# Patient Record
Sex: FEMALE | Race: WHITE | HISPANIC OR LATINO | Employment: PART TIME | URBAN - METROPOLITAN AREA
[De-identification: names, ages, dates, MRNs, and addresses within clinical notes are randomized per-mention and may not be internally consistent; named-entity substitution may affect disease eponyms.]

---

## 2024-03-14 ENCOUNTER — APPOINTMENT (OUTPATIENT)
Dept: RADIOLOGY | Facility: HOSPITAL | Age: 44
End: 2024-03-14
Payer: COMMERCIAL

## 2024-03-14 ENCOUNTER — HOSPITAL ENCOUNTER (EMERGENCY)
Facility: HOSPITAL | Age: 44
Discharge: LEFT BEFORE DISCHARGE | End: 2024-03-14
Attending: STUDENT IN AN ORGANIZED HEALTH CARE EDUCATION/TRAINING PROGRAM
Payer: COMMERCIAL

## 2024-03-14 ENCOUNTER — APPOINTMENT (OUTPATIENT)
Dept: CARDIOLOGY | Facility: HOSPITAL | Age: 44
End: 2024-03-14
Payer: COMMERCIAL

## 2024-03-14 VITALS
WEIGHT: 140 LBS | RESPIRATION RATE: 18 BRPM | DIASTOLIC BLOOD PRESSURE: 91 MMHG | SYSTOLIC BLOOD PRESSURE: 150 MMHG | TEMPERATURE: 97.7 F | BODY MASS INDEX: 23.9 KG/M2 | OXYGEN SATURATION: 98 % | HEIGHT: 64 IN | HEART RATE: 89 BPM

## 2024-03-14 DIAGNOSIS — E87.6 HYPOKALEMIA: ICD-10-CM

## 2024-03-14 DIAGNOSIS — N30.00 ACUTE CYSTITIS WITHOUT HEMATURIA: ICD-10-CM

## 2024-03-14 DIAGNOSIS — N89.8 VAGINAL DISCHARGE: ICD-10-CM

## 2024-03-14 DIAGNOSIS — R79.89 ELEVATED TROPONIN: ICD-10-CM

## 2024-03-14 DIAGNOSIS — R10.13 ABDOMINAL PAIN, EPIGASTRIC: Primary | ICD-10-CM

## 2024-03-14 LAB
ALBUMIN SERPL BCP-MCNC: 4.1 G/DL (ref 3.4–5)
ALP SERPL-CCNC: 69 U/L (ref 33–110)
ALT SERPL W P-5'-P-CCNC: 9 U/L (ref 7–45)
AMPHETAMINES UR QL SCN: ABNORMAL
ANION GAP SERPL CALC-SCNC: 13 MMOL/L (ref 10–20)
APPEARANCE UR: CLEAR
AST SERPL W P-5'-P-CCNC: 13 U/L (ref 9–39)
BACTERIA #/AREA URNS AUTO: ABNORMAL /HPF
BARBITURATES UR QL SCN: ABNORMAL
BASOPHILS # BLD AUTO: 0.05 X10*3/UL (ref 0–0.1)
BASOPHILS NFR BLD AUTO: 0.6 %
BENZODIAZ UR QL SCN: ABNORMAL
BILIRUB SERPL-MCNC: 0.5 MG/DL (ref 0–1.2)
BILIRUB UR STRIP.AUTO-MCNC: NEGATIVE MG/DL
BUN SERPL-MCNC: 7 MG/DL (ref 6–23)
BZE UR QL SCN: ABNORMAL
CALCIUM SERPL-MCNC: 9 MG/DL (ref 8.6–10.3)
CANNABINOIDS UR QL SCN: ABNORMAL
CARDIAC TROPONIN I PNL SERPL HS: 65 NG/L (ref 0–13)
CARDIAC TROPONIN I PNL SERPL HS: 68 NG/L (ref 0–13)
CHLORIDE SERPL-SCNC: 106 MMOL/L (ref 98–107)
CO2 SERPL-SCNC: 23 MMOL/L (ref 21–32)
COLOR UR: YELLOW
CREAT SERPL-MCNC: 0.58 MG/DL (ref 0.5–1.05)
EGFRCR SERPLBLD CKD-EPI 2021: >90 ML/MIN/1.73M*2
EOSINOPHIL # BLD AUTO: 0.29 X10*3/UL (ref 0–0.7)
EOSINOPHIL NFR BLD AUTO: 3.3 %
ERYTHROCYTE [DISTWIDTH] IN BLOOD BY AUTOMATED COUNT: 17.6 % (ref 11.5–14.5)
FENTANYL+NORFENTANYL UR QL SCN: ABNORMAL
GLUCOSE SERPL-MCNC: 87 MG/DL (ref 74–99)
GLUCOSE UR STRIP.AUTO-MCNC: NEGATIVE MG/DL
HCG UR QL IA.RAPID: NEGATIVE
HCT VFR BLD AUTO: 37.4 % (ref 36–46)
HGB BLD-MCNC: 12.6 G/DL (ref 12–16)
IMM GRANULOCYTES # BLD AUTO: 0.02 X10*3/UL (ref 0–0.7)
IMM GRANULOCYTES NFR BLD AUTO: 0.2 % (ref 0–0.9)
INR PPP: 1.1 (ref 0.9–1.1)
KETONES UR STRIP.AUTO-MCNC: ABNORMAL MG/DL
LEUKOCYTE ESTERASE UR QL STRIP.AUTO: ABNORMAL
LIPASE SERPL-CCNC: 21 U/L (ref 9–82)
LYMPHOCYTES # BLD AUTO: 2.15 X10*3/UL (ref 1.2–4.8)
LYMPHOCYTES NFR BLD AUTO: 24.3 %
MAGNESIUM SERPL-MCNC: 2.05 MG/DL (ref 1.6–2.4)
MCH RBC QN AUTO: 25 PG (ref 26–34)
MCHC RBC AUTO-ENTMCNC: 33.7 G/DL (ref 32–36)
MCV RBC AUTO: 74 FL (ref 80–100)
METHADONE UR QL SCN: ABNORMAL
MONOCYTES # BLD AUTO: 0.59 X10*3/UL (ref 0.1–1)
MONOCYTES NFR BLD AUTO: 6.7 %
NEUTROPHILS # BLD AUTO: 5.74 X10*3/UL (ref 1.2–7.7)
NEUTROPHILS NFR BLD AUTO: 64.9 %
NITRITE UR QL STRIP.AUTO: NEGATIVE
NRBC BLD-RTO: 0 /100 WBCS (ref 0–0)
OPIATES UR QL SCN: ABNORMAL
OXYCODONE+OXYMORPHONE UR QL SCN: ABNORMAL
PCP UR QL SCN: ABNORMAL
PH UR STRIP.AUTO: 8 [PH]
PLATELET # BLD AUTO: 338 X10*3/UL (ref 150–450)
POTASSIUM SERPL-SCNC: 3.3 MMOL/L (ref 3.5–5.3)
PROT SERPL-MCNC: 7.7 G/DL (ref 6.4–8.2)
PROT UR STRIP.AUTO-MCNC: NEGATIVE MG/DL
PROTHROMBIN TIME: 12.5 SECONDS (ref 9.8–12.8)
RBC # BLD AUTO: 5.05 X10*6/UL (ref 4–5.2)
RBC # UR STRIP.AUTO: NEGATIVE /UL
RBC #/AREA URNS AUTO: ABNORMAL /HPF
SODIUM SERPL-SCNC: 139 MMOL/L (ref 136–145)
SP GR UR STRIP.AUTO: 1.01
SQUAMOUS #/AREA URNS AUTO: ABNORMAL /HPF
UROBILINOGEN UR STRIP.AUTO-MCNC: <2 MG/DL
WBC # BLD AUTO: 8.8 X10*3/UL (ref 4.4–11.3)
WBC #/AREA URNS AUTO: ABNORMAL /HPF

## 2024-03-14 PROCEDURE — 87086 URINE CULTURE/COLONY COUNT: CPT | Mod: ELYLAB | Performed by: PHYSICIAN ASSISTANT

## 2024-03-14 PROCEDURE — 83690 ASSAY OF LIPASE: CPT | Performed by: PHYSICIAN ASSISTANT

## 2024-03-14 PROCEDURE — 81025 URINE PREGNANCY TEST: CPT | Performed by: PHYSICIAN ASSISTANT

## 2024-03-14 PROCEDURE — 74177 CT ABD & PELVIS W/CONTRAST: CPT | Mod: FOREIGN READ | Performed by: RADIOLOGY

## 2024-03-14 PROCEDURE — 80307 DRUG TEST PRSMV CHEM ANLYZR: CPT | Performed by: PHYSICIAN ASSISTANT

## 2024-03-14 PROCEDURE — 36415 COLL VENOUS BLD VENIPUNCTURE: CPT | Performed by: PHYSICIAN ASSISTANT

## 2024-03-14 PROCEDURE — 96361 HYDRATE IV INFUSION ADD-ON: CPT

## 2024-03-14 PROCEDURE — 93005 ELECTROCARDIOGRAM TRACING: CPT

## 2024-03-14 PROCEDURE — 96374 THER/PROPH/DIAG INJ IV PUSH: CPT | Mod: 59

## 2024-03-14 PROCEDURE — 85610 PROTHROMBIN TIME: CPT | Performed by: PHYSICIAN ASSISTANT

## 2024-03-14 PROCEDURE — 2550000001 HC RX 255 CONTRASTS: Performed by: PHYSICIAN ASSISTANT

## 2024-03-14 PROCEDURE — 81001 URINALYSIS AUTO W/SCOPE: CPT | Performed by: PHYSICIAN ASSISTANT

## 2024-03-14 PROCEDURE — 74177 CT ABD & PELVIS W/CONTRAST: CPT

## 2024-03-14 PROCEDURE — 96375 TX/PRO/DX INJ NEW DRUG ADDON: CPT

## 2024-03-14 PROCEDURE — 80053 COMPREHEN METABOLIC PANEL: CPT | Performed by: PHYSICIAN ASSISTANT

## 2024-03-14 PROCEDURE — 87205 SMEAR GRAM STAIN: CPT | Mod: ELYLAB | Performed by: PHYSICIAN ASSISTANT

## 2024-03-14 PROCEDURE — 2500000002 HC RX 250 W HCPCS SELF ADMINISTERED DRUGS (ALT 637 FOR MEDICARE OP, ALT 636 FOR OP/ED): Performed by: PHYSICIAN ASSISTANT

## 2024-03-14 PROCEDURE — 99285 EMERGENCY DEPT VISIT HI MDM: CPT | Mod: 25

## 2024-03-14 PROCEDURE — 2500000004 HC RX 250 GENERAL PHARMACY W/ HCPCS (ALT 636 FOR OP/ED): Performed by: PHYSICIAN ASSISTANT

## 2024-03-14 PROCEDURE — 2500000001 HC RX 250 WO HCPCS SELF ADMINISTERED DRUGS (ALT 637 FOR MEDICARE OP): Performed by: PHYSICIAN ASSISTANT

## 2024-03-14 PROCEDURE — C9113 INJ PANTOPRAZOLE SODIUM, VIA: HCPCS | Performed by: PHYSICIAN ASSISTANT

## 2024-03-14 PROCEDURE — 85025 COMPLETE CBC W/AUTO DIFF WBC: CPT | Performed by: PHYSICIAN ASSISTANT

## 2024-03-14 PROCEDURE — 84484 ASSAY OF TROPONIN QUANT: CPT | Performed by: PHYSICIAN ASSISTANT

## 2024-03-14 PROCEDURE — 87800 DETECT AGNT MULT DNA DIREC: CPT | Mod: ELYLAB | Performed by: PHYSICIAN ASSISTANT

## 2024-03-14 PROCEDURE — 83735 ASSAY OF MAGNESIUM: CPT | Performed by: PHYSICIAN ASSISTANT

## 2024-03-14 RX ORDER — ONDANSETRON HYDROCHLORIDE 2 MG/ML
4 INJECTION, SOLUTION INTRAVENOUS ONCE
Status: COMPLETED | OUTPATIENT
Start: 2024-03-14 | End: 2024-03-14

## 2024-03-14 RX ORDER — PANTOPRAZOLE SODIUM 40 MG/10ML
40 INJECTION, POWDER, LYOPHILIZED, FOR SOLUTION INTRAVENOUS ONCE
Status: COMPLETED | OUTPATIENT
Start: 2024-03-14 | End: 2024-03-14

## 2024-03-14 RX ORDER — POTASSIUM CHLORIDE 20 MEQ/1
40 TABLET, EXTENDED RELEASE ORAL ONCE
Status: COMPLETED | OUTPATIENT
Start: 2024-03-14 | End: 2024-03-14

## 2024-03-14 RX ORDER — SULFAMETHOXAZOLE AND TRIMETHOPRIM 800; 160 MG/1; MG/1
1 TABLET ORAL 2 TIMES DAILY
Qty: 10 TABLET | Refills: 0 | Status: SHIPPED | OUTPATIENT
Start: 2024-03-14 | End: 2024-03-19

## 2024-03-14 RX ORDER — NITROFURANTOIN 25; 75 MG/1; MG/1
100 CAPSULE ORAL 2 TIMES DAILY
Qty: 10 CAPSULE | Refills: 0 | Status: SHIPPED | OUTPATIENT
Start: 2024-03-14 | End: 2024-03-19

## 2024-03-14 RX ORDER — ALUMINUM HYDROXIDE, MAGNESIUM HYDROXIDE, AND SIMETHICONE 1200; 120; 1200 MG/30ML; MG/30ML; MG/30ML
30 SUSPENSION ORAL ONCE
Status: COMPLETED | OUTPATIENT
Start: 2024-03-14 | End: 2024-03-14

## 2024-03-14 RX ADMIN — ALUMINUM HYDROXIDE, MAGNESIUM HYDROXIDE, AND DIMETHICONE 30 ML: 200; 20; 200 SUSPENSION ORAL at 18:50

## 2024-03-14 RX ADMIN — IOHEXOL 75 ML: 350 INJECTION, SOLUTION INTRAVENOUS at 19:17

## 2024-03-14 RX ADMIN — POTASSIUM CHLORIDE 40 MEQ: 1500 TABLET, EXTENDED RELEASE ORAL at 19:59

## 2024-03-14 RX ADMIN — ONDANSETRON 4 MG: 2 INJECTION INTRAMUSCULAR; INTRAVENOUS at 18:50

## 2024-03-14 RX ADMIN — SODIUM CHLORIDE 1000 ML: 9 INJECTION, SOLUTION INTRAVENOUS at 18:50

## 2024-03-14 RX ADMIN — PANTOPRAZOLE SODIUM 40 MG: 40 INJECTION, POWDER, FOR SOLUTION INTRAVENOUS at 18:50

## 2024-03-14 ASSESSMENT — COLUMBIA-SUICIDE SEVERITY RATING SCALE - C-SSRS
6. HAVE YOU EVER DONE ANYTHING, STARTED TO DO ANYTHING, OR PREPARED TO DO ANYTHING TO END YOUR LIFE?: NO
1. IN THE PAST MONTH, HAVE YOU WISHED YOU WERE DEAD OR WISHED YOU COULD GO TO SLEEP AND NOT WAKE UP?: NO
2. HAVE YOU ACTUALLY HAD ANY THOUGHTS OF KILLING YOURSELF?: NO

## 2024-03-14 ASSESSMENT — PAIN SCALES - GENERAL: PAINLEVEL_OUTOF10: 8

## 2024-03-14 ASSESSMENT — PAIN - FUNCTIONAL ASSESSMENT: PAIN_FUNCTIONAL_ASSESSMENT: 0-10

## 2024-03-14 ASSESSMENT — PAIN DESCRIPTION - LOCATION: LOCATION: ABDOMEN

## 2024-03-14 NOTE — ED PROVIDER NOTES
HPI   Chief Complaint   Patient presents with    Abdominal Pain     X2 days, upper abd pain, vomiting, vaginal discharge          History provided by:  Patient   used: No         43-year-old female past medical history acid reflux presents with epigastric pain for the past 3 days.  She is been on omeprazole as she saw GI a year ago for the same type of pain and has not had this pain in a year.  She has been vomiting.  Denies radiation of pain.  She also complains of vaginal discharge that clear and has an odor.  She also complains of dysuria.  Denies fever, diarrhea, back pain, SOB or CP    ROS negative unless otherwise stated in HPI                   [place] Coma Scale Score: 15                     Patient History   History reviewed. No pertinent past medical history.  History reviewed. No pertinent surgical history.  No family history on file.  Social History     Tobacco Use    Smoking status: Not on file    Smokeless tobacco: Not on file   Substance Use Topics    Alcohol use: Not on file    Drug use: Not on file       Physical Exam   ED Triage Vitals [03/14/24 1811]   Temperature Heart Rate Respirations BP   36.5 °C (97.7 °F) 89 18 (!) 150/91      Pulse Ox Temp src Heart Rate Source Patient Position   98 % -- -- --      BP Location FiO2 (%)     -- --       Physical Exam    General: alert, no distress, well nourished, talking in full and complete sentences  Skin: dry, intact, no rashes  Head: atraumatic  Eyes: EOMI, PERRLA, normal conjunctiva  Throat: no stridor, no hot potato voice  Nose: nares patent  Mouth: mucous membranes moist  CV: +S1/S1  Respiratory: non labored breathing, lungs CTA, no wheezing, no retractions  Abd: soft, epigastric tenderness, normal BS, no peritoneal signs  Extremities: FROM X4, strength +5/5, pulses intact, capillary refill intact, radial pulses equal  Neuro: A&Ox3, no focal neurological deficits  Psych: cooperative, appropriate mood      ED Course & MDM   Diagnoses  as of 03/14/24 2054   Abdominal pain, epigastric   Vaginal discharge   Hypokalemia   Elevated troponin   Acute cystitis without hematuria     Labs Reviewed   CBC WITH AUTO DIFFERENTIAL - Abnormal       Result Value    WBC 8.8      nRBC 0.0      RBC 5.05      Hemoglobin 12.6      Hematocrit 37.4      MCV 74 (*)     MCH 25.0 (*)     MCHC 33.7      RDW 17.6 (*)     Platelets 338      Neutrophils % 64.9      Immature Granulocytes %, Automated 0.2      Lymphocytes % 24.3      Monocytes % 6.7      Eosinophils % 3.3      Basophils % 0.6      Neutrophils Absolute 5.74      Immature Granulocytes Absolute, Automated 0.02      Lymphocytes Absolute 2.15      Monocytes Absolute 0.59      Eosinophils Absolute 0.29      Basophils Absolute 0.05     COMPREHENSIVE METABOLIC PANEL - Abnormal    Glucose 87      Sodium 139      Potassium 3.3 (*)     Chloride 106      Bicarbonate 23      Anion Gap 13      Urea Nitrogen 7      Creatinine 0.58      eGFR >90      Calcium 9.0      Albumin 4.1      Alkaline Phosphatase 69      Total Protein 7.7      AST 13      Bilirubin, Total 0.5      ALT 9     TROPONIN I, HIGH SENSITIVITY - Abnormal    Troponin I, High Sensitivity 68 (*)     Narrative:     Less than 99th percentile of normal range cutoff-  Female and children under 18 years old <14 ng/L; Male <21 ng/L: Negative  Repeat testing should be performed if clinically indicated.     Female and children under 18 years old 14-50 ng/L; Male 21-50 ng/L:  Consistent with possible cardiac damage and possible increased clinical   risk. Serial measurements may help to assess extent of myocardial damage.     >50 ng/L: Consistent with cardiac damage, increased clinical risk and  myocardial infarction. Serial measurements may help assess extent of   myocardial damage.      NOTE: Children less than 1 year old may have higher baseline troponin   levels and results should be interpreted in conjunction with the overall   clinical context.     NOTE: Troponin I  testing is performed using a different   testing methodology at [facility] than at other   [facility]. Direct result comparisons should only   be made within the same method.   URINALYSIS WITH REFLEX CULTURE AND MICROSCOPIC - Abnormal    Color, Urine Yellow      Appearance, Urine Clear      Specific Gravity, Urine 1.010      pH, Urine 8.0      Protein, Urine NEGATIVE      Glucose, Urine NEGATIVE      Blood, Urine NEGATIVE      Ketones, Urine 20 (1+) (*)     Bilirubin, Urine NEGATIVE      Urobilinogen, Urine <2.0      Nitrite, Urine NEGATIVE      Leukocyte Esterase, Urine TRACE (*)    DRUG SCREEN,URINE - Abnormal    Amphetamine Screen, Urine Presumptive Negative      Barbiturate Screen, Urine Presumptive Negative      Benzodiazepines Screen, Urine Presumptive Negative      Cannabinoid Screen, Urine Presumptive Positive (*)     Cocaine Metabolite Screen, Urine Presumptive Negative      Fentanyl Screen, Urine Presumptive Negative      Opiate Screen, Urine Presumptive Negative      Oxycodone Screen, Urine Presumptive Negative      PCP Screen, Urine Presumptive Negative      Methadone Screen, Urine Presumptive Negative      Narrative:     Drug screen results are presumptive and should not be used to assess   compliance with prescribed medication. Contact the performing [facility] laboratory   to add-on definitive confirmatory testing if clinically indicated.    Toxicology screening results are reported qualitatively. The concentration must   be greater than or equal to the cutoff to be reported as positive. The concentration   at which the screening test can detect an individual drug or metabolite varies.   The absence of expected drug(s) and/or drug metabolite(s) may indicate non-compliance,   inappropriate timing of specimen collection relative to drug administration, poor drug   absorption, diluted/adulterated urine, or limitations of testing. For medical purposes   only; not valid for forensic  use.    Interpretive questions should be directed to the laboratory medical directors.   TROPONIN I, HIGH SENSITIVITY - Abnormal    Troponin I, High Sensitivity 65 (*)     Narrative:     Less than 99th percentile of normal range cutoff-  Female and children under 18 years old <14 ng/L; Male <21 ng/L: Negative  Repeat testing should be performed if clinically indicated.     Female and children under 18 years old 14-50 ng/L; Male 21-50 ng/L:  Consistent with possible cardiac damage and possible increased clinical   risk. Serial measurements may help to assess extent of myocardial damage.     >50 ng/L: Consistent with cardiac damage, increased clinical risk and  myocardial infarction. Serial measurements may help assess extent of   myocardial damage.      NOTE: Children less than 1 year old may have higher baseline troponin   levels and results should be interpreted in conjunction with the overall   clinical context.     NOTE: Troponin I testing is performed using a different   testing methodology at [facility] than at other   [facility]. Direct result comparisons should only   be made within the same method.   MICROSCOPIC ONLY, URINE - Abnormal    WBC, Urine 6-10 (*)     RBC, Urine 1-2      Squamous Epithelial Cells, Urine 1-9 (SPARSE)      Bacteria, Urine 2+ (*)    MAGNESIUM - Normal    Magnesium 2.05     LIPASE - Normal    Lipase 21      Narrative:     Venipuncture immediately after or during the administration of Metamizole may lead to falsely low results. Testing should be performed immediately prior to Metamizole dosing.   PROTIME-INR - Normal    Protime 12.5      INR 1.1     HCG, URINE, QUALITATIVE - Normal    HCG, Urine NEGATIVE     GRAM STAIN FOR BACTERIAL VAGINOSIS/YEAST   URINE CULTURE   URINALYSIS WITH REFLEX CULTURE AND MICROSCOPIC    Narrative:     The following orders were created for panel order Urinalysis with Reflex Culture and Microscopic.  Procedure                                Abnormality         Status                     ---------                               -----------         ------                     Urinalysis with Reflex C...[[id number]]  Abnormal            Final result               Extra Urine Gray Tube[[id number]]                                                         Please view results for these tests on the individual orders.   EXTRA URINE GRAY TUBE   C. TRACHOMATIS + N. GONORRHOEAE, AMPLIFIED      CT abdomen pelvis w IV contrast   Final Result   1. No detectable acute abnormality.   2. No bowel obstruction or detectable bowel inflammation; normal   appendix.   3. Minimal prominence of the renal collecting systems; no detectable   calculi or hydronephrosis.   4. Mild pelvic varices.   5. Remainder as above.   Signed by Hollis Perkins MD            Medical Decision Making  She is given Zofran and Protonix along with Maalox.  She states that this feels exact same as her acid reflux she had a year ago.  Troponin 68.1 year ago, she had elevated troponins at 167 and 176 and after reviewing previous notes, likely a demand ischemia from anemia.  Potassium 3.3 and will be given 40 p.o.  Self swabs are pending and are send outs for STD and vaginal panel.  EKG my interpretation NSR at a rate of 83 with no ST elevations.  UA positive for ketones, leukocytes WBCs and bacteria.  Urine hCG negative.  UDS positive for cannabinoids.  Delta troponin 65.  No acute findings on final read of CT abdomen pelvis with contrast.  I discussed admission for the elevated troponins as I cannot guarantee that this is a demand ischemia versus cardiac cause for the elevated troponins.  Patient states that she is using someone else's car and she cannot stay.  She states that she feels better with her symptoms completely resolved.  I discussed the risks of having an MI at home if this is a cardiac cause that could result in death and she understands.  She is given strict return precautions.  Patient will be  treated for UTI with Bactrim as the system is telling me Macrobid needs a prior Auth.  She is to follow-up with GI for the possible acid reflux.  She is to follow-up with family doctor.  Afebrile, not tachycardic, not tachypneic, not hypoxic, tolerating PO, non toxic appearing and ambulating at baseline at discharge. Hemodynamically intact. All questions answered. Educated on SE of meds. Patient in agreement with treatment.      Procedure  Procedures     Concepción Black PA-C  03/14/24 2055

## 2024-03-14 NOTE — ED TRIAGE NOTES
Pt to ED for c/o upper abd pain x2 days and vaginal discharge.  Respirations even and unlabored.  No acute distress noted at this time.  Denies CP and SOB.  A&Ox4.  VSS.

## 2024-03-15 LAB
C TRACH RRNA SPEC QL NAA+PROBE: NEGATIVE
N GONORRHOEA DNA SPEC QL PROBE+SIG AMP: NEGATIVE

## 2024-03-16 LAB
ATRIAL RATE: 83 BPM
CLUE CELLS VAG LPF-#/AREA: PRESENT /[LPF]
NUGENT SCORE: 7
P AXIS: 84 DEGREES
P OFFSET: 184 MS
P ONSET: 132 MS
PR INTERVAL: 180 MS
Q ONSET: 222 MS
QRS COUNT: 14 BEATS
QRS DURATION: 86 MS
QT INTERVAL: 388 MS
QTC CALCULATION(BAZETT): 455 MS
QTC FREDERICIA: 432 MS
R AXIS: 73 DEGREES
T AXIS: 69 DEGREES
T OFFSET: 416 MS
VENTRICULAR RATE: 83 BPM
YEAST VAG WET PREP-#/AREA: ABNORMAL

## 2024-03-17 LAB — BACTERIA UR CULT: ABNORMAL

## 2024-04-11 ENCOUNTER — APPOINTMENT (OUTPATIENT)
Dept: RADIOLOGY | Facility: HOSPITAL | Age: 44
End: 2024-04-11
Payer: COMMERCIAL

## 2024-04-11 ENCOUNTER — HOSPITAL ENCOUNTER (EMERGENCY)
Facility: HOSPITAL | Age: 44
Discharge: HOME | End: 2024-04-11
Payer: COMMERCIAL

## 2024-04-11 VITALS
DIASTOLIC BLOOD PRESSURE: 80 MMHG | SYSTOLIC BLOOD PRESSURE: 121 MMHG | WEIGHT: 136 LBS | BODY MASS INDEX: 23.22 KG/M2 | OXYGEN SATURATION: 99 % | TEMPERATURE: 97.7 F | RESPIRATION RATE: 16 BRPM | HEART RATE: 87 BPM | HEIGHT: 64 IN

## 2024-04-11 DIAGNOSIS — K29.70 GASTRITIS WITHOUT BLEEDING, UNSPECIFIED CHRONICITY, UNSPECIFIED GASTRITIS TYPE: Primary | ICD-10-CM

## 2024-04-11 LAB
ALBUMIN SERPL BCP-MCNC: 3.8 G/DL (ref 3.4–5)
ALP SERPL-CCNC: 65 U/L (ref 33–110)
ALT SERPL W P-5'-P-CCNC: 8 U/L (ref 7–45)
ANION GAP SERPL CALC-SCNC: 12 MMOL/L (ref 10–20)
APPEARANCE UR: CLEAR
AST SERPL W P-5'-P-CCNC: 13 U/L (ref 9–39)
B-HCG SERPL-ACNC: <2 MIU/ML
BASOPHILS # BLD AUTO: 0.03 X10*3/UL (ref 0–0.1)
BASOPHILS NFR BLD AUTO: 0.5 %
BILIRUB SERPL-MCNC: 0.5 MG/DL (ref 0–1.2)
BILIRUB UR STRIP.AUTO-MCNC: NEGATIVE MG/DL
BUN SERPL-MCNC: 7 MG/DL (ref 6–23)
CALCIUM SERPL-MCNC: 8.2 MG/DL (ref 8.6–10.3)
CHLORIDE SERPL-SCNC: 107 MMOL/L (ref 98–107)
CO2 SERPL-SCNC: 21 MMOL/L (ref 21–32)
COLOR UR: ABNORMAL
CREAT SERPL-MCNC: 0.69 MG/DL (ref 0.5–1.05)
EGFRCR SERPLBLD CKD-EPI 2021: >90 ML/MIN/1.73M*2
EOSINOPHIL # BLD AUTO: 0.17 X10*3/UL (ref 0–0.7)
EOSINOPHIL NFR BLD AUTO: 2.8 %
ERYTHROCYTE [DISTWIDTH] IN BLOOD BY AUTOMATED COUNT: 18.8 % (ref 11.5–14.5)
GLUCOSE SERPL-MCNC: 91 MG/DL (ref 74–99)
GLUCOSE UR STRIP.AUTO-MCNC: NEGATIVE MG/DL
HCT VFR BLD AUTO: 41.2 % (ref 36–46)
HGB BLD-MCNC: 13.6 G/DL (ref 12–16)
HOLD SPECIMEN: NORMAL
IMM GRANULOCYTES # BLD AUTO: 0.02 X10*3/UL (ref 0–0.7)
IMM GRANULOCYTES NFR BLD AUTO: 0.3 % (ref 0–0.9)
KETONES UR STRIP.AUTO-MCNC: ABNORMAL MG/DL
LACTATE SERPL-SCNC: 2 MMOL/L (ref 0.4–2)
LEUKOCYTE ESTERASE UR QL STRIP.AUTO: NEGATIVE
LIPASE SERPL-CCNC: 21 U/L (ref 9–82)
LYMPHOCYTES # BLD AUTO: 1.12 X10*3/UL (ref 1.2–4.8)
LYMPHOCYTES NFR BLD AUTO: 18.7 %
MCH RBC QN AUTO: 25.2 PG (ref 26–34)
MCHC RBC AUTO-ENTMCNC: 33 G/DL (ref 32–36)
MCV RBC AUTO: 76 FL (ref 80–100)
MONOCYTES # BLD AUTO: 0.37 X10*3/UL (ref 0.1–1)
MONOCYTES NFR BLD AUTO: 6.2 %
NEUTROPHILS # BLD AUTO: 4.28 X10*3/UL (ref 1.2–7.7)
NEUTROPHILS NFR BLD AUTO: 71.5 %
NITRITE UR QL STRIP.AUTO: NEGATIVE
NRBC BLD-RTO: 0 /100 WBCS (ref 0–0)
PH UR STRIP.AUTO: 8 [PH]
PLATELET # BLD AUTO: 330 X10*3/UL (ref 150–450)
POTASSIUM SERPL-SCNC: 3.4 MMOL/L (ref 3.5–5.3)
PROT SERPL-MCNC: 7.2 G/DL (ref 6.4–8.2)
PROT UR STRIP.AUTO-MCNC: NEGATIVE MG/DL
RBC # BLD AUTO: 5.39 X10*6/UL (ref 4–5.2)
RBC # UR STRIP.AUTO: NEGATIVE /UL
SODIUM SERPL-SCNC: 137 MMOL/L (ref 136–145)
SP GR UR STRIP.AUTO: 1.02
UROBILINOGEN UR STRIP.AUTO-MCNC: <2 MG/DL
WBC # BLD AUTO: 6 X10*3/UL (ref 4.4–11.3)

## 2024-04-11 PROCEDURE — 36415 COLL VENOUS BLD VENIPUNCTURE: CPT | Performed by: PHYSICIAN ASSISTANT

## 2024-04-11 PROCEDURE — 96375 TX/PRO/DX INJ NEW DRUG ADDON: CPT | Performed by: PHYSICIAN ASSISTANT

## 2024-04-11 PROCEDURE — 2500000004 HC RX 250 GENERAL PHARMACY W/ HCPCS (ALT 636 FOR OP/ED): Performed by: PHYSICIAN ASSISTANT

## 2024-04-11 PROCEDURE — 85025 COMPLETE CBC W/AUTO DIFF WBC: CPT | Performed by: PHYSICIAN ASSISTANT

## 2024-04-11 PROCEDURE — 96374 THER/PROPH/DIAG INJ IV PUSH: CPT | Performed by: PHYSICIAN ASSISTANT

## 2024-04-11 PROCEDURE — 81003 URINALYSIS AUTO W/O SCOPE: CPT | Performed by: PHYSICIAN ASSISTANT

## 2024-04-11 PROCEDURE — 2500000002 HC RX 250 W HCPCS SELF ADMINISTERED DRUGS (ALT 637 FOR MEDICARE OP, ALT 636 FOR OP/ED): Performed by: PHYSICIAN ASSISTANT

## 2024-04-11 PROCEDURE — 74177 CT ABD & PELVIS W/CONTRAST: CPT | Performed by: RADIOLOGY

## 2024-04-11 PROCEDURE — 2550000001 HC RX 255 CONTRASTS: Performed by: PHYSICIAN ASSISTANT

## 2024-04-11 PROCEDURE — 84702 CHORIONIC GONADOTROPIN TEST: CPT | Performed by: PHYSICIAN ASSISTANT

## 2024-04-11 PROCEDURE — C9113 INJ PANTOPRAZOLE SODIUM, VIA: HCPCS | Performed by: PHYSICIAN ASSISTANT

## 2024-04-11 PROCEDURE — 83690 ASSAY OF LIPASE: CPT | Performed by: PHYSICIAN ASSISTANT

## 2024-04-11 PROCEDURE — 99284 EMERGENCY DEPT VISIT MOD MDM: CPT | Mod: 25 | Performed by: PHYSICIAN ASSISTANT

## 2024-04-11 PROCEDURE — 80053 COMPREHEN METABOLIC PANEL: CPT | Performed by: PHYSICIAN ASSISTANT

## 2024-04-11 PROCEDURE — 74177 CT ABD & PELVIS W/CONTRAST: CPT

## 2024-04-11 PROCEDURE — 83605 ASSAY OF LACTIC ACID: CPT | Performed by: PHYSICIAN ASSISTANT

## 2024-04-11 PROCEDURE — 96361 HYDRATE IV INFUSION ADD-ON: CPT | Performed by: PHYSICIAN ASSISTANT

## 2024-04-11 RX ORDER — PANTOPRAZOLE SODIUM 20 MG/1
20 TABLET, DELAYED RELEASE ORAL DAILY
Qty: 20 TABLET | Refills: 0 | Status: SHIPPED | OUTPATIENT
Start: 2024-04-11 | End: 2024-05-01

## 2024-04-11 RX ORDER — SUCRALFATE 1 G/10ML
1 SUSPENSION ORAL 4 TIMES DAILY
Qty: 1200 ML | Refills: 0 | Status: SHIPPED | OUTPATIENT
Start: 2024-04-11 | End: 2024-05-11

## 2024-04-11 RX ORDER — PANTOPRAZOLE SODIUM 40 MG/10ML
40 INJECTION, POWDER, LYOPHILIZED, FOR SOLUTION INTRAVENOUS ONCE
Status: COMPLETED | OUTPATIENT
Start: 2024-04-11 | End: 2024-04-11

## 2024-04-11 RX ORDER — POTASSIUM CHLORIDE 20 MEQ/1
40 TABLET, EXTENDED RELEASE ORAL ONCE
Status: COMPLETED | OUTPATIENT
Start: 2024-04-11 | End: 2024-04-11

## 2024-04-11 RX ORDER — ONDANSETRON HYDROCHLORIDE 2 MG/ML
4 INJECTION, SOLUTION INTRAVENOUS ONCE
Status: COMPLETED | OUTPATIENT
Start: 2024-04-11 | End: 2024-04-11

## 2024-04-11 RX ORDER — ONDANSETRON 4 MG/1
4 TABLET, ORALLY DISINTEGRATING ORAL EVERY 8 HOURS PRN
Qty: 15 TABLET | Refills: 0 | Status: SHIPPED | OUTPATIENT
Start: 2024-04-11 | End: 2024-04-16

## 2024-04-11 RX ORDER — MORPHINE SULFATE 4 MG/ML
4 INJECTION, SOLUTION INTRAMUSCULAR; INTRAVENOUS ONCE
Status: COMPLETED | OUTPATIENT
Start: 2024-04-11 | End: 2024-04-11

## 2024-04-11 RX ADMIN — MORPHINE SULFATE 4 MG: 4 INJECTION, SOLUTION INTRAMUSCULAR; INTRAVENOUS at 08:27

## 2024-04-11 RX ADMIN — SODIUM CHLORIDE 1000 ML: 9 INJECTION, SOLUTION INTRAVENOUS at 08:27

## 2024-04-11 RX ADMIN — PANTOPRAZOLE SODIUM 40 MG: 40 INJECTION, POWDER, FOR SOLUTION INTRAVENOUS at 08:26

## 2024-04-11 RX ADMIN — IOHEXOL 75 ML: 350 INJECTION, SOLUTION INTRAVENOUS at 09:12

## 2024-04-11 RX ADMIN — POTASSIUM CHLORIDE 40 MEQ: 1500 TABLET, EXTENDED RELEASE ORAL at 10:36

## 2024-04-11 RX ADMIN — ONDANSETRON 4 MG: 2 INJECTION INTRAMUSCULAR; INTRAVENOUS at 08:26

## 2024-04-11 ASSESSMENT — LIFESTYLE VARIABLES
HAVE YOU EVER FELT YOU SHOULD CUT DOWN ON YOUR DRINKING: NO
TOTAL SCORE: 0
EVER HAD A DRINK FIRST THING IN THE MORNING TO STEADY YOUR NERVES TO GET RID OF A HANGOVER: NO
EVER FELT BAD OR GUILTY ABOUT YOUR DRINKING: NO
HAVE PEOPLE ANNOYED YOU BY CRITICIZING YOUR DRINKING: NO

## 2024-04-11 ASSESSMENT — PAIN DESCRIPTION - ORIENTATION: ORIENTATION: MID;UPPER

## 2024-04-11 ASSESSMENT — PAIN SCALES - GENERAL
PAINLEVEL_OUTOF10: 9
PAINLEVEL_OUTOF10: 0 - NO PAIN

## 2024-04-11 ASSESSMENT — PAIN DESCRIPTION - FREQUENCY: FREQUENCY: CONSTANT/CONTINUOUS

## 2024-04-11 ASSESSMENT — PAIN DESCRIPTION - LOCATION: LOCATION: ABDOMEN

## 2024-04-11 ASSESSMENT — PAIN DESCRIPTION - DESCRIPTORS: DESCRIPTORS: TIGHTNESS

## 2024-04-11 ASSESSMENT — PAIN DESCRIPTION - PROGRESSION: CLINICAL_PROGRESSION: NOT CHANGED

## 2024-04-11 ASSESSMENT — COLUMBIA-SUICIDE SEVERITY RATING SCALE - C-SSRS
2. HAVE YOU ACTUALLY HAD ANY THOUGHTS OF KILLING YOURSELF?: NO
6. HAVE YOU EVER DONE ANYTHING, STARTED TO DO ANYTHING, OR PREPARED TO DO ANYTHING TO END YOUR LIFE?: NO
1. IN THE PAST MONTH, HAVE YOU WISHED YOU WERE DEAD OR WISHED YOU COULD GO TO SLEEP AND NOT WAKE UP?: NO

## 2024-04-11 ASSESSMENT — PAIN - FUNCTIONAL ASSESSMENT: PAIN_FUNCTIONAL_ASSESSMENT: 0-10

## 2024-04-11 ASSESSMENT — PAIN DESCRIPTION - PAIN TYPE: TYPE: ACUTE PAIN

## 2024-04-11 ASSESSMENT — PAIN DESCRIPTION - ONSET: ONSET: AWAKENED FROM SLEEP

## 2024-04-11 NOTE — ED PROVIDER NOTES
HPI   Chief Complaint   Patient presents with    Abdominal Pain     Epigastric pain x 2 days.       44-year-old female patient comes in the emergency department today with complaints of upper abdominal pain x 3 days.  States she has had associated nausea, vomiting is unable to keep anything down.  States she has decreased appetite.  States she has history of gastritis.  She rates pain a 10 out of 10 on the pain scale.  Otherwise has no other complaints at this present time.  Denies any associated fevers or chills.  Denies any urinary symptoms including urinary dysuria, frequency or urgency.  Denies any history of pancreatitis.                          Eric Coma Scale Score: 15                     Patient History   Past Medical History:   Diagnosis Date    Acid reflux     Gastritis      History reviewed. No pertinent surgical history.  No family history on file.  Social History     Tobacco Use    Smoking status: Never     Passive exposure: Never    Smokeless tobacco: Never   Vaping Use    Vaping status: Never Used   Substance Use Topics    Alcohol use: Defer    Drug use: Yes     Types: Marijuana       Physical Exam   ED Triage Vitals [04/11/24 0800]   Temperature Heart Rate Respirations BP   36.5 °C (97.7 °F) 89 20 124/81      Pulse Ox Temp Source Heart Rate Source Patient Position   100 % Temporal Monitor Sitting      BP Location FiO2 (%)     Right arm --       Physical Exam  Constitutional:       General: She is in acute distress (Moderate).      Appearance: Normal appearance. She is well-developed. She is ill-appearing. She is not diaphoretic.   HENT:      Head: Normocephalic and atraumatic.      Nose: Nose normal.   Eyes:      Extraocular Movements: Extraocular movements intact.      Conjunctiva/sclera: Conjunctivae normal.      Pupils: Pupils are equal, round, and reactive to light.   Cardiovascular:      Rate and Rhythm: Normal rate and regular rhythm.   Pulmonary:      Effort: Pulmonary effort is normal. No  respiratory distress.      Breath sounds: Normal breath sounds. No stridor. No wheezing.   Abdominal:      General: Abdomen is flat. Bowel sounds are normal.      Palpations: Abdomen is soft.      Tenderness: There is abdominal tenderness in the epigastric area and left upper quadrant. There is guarding and rebound.   Musculoskeletal:         General: Normal range of motion.      Cervical back: Normal range of motion.   Skin:     General: Skin is warm and dry.   Neurological:      General: No focal deficit present.      Mental Status: She is alert and oriented to person, place, and time. Mental status is at baseline.   Psychiatric:         Mood and Affect: Mood normal.         ED Course & MDM   Diagnoses as of 04/11/24 1036   Gastritis without bleeding, unspecified chronicity, unspecified gastritis type       Medical Decision Making  44-year-old female patient comes in the emergency department today with complaints of upper abdominal pain x 3 days.  States she has had associated nausea, vomiting is unable to keep anything down.  States she has decreased appetite.  States she has history of gastritis.  She rates pain a 10 out of 10 on the pain scale.  Otherwise has no other complaints at this present time.  Denies any associated fevers or chills.  Denies any urinary symptoms including urinary dysuria, frequency or urgency.  Denies any history of pancreatitis.    Laboratory studies ordered as well as CT study of the abdomen pelvis, urinalysis.  Rule electrolyte maladies, leukocytosis or left shift.  Rule out acute kidney injury.  Rule out UTI, acute intra-abdominal surgical need.  Rule out gastroenteritis, pancreatitis, cholecystitis, colitis, bowel perforation or abscess.  IV morphine IV Zofran and IV fluids ordered for the patient as well as IV Protonix.    Patient states she is feeling much better after medications.  Patient's lipase negative lactate negative patient has a potassium 3.4 which I ordered p.o.  potassium for the patient.  Patient's urinalysis negative for infection negative pregnancy test no leukocytosis or left shift.  CT studies abdomen pelvis is negative.  Patient's symptoms are consistent with gastritis.  Will put patient on p.o. Carafate, p.o. Protonix for home as well as follow-up with her doctor.  Patient agrees with this plan expressed full verbal understanding.  All questions were answered.  Patient declines work note.    Historians patient    Diagnosis: Gastritis      Labs Reviewed   CBC WITH AUTO DIFFERENTIAL - Abnormal       Result Value    WBC 6.0      nRBC 0.0      RBC 5.39 (*)     Hemoglobin 13.6      Hematocrit 41.2      MCV 76 (*)     MCH 25.2 (*)     MCHC 33.0      RDW 18.8 (*)     Platelets 330      Neutrophils % 71.5      Immature Granulocytes %, Automated 0.3      Lymphocytes % 18.7      Monocytes % 6.2      Eosinophils % 2.8      Basophils % 0.5      Neutrophils Absolute 4.28      Immature Granulocytes Absolute, Automated 0.02      Lymphocytes Absolute 1.12 (*)     Monocytes Absolute 0.37      Eosinophils Absolute 0.17      Basophils Absolute 0.03     COMPREHENSIVE METABOLIC PANEL - Abnormal    Glucose 91      Sodium 137      Potassium 3.4 (*)     Chloride 107      Bicarbonate 21      Anion Gap 12      Urea Nitrogen 7      Creatinine 0.69      eGFR >90      Calcium 8.2 (*)     Albumin 3.8      Alkaline Phosphatase 65      Total Protein 7.2      AST 13      Bilirubin, Total 0.5      ALT 8     URINALYSIS WITH REFLEX CULTURE AND MICROSCOPIC - Abnormal    Color, Urine Straw      Appearance, Urine Clear      Specific Gravity, Urine 1.016      pH, Urine 8.0      Protein, Urine NEGATIVE      Glucose, Urine NEGATIVE      Blood, Urine NEGATIVE      Ketones, Urine 20 (1+) (*)     Bilirubin, Urine NEGATIVE      Urobilinogen, Urine <2.0      Nitrite, Urine NEGATIVE      Leukocyte Esterase, Urine NEGATIVE     LIPASE - Normal    Lipase 21      Narrative:     Venipuncture immediately after or during  the administration of Metamizole may lead to falsely low results. Testing should be performed immediately prior to Metamizole dosing.   LACTATE - Normal    Lactate 2.0      Narrative:     Venipuncture immediately after or during the administration of Metamizole may lead to falsely low results. Testing should be performed immediately  prior to Metamizole dosing.   HUMAN CHORIONIC GONADOTROPIN, SERUM QUANTITATIVE - Normal    HCG, Beta-Quantitative <2      Narrative:      Total HCG measurement is performed using the [employer] Access   Immunoassay which detects intact HCG and free beta HCG subunit.    This test is not indicated for use as a tumor marker.   HCG testing is performed using a different test methodology at [facility] than other [facility]. Direct result comparison   should only be made within the same method.       URINALYSIS WITH REFLEX CULTURE AND MICROSCOPIC    Narrative:     The following orders were created for panel order Urinalysis with Reflex Culture and Microscopic.  Procedure                               Abnormality         Status                     ---------                               -----------         ------                     Urinalysis with Reflex C...[[id number]]  Abnormal            Final result               Extra Urine Gray Tube[[id number]]                            In process                   Please view results for these tests on the individual orders.   EXTRA URINE GRAY TUBE        CT abdomen pelvis w IV contrast   Final Result   No acute finding.        MACRO:   None        Signed by: Jeovanny Varma 4/11/2024 9:29 AM   Dictation workstation:   [id number]            Procedure  Procedures     Carlos Benitez PA-C  04/11/24 1036

## 2024-06-19 ENCOUNTER — APPOINTMENT (OUTPATIENT)
Dept: CARDIOLOGY | Facility: HOSPITAL | Age: 44
End: 2024-06-19
Payer: COMMERCIAL

## 2024-06-19 ENCOUNTER — HOSPITAL ENCOUNTER (EMERGENCY)
Facility: HOSPITAL | Age: 44
Discharge: HOME | End: 2024-06-19
Attending: EMERGENCY MEDICINE
Payer: COMMERCIAL

## 2024-06-19 ENCOUNTER — APPOINTMENT (OUTPATIENT)
Dept: RADIOLOGY | Facility: HOSPITAL | Age: 44
End: 2024-06-19
Payer: COMMERCIAL

## 2024-06-19 VITALS
DIASTOLIC BLOOD PRESSURE: 85 MMHG | WEIGHT: 135.8 LBS | SYSTOLIC BLOOD PRESSURE: 124 MMHG | BODY MASS INDEX: 23.18 KG/M2 | RESPIRATION RATE: 18 BRPM | OXYGEN SATURATION: 99 % | TEMPERATURE: 97.7 F | HEART RATE: 78 BPM | HEIGHT: 64 IN

## 2024-06-19 DIAGNOSIS — R10.9 ABDOMINAL PAIN, UNSPECIFIED ABDOMINAL LOCATION: Primary | ICD-10-CM

## 2024-06-19 LAB
ALBUMIN SERPL BCP-MCNC: 4.2 G/DL (ref 3.4–5)
ALP SERPL-CCNC: 76 U/L (ref 33–110)
ALT SERPL W P-5'-P-CCNC: 8 U/L (ref 7–45)
ANION GAP SERPL CALC-SCNC: 13 MMOL/L (ref 10–20)
APPEARANCE UR: CLEAR
AST SERPL W P-5'-P-CCNC: 13 U/L (ref 9–39)
BASOPHILS # BLD AUTO: 0.02 X10*3/UL (ref 0–0.1)
BASOPHILS NFR BLD AUTO: 0.3 %
BILIRUB DIRECT SERPL-MCNC: 0 MG/DL (ref 0–0.3)
BILIRUB SERPL-MCNC: 0.7 MG/DL (ref 0–1.2)
BILIRUB UR STRIP.AUTO-MCNC: NEGATIVE MG/DL
BUN SERPL-MCNC: 8 MG/DL (ref 6–23)
CALCIUM SERPL-MCNC: 9.6 MG/DL (ref 8.6–10.3)
CARDIAC TROPONIN I PNL SERPL HS: 79 NG/L (ref 0–13)
CARDIAC TROPONIN I PNL SERPL HS: 97 NG/L (ref 0–13)
CHLORIDE SERPL-SCNC: 106 MMOL/L (ref 98–107)
CO2 SERPL-SCNC: 24 MMOL/L (ref 21–32)
COLOR UR: COLORLESS
CREAT SERPL-MCNC: 0.69 MG/DL (ref 0.5–1.05)
EGFRCR SERPLBLD CKD-EPI 2021: >90 ML/MIN/1.73M*2
EOSINOPHIL # BLD AUTO: 0.15 X10*3/UL (ref 0–0.7)
EOSINOPHIL NFR BLD AUTO: 2 %
ERYTHROCYTE [DISTWIDTH] IN BLOOD BY AUTOMATED COUNT: 16.6 % (ref 11.5–14.5)
GLUCOSE SERPL-MCNC: 101 MG/DL (ref 74–99)
GLUCOSE UR STRIP.AUTO-MCNC: NORMAL MG/DL
HCT VFR BLD AUTO: 41 % (ref 36–46)
HGB BLD-MCNC: 13.5 G/DL (ref 12–16)
HOLD SPECIMEN: NORMAL
IMM GRANULOCYTES # BLD AUTO: 0.01 X10*3/UL (ref 0–0.7)
IMM GRANULOCYTES NFR BLD AUTO: 0.1 % (ref 0–0.9)
KETONES UR STRIP.AUTO-MCNC: ABNORMAL MG/DL
LACTATE SERPL-SCNC: 1.3 MMOL/L (ref 0.4–2)
LEUKOCYTE ESTERASE UR QL STRIP.AUTO: NEGATIVE
LIPASE SERPL-CCNC: 26 U/L (ref 9–82)
LYMPHOCYTES # BLD AUTO: 1.56 X10*3/UL (ref 1.2–4.8)
LYMPHOCYTES NFR BLD AUTO: 21 %
MAGNESIUM SERPL-MCNC: 1.91 MG/DL (ref 1.6–2.4)
MCH RBC QN AUTO: 25.5 PG (ref 26–34)
MCHC RBC AUTO-ENTMCNC: 32.9 G/DL (ref 32–36)
MCV RBC AUTO: 78 FL (ref 80–100)
MONOCYTES # BLD AUTO: 0.41 X10*3/UL (ref 0.1–1)
MONOCYTES NFR BLD AUTO: 5.5 %
NEUTROPHILS # BLD AUTO: 5.27 X10*3/UL (ref 1.2–7.7)
NEUTROPHILS NFR BLD AUTO: 71.1 %
NITRITE UR QL STRIP.AUTO: NEGATIVE
NRBC BLD-RTO: 0 /100 WBCS (ref 0–0)
PH UR STRIP.AUTO: 8 [PH]
PLATELET # BLD AUTO: 347 X10*3/UL (ref 150–450)
POTASSIUM SERPL-SCNC: 3.7 MMOL/L (ref 3.5–5.3)
PROT SERPL-MCNC: 7.8 G/DL (ref 6.4–8.2)
PROT UR STRIP.AUTO-MCNC: NEGATIVE MG/DL
RBC # BLD AUTO: 5.29 X10*6/UL (ref 4–5.2)
RBC # UR STRIP.AUTO: NEGATIVE /UL
SODIUM SERPL-SCNC: 139 MMOL/L (ref 136–145)
SP GR UR STRIP.AUTO: 1
UROBILINOGEN UR STRIP.AUTO-MCNC: NORMAL MG/DL
WBC # BLD AUTO: 7.4 X10*3/UL (ref 4.4–11.3)

## 2024-06-19 PROCEDURE — 84484 ASSAY OF TROPONIN QUANT: CPT | Performed by: EMERGENCY MEDICINE

## 2024-06-19 PROCEDURE — 96375 TX/PRO/DX INJ NEW DRUG ADDON: CPT | Performed by: EMERGENCY MEDICINE

## 2024-06-19 PROCEDURE — 83735 ASSAY OF MAGNESIUM: CPT | Performed by: EMERGENCY MEDICINE

## 2024-06-19 PROCEDURE — 36415 COLL VENOUS BLD VENIPUNCTURE: CPT | Performed by: EMERGENCY MEDICINE

## 2024-06-19 PROCEDURE — 85025 COMPLETE CBC W/AUTO DIFF WBC: CPT | Performed by: EMERGENCY MEDICINE

## 2024-06-19 PROCEDURE — 93005 ELECTROCARDIOGRAM TRACING: CPT

## 2024-06-19 PROCEDURE — 74176 CT ABD & PELVIS W/O CONTRAST: CPT | Performed by: RADIOLOGY

## 2024-06-19 PROCEDURE — 96361 HYDRATE IV INFUSION ADD-ON: CPT | Performed by: EMERGENCY MEDICINE

## 2024-06-19 PROCEDURE — 83690 ASSAY OF LIPASE: CPT | Performed by: EMERGENCY MEDICINE

## 2024-06-19 PROCEDURE — 96374 THER/PROPH/DIAG INJ IV PUSH: CPT | Performed by: EMERGENCY MEDICINE

## 2024-06-19 PROCEDURE — 80053 COMPREHEN METABOLIC PANEL: CPT | Performed by: EMERGENCY MEDICINE

## 2024-06-19 PROCEDURE — 99285 EMERGENCY DEPT VISIT HI MDM: CPT | Performed by: EMERGENCY MEDICINE

## 2024-06-19 PROCEDURE — 74176 CT ABD & PELVIS W/O CONTRAST: CPT

## 2024-06-19 PROCEDURE — C9113 INJ PANTOPRAZOLE SODIUM, VIA: HCPCS | Performed by: EMERGENCY MEDICINE

## 2024-06-19 PROCEDURE — 81003 URINALYSIS AUTO W/O SCOPE: CPT | Performed by: EMERGENCY MEDICINE

## 2024-06-19 PROCEDURE — 2500000004 HC RX 250 GENERAL PHARMACY W/ HCPCS (ALT 636 FOR OP/ED): Performed by: EMERGENCY MEDICINE

## 2024-06-19 PROCEDURE — 83605 ASSAY OF LACTIC ACID: CPT | Performed by: EMERGENCY MEDICINE

## 2024-06-19 RX ORDER — ONDANSETRON HYDROCHLORIDE 2 MG/ML
4 INJECTION, SOLUTION INTRAVENOUS ONCE
Status: COMPLETED | OUTPATIENT
Start: 2024-06-19 | End: 2024-06-19

## 2024-06-19 RX ORDER — PANTOPRAZOLE SODIUM 40 MG/10ML
40 INJECTION, POWDER, LYOPHILIZED, FOR SOLUTION INTRAVENOUS ONCE
Status: COMPLETED | OUTPATIENT
Start: 2024-06-19 | End: 2024-06-19

## 2024-06-19 RX ORDER — CHOLECALCIFEROL (VITAMIN D3) 50 MCG
20 CAPSULE ORAL
Qty: 30 CAPSULE | Refills: 0 | Status: SHIPPED | OUTPATIENT
Start: 2024-06-19 | End: 2024-07-19

## 2024-06-19 ASSESSMENT — PAIN SCALES - GENERAL
PAINLEVEL_OUTOF10: 8
PAINLEVEL_OUTOF10: 8
PAINLEVEL_OUTOF10: 0 - NO PAIN
PAINLEVEL_OUTOF10: 0 - NO PAIN

## 2024-06-19 ASSESSMENT — PAIN - FUNCTIONAL ASSESSMENT
PAIN_FUNCTIONAL_ASSESSMENT: 0-10
PAIN_FUNCTIONAL_ASSESSMENT: 0-10

## 2024-06-19 ASSESSMENT — HEART SCORE
ECG: NORMAL
AGE: <45
HEART SCORE: 2
TROPONIN: GREATER THAN OR EQUAL TO 3 TIMES NORMAL LIMIT
RISK FACTORS: NO KNOWN RISK FACTORS
HISTORY: SLIGHTLY SUSPICIOUS

## 2024-06-19 ASSESSMENT — LIFESTYLE VARIABLES
HAVE YOU EVER FELT YOU SHOULD CUT DOWN ON YOUR DRINKING: NO
EVER FELT BAD OR GUILTY ABOUT YOUR DRINKING: NO
EVER HAD A DRINK FIRST THING IN THE MORNING TO STEADY YOUR NERVES TO GET RID OF A HANGOVER: NO
HAVE PEOPLE ANNOYED YOU BY CRITICIZING YOUR DRINKING: NO
TOTAL SCORE: 0

## 2024-06-19 ASSESSMENT — PAIN DESCRIPTION - PAIN TYPE: TYPE: ACUTE PAIN

## 2024-06-19 ASSESSMENT — PAIN DESCRIPTION - LOCATION: LOCATION: ABDOMEN

## 2024-06-19 NOTE — ED PROVIDER NOTES
HPI   Chief Complaint   Patient presents with    Abdominal Pain     Abdominal pain with vomiting since 3 am        HPI: 44-year-old female history of gastritis and esophageal reflux has been out of her GI medications states that she has been having some upper abdominal pain.  She has had some nausea and some vomiting.  She tried her Zofran but it did not seem to help.  She denies chest pain.  She denies shortness of breath.  She denies any lower abdominal pain.  No diarrhea.  No bloody or blood in her stools or dark tarry stools.  She is not vomiting blood.  She denies any recent surgeries.  She has had 2 previous C-sections 25 years ago.  No swelling in her legs.  No shortness of breath.  No fevers  Family HX: Denies any significant/pertinent family history.  Social Hx: Denies ETOH or drug use.  Review of systems:  Gen.: No weight loss, fatigue, anorexia, insomnia, fever.   Eyes: No vision loss, double vision, drainage, eye pain.   ENT: No pharyngitis, dry mouth.   Cardiac: No chest pain, palpitations, syncope, near syncope.   Pulmonary: No shortness of breath, cough, hemoptysis.   Heme/lymph: No swollen glands, fever, bleeding.   GI: No abdominal pain, change in bowel habits, melena, hematemesis, hematochezia, nausea, vomiting, diarrhea.   : No discharge, dysuria, frequency, urgency, hematuria.   Musculoskeletal: No limb pain, joint pain, joint swelling.   Skin: No rashes.   Psych: No depression, anxiety, suicidality, homicidality.   Review of systems is otherwise negative unless stated above or in history of present illness.    Physical Exam:    Appearance: Alert, oriented , cooperative,  in no acute distress. Well nourished & well hydrated.    Skin: Intact,  dry skin, no lesions, rash, petechiae or purpura.     Eyes: PERRLA, EOMs intact,  Conjunctiva pink with no redness or exudates. Eyelids without lesions. No scleral icterus.     ENT: Hearing grossly intact. External auditory canals patent, tympanic  membranes intact with visible landmarks. Nares patent, mucus membranes moist. Dentition without lesions. Pharynx clear, uvula midline.     Neck: Supple, without meningismus. Thyroid not palpable. Trachea at midline. No lymphadenopathy.    Pulmonary: Clear bilaterally with good chest wall excursion. No rales, rhonchi or wheezing. No accessory muscle use or stridor.    Cardiac: Normal S1, S2 without murmur, rub, gallop or extrasystole. No JVD, Carotids without bruits.    Abdomen: Soft, mild left upper quadrant tenderness without rebound or guarding, active bowel sounds.  No palpable organomegaly.  No rebound or guarding.  No CVA tenderness.    Genitourinary: Exam deferred.    Musculoskeletal: Full range of motion. no pain, edema, or deformity. Pulses full and equal. No cyanosis, clubbing, or edema.    Neurological:  Cranial nerves II through XII are grossly intact, finger-nose touch is normal, normal sensation, no weakness, no focal findings identified.    Psychiatric: Appropriate mood and affect.     Medical Decision-Making:  Testing: EKG was obtained which is interpreted by me shows a sinus rhythm rate of 74 without evidence of obvious ST elevations or T wave inversions to indicate acute ischemia or infarct.  Labs were reviewed.  The initial troponin was elevated at 97.  The recheck is improved however still elevated at 79.  Patient is not having any chest pain.  I did review these findings with the patient and my concerns and the patient states that that is how her enzymes always are.  I was able to look at old findings and patient has had elevated cardiac enzymes in multiple visits in the past.  However given these findings although her heart score is a 2 I had considered admission.  Patient has declined.  She states that she would like to see somebody for GI appointment therefore we will attempt to make her a GI appointment.  She was given a dose of IV Protonix.  On reevaluation she states that she feels markedly  improved.  I did refill her prescription for omeprazole at the pharmacy.  She is tolerating p.o.  Her abdomen is benign.  CT scan is showing likely nondistended colon otherwise no really acute findings.  Therefore at this time patient understands my concerns we did have a discussion and she has declined admission.  She should have a low threshold to return.  I will also give her a referral to cardiology.  Treatment:   Reevaluation:   Plan: Homegoing. Discussed differential. Will follow-up with the primary physician in the next 2-3 days. Return if worse. They understand return precautions and discharge instructions. Patient and family/friend/caregiver are in agreement with this plan.   Impression:   1.  Abdominal pain  2.  Elevated troponin  Labs Reviewed  BASIC METABOLIC PANEL - Abnormal     Glucose                       101 (*)                Sodium                        139                    Potassium                     3.7                    Chloride                      106                    Bicarbonate                   24                     Anion Gap                     13                     Urea Nitrogen                 8                      Creatinine                    0.69                   eGFR                          >90                    Calcium                       9.6                 CBC WITH AUTO DIFFERENTIAL - Abnormal     WBC                           7.4                    nRBC                          0.0                    RBC                           5.29 (*)               Hemoglobin                    13.5                   Hematocrit                    41.0                   MCV                           78 (*)                 MCH                           25.5 (*)               MCHC                          32.9                   RDW                           16.6 (*)               Platelets                     347                    Neutrophils %                 71.1                    Immature Granulocytes %, Automated   0.1                    Lymphocytes %                 21.0                   Monocytes %                   5.5                    Eosinophils %                 2.0                    Basophils %                   0.3                    Neutrophils Absolute          5.27                   Immature Granulocytes Absolute, Au*   0.01                   Lymphocytes Absolute          1.56                   Monocytes Absolute            0.41                   Eosinophils Absolute          0.15                   Basophils Absolute            0.02                TROPONIN I, HIGH SENSITIVITY - Abnormal     Troponin I, High Sensitivity   97 (*)                   Narrative: Less than 99th percentile of normal range cutoff-                Female and children under 18 years old <14 ng/L; Male <21 ng/L: Negative                Repeat testing should be performed if clinically indicated.                                 Female and children under 18 years old 14-50 ng/L; Male 21-50 ng/L:                Consistent with possible cardiac damage and possible increased clinical                 risk. Serial measurements may help to assess extent of myocardial damage.                                 >50 ng/L: Consistent with cardiac damage, increased clinical risk and                myocardial infarction. Serial measurements may help assess extent of                 myocardial damage.                                  NOTE: Children less than 1 year old may have higher baseline troponin                 levels and results should be interpreted in conjunction with the overall                 clinical context.                                 NOTE: Troponin I testing is performed using a different                 testing methodology at [facility] than at other                 [facility]. Direct result comparisons should only                 be made within the same method.  URINALYSIS  WITH REFLEX CULTURE AND MICROSCOPIC - Abnormal     Color, Urine                  Colorless (*)               Appearance, Urine             Clear                  Specific Gravity, Urine       1.005                  pH, Urine                     8.0                    Protein, Urine                NEGATIVE                Glucose, Urine                Normal                 Blood, Urine                  NEGATIVE                Ketones, Urine                10 (1+) (*)               Bilirubin, Urine              NEGATIVE                Urobilinogen, Urine           Normal                 Nitrite, Urine                NEGATIVE                Leukocyte Esterase, Urine     NEGATIVE             TROPONIN I, HIGH SENSITIVITY - Abnormal     Troponin I, High Sensitivity   79 (*)                   Narrative: Less than 99th percentile of normal range cutoff-                Female and children under 18 years old <14 ng/L; Male <21 ng/L: Negative                Repeat testing should be performed if clinically indicated.                                 Female and children under 18 years old 14-50 ng/L; Male 21-50 ng/L:                Consistent with possible cardiac damage and possible increased clinical                 risk. Serial measurements may help to assess extent of myocardial damage.                                 >50 ng/L: Consistent with cardiac damage, increased clinical risk and                myocardial infarction. Serial measurements may help assess extent of                 myocardial damage.                                  NOTE: Children less than 1 year old may have higher baseline troponin                 levels and results should be interpreted in conjunction with the overall                 clinical context.                                 NOTE: Troponin I testing is performed using a different                 testing methodology at [facility] than at other                 [facility].  Direct result comparisons should only                 be made within the same method.  LIPASE - Normal     Lipase                        26                       Narrative: Venipuncture immediately after or during the administration of Metamizole may lead to falsely low results. Testing should be performed immediately prior to Metamizole dosing.  LACTATE - Normal     Lactate                       1.3                      Narrative: Venipuncture immediately after or during the administration of Metamizole may lead to falsely low results. Testing should be performed immediately                prior to Metamizole dosing.  HEPATIC FUNCTION PANEL - Normal     Albumin                       4.2                    Bilirubin, Total              0.7                    Bilirubin, Direct             0.0                    Alkaline Phosphatase          76                     ALT                           8                      AST                           13                     Total Protein                 7.8                 MAGNESIUM - Normal     Magnesium                     1.91                URINALYSIS WITH REFLEX CULTURE AND MICROSCOPIC       Narrative: The following orders were created for panel order Urinalysis with Reflex Culture and Microscopic.                Procedure                               Abnormality         Status                                   ---------                               -----------         ------                                   Urinalysis with Reflex C...[[id number]]  Abnormal            Final result                             Extra Urine Gray Tube[[id number]]                            In process                                               Please view results for these tests on the individual orders.  EXTRA URINE GRAY TUBE     CT abdomen pelvis wo IV contrast   Final Result    Mild prominence of the walls of the ascending and transverse colon    most likely relates to incomplete  distention. Colitis felt less    likely. Cysts/follicles in the ovaries measuring up to 2.1 cm on the    right. Atherosclerotic disease.          MACRO:    None.          Signed by: Tariq Wiseman 6/19/2024 10:09 AM    Dictation workstation:   [id number]                                No data recorded HEART Score: 2                   Patient History   Past Medical History:   Diagnosis Date    Acid reflux     Gastritis      History reviewed. No pertinent surgical history.  No family history on file.  Social History     Tobacco Use    Smoking status: Never     Passive exposure: Never    Smokeless tobacco: Never   Vaping Use    Vaping status: Never Used   Substance Use Topics    Alcohol use: Defer    Drug use: Yes     Types: Marijuana       Physical Exam   ED Triage Vitals [06/19/24 0806]   Temperature Heart Rate Respirations BP   36.5 °C (97.7 °F) 82 16 118/74      Pulse Ox Temp src Heart Rate Source Patient Position   98 % -- -- --      BP Location FiO2 (%)     -- --       Physical Exam    ED Course & MDM   Diagnoses as of 06/19/24 1418   Abdominal pain, unspecified abdominal location       Medical Decision Making      Procedure  Procedures     Namita Guerrero MD  06/19/24 1414

## 2024-06-20 ENCOUNTER — OFFICE VISIT (OUTPATIENT)
Dept: CARDIOLOGY | Facility: CLINIC | Age: 44
End: 2024-06-20
Payer: COMMERCIAL

## 2024-06-20 VITALS
WEIGHT: 134.6 LBS | BODY MASS INDEX: 22.42 KG/M2 | HEIGHT: 65 IN | HEART RATE: 102 BPM | SYSTOLIC BLOOD PRESSURE: 110 MMHG | DIASTOLIC BLOOD PRESSURE: 70 MMHG

## 2024-06-20 DIAGNOSIS — R79.89 ELEVATED TROPONIN LEVEL: ICD-10-CM

## 2024-06-20 DIAGNOSIS — R07.9 CHEST PAIN, UNSPECIFIED TYPE: ICD-10-CM

## 2024-06-20 DIAGNOSIS — Z78.9 NEVER SMOKED CIGARETTES: ICD-10-CM

## 2024-06-20 DIAGNOSIS — K21.9 GASTROESOPHAGEAL REFLUX DISEASE, UNSPECIFIED WHETHER ESOPHAGITIS PRESENT: ICD-10-CM

## 2024-06-20 PROBLEM — R10.9 ABDOMINAL PAIN: Status: ACTIVE | Noted: 2024-06-20

## 2024-06-20 PROCEDURE — 99204 OFFICE O/P NEW MOD 45 MIN: CPT | Performed by: INTERNAL MEDICINE

## 2024-06-20 PROCEDURE — 1036F TOBACCO NON-USER: CPT | Performed by: INTERNAL MEDICINE

## 2024-06-20 PROCEDURE — 3008F BODY MASS INDEX DOCD: CPT | Performed by: INTERNAL MEDICINE

## 2024-06-20 RX ORDER — MIRTAZAPINE 15 MG/1
15 TABLET, FILM COATED ORAL NIGHTLY
COMMUNITY

## 2024-06-20 RX ORDER — ONDANSETRON 4 MG/1
1 TABLET, ORALLY DISINTEGRATING ORAL EVERY 8 HOURS PRN
COMMUNITY
Start: 2023-02-02

## 2024-06-20 RX ORDER — FAMOTIDINE 20 MG/1
20 TABLET, FILM COATED ORAL
COMMUNITY
Start: 2024-04-10

## 2024-06-20 NOTE — PROGRESS NOTES
CARDIOLOGY OFFICE VISIT      CHIEF COMPLAINT    Sent from ER at [facility] for cardiac evaluation  HISTORY OF PRESENT ILLNESS  The patient states she was seen in the emergency room yesterday at [facility].  She went to the emergency room because of some upper abdominal discomfort and some nausea.  She does have a history of esophageal reflux and gastritis.  She had been on medication for these but did run out.  She got relief with medications in the emergency room.  She was not having any chest discomfort.  She was noted to have elevated troponin at 97 and 79.  She has chronically elevated troponins in the same range or little higher.  She does not have any history of cardiac pathology.  She does have history of chronic mild anterior sharp pain when she takes a deep breath.  She did undergo a CT scan of the chest on 1 ER visit in February of last year.  This was negative for pulmonary embolus.  The CT scan was basically unremarkable.  She denies dyspnea exertion.  She denies palpitations and syncope.  She denies any problem with her current medications.  She is a former cigarette smoker.  There is no immediate family history of any significant cardiac pathology.  She does not have any history of hypertension, hyperlipidemia, or diabetes mellitus.    EKG: Normal sinus rhythm, sinus arrhythmia, within normal limits, done 6/19/2024, results discussed with patient    Lipid profile: Done 823, results discussed with patient, cholesterol 186, triglycerides 110, HDL 40,     Impression:  Chronically elevated troponin in the  range  Chronic mild pleuritic retrosternal chest discomfort  Former smoker  History of gastritis and esophageal reflux disease.    Plan:  Echocardiogram    Please excuse any errors in grammar or translation related to this dictation.  Voice recognition software was utilized to prepare this document.    Past Medical History  Past Medical History:   Diagnosis Date    Acid reflux     Gastritis         Social History  Social History     Tobacco Use    Smoking status: Never     Passive exposure: Never    Smokeless tobacco: Never   Vaping Use    Vaping status: Never Used   Substance Use Topics    Alcohol use: Never    Drug use: Yes     Types: Marijuana       Family History     Family History   Problem Relation Name Age of Onset    Hypertension Mother          Allergies:  Allergies   Allergen Reactions    Aripiprazole Nausea/vomiting and Nausea And Vomiting     Patient received one dose of Abilify and began vomiting within 2 hours. The vomiting resolved over the next 24 hours and Abilify was discontinued.    Fluoxetine GI Upset and Nausea And Vomiting     nausea        Outpatient Medications:  Current Outpatient Medications   Medication Instructions    ascorbic acid (VITAMIN C) 100 mg, oral, Daily RT    famotidine (PEPCID) 20 mg, oral    mirtazapine (REMERON) 15 mg, oral, Nightly    omeprazole (PRILOSEC) 20 mg, oral, Daily before breakfast    ondansetron ODT (Zofran-ODT) 4 mg disintegrating tablet 1 tablet, oral, Every 8 hours PRN    pantoprazole (PROTONIX) 20 mg, oral, Daily, Do not crush, chew, or split.          REVIEW OF SYSTEMS  Review of Systems   Cardiovascular:  Positive for chest pain.   All other systems reviewed and are negative.        VITALS  Vitals:    06/20/24 1526   BP: 110/70   Pulse: 102       PHYSICAL EXAM  Vitals and nursing note reviewed.   Constitutional:       Appearance: Healthy appearance.   Eyes:      Conjunctiva/sclera: Conjunctivae normal.      Pupils: Pupils are equal, round, and reactive to light.   Pulmonary:      Effort: Pulmonary effort is normal.      Breath sounds: Normal breath sounds.   Cardiovascular:      PMI at left midclavicular line. Normal rate. Regular rhythm.      Murmurs: There is no murmur.      No gallop.  No click. No rub.   Pulses:     Intact distal pulses.   Edema:     Peripheral edema absent.   Musculoskeletal: Normal range of motion. Skin:     General: Skin  is warm and dry.   Neurological:      Mental Status: Alert and oriented to person, place and time.           ASSESSMENT AND PLAN  Diagnoses and all orders for this visit:  Elevated troponin level  Chest pain, unspecified type  Gastroesophageal reflux disease, unspecified whether esophagitis present  BMI 22.0-22.9, adult  Never smoked cigarettes  Other orders  -     Referral to Cardiology      [unfilled]

## 2024-06-20 NOTE — PATIENT INSTRUCTIONS
Echocardiogram to be done   Patient to follow up after testing.    Continue same medications/treatment.  Patient educated on proper medication use.  Patient educated on risk factor modification.  Please bring any lab results from other providers / physicians to your next appointment.    Please bring all medicines, vitamins and herbal supplements with you when you come to the office.    Prescriptions will not be filled unless you are compliant with your follow up appointments or have a follow up  appointment scheduled as per instruction of your physician.  Refills should be requested at the time of  Your visit.    Roseann GUERRA LPN, am scribing for and in the presence of Dr. Zbigniew Tamez MD, FACC

## 2024-06-22 LAB
ATRIAL RATE: 74 BPM
P AXIS: 61 DEGREES
P OFFSET: 194 MS
P ONSET: 142 MS
PR INTERVAL: 162 MS
Q ONSET: 223 MS
QRS COUNT: 12 BEATS
QRS DURATION: 80 MS
QT INTERVAL: 384 MS
QTC CALCULATION(BAZETT): 426 MS
QTC FREDERICIA: 412 MS
R AXIS: 66 DEGREES
T AXIS: 64 DEGREES
T OFFSET: 415 MS
VENTRICULAR RATE: 74 BPM

## 2024-07-11 ENCOUNTER — HOSPITAL ENCOUNTER (OUTPATIENT)
Dept: CARDIOLOGY | Facility: CLINIC | Age: 44
Discharge: HOME | End: 2024-07-11
Payer: COMMERCIAL

## 2024-07-11 DIAGNOSIS — R79.89 ELEVATED TROPONIN LEVEL: ICD-10-CM

## 2024-07-11 DIAGNOSIS — R07.9 CHEST PAIN, UNSPECIFIED TYPE: ICD-10-CM

## 2024-07-11 PROCEDURE — 93306 TTE W/DOPPLER COMPLETE: CPT

## 2024-07-11 PROCEDURE — 93306 TTE W/DOPPLER COMPLETE: CPT | Performed by: INTERNAL MEDICINE

## 2024-07-12 LAB
AORTIC VALVE MEAN GRADIENT: 5 MMHG
AORTIC VALVE PEAK VELOCITY: 1.36 M/S
AV PEAK GRADIENT: 7.4 MMHG
AVA (PEAK VEL): 1.7 CM2
AVA (VTI): 1.86 CM2
EJECTION FRACTION APICAL 4 CHAMBER: 54.3
EJECTION FRACTION: 63 %
LEFT VENTRICLE INTERNAL DIMENSION DIASTOLE: 3.71 CM (ref 3.5–6)
LEFT VENTRICULAR OUTFLOW TRACT DIAMETER: 1.8 CM
LV EJECTION FRACTION BIPLANE: 67 %
MITRAL VALVE E/A RATIO: 1.3

## 2024-07-18 ENCOUNTER — APPOINTMENT (OUTPATIENT)
Dept: CARDIOLOGY | Facility: CLINIC | Age: 44
End: 2024-07-18
Payer: COMMERCIAL

## 2024-07-18 VITALS
SYSTOLIC BLOOD PRESSURE: 116 MMHG | BODY MASS INDEX: 22.33 KG/M2 | HEART RATE: 80 BPM | DIASTOLIC BLOOD PRESSURE: 78 MMHG | WEIGHT: 134.2 LBS

## 2024-07-18 DIAGNOSIS — R10.9 ABDOMINAL PAIN, UNSPECIFIED ABDOMINAL LOCATION: ICD-10-CM

## 2024-07-18 DIAGNOSIS — Z78.9 NEVER SMOKED CIGARETTES: ICD-10-CM

## 2024-07-18 DIAGNOSIS — R07.9 CHEST PAIN, UNSPECIFIED TYPE: ICD-10-CM

## 2024-07-18 PROCEDURE — 1036F TOBACCO NON-USER: CPT | Performed by: INTERNAL MEDICINE

## 2024-07-18 PROCEDURE — 99214 OFFICE O/P EST MOD 30 MIN: CPT | Performed by: INTERNAL MEDICINE

## 2024-07-18 RX ORDER — CHOLECALCIFEROL (VITAMIN D3) 50 MCG
20 CAPSULE ORAL
Qty: 90 CAPSULE | Refills: 3 | Status: SHIPPED | OUTPATIENT
Start: 2024-07-18 | End: 2025-07-18

## 2024-07-18 NOTE — PROGRESS NOTES
CARDIOLOGY OFFICE VISIT      CHIEF COMPLAINT      HISTORY OF PRESENT ILLNESS  The patient states she is doing well.  She states she has not had any recent chest discomfort.  She denies dyspnea.  She denies palpitations and syncope.  She states she is back on her GI medicines regularly and this is why she is feeling so well.  I did go over results of her echocardiogram with her.  This demonstrates normal left ventricular systolic function and no significant valvular abnormalities.      Impression:  Chronically elevated troponin in the  range  Chronic mild pleuritic retrosternal chest discomfort  Former smoker  History of gastritis and esophageal reflux disease.    Please excuse any errors in grammar or translation related to this dictation.  Voice recognition software was utilized to prepare this document.       Past Medical History  Past Medical History:   Diagnosis Date    Acid reflux     Gastritis        Social History  Social History     Tobacco Use    Smoking status: Never     Passive exposure: Never    Smokeless tobacco: Never   Vaping Use    Vaping status: Never Used   Substance Use Topics    Alcohol use: Never    Drug use: Yes     Types: Marijuana       Family History     Family History   Problem Relation Name Age of Onset    Hypertension Mother          Allergies:  Allergies   Allergen Reactions    Aripiprazole Nausea/vomiting and Nausea And Vomiting     Patient received one dose of Abilify and began vomiting within 2 hours. The vomiting resolved over the next 24 hours and Abilify was discontinued.    Fluoxetine GI Upset and Nausea And Vomiting     nausea        Outpatient Medications:  Current Outpatient Medications   Medication Instructions    ascorbic acid (VITAMIN C) 100 mg, oral, Daily RT    mirtazapine (REMERON) 15 mg, oral, Nightly    omeprazole (PRILOSEC) 20 mg, oral, Daily before breakfast    ondansetron ODT (Zofran-ODT) 4 mg disintegrating tablet 1 tablet, oral, Every 8 hours PRN     pantoprazole (PROTONIX) 20 mg, oral, Daily, Do not crush, chew, or split.          REVIEW OF SYSTEMS  Review of Systems   All other systems reviewed and are negative.        VITALS  Vitals:    07/18/24 1502   BP: 116/78   Pulse: 80       PHYSICAL EXAM  Constitutional:       Appearance: Healthy appearance. Not in distress.   Neck:      Vascular: No JVR. JVD normal.   Pulmonary:      Effort: Pulmonary effort is normal.      Breath sounds: Normal breath sounds. No wheezing. No rhonchi. No rales.   Chest:      Chest wall: Not tender to palpatation.   Cardiovascular:      PMI at left midclavicular line. Normal rate. Regular rhythm. Normal S1. Normal S2.       Murmurs: There is no murmur.      No gallop.  No click. No rub.   Pulses:     Intact distal pulses.   Edema:     Peripheral edema absent.   Abdominal:      General: Bowel sounds are normal.      Palpations: Abdomen is soft.      Tenderness: There is no abdominal tenderness.   Musculoskeletal: Normal range of motion.         General: No tenderness. Skin:     General: Skin is warm and dry.   Neurological:      General: No focal deficit present.      Mental Status: Alert and oriented to person, place and time.           ASSESSMENT AND PLAN      [unfilled]

## 2024-07-18 NOTE — PATIENT INSTRUCTIONS
Continue same medications/treatment.  Patient educated on proper medication use.  Patient educated on risk factor modification.  Please bring any lab results from other providers/physicians to your next appointment.    Please bring all medicines, vitamins, and herbal supplements with you when you come to the office.    Prescriptions will not be filled unless you are compliant with your follow up appointments or have a follow up appointment scheduled as per instruction of your physician. Refills should be requested at the time of your visit.    Follow up as needed    IRosamaria RN, am scribing for and in the presence of, Dr. Zbigniew Tamez MD, FACC

## 2024-08-07 ENCOUNTER — APPOINTMENT (OUTPATIENT)
Dept: GASTROENTEROLOGY | Facility: CLINIC | Age: 44
End: 2024-08-07
Payer: COMMERCIAL

## 2024-08-15 ENCOUNTER — HOSPITAL ENCOUNTER (INPATIENT)
Facility: HOSPITAL | Age: 44
LOS: 1 days | Discharge: HOME | End: 2024-08-17
Attending: EMERGENCY MEDICINE | Admitting: STUDENT IN AN ORGANIZED HEALTH CARE EDUCATION/TRAINING PROGRAM
Payer: COMMERCIAL

## 2024-08-15 ENCOUNTER — APPOINTMENT (OUTPATIENT)
Dept: RADIOLOGY | Facility: HOSPITAL | Age: 44
End: 2024-08-15
Payer: COMMERCIAL

## 2024-08-15 DIAGNOSIS — N20.0 RENAL STONE: ICD-10-CM

## 2024-08-15 DIAGNOSIS — R10.9 FLANK PAIN: Primary | ICD-10-CM

## 2024-08-15 DIAGNOSIS — N12 PYELONEPHRITIS: ICD-10-CM

## 2024-08-15 DIAGNOSIS — F41.9 ANXIETY: ICD-10-CM

## 2024-08-15 LAB
ALBUMIN SERPL BCP-MCNC: 4.5 G/DL (ref 3.4–5)
ALP SERPL-CCNC: 77 U/L (ref 33–110)
ALT SERPL W P-5'-P-CCNC: 9 U/L (ref 7–45)
AMYLASE SERPL-CCNC: 65 U/L (ref 29–103)
ANION GAP SERPL CALC-SCNC: 16 MMOL/L (ref 10–20)
APPEARANCE UR: ABNORMAL
AST SERPL W P-5'-P-CCNC: 12 U/L (ref 9–39)
BASOPHILS # BLD AUTO: 0.04 X10*3/UL (ref 0–0.1)
BASOPHILS NFR BLD AUTO: 0.2 %
BILIRUB SERPL-MCNC: 1.1 MG/DL (ref 0–1.2)
BILIRUB UR STRIP.AUTO-MCNC: NEGATIVE MG/DL
BUN SERPL-MCNC: 7 MG/DL (ref 6–23)
CALCIUM SERPL-MCNC: 9.8 MG/DL (ref 8.6–10.3)
CHLORIDE SERPL-SCNC: 102 MMOL/L (ref 98–107)
CO2 SERPL-SCNC: 23 MMOL/L (ref 21–32)
COLOR UR: ABNORMAL
CREAT SERPL-MCNC: 0.74 MG/DL (ref 0.5–1.05)
EGFRCR SERPLBLD CKD-EPI 2021: >90 ML/MIN/1.73M*2
EOSINOPHIL # BLD AUTO: 0.04 X10*3/UL (ref 0–0.7)
EOSINOPHIL NFR BLD AUTO: 0.2 %
ERYTHROCYTE [DISTWIDTH] IN BLOOD BY AUTOMATED COUNT: 16.1 % (ref 11.5–14.5)
GLUCOSE SERPL-MCNC: 123 MG/DL (ref 74–99)
GLUCOSE UR STRIP.AUTO-MCNC: NORMAL MG/DL
HCG UR QL IA.RAPID: NEGATIVE
HCT VFR BLD AUTO: 40.5 % (ref 36–46)
HGB BLD-MCNC: 13.8 G/DL (ref 12–16)
HOLD SPECIMEN: NORMAL
IMM GRANULOCYTES # BLD AUTO: 0.14 X10*3/UL (ref 0–0.7)
IMM GRANULOCYTES NFR BLD AUTO: 0.7 % (ref 0–0.9)
KETONES UR STRIP.AUTO-MCNC: ABNORMAL MG/DL
LACTATE SERPL-SCNC: 2 MMOL/L (ref 0.4–2)
LEUKOCYTE ESTERASE UR QL STRIP.AUTO: ABNORMAL
LIPASE SERPL-CCNC: 15 U/L (ref 9–82)
LYMPHOCYTES # BLD AUTO: 1.16 X10*3/UL (ref 1.2–4.8)
LYMPHOCYTES NFR BLD AUTO: 5.6 %
MCH RBC QN AUTO: 26 PG (ref 26–34)
MCHC RBC AUTO-ENTMCNC: 34.1 G/DL (ref 32–36)
MCV RBC AUTO: 76 FL (ref 80–100)
MONOCYTES # BLD AUTO: 1.49 X10*3/UL (ref 0.1–1)
MONOCYTES NFR BLD AUTO: 7.2 %
NEUTROPHILS # BLD AUTO: 17.73 X10*3/UL (ref 1.2–7.7)
NEUTROPHILS NFR BLD AUTO: 86.1 %
NITRITE UR QL STRIP.AUTO: NEGATIVE
NRBC BLD-RTO: 0 /100 WBCS (ref 0–0)
PH UR STRIP.AUTO: 8.5 [PH]
PLATELET # BLD AUTO: 313 X10*3/UL (ref 150–450)
POTASSIUM SERPL-SCNC: 3.5 MMOL/L (ref 3.5–5.3)
PROT SERPL-MCNC: 8.6 G/DL (ref 6.4–8.2)
PROT UR STRIP.AUTO-MCNC: ABNORMAL MG/DL
RBC # BLD AUTO: 5.31 X10*6/UL (ref 4–5.2)
RBC # UR STRIP.AUTO: ABNORMAL /UL
RBC #/AREA URNS AUTO: >20 /HPF
SODIUM SERPL-SCNC: 137 MMOL/L (ref 136–145)
SP GR UR STRIP.AUTO: 1.01
SQUAMOUS #/AREA URNS AUTO: ABNORMAL /HPF
UROBILINOGEN UR STRIP.AUTO-MCNC: NORMAL MG/DL
WBC # BLD AUTO: 20.6 X10*3/UL (ref 4.4–11.3)
WBC #/AREA URNS AUTO: >50 /HPF

## 2024-08-15 PROCEDURE — 85025 COMPLETE CBC W/AUTO DIFF WBC: CPT | Performed by: EMERGENCY MEDICINE

## 2024-08-15 PROCEDURE — 74176 CT ABD & PELVIS W/O CONTRAST: CPT

## 2024-08-15 PROCEDURE — 2500000004 HC RX 250 GENERAL PHARMACY W/ HCPCS (ALT 636 FOR OP/ED): Performed by: EMERGENCY MEDICINE

## 2024-08-15 PROCEDURE — 83690 ASSAY OF LIPASE: CPT | Performed by: EMERGENCY MEDICINE

## 2024-08-15 PROCEDURE — 83605 ASSAY OF LACTIC ACID: CPT | Performed by: EMERGENCY MEDICINE

## 2024-08-15 PROCEDURE — 96361 HYDRATE IV INFUSION ADD-ON: CPT

## 2024-08-15 PROCEDURE — 82150 ASSAY OF AMYLASE: CPT | Performed by: EMERGENCY MEDICINE

## 2024-08-15 PROCEDURE — 81025 URINE PREGNANCY TEST: CPT | Performed by: EMERGENCY MEDICINE

## 2024-08-15 PROCEDURE — 81001 URINALYSIS AUTO W/SCOPE: CPT | Performed by: EMERGENCY MEDICINE

## 2024-08-15 PROCEDURE — 2500000001 HC RX 250 WO HCPCS SELF ADMINISTERED DRUGS (ALT 637 FOR MEDICARE OP): Performed by: STUDENT IN AN ORGANIZED HEALTH CARE EDUCATION/TRAINING PROGRAM

## 2024-08-15 PROCEDURE — 2500000004 HC RX 250 GENERAL PHARMACY W/ HCPCS (ALT 636 FOR OP/ED): Performed by: STUDENT IN AN ORGANIZED HEALTH CARE EDUCATION/TRAINING PROGRAM

## 2024-08-15 PROCEDURE — G0378 HOSPITAL OBSERVATION PER HR: HCPCS

## 2024-08-15 PROCEDURE — 99285 EMERGENCY DEPT VISIT HI MDM: CPT | Mod: 25

## 2024-08-15 PROCEDURE — 99223 1ST HOSP IP/OBS HIGH 75: CPT | Performed by: STUDENT IN AN ORGANIZED HEALTH CARE EDUCATION/TRAINING PROGRAM

## 2024-08-15 PROCEDURE — 80053 COMPREHEN METABOLIC PANEL: CPT | Performed by: EMERGENCY MEDICINE

## 2024-08-15 PROCEDURE — 96365 THER/PROPH/DIAG IV INF INIT: CPT

## 2024-08-15 PROCEDURE — 96375 TX/PRO/DX INJ NEW DRUG ADDON: CPT

## 2024-08-15 PROCEDURE — 87086 URINE CULTURE/COLONY COUNT: CPT | Mod: ELYLAB | Performed by: EMERGENCY MEDICINE

## 2024-08-15 PROCEDURE — 74176 CT ABD & PELVIS W/O CONTRAST: CPT | Performed by: RADIOLOGY

## 2024-08-15 RX ORDER — CEFTRIAXONE 1 G/50ML
1 INJECTION, SOLUTION INTRAVENOUS EVERY 24 HOURS
Status: DISCONTINUED | OUTPATIENT
Start: 2024-08-16 | End: 2024-08-17 | Stop reason: HOSPADM

## 2024-08-15 RX ORDER — ONDANSETRON HYDROCHLORIDE 2 MG/ML
4 INJECTION, SOLUTION INTRAVENOUS EVERY 6 HOURS PRN
Status: DISCONTINUED | OUTPATIENT
Start: 2024-08-15 | End: 2024-08-17 | Stop reason: HOSPADM

## 2024-08-15 RX ORDER — PROCHLORPERAZINE EDISYLATE 5 MG/ML
10 INJECTION INTRAMUSCULAR; INTRAVENOUS EVERY 6 HOURS PRN
Status: DISCONTINUED | OUTPATIENT
Start: 2024-08-15 | End: 2024-08-17 | Stop reason: HOSPADM

## 2024-08-15 RX ORDER — HYDROXYZINE HYDROCHLORIDE 25 MG/1
50 TABLET, FILM COATED ORAL EVERY 4 HOURS PRN
Status: DISCONTINUED | OUTPATIENT
Start: 2024-08-15 | End: 2024-08-17 | Stop reason: HOSPADM

## 2024-08-15 RX ORDER — KETOROLAC TROMETHAMINE 30 MG/ML
15 INJECTION, SOLUTION INTRAMUSCULAR; INTRAVENOUS ONCE
Status: COMPLETED | OUTPATIENT
Start: 2024-08-15 | End: 2024-08-15

## 2024-08-15 RX ORDER — PROCHLORPERAZINE 25 MG/1
25 SUPPOSITORY RECTAL EVERY 12 HOURS PRN
Status: DISCONTINUED | OUTPATIENT
Start: 2024-08-15 | End: 2024-08-17 | Stop reason: HOSPADM

## 2024-08-15 RX ORDER — OXYCODONE HYDROCHLORIDE 5 MG/1
5 TABLET ORAL EVERY 6 HOURS PRN
Status: DISCONTINUED | OUTPATIENT
Start: 2024-08-15 | End: 2024-08-17 | Stop reason: HOSPADM

## 2024-08-15 RX ORDER — PANTOPRAZOLE SODIUM 40 MG/1
40 TABLET, DELAYED RELEASE ORAL
Status: DISCONTINUED | OUTPATIENT
Start: 2024-08-16 | End: 2024-08-17 | Stop reason: HOSPADM

## 2024-08-15 RX ORDER — ACETAMINOPHEN 160 MG/5ML
650 SOLUTION ORAL EVERY 4 HOURS PRN
Status: DISCONTINUED | OUTPATIENT
Start: 2024-08-15 | End: 2024-08-17 | Stop reason: HOSPADM

## 2024-08-15 RX ORDER — ACETAMINOPHEN 325 MG/1
650 TABLET ORAL EVERY 4 HOURS PRN
Status: DISCONTINUED | OUTPATIENT
Start: 2024-08-15 | End: 2024-08-17 | Stop reason: HOSPADM

## 2024-08-15 RX ORDER — OMEPRAZOLE 20 MG/1
1 CAPSULE, DELAYED RELEASE ORAL
COMMUNITY
Start: 2024-07-18 | End: 2024-08-15 | Stop reason: ENTERED-IN-ERROR

## 2024-08-15 RX ORDER — SODIUM CHLORIDE, SODIUM LACTATE, POTASSIUM CHLORIDE, CALCIUM CHLORIDE 600; 310; 30; 20 MG/100ML; MG/100ML; MG/100ML; MG/100ML
100 INJECTION, SOLUTION INTRAVENOUS CONTINUOUS
Status: ACTIVE | OUTPATIENT
Start: 2024-08-15 | End: 2024-08-16

## 2024-08-15 RX ORDER — MIRTAZAPINE 15 MG/1
15 TABLET, FILM COATED ORAL NIGHTLY
Status: DISCONTINUED | OUTPATIENT
Start: 2024-08-15 | End: 2024-08-17 | Stop reason: HOSPADM

## 2024-08-15 RX ORDER — SUCRALFATE 1 G/10ML
1 SUSPENSION ORAL 4 TIMES DAILY PRN
COMMUNITY

## 2024-08-15 RX ORDER — ACETAMINOPHEN 650 MG/1
650 SUPPOSITORY RECTAL EVERY 4 HOURS PRN
Status: DISCONTINUED | OUTPATIENT
Start: 2024-08-15 | End: 2024-08-17 | Stop reason: HOSPADM

## 2024-08-15 RX ORDER — KETOROLAC TROMETHAMINE 30 MG/ML
15 INJECTION, SOLUTION INTRAMUSCULAR; INTRAVENOUS EVERY 6 HOURS PRN
Status: DISCONTINUED | OUTPATIENT
Start: 2024-08-15 | End: 2024-08-17 | Stop reason: HOSPADM

## 2024-08-15 RX ORDER — ONDANSETRON HYDROCHLORIDE 2 MG/ML
4 INJECTION, SOLUTION INTRAVENOUS ONCE
Status: COMPLETED | OUTPATIENT
Start: 2024-08-15 | End: 2024-08-15

## 2024-08-15 RX ORDER — PROCHLORPERAZINE MALEATE 5 MG
10 TABLET ORAL EVERY 6 HOURS PRN
Status: DISCONTINUED | OUTPATIENT
Start: 2024-08-15 | End: 2024-08-17 | Stop reason: HOSPADM

## 2024-08-15 RX ORDER — POLYETHYLENE GLYCOL 3350 17 G/17G
17 POWDER, FOR SOLUTION ORAL DAILY PRN
Status: DISCONTINUED | OUTPATIENT
Start: 2024-08-15 | End: 2024-08-17 | Stop reason: HOSPADM

## 2024-08-15 SDOH — SOCIAL STABILITY: SOCIAL INSECURITY: HAVE YOU HAD THOUGHTS OF HARMING ANYONE ELSE?: NO

## 2024-08-15 SDOH — SOCIAL STABILITY: SOCIAL INSECURITY: ARE THERE ANY APPARENT SIGNS OF INJURIES/BEHAVIORS THAT COULD BE RELATED TO ABUSE/NEGLECT?: NO

## 2024-08-15 SDOH — SOCIAL STABILITY: SOCIAL INSECURITY: DOES ANYONE TRY TO KEEP YOU FROM HAVING/CONTACTING OTHER FRIENDS OR DOING THINGS OUTSIDE YOUR HOME?: NO

## 2024-08-15 SDOH — SOCIAL STABILITY: SOCIAL INSECURITY: HAS ANYONE EVER THREATENED TO HURT YOUR FAMILY OR YOUR PETS?: NO

## 2024-08-15 SDOH — SOCIAL STABILITY: SOCIAL INSECURITY: ARE YOU OR HAVE YOU BEEN THREATENED OR ABUSED PHYSICALLY, EMOTIONALLY, OR SEXUALLY BY ANYONE?: NO

## 2024-08-15 SDOH — SOCIAL STABILITY: SOCIAL INSECURITY: DO YOU FEEL ANYONE HAS EXPLOITED OR TAKEN ADVANTAGE OF YOU FINANCIALLY OR OF YOUR PERSONAL PROPERTY?: NO

## 2024-08-15 SDOH — SOCIAL STABILITY: SOCIAL INSECURITY: DO YOU FEEL UNSAFE GOING BACK TO THE PLACE WHERE YOU ARE LIVING?: NO

## 2024-08-15 SDOH — SOCIAL STABILITY: SOCIAL INSECURITY: WERE YOU ABLE TO COMPLETE ALL THE BEHAVIORAL HEALTH SCREENINGS?: YES

## 2024-08-15 SDOH — SOCIAL STABILITY: SOCIAL INSECURITY: ABUSE: ADULT

## 2024-08-15 SDOH — SOCIAL STABILITY: SOCIAL INSECURITY: HAVE YOU HAD ANY THOUGHTS OF HARMING ANYONE ELSE?: NO

## 2024-08-15 ASSESSMENT — PATIENT HEALTH QUESTIONNAIRE - PHQ9
2. FEELING DOWN, DEPRESSED OR HOPELESS: SEVERAL DAYS
SUM OF ALL RESPONSES TO PHQ9 QUESTIONS 1 & 2: 2
1. LITTLE INTEREST OR PLEASURE IN DOING THINGS: SEVERAL DAYS

## 2024-08-15 ASSESSMENT — COGNITIVE AND FUNCTIONAL STATUS - GENERAL
TOILETING: A LITTLE
DRESSING REGULAR UPPER BODY CLOTHING: A LITTLE
DAILY ACTIVITIY SCORE: 20
DRESSING REGULAR LOWER BODY CLOTHING: A LITTLE
HELP NEEDED FOR BATHING: A LITTLE
CLIMB 3 TO 5 STEPS WITH RAILING: A LITTLE
MOBILITY SCORE: 24
PATIENT BASELINE BEDBOUND: NO
TOILETING: A LITTLE
DRESSING REGULAR UPPER BODY CLOTHING: A LITTLE
HELP NEEDED FOR BATHING: A LITTLE
MOBILITY SCORE: 23
DRESSING REGULAR LOWER BODY CLOTHING: A LITTLE
DAILY ACTIVITIY SCORE: 20

## 2024-08-15 ASSESSMENT — ACTIVITIES OF DAILY LIVING (ADL)
GROOMING: INDEPENDENT
HEARING - LEFT EAR: FUNCTIONAL
FEEDING YOURSELF: INDEPENDENT
TOILETING: NEEDS ASSISTANCE
LACK_OF_TRANSPORTATION: PATIENT DECLINED
JUDGMENT_ADEQUATE_SAFELY_COMPLETE_DAILY_ACTIVITIES: YES
DRESSING YOURSELF: INDEPENDENT
HEARING - RIGHT EAR: FUNCTIONAL
BATHING: INDEPENDENT
WALKS IN HOME: INDEPENDENT
ADEQUATE_TO_COMPLETE_ADL: YES
PATIENT'S MEMORY ADEQUATE TO SAFELY COMPLETE DAILY ACTIVITIES?: YES

## 2024-08-15 ASSESSMENT — LIFESTYLE VARIABLES
EVER FELT BAD OR GUILTY ABOUT YOUR DRINKING: NO
HAVE PEOPLE ANNOYED YOU BY CRITICIZING YOUR DRINKING: NO
AUDIT-C TOTAL SCORE: 0
HOW OFTEN DO YOU HAVE 6 OR MORE DRINKS ON ONE OCCASION: NEVER
SUBSTANCE_ABUSE_PAST_12_MONTHS: YES
SKIP TO QUESTIONS 9-10: 1
AUDIT-C TOTAL SCORE: 0
EVER HAD A DRINK FIRST THING IN THE MORNING TO STEADY YOUR NERVES TO GET RID OF A HANGOVER: NO
HOW MANY STANDARD DRINKS CONTAINING ALCOHOL DO YOU HAVE ON A TYPICAL DAY: PATIENT DOES NOT DRINK
PRESCIPTION_ABUSE_PAST_12_MONTHS: NO
HOW OFTEN DO YOU HAVE A DRINK CONTAINING ALCOHOL: NEVER

## 2024-08-15 ASSESSMENT — PAIN - FUNCTIONAL ASSESSMENT
PAIN_FUNCTIONAL_ASSESSMENT: 0-10

## 2024-08-15 ASSESSMENT — PAIN DESCRIPTION - PAIN TYPE: TYPE: ACUTE PAIN

## 2024-08-15 ASSESSMENT — PAIN SCALES - GENERAL
PAINLEVEL_OUTOF10: 5 - MODERATE PAIN
PAINLEVEL_OUTOF10: 7
PAINLEVEL_OUTOF10: 8
PAINLEVEL_OUTOF10: 3
PAINLEVEL_OUTOF10: 8
PAINLEVEL_OUTOF10: 6
PAINLEVEL_OUTOF10: 1

## 2024-08-15 ASSESSMENT — PAIN DESCRIPTION - ORIENTATION
ORIENTATION: RIGHT
ORIENTATION: RIGHT
ORIENTATION: RIGHT;LOWER
ORIENTATION: RIGHT

## 2024-08-15 ASSESSMENT — PAIN DESCRIPTION - LOCATION
LOCATION: ABDOMEN
LOCATION: BACK
LOCATION: OTHER (COMMENT)
LOCATION: BACK

## 2024-08-15 ASSESSMENT — PAIN DESCRIPTION - DESCRIPTORS: DESCRIPTORS: ACHING

## 2024-08-15 NOTE — PROGRESS NOTES
Emergency Medicine Transition of Care Note.    I received Neela Elaine in signout from Dr. Schmitt.  Please see the previous ED provider note for all HPI, PE and MDM up to the time of signout at 0700. This is in addition to the primary record.    In brief Neela Elaine is an 44 y.o. female presenting for   Chief Complaint   Patient presents with    Flank Pain     Right flank pain since last night.      At the time of signout we were awaiting: CT    Diagnoses as of 08/15/24 0854   Flank pain   Pyelonephritis   Renal stone       Medical Decision Making      Final diagnoses:   [R10.9] Flank pain   [N12] Pyelonephritis   [N20.0] Renal stone     44-year-old female endorsed over to me by the previous emergency physician for results and reevaluation.  Patient states that she has been having right-sided flank pain for 2 days.  On my evaluation she states that the medication she was given previously has been helping her pain.  Her abdomen is otherwise benign.  I did look at the labs and she did his left a leukocytosis with a white count of 20.  In looking at old records this appears to be new.  Patient also has signs of a UTI with positive leukocyte esterase greater than 50 WBCs and greater than 50 RBCs.  I also reviewed the CT scan which showed a questionable punctate right renal stone with some collecting system fullness.  Given these findings in the setting of infection I did speak to urology Dr. Moise.  At this time it is not felt that the patient needs any emergent intervention.  Patient will be admitted to medicine for IV antibiotics and IV pain medication.      Procedure  Procedures    Namita Guerrero MD

## 2024-08-15 NOTE — CONSULTS
Urology consult note for Thursday, 8/15/2024    SUBJECTIVE: Pleasant 44-year-old  female currently being referred urologically for UTI/right-sided pyelonephritis associated with right flank pain and nausea  Clinically the patient reports to me that she has had occasional bladder/UTIs in the past but this is the first time that she has had a UTI that has resulted in aching right-sided flank pain for the past several days  Urine is otherwise clear/concentrated no hematuria no fever no chills  Normally has good urinary control  Additional medical comorbidities as listed and reviewed     OBJECTIVE:   On today's visit the patient is lying in bed, the abdomen is generally soft but she clearly points to the right CVA angle and flank area as moderate discomfort, improved somewhat over admission  Urinalysis is grossly positive for pyuria, final culture sensitivity pending  Renal function normal with serum creatinine of 0.74  CT scan was reviewed personally and agree with the report as noted, there is perhaps some stranding edema and thickening of the right kidney compared to the left, no stones no obstruction no hydronephrosis  Currently on Rocephin antibiotic prophylaxis    ASSESSMENT/PLAN    Clinically patient seems to have a UTI with right-sided pyelonephritis and flank pain  Final culture sensitivity pending and continue Rocephin until culture sensitivities returned and adjust appropriately  Recommend continued antibiotics until the patient clinically has improved and her flank pain is improved as well  Follow-up on an outpatient basis in approximately 1 month with a kidney bladder ultrasound and PVR, routine lab work including repeat urinalysis culture  Periodically thereafter depending on clinical findings and follow-up    Thank you for allowing us to participate in the patient's care and management and we will follow her along urologically with you    Additional medical and historical objective data reviewed  and listed below      Current Facility-Administered Medications:     acetaminophen (Tylenol) tablet 650 mg, 650 mg, oral, q4h PRN, 650 mg at 08/15/24 1212 **OR** acetaminophen (Tylenol) oral liquid 650 mg, 650 mg, nasogastric tube, q4h PRN **OR** acetaminophen (Tylenol) suppository 650 mg, 650 mg, rectal, q4h PRN, Sin Pearce MD    [START ON 8/16/2024] cefTRIAXone (Rocephin) 1 g in dextrose (iso) IV 50 mL, 1 g, intravenous, q24h, Sin Pearce MD    hydrOXYzine HCL (Atarax) tablet 50 mg, 50 mg, oral, q4h PRN, Sin Pearce MD, 50 mg at 08/15/24 1212    ketorolac (Toradol) injection 15 mg, 15 mg, intravenous, q6h PRN, Sin Pearce MD    mirtazapine (Remeron) tablet 15 mg, 15 mg, oral, Nightly, Sin Pearce MD    ondansetron (Zofran) injection 4 mg, 4 mg, intravenous, q6h PRN, Sin Pearce MD, 4 mg at 08/15/24 1202    oxyCODONE (Roxicodone) immediate release tablet 5 mg, 5 mg, oral, q6h PRN, Sin Pearce MD, 5 mg at 08/15/24 1045    [START ON 8/16/2024] pantoprazole (ProtoNix) EC tablet 40 mg, 40 mg, oral, Daily before breakfast, Sin Pearce MD    polyethylene glycol (Glycolax, Miralax) packet 17 g, 17 g, oral, Daily PRN, Sin Pearce MD     Results for orders placed or performed during the hospital encounter of 08/15/24 (from the past 96 hour(s))   hCG, Urine, Qualitative   Result Value Ref Range    HCG, Urine NEGATIVE NEGATIVE   CBC and Auto Differential   Result Value Ref Range    WBC 20.6 (H) 4.4 - 11.3 x10*3/uL    nRBC 0.0 0.0 - 0.0 /100 WBCs    RBC 5.31 (H) 4.00 - 5.20 x10*6/uL    Hemoglobin 13.8 12.0 - 16.0 g/dL    Hematocrit 40.5 36.0 - 46.0 %    MCV 76 (L) 80 - 100 fL    MCH 26.0 26.0 - 34.0 pg    MCHC 34.1 32.0 - 36.0 g/dL    RDW 16.1 (H) 11.5 - 14.5 %    Platelets 313 150 - 450 x10*3/uL    Neutrophils % 86.1 40.0 - 80.0 %    Immature Granulocytes %, Automated 0.7 0.0 - 0.9 %    Lymphocytes % 5.6 13.0 - 44.0 %    Monocytes % 7.2 2.0 - 10.0 %    Eosinophils % 0.2 0.0 - 6.0 %    Basophils %  0.2 0.0 - 2.0 %    Neutrophils Absolute 17.73 (H) 1.20 - 7.70 x10*3/uL    Immature Granulocytes Absolute, Automated 0.14 0.00 - 0.70 x10*3/uL    Lymphocytes Absolute 1.16 (L) 1.20 - 4.80 x10*3/uL    Monocytes Absolute 1.49 (H) 0.10 - 1.00 x10*3/uL    Eosinophils Absolute 0.04 0.00 - 0.70 x10*3/uL    Basophils Absolute 0.04 0.00 - 0.10 x10*3/uL   Comprehensive metabolic panel   Result Value Ref Range    Glucose 123 (H) 74 - 99 mg/dL    Sodium 137 136 - 145 mmol/L    Potassium 3.5 3.5 - 5.3 mmol/L    Chloride 102 98 - 107 mmol/L    Bicarbonate 23 21 - 32 mmol/L    Anion Gap 16 10 - 20 mmol/L    Urea Nitrogen 7 6 - 23 mg/dL    Creatinine 0.74 0.50 - 1.05 mg/dL    eGFR >90 >60 mL/min/1.73m*2    Calcium 9.8 8.6 - 10.3 mg/dL    Albumin 4.5 3.4 - 5.0 g/dL    Alkaline Phosphatase 77 33 - 110 U/L    Total Protein 8.6 (H) 6.4 - 8.2 g/dL    AST 12 9 - 39 U/L    Bilirubin, Total 1.1 0.0 - 1.2 mg/dL    ALT 9 7 - 45 U/L   Lipase   Result Value Ref Range    Lipase 15 9 - 82 U/L   Lactate   Result Value Ref Range    Lactate 2.0 0.4 - 2.0 mmol/L   Amylase   Result Value Ref Range    Amylase 65 29 - 103 U/L   Urinalysis with Reflex Culture and Microscopic   Result Value Ref Range    Color, Urine Light-Yellow Light-Yellow, Yellow, Dark-Yellow    Appearance, Urine Turbid (N) Clear    Specific Gravity, Urine 1.010 1.005 - 1.035    pH, Urine 8.5 (N) 5.0, 5.5, 6.0, 6.5, 7.0, 7.5, 8.0    Protein, Urine 100 (2+) (A) NEGATIVE, 10 (TRACE), 20 (TRACE) mg/dL    Glucose, Urine Normal Normal mg/dL    Blood, Urine 0.06 (1+) (A) NEGATIVE    Ketones, Urine 40 (2+) (A) NEGATIVE mg/dL    Bilirubin, Urine NEGATIVE NEGATIVE    Urobilinogen, Urine Normal Normal mg/dL    Nitrite, Urine NEGATIVE NEGATIVE    Leukocyte Esterase, Urine 500 Lola/µL (A) NEGATIVE   Microscopic Only, Urine   Result Value Ref Range    WBC, Urine >50 (A) 1-5, NONE /HPF    RBC, Urine >20 (A) NONE, 1-2, 3-5 /HPF    Squamous Epithelial Cells, Urine 1-9 (SPARSE) Reference range not  established. /[facility]     CT abdomen pelvis wo IV contrast    Result Date: 8/15/2024  Interpreted By:  Eliza Emmanuel, STUDY: CT ABDOMEN PELVIS WO IV CONTRAST;  8/15/2024 8:07 am   INDICATION: Signs/Symptoms:Right flank pain.   COMPARISON: 06/1924   ACCESSION NUMBER(S): [id number]   ORDERING CLINICIAN: ALEXANDER CRYSTAL   TECHNIQUE: CT of the abdomen and pelvis was performed. Sagittal and coronal reconstructions were generated. No intravenous contrast given for the exam.   FINDINGS: Solid organ and vessel evaluation limited without IV contrast.   ABDOMINAL ORGANS:   LIVER: No focal lesion within limits of unenhanced exam.   GALL BLADDER AND BILIARY TREE: No calcified gallstone   SPLEEN: No focal lesion within limits of unenhanced exam.   PANCREAS: No focal lesion within limits of unenhanced exam.   ADRENALS: No adrenal mass   KIDNEYS AND URETERS: No focal renal mass within limits of unenhanced exam. Mild fullness and wall thickening in the proximal right renal collecting system. Possible punctate stone in the inferior pole of the right kidney. Limited delineation of the distal ureters. No obvious ureteral calculus.   BOWEL: No abnormally dilated large or small bowel loops. Probable prominent colonic submucosal fat similar to the prior exam.   PERITONEUM, RETROPERITONEUM, NODES: No significant free fluid. No free air. No significant retroperitoneal adenopathy within limits of unenhanced exam.   VESSELS:  Lack of IV contrast precludes vascular luminal assessment. Scattered atherosclerotic calcifications. No abdominal aortic aneurysm.   PELVIS: Partially distended slightly thick-walled urinary bladder. Prominent anteverted uterus. Subtle central uterine hypodensity. Small rounded hypodensities in each adnexa measuring up to 2 cm on the right.   ABDOMINAL WALL: No sizable abdominal wall hernia.   BONES: Stable subcentimeter right acetabular sclerotic lesion, possible bone island. No new focal concerning lytic or blastic  osseous lesion.   LOWER CHEST: No airspace consolidation or pleural effusion in the included areas.       Possible punctate right kidney stone. Mild proximal right renal collecting system fullness with wall thickening consistent with UTI. Diffuse bladder wall thickening could be related to poor distention however could also reflect trabeculation and/or cystitis.   Prominent colonic submucosal fat which can be seen with chronic inflammatory disease.   Hypodensities in the central uterus and both adnexa, likely physiologic endometrial fluid or thickening and ovarian cysts, respectively, in a patient of this age.   Additional findings as described above.   MACRO: None.   Signed by: Eliza Emmanuel 8/15/2024 8:31 AM Dictation workstation:   [id number]

## 2024-08-15 NOTE — PROGRESS NOTES
08/15/24 1651   Discharge Planning   Living Arrangements Children   Support Systems Children   Assistance Needed PTA - none   Type of Residence Private residence   Do you have animals or pets at home? Yes   Home or Post Acute Services None   Expected Discharge Disposition Home   Does the patient need discharge transport arranged? No     HOME

## 2024-08-15 NOTE — H&P
Medical Group History and Physical  ASSESSMENT & PLAN:     Acute pyelonephritis  Complicated UTI  - Presenting from home with chief complaints of sudden onset right flank pain, chills, nausea and vomiting that began at midnight  - Urinalysis grossly positive  - CT abdomen/pelvis without IV contrast with concerns for this as well  - Follow-up urine cultures  - Continue ceftriaxone IV    Right nephrolithiasis  - Urology consulted  - Imaging as per above and plan as per above    Leukocytosis  - WBC count 20.6 this morning  - Likely in setting above, continue to trend    GERD  General anxiety  - Resume home medications once reconciled  - Continue protonix - will provide a couple doses of toradol PRN as she had good relief with med earlier today    VTE prophylaxis: SCDs (low VTE risk low)      ---Of note, this documentation is completed using the Dragon Dictation system (voice recognition software). There may be spelling and/or grammatical errors that were not corrected prior to final submission.---    Sin Pearce MD    HISTORY OF PRESENT ILLNESS:   Chief Complaint: Right flank pain    History Of Present Illness:    Neela Elaine is a 44 y.o. female with a significant past medical history of general anxiety, GERD that presented from home with chief complaints of sudden onset right flank pain overnight.  She states the pain began around midnight last night was tight and stabbing in nature.  Did have associated chills with nausea and vomiting.  Also has dysuria.  Feels significantly better this morning after dose of Toradol in the ED.  Denies having prior history of kidney stones and pyelonephritis.    ED course:  - Vitals: Temperature 98.6, , /82, RR 18 satting 100% on room air  - Labs: Unremarkable CMP.  WBC count of 20.6 otherwise unremarkable CBC.  Urinalysis grossly positive.  - Imaging: CT abdomen/pelvis with IV contrast with a possible right kidney stone along with right renal  "collecting system fullness and diffuse bladder wall thickening concerning for UTI/pyelonephritis.     Review of systems: 10 point review of systems is otherwise negative except as mentioned above.    PAST HISTORIES:     Past Medical History: GERD, gastritis, anxiety    Past Surgical History:  x 2      Social History: Denies previous tobacco smoking use history.  Denies current alcohol use.  Smokes marijuana daily.  Lives at home with her 2 kids.  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    Family History: Other with hypertension.  Father she does not know.     Allergies: Aripiprazole and Fluoxetine    OBJECTIVE:     Last Recorded Vitals:  Vitals:    08/15/24 0635   BP: 121/82   Patient Position: Sitting   Pulse: (!) 107   Resp: 18   Temp: 37 °C (98.6 °F)   TempSrc: Temporal   SpO2: 100%   Weight: 61.2 kg (135 lb)   Height: 1.626 m (5' 4\")     Last I/O:  No intake/output data recorded.    Physical Exam  Vitals reviewed.   Constitutional:       General: She is not in acute distress.     Appearance: Normal appearance. She is not toxic-appearing.   HENT:      Head: Normocephalic and atraumatic.      Nose: Nose normal.      Mouth/Throat:      Mouth: Mucous membranes are moist.      Pharynx: Oropharynx is clear.      Comments: Dentition okay, cavities present.  Eyes:      Extraocular Movements: Extraocular movements intact.      Conjunctiva/sclera: Conjunctivae normal.      Pupils: Pupils are equal, round, and reactive to light.   Cardiovascular:      Rate and Rhythm: Normal rate and regular rhythm.      Pulses: Normal pulses.      Heart sounds: Normal heart sounds.   Pulmonary:      Effort: Pulmonary effort is normal. No respiratory distress.      Breath sounds: Normal breath sounds. No wheezing.   Abdominal:      General: Bowel sounds are normal. There is no distension.      Palpations: Abdomen is soft.      Tenderness: There is abdominal tenderness. There is no guarding.      Comments: Right abdominal and suprapubic " tenderness to deep palpation.   Genitourinary:     Comments: Right CVA tenderness.  Musculoskeletal:         General: Normal range of motion.      Cervical back: Normal range of motion and neck supple.   Neurological:      General: No focal deficit present.      Mental Status: She is alert and oriented to person, place, and time. Mental status is at baseline.   Psychiatric:         Mood and Affect: Mood normal.         Behavior: Behavior normal.         Thought Content: Thought content normal.         Scheduled Medications  cefTRIAXone, 2 g, intravenous, Once      PRN Medications    Continuous Medications       Outpatient Medications:  Prior to Admission medications    Medication Sig Start Date End Date Taking? Authorizing Provider   ascorbic acid (Vitamin C) 100 mg tablet Take 1 tablet (100 mg) by mouth once daily.    Historical Provider, MD   mirtazapine (Remeron) 15 mg tablet Take 1 tablet (15 mg) by mouth once daily at bedtime.    Historical Provider, MD   omeprazole (PriLOSEC) 20 mg capsule,delayed release(DR/EC) Take 1 capsule (20 mg) by mouth once daily in the morning. Take before meals. 7/18/24 7/18/25  Zbigniew Tamez MD   ondansetron ODT (Zofran-ODT) 4 mg disintegrating tablet Take 1 tablet (4 mg) by mouth every 8 hours if needed. 2/2/23   Historical Provider, MD       LABS AND IMAGING:     Labs:  Results from last 7 days   Lab Units 08/15/24  0651   WBC AUTO x10*3/uL 20.6*   RBC AUTO x10*6/uL 5.31*   HEMOGLOBIN g/dL 13.8   HEMATOCRIT % 40.5   MCV fL 76*   MCH pg 26.0   MCHC g/dL 34.1   RDW % 16.1*   PLATELETS AUTO x10*3/uL 313     Results from last 7 days   Lab Units 08/15/24  0651   SODIUM mmol/L 137   POTASSIUM mmol/L 3.5   CHLORIDE mmol/L 102   CO2 mmol/L 23   BUN mg/dL 7   CREATININE mg/dL 0.74   GLUCOSE mg/dL 123*   PROTEIN TOTAL g/dL 8.6*   CALCIUM mg/dL 9.8   BILIRUBIN TOTAL mg/dL 1.1   ALK PHOS U/L 77   AST U/L 12   ALT U/L 9               Imaging:  CT abdomen pelvis wo IV  contrast  Narrative: Interpreted By:  Elzia Emmanuel,   STUDY:  CT ABDOMEN PELVIS WO IV CONTRAST;  8/15/2024 8:07 am      INDICATION:  Signs/Symptoms:Right flank pain.      COMPARISON:  06/1924      ACCESSION NUMBER(S):  [id number]      ORDERING CLINICIAN:  ALEXANDER CRYSTAL      TECHNIQUE:  CT of the abdomen and pelvis was performed. Sagittal and coronal  reconstructions were generated. No intravenous contrast given for the  exam.      FINDINGS:  Solid organ and vessel evaluation limited without IV contrast.      ABDOMINAL ORGANS:      LIVER: No focal lesion within limits of unenhanced exam.      GALL BLADDER AND BILIARY TREE: No calcified gallstone      SPLEEN: No focal lesion within limits of unenhanced exam.      PANCREAS: No focal lesion within limits of unenhanced exam.      ADRENALS: No adrenal mass      KIDNEYS AND URETERS: No focal renal mass within limits of unenhanced  exam. Mild fullness and wall thickening in the proximal right renal  collecting system. Possible punctate stone in the inferior pole of  the right kidney. Limited delineation of the distal ureters. No  obvious ureteral calculus.      BOWEL: No abnormally dilated large or small bowel loops. Probable  prominent colonic submucosal fat similar to the prior exam.      PERITONEUM, RETROPERITONEUM, NODES: No significant free fluid. No  free air. No significant retroperitoneal adenopathy within limits of  unenhanced exam.      VESSELS:  Lack of IV contrast precludes vascular luminal assessment.  Scattered atherosclerotic calcifications. No abdominal aortic  aneurysm.      PELVIS: Partially distended slightly thick-walled urinary bladder.  Prominent anteverted uterus. Subtle central uterine hypodensity.  Small rounded hypodensities in each adnexa measuring up to 2 cm on  the right.      ABDOMINAL WALL: No sizable abdominal wall hernia.      BONES: Stable subcentimeter right acetabular sclerotic lesion,  possible bone island. No new focal concerning  lytic or blastic  osseous lesion.      LOWER CHEST: No airspace consolidation or pleural effusion in the  included areas.      Impression: Possible punctate right kidney stone. Mild proximal right renal  collecting system fullness with wall thickening consistent with UTI.  Diffuse bladder wall thickening could be related to poor distention  however could also reflect trabeculation and/or cystitis.      Prominent colonic submucosal fat which can be seen with chronic  inflammatory disease.      Hypodensities in the central uterus and both adnexa, likely  physiologic endometrial fluid or thickening and ovarian cysts,  respectively, in a patient of this age.      Additional findings as described above.      MACRO:  None.      Signed by: Eliza Emmanuel 8/15/2024 8:31 AM  Dictation workstation:   [id number]

## 2024-08-15 NOTE — CARE PLAN
Problem: Pain - Adult  Goal: Verbalizes/displays adequate comfort level or baseline comfort level  8/15/2024 1836 by Rebecca Zapata RN  Outcome: Progressing  8/15/2024 1230 by Rebecca Zapata RN  Flowsheets (Taken 8/15/2024 1230)  Verbalizes/displays adequate comfort level or baseline comfort level:   Encourage patient to monitor pain and request assistance   Assess pain using appropriate pain scale   Administer analgesics based on type and severity of pain and evaluate response   Implement non-pharmacological measures as appropriate and evaluate response   Consider cultural and social influences on pain and pain management   Notify Licensed Independent Practitioner if interventions unsuccessful or patient reports new pain     Problem: Safety - Adult  Goal: Free from fall injury  8/15/2024 1836 by Rebecca Zapata RN  Outcome: Progressing  8/15/2024 1230 by Rebecca Zapata RN  Flowsheets (Taken 8/15/2024 1230)  Free from fall injury: Instruct family/caregiver on patient safety     Problem: Discharge Planning  Goal: Discharge to home or other facility with appropriate resources  8/15/2024 1836 by Rebecca Zapata RN  Outcome: Progressing  8/15/2024 1230 by Rebecca Zapata RN  Flowsheets (Taken 8/15/2024 1230)  Discharge to home or other facility with appropriate resources:   Identify barriers to discharge with patient and caregiver   Arrange for needed discharge resources and transportation as appropriate   Identify discharge learning needs (meds, wound care, etc)   Arrange for interpreters to assist at discharge as needed   Refer to discharge planning if patient needs post-hospital services based on physician order or complex needs related to functional status, cognitive ability or social support system     Problem: Chronic Conditions and Co-morbidities  Goal: Patient's chronic conditions and co-morbidity symptoms are monitored and maintained or improved  8/15/2024 1836 by Rebecca Zapata RN  Outcome:  Progressing  8/15/2024 1230 by Rebecca Zapata RN  Flowsheets (Taken 8/15/2024 1230)  Care Plan - Patient's Chronic Conditions and Co-Morbidity Symptoms are Monitored and Maintained or Improved:   Monitor and assess patient's chronic conditions and comorbid symptoms for stability, deterioration, or improvement   Collaborate with multidisciplinary team to address chronic and comorbid conditions and prevent exacerbation or deterioration   Update acute care plan with appropriate goals if chronic or comorbid symptoms are exacerbated and prevent overall improvement and discharge     Problem: Pain  Goal: Turns in bed with improved pain control throughout the shift  Outcome: Progressing  Goal: Walks with improved pain control throughout the shift  Outcome: Progressing  Goal: Performs ADL's with improved pain control throughout shift  Outcome: Progressing  Goal: Free from opioid side effects throughout the shift  Outcome: Progressing  Goal: Free from acute confusion related to pain meds throughout the shift  Outcome: Progressing     Problem: Nutrition  Goal: Oral intake greater 75%  Outcome: Progressing  Goal: Consume prescribed supplement  Outcome: Progressing  Goal: Adequate PO fluid intake  Outcome: Progressing  Goal: Lab values WNL  Outcome: Progressing  Goal: Electrolytes WNL  Outcome: Progressing  Goal: Promote healing  Outcome: Progressing   The patient's goals for the shift include control of pain and nausea.     The clinical goals for the shift include Patient will be afebrile by the end of this shift.    Over the shift, the patient did make progress toward care plan goals.

## 2024-08-15 NOTE — ED PROVIDER NOTES
HPI   Chief Complaint   Patient presents with    Flank Pain     Right flank pain since last night.        Chief complaint flank pain  History of present illness this is a 44-year-old  female who is here with right flank pain rating to right lower quadrant had his onset last evening progressively getting worse with slight nausea she denies a pleuritic chest pain.  Denies any cough congestion she has had a  in the past acid reflux and gastritis.              Patient History   Past Medical History:   Diagnosis Date    Acid reflux     Gastritis      Past Surgical History:   Procedure Laterality Date     SECTION, CLASSIC  1999     SECTION, CLASSIC  10/21/2003     Family History   Problem Relation Name Age of Onset    Hypertension Mother       Social History     Tobacco Use    Smoking status: Never     Passive exposure: Never    Smokeless tobacco: Never   Vaping Use    Vaping status: Never Used   Substance Use Topics    Alcohol use: Never    Drug use: Yes     Types: Marijuana       Physical Exam   ED Triage Vitals   Temp Pulse Resp BP   -- -- -- --      SpO2 Temp src Heart Rate Source Patient Position   -- -- -- --      BP Location FiO2 (%)     -- --       Physical Exam  Vitals and nursing note reviewed. Exam conducted with a chaperone present.   Constitutional:       General: She is in acute distress.      Appearance: Normal appearance.   HENT:      Head: Normocephalic and atraumatic.      Nose: Nose normal.      Mouth/Throat:      Mouth: Mucous membranes are moist.   Eyes:      Pupils: Pupils are equal, round, and reactive to light.   Cardiovascular:      Rate and Rhythm: Normal rate and regular rhythm.   Pulmonary:      Effort: Pulmonary effort is normal.   Abdominal:      Palpations: Abdomen is soft.      Tenderness: There is right CVA tenderness.   Musculoskeletal:         General: Normal range of motion.      Cervical back: Normal range of motion.   Skin:     General: Skin is  dry.      Capillary Refill: Capillary refill takes 2 to 3 seconds.   Neurological:      General: No focal deficit present.      Mental Status: She is alert.   Psychiatric:         Mood and Affect: Mood normal.           ED Course & MDM   Diagnoses as of 08/16/24 2205   Flank pain   Pyelonephritis   Renal stone                 No data recorded     [place] Coma Scale Score: 15 (08/15/24 2110 : Beverley Ruiz, RAUL)                           Medical Decision Making  Differential diagnosis  Pyelonephritis  Ureteral stone  Perinephric abscess  Ovarian cyst  Considering the above differential diagnosis following tests and treatments were considered in order with shared decision making  CBC electrolytes hepatic panel amylase lipase lactic acid urinalysis urine pregnancy  CT of the abdomen renal protocol  IV fluids IV Toradol IV Zofran      Labs Reviewed   URINE CULTURE - Abnormal       Result Value    Urine Culture 20,000 - 80,000 Gram Negative Bacilli (*)    CBC WITH AUTO DIFFERENTIAL - Abnormal    WBC 20.6 (*)     nRBC 0.0      RBC 5.31 (*)     Hemoglobin 13.8      Hematocrit 40.5      MCV 76 (*)     MCH 26.0      MCHC 34.1      RDW 16.1 (*)     Platelets 313      Neutrophils % 86.1      Immature Granulocytes %, Automated 0.7      Lymphocytes % 5.6      Monocytes % 7.2      Eosinophils % 0.2      Basophils % 0.2      Neutrophils Absolute 17.73 (*)     Immature Granulocytes Absolute, Automated 0.14      Lymphocytes Absolute 1.16 (*)     Monocytes Absolute 1.49 (*)     Eosinophils Absolute 0.04      Basophils Absolute 0.04     COMPREHENSIVE METABOLIC PANEL - Abnormal    Glucose 123 (*)     Sodium 137      Potassium 3.5      Chloride 102      Bicarbonate 23      Anion Gap 16      Urea Nitrogen 7      Creatinine 0.74      eGFR >90      Calcium 9.8      Albumin 4.5      Alkaline Phosphatase 77      Total Protein 8.6 (*)     AST 12      Bilirubin, Total 1.1      ALT 9     URINALYSIS WITH REFLEX CULTURE AND MICROSCOPIC -  Abnormal    Color, Urine Light-Yellow      Appearance, Urine Turbid (*)     Specific Gravity, Urine 1.010      pH, Urine 8.5 (*)     Protein, Urine 100 (2+) (*)     Glucose, Urine Normal      Blood, Urine 0.06 (1+) (*)     Ketones, Urine 40 (2+) (*)     Bilirubin, Urine NEGATIVE      Urobilinogen, Urine Normal      Nitrite, Urine NEGATIVE      Leukocyte Esterase, Urine 500 Lola/µL (*)    MICROSCOPIC ONLY, URINE - Abnormal    WBC, Urine >50 (*)     RBC, Urine >20 (*)     Squamous Epithelial Cells, Urine 1-9 (SPARSE)     BASIC METABOLIC PANEL - Abnormal    Glucose 108 (*)     Sodium 137      Potassium 3.0 (*)     Chloride 104      Bicarbonate 25      Anion Gap 11      Urea Nitrogen 6      Creatinine 0.74      eGFR >90      Calcium 9.0     CBC WITH AUTO DIFFERENTIAL - Abnormal    WBC 13.8 (*)     nRBC 0.0      RBC 4.52      Hemoglobin 11.7 (*)     Hematocrit 34.4 (*)     MCV 76 (*)     MCH 25.9 (*)     MCHC 34.0      RDW 16.1 (*)     Platelets 229      Neutrophils % 86.3      Immature Granulocytes %, Automated 0.4      Lymphocytes % 5.2      Monocytes % 7.3      Eosinophils % 0.6      Basophils % 0.2      Neutrophils Absolute 11.88 (*)     Immature Granulocytes Absolute, Automated 0.06      Lymphocytes Absolute 0.72 (*)     Monocytes Absolute 1.00      Eosinophils Absolute 0.08      Basophils Absolute 0.03     HCG, URINE, QUALITATIVE - Normal    HCG, Urine NEGATIVE     LIPASE - Normal    Lipase 15      Narrative:     Venipuncture immediately after or during the administration of Metamizole may lead to falsely low results. Testing should be performed immediately prior to Metamizole dosing.   LACTATE - Normal    Lactate 2.0      Narrative:     Venipuncture immediately after or during the administration of Metamizole may lead to falsely low results. Testing should be performed immediately  prior to Metamizole dosing.   AMYLASE - Normal    Amylase 65     MAGNESIUM - Normal    Magnesium 1.86     URINALYSIS WITH REFLEX  CULTURE AND MICROSCOPIC    Narrative:     The following orders were created for panel order Urinalysis with Reflex Culture and Microscopic.  Procedure                               Abnormality         Status                     ---------                               -----------         ------                     Urinalysis with Reflex C...[[id number]]  Abnormal            Final result               Extra Urine Gray Tube[[id number]]                            Final result                 Please view results for these tests on the individual orders.   EXTRA URINE GRAY TUBE    Extra Tube Hold for add-ons.     BASIC METABOLIC PANEL   CBC WITH AUTO DIFFERENTIAL   MAGNESIUM        CT abdomen pelvis wo IV contrast   Final Result   Possible punctate right kidney stone. Mild proximal right renal   collecting system fullness with wall thickening consistent with UTI.   Diffuse bladder wall thickening could be related to poor distention   however could also reflect trabeculation and/or cystitis.        Prominent colonic submucosal fat which can be seen with chronic   inflammatory disease.        Hypodensities in the central uterus and both adnexa, likely   physiologic endometrial fluid or thickening and ovarian cysts,   respectively, in a patient of this age.        Additional findings as described above.        MACRO:   None.        Signed by: Eliza Emmanuel 8/15/2024 8:31 AM   Dictation workstation:   [id number]           Procedure  Procedures     Geovani Schmitt MD  08/16/24 6032

## 2024-08-15 NOTE — CONSULTS
"Nutrition Initial Assessment:   Nutrition Assessment    Reason for Assessment: Admission nursing screening    Patient is a 44 y.o. female presenting with pyelonephritis      Nutrition History:  Energy Intake:  (no meal intakes recorded at this time)  Food and Nutrient History: RDN consult for MST score of 2. Pt reports 8 lb wt loss in the past 2 weeks due to poor po intake and decreased appetite. Pt states she has only been eating 1 meal/day, will usually have 3. Pt denies following diet restrictions, states she has had Ensure in the past but [employer] no longer covering. Pt states she avoids spicey or acidic foods. Pt reports vomiting, denies diarrhea. Pt denies chewing or swallowing difficulty. Pt agreeable to adding Ensure BID - will follow for acceptance.  Vitamin/Herbal Supplement Use: vitamin C per home med list  Food Allergies/Intolerances:  None  GI Symptoms: Vomiting  Oral Problems: None       Anthropometrics:  Height: 162.6 cm (5' 4.02\")   Weight: 60.6 kg (133 lb 9.6 oz)   BMI (Calculated): 22.92  IBW/kg (Dietitian Calculated): 54.5 kg  Percent of IBW: 111 %       Weight History:   Wt Readings from Last 10 Encounters:   08/15/24 60.6 kg (133 lb 9.6 oz)   07/18/24 60.9 kg (134 lb 3.2 oz)   06/20/24 61.1 kg (134 lb 9.6 oz)      Weight Change %:  Weight History / % Weight Change: no significant wt change in 1 month or 2 months per chart review  Significant Weight Loss: No    Nutrition Focused Physical Exam Findings:    Subcutaneous Fat Loss:   Orbital Fat Pads: Well nourished (slightly bulging fat pads)  Buccal Fat Pads: Well nourished (full, rounded cheeks)  Triceps: Well nourished (ample fat tissue)  Muscle Wasting:  Temporalis: Mild-Moderate (slight depression)  Pectoralis (Clavicular Region): Well nourished (clavicle not visible)  Deltoid/Trapezius: Well nourished (rounded appearance at arm, shoulder, neck)  Interosseous: Well nourished (muscle bulges)  Edema:  Edema: none  Physical Findings:  Skin: " Negative    Nutrition Significant Labs:  BMP Trend:   Results from last 7 days   Lab Units 08/15/24  0651   GLUCOSE mg/dL 123*   CALCIUM mg/dL 9.8   SODIUM mmol/L 137   POTASSIUM mmol/L 3.5   CO2 mmol/L 23   CHLORIDE mmol/L 102   BUN mg/dL 7   CREATININE mg/dL 0.74    , A1C:  Lab Results   Component Value Date    HGBA1C 5.7 (H) 08/05/2023   , BG POCT trend:        Nutrition Specific Medications:  Scheduled medications  [START ON 8/16/2024] cefTRIAXone, 1 g, intravenous, q24h  mirtazapine, 15 mg, oral, Nightly  [START ON 8/16/2024] pantoprazole, 40 mg, oral, Daily before breakfast        I/O:    ;      Dietary Orders (From admission, onward)       Start     Ordered    08/15/24 1125  Adult diet Regular  Diet effective now        Question:  Diet type  Answer:  Regular    08/15/24 1124                     Estimated Needs:   Total Energy Estimated Needs (kCal): 1515 kCal  Method for Estimating Needs: 25 kcal/kg ABW  Total Protein Estimated Needs (g): 61 g  Method for Estimating Needs: 1 g/kg ABW  Total Fluid Estimated Needs (mL): 1515 mL  Method for Estimating Needs: 1 ml/kcal or per MD        Nutrition Diagnosis   Malnutrition Diagnosis  Patient has Malnutrition Diagnosis: No    Nutrition Diagnosis  Patient has Nutrition Diagnosis: Yes  Diagnosis Status (1): New  Nutrition Diagnosis 1: Inadequate oral intake  Related to (1): acute illness, change in appetite  As Evidenced by (1): pt report of eating 1 meal/day for the past 2 weeks prior to admission       Nutrition Interventions/Recommendations         Nutrition Prescription:  Individualized Nutrition Prescription Provided for : Regular diet with ONS        Nutrition Interventions:   Interventions: Meals and snacks, Medical food supplement  Meals and Snacks: General healthful diet  Goal: Consumes 3 meals per day  Medical Food Supplement: Commercial beverage  Goal: Ensure Plus High Protein BID (provides 350 kcal, 20 g protein per serving)         Nutrition Education:    No needs at this time       Nutrition Monitoring and Evaluation   Food/Nutrient Related History Monitoring  Monitoring and Evaluation Plan: Energy intake, Amount of food, Fluid intake  Energy Intake: Estimated energy intake  Criteria: Pt meets >75% of estimated energy needs  Fluid Intake: Estimated fluid intake  Criteria: Meets >75% of estimated fluid needs  Amount of Food: Estimated amout of food, Medical food intake  Criteria: Pt consumes >75% of meals and supplements    Body Composition/Growth/Weight History  Monitoring and Evaluation Plan: Weight  Weight: Measured weight  Criteria: Maintains stable weight    Biochemical Data, Medical Tests and Procedures  Monitoring and Evaluation Plan: Glucose/endocrine profile, Electrolyte/renal panel  Electrolyte and Renal Panel: Sodium, Potassium, Phosphorus  Criteria: Electrolytes WNL  Glucose/Endocrine Profile: Glucose, casual  Criteria: BG within desirable range              Time Spent (min): 45 minutes

## 2024-08-16 PROBLEM — R10.9 FLANK PAIN: Status: ACTIVE | Noted: 2024-08-16

## 2024-08-16 LAB
ANION GAP SERPL CALC-SCNC: 11 MMOL/L (ref 10–20)
BASOPHILS # BLD AUTO: 0.03 X10*3/UL (ref 0–0.1)
BASOPHILS NFR BLD AUTO: 0.2 %
BUN SERPL-MCNC: 6 MG/DL (ref 6–23)
CALCIUM SERPL-MCNC: 9 MG/DL (ref 8.6–10.3)
CHLORIDE SERPL-SCNC: 104 MMOL/L (ref 98–107)
CO2 SERPL-SCNC: 25 MMOL/L (ref 21–32)
CREAT SERPL-MCNC: 0.74 MG/DL (ref 0.5–1.05)
EGFRCR SERPLBLD CKD-EPI 2021: >90 ML/MIN/1.73M*2
EOSINOPHIL # BLD AUTO: 0.08 X10*3/UL (ref 0–0.7)
EOSINOPHIL NFR BLD AUTO: 0.6 %
ERYTHROCYTE [DISTWIDTH] IN BLOOD BY AUTOMATED COUNT: 16.1 % (ref 11.5–14.5)
GLUCOSE SERPL-MCNC: 108 MG/DL (ref 74–99)
HCT VFR BLD AUTO: 34.4 % (ref 36–46)
HGB BLD-MCNC: 11.7 G/DL (ref 12–16)
HOLD SPECIMEN: NORMAL
IMM GRANULOCYTES # BLD AUTO: 0.06 X10*3/UL (ref 0–0.7)
IMM GRANULOCYTES NFR BLD AUTO: 0.4 % (ref 0–0.9)
LYMPHOCYTES # BLD AUTO: 0.72 X10*3/UL (ref 1.2–4.8)
LYMPHOCYTES NFR BLD AUTO: 5.2 %
MAGNESIUM SERPL-MCNC: 1.86 MG/DL (ref 1.6–2.4)
MCH RBC QN AUTO: 25.9 PG (ref 26–34)
MCHC RBC AUTO-ENTMCNC: 34 G/DL (ref 32–36)
MCV RBC AUTO: 76 FL (ref 80–100)
MONOCYTES # BLD AUTO: 1 X10*3/UL (ref 0.1–1)
MONOCYTES NFR BLD AUTO: 7.3 %
NEUTROPHILS # BLD AUTO: 11.88 X10*3/UL (ref 1.2–7.7)
NEUTROPHILS NFR BLD AUTO: 86.3 %
NRBC BLD-RTO: 0 /100 WBCS (ref 0–0)
PLATELET # BLD AUTO: 229 X10*3/UL (ref 150–450)
POTASSIUM SERPL-SCNC: 3 MMOL/L (ref 3.5–5.3)
RBC # BLD AUTO: 4.52 X10*6/UL (ref 4–5.2)
SODIUM SERPL-SCNC: 137 MMOL/L (ref 136–145)
WBC # BLD AUTO: 13.8 X10*3/UL (ref 4.4–11.3)

## 2024-08-16 PROCEDURE — 83735 ASSAY OF MAGNESIUM: CPT | Performed by: STUDENT IN AN ORGANIZED HEALTH CARE EDUCATION/TRAINING PROGRAM

## 2024-08-16 PROCEDURE — 85025 COMPLETE CBC W/AUTO DIFF WBC: CPT | Performed by: STUDENT IN AN ORGANIZED HEALTH CARE EDUCATION/TRAINING PROGRAM

## 2024-08-16 PROCEDURE — 1210000001 HC SEMI-PRIVATE ROOM DAILY

## 2024-08-16 PROCEDURE — 36415 COLL VENOUS BLD VENIPUNCTURE: CPT | Performed by: STUDENT IN AN ORGANIZED HEALTH CARE EDUCATION/TRAINING PROGRAM

## 2024-08-16 PROCEDURE — 2500000002 HC RX 250 W HCPCS SELF ADMINISTERED DRUGS (ALT 637 FOR MEDICARE OP, ALT 636 FOR OP/ED): Performed by: STUDENT IN AN ORGANIZED HEALTH CARE EDUCATION/TRAINING PROGRAM

## 2024-08-16 PROCEDURE — 2500000001 HC RX 250 WO HCPCS SELF ADMINISTERED DRUGS (ALT 637 FOR MEDICARE OP): Performed by: STUDENT IN AN ORGANIZED HEALTH CARE EDUCATION/TRAINING PROGRAM

## 2024-08-16 PROCEDURE — 80048 BASIC METABOLIC PNL TOTAL CA: CPT | Performed by: STUDENT IN AN ORGANIZED HEALTH CARE EDUCATION/TRAINING PROGRAM

## 2024-08-16 PROCEDURE — 99232 SBSQ HOSP IP/OBS MODERATE 35: CPT | Performed by: STUDENT IN AN ORGANIZED HEALTH CARE EDUCATION/TRAINING PROGRAM

## 2024-08-16 PROCEDURE — 2500000004 HC RX 250 GENERAL PHARMACY W/ HCPCS (ALT 636 FOR OP/ED): Performed by: STUDENT IN AN ORGANIZED HEALTH CARE EDUCATION/TRAINING PROGRAM

## 2024-08-16 PROCEDURE — 2500000004 HC RX 250 GENERAL PHARMACY W/ HCPCS (ALT 636 FOR OP/ED)

## 2024-08-16 RX ORDER — SODIUM CHLORIDE, SODIUM LACTATE, POTASSIUM CHLORIDE, CALCIUM CHLORIDE 600; 310; 30; 20 MG/100ML; MG/100ML; MG/100ML; MG/100ML
100 INJECTION, SOLUTION INTRAVENOUS CONTINUOUS
Status: ACTIVE | OUTPATIENT
Start: 2024-08-16 | End: 2024-08-16

## 2024-08-16 RX ORDER — POTASSIUM CHLORIDE 20 MEQ/1
40 TABLET, EXTENDED RELEASE ORAL ONCE
Status: COMPLETED | OUTPATIENT
Start: 2024-08-16 | End: 2024-08-16

## 2024-08-16 RX ORDER — SUCRALFATE 1 G/1
1 TABLET ORAL EVERY 6 HOURS SCHEDULED
Status: DISCONTINUED | OUTPATIENT
Start: 2024-08-16 | End: 2024-08-17 | Stop reason: HOSPADM

## 2024-08-16 RX ORDER — SODIUM CHLORIDE, SODIUM LACTATE, POTASSIUM CHLORIDE, CALCIUM CHLORIDE 600; 310; 30; 20 MG/100ML; MG/100ML; MG/100ML; MG/100ML
80 INJECTION, SOLUTION INTRAVENOUS CONTINUOUS
Status: ACTIVE | OUTPATIENT
Start: 2024-08-16 | End: 2024-08-17

## 2024-08-16 RX ORDER — POTASSIUM CHLORIDE 14.9 MG/ML
20 INJECTION INTRAVENOUS ONCE
Status: COMPLETED | OUTPATIENT
Start: 2024-08-16 | End: 2024-08-16

## 2024-08-16 RX ORDER — LORAZEPAM 2 MG/ML
0.5 INJECTION INTRAMUSCULAR ONCE AS NEEDED
Status: COMPLETED | OUTPATIENT
Start: 2024-08-16 | End: 2024-08-16

## 2024-08-16 RX ORDER — LORAZEPAM 2 MG/ML
INJECTION INTRAMUSCULAR
Status: COMPLETED
Start: 2024-08-16 | End: 2024-08-16

## 2024-08-16 ASSESSMENT — PAIN - FUNCTIONAL ASSESSMENT
PAIN_FUNCTIONAL_ASSESSMENT: 0-10
PAIN_FUNCTIONAL_ASSESSMENT: 0-10

## 2024-08-16 ASSESSMENT — COGNITIVE AND FUNCTIONAL STATUS - GENERAL
TOILETING: A LITTLE
DRESSING REGULAR UPPER BODY CLOTHING: A LITTLE
DAILY ACTIVITIY SCORE: 21
MOBILITY SCORE: 24
DRESSING REGULAR LOWER BODY CLOTHING: A LITTLE

## 2024-08-16 ASSESSMENT — PAIN SCALES - GENERAL
PAINLEVEL_OUTOF10: 2
PAINLEVEL_OUTOF10: 6

## 2024-08-16 ASSESSMENT — PAIN DESCRIPTION - ORIENTATION: ORIENTATION: RIGHT

## 2024-08-16 ASSESSMENT — PAIN DESCRIPTION - LOCATION: LOCATION: BACK

## 2024-08-16 NOTE — PROGRESS NOTES
Medical Group Progress Note  ASSESSMENT & PLAN:     Acute pyelonephritis  Complicated UTI  - Presenting from home with chief complaints of sudden onset right flank pain, chills, nausea and vomiting that began at midnight  - Urinalysis grossly positive  - CT abdomen/pelvis without IV contrast with concerns for this as well  - Follow-up urine cultures  - Continue ceftriaxone IV    Panic attack  - Did have a panic attack this morning which improved with medication  - If she continues have episodes of anxiety or panic attacks will consult psychiatry    Intractable nausea/vomiting  - Continues to have nausea and vomiting today, Zofran helps  - Continue IV fluids     Right nephrolithiasis  - Urology consulted  - Imaging as per above and plan as per above     Leukocytosis  - WBC count 20.6 this morning  - Likely in setting above, continue to trend     GERD  General anxiety  - Resume home medications once reconciled  - Continue protonix - will provide a couple doses of toradol PRN as she had good relief with med earlier today     VTE prophylaxis: SCDs (low VTE risk low)    Disposition/Daily update: Had a panic attack today requiring IV Ativan.  IV Zofran continuing IV fluids as well as she is still symptomatic.  Spoke with patient's friend at bedside.      ---Of note, this documentation is completed using the Dragon Dictation system (voice recognition software). There may be spelling and/or grammatical errors that were not corrected prior to final submission.---    Sin Pearce MD    SUBJECTIVE     Patient was seen and examined bedside this morning.  Had some episodes of nausea prior to my exam otherwise had no complaints of continued pain.    Few hours later in the morning, was notified by nursing staff the patient was having a panic attack.  Was present at bedside, was clearly tachypneic and tachycardic.  Did feel better with Ativan.    OBJECTIVE:     Last Recorded Vitals:  Vitals:    08/16/24 0804 08/16/24 1015  08/16/24 1113 08/16/24 1205   BP: 135/88 130/80 (!) 140/91 125/77   BP Location: Left arm Right arm Right arm Right arm   Patient Position: Lying Lying Lying Lying   Pulse:  (!) 122 (!) 128 99   Resp:   (!) 28 20   Temp:   36.7 °C (98.1 °F) 37.3 °C (99.1 °F)   TempSrc:   Temporal Temporal   SpO2:   96% 100%   Weight:       Height:         Last I/O:  I/O last 3 completed shifts:  In: 2324 (38.4 mL/kg) [P.O.:600; I.V.:675 (11.1 mL/kg); IV Piggyback:1049]  Out: 775 (12.8 mL/kg) [Urine:275 (0.1 mL/kg/hr); Emesis/NG output:500]  Weight: 60.6 kg     Physical Exam  Vitals reviewed.   Constitutional:       General: She is not in acute distress.     Appearance: Normal appearance. She is not toxic-appearing.   HENT:      Head: Normocephalic and atraumatic.   Eyes:      Extraocular Movements: Extraocular movements intact.      Conjunctiva/sclera: Conjunctivae normal.   Cardiovascular:      Rate and Rhythm: Normal rate and regular rhythm.      Pulses: Normal pulses.      Heart sounds: Normal heart sounds.   Pulmonary:      Effort: Pulmonary effort is normal. No respiratory distress.      Breath sounds: No wheezing.   Abdominal:      General: Bowel sounds are normal. There is no distension.      Palpations: Abdomen is soft.      Tenderness: There is no abdominal tenderness. There is no guarding.   Musculoskeletal:         General: Normal range of motion.      Cervical back: Normal range of motion and neck supple.   Neurological:      General: No focal deficit present.      Mental Status: She is alert and oriented to person, place, and time. Mental status is at baseline.   Psychiatric:      Comments: Had a panic attack during 2nd exam today     Inpatient Medications:  cefTRIAXone, 1 g, intravenous, q24h  mirtazapine, 15 mg, oral, Nightly  pantoprazole, 40 mg, oral, Daily before breakfast    PRN Medications  PRN medications: acetaminophen **OR** acetaminophen **OR** acetaminophen, hydrOXYzine HCL, ketorolac, ondansetron,  oxyCODONE, polyethylene glycol, prochlorperazine **OR** prochlorperazine **OR** prochlorperazine  Continuous Medications:  lactated Ringer's, 100 mL/hr, Last Rate: 100 mL/hr (08/16/24 1426)      LABS AND IMAGING:     Labs:  Results from last 7 days   Lab Units 08/16/24  0647 08/15/24  0651   WBC AUTO x10*3/uL 13.8* 20.6*   RBC AUTO x10*6/uL 4.52 5.31*   HEMOGLOBIN g/dL 11.7* 13.8   HEMATOCRIT % 34.4* 40.5   MCV fL 76* 76*   MCH pg 25.9* 26.0   MCHC g/dL 34.0 34.1   RDW % 16.1* 16.1*   PLATELETS AUTO x10*3/uL 229 313     Results from last 7 days   Lab Units 08/16/24  0647 08/15/24  0651   SODIUM mmol/L 137 137   POTASSIUM mmol/L 3.0* 3.5   CHLORIDE mmol/L 104 102   CO2 mmol/L 25 23   BUN mg/dL 6 7   CREATININE mg/dL 0.74 0.74   GLUCOSE mg/dL 108* 123*   PROTEIN TOTAL g/dL  --  8.6*   CALCIUM mg/dL 9.0 9.8   BILIRUBIN TOTAL mg/dL  --  1.1   ALK PHOS U/L  --  77   AST U/L  --  12   ALT U/L  --  9     Results from last 7 days   Lab Units 08/16/24  0647   MAGNESIUM mg/dL 1.86         Imaging:  CT abdomen pelvis wo IV contrast  Narrative: Interpreted By:  Eliza Emmanuel,   STUDY:  CT ABDOMEN PELVIS WO IV CONTRAST;  8/15/2024 8:07 am      INDICATION:  Signs/Symptoms:Right flank pain.      COMPARISON:  06/1924      ACCESSION NUMBER(S):  [id number]      ORDERING CLINICIAN:  ALEXANDER CRYSTAL      TECHNIQUE:  CT of the abdomen and pelvis was performed. Sagittal and coronal  reconstructions were generated. No intravenous contrast given for the  exam.      FINDINGS:  Solid organ and vessel evaluation limited without IV contrast.      ABDOMINAL ORGANS:      LIVER: No focal lesion within limits of unenhanced exam.      GALL BLADDER AND BILIARY TREE: No calcified gallstone      SPLEEN: No focal lesion within limits of unenhanced exam.      PANCREAS: No focal lesion within limits of unenhanced exam.      ADRENALS: No adrenal mass      KIDNEYS AND URETERS: No focal renal mass within limits of unenhanced  exam. Mild fullness and wall  thickening in the proximal right renal  collecting system. Possible punctate stone in the inferior pole of  the right kidney. Limited delineation of the distal ureters. No  obvious ureteral calculus.      BOWEL: No abnormally dilated large or small bowel loops. Probable  prominent colonic submucosal fat similar to the prior exam.      PERITONEUM, RETROPERITONEUM, NODES: No significant free fluid. No  free air. No significant retroperitoneal adenopathy within limits of  unenhanced exam.      VESSELS:  Lack of IV contrast precludes vascular luminal assessment.  Scattered atherosclerotic calcifications. No abdominal aortic  aneurysm.      PELVIS: Partially distended slightly thick-walled urinary bladder.  Prominent anteverted uterus. Subtle central uterine hypodensity.  Small rounded hypodensities in each adnexa measuring up to 2 cm on  the right.      ABDOMINAL WALL: No sizable abdominal wall hernia.      BONES: Stable subcentimeter right acetabular sclerotic lesion,  possible bone island. No new focal concerning lytic or blastic  osseous lesion.      LOWER CHEST: No airspace consolidation or pleural effusion in the  included areas.      Impression: Possible punctate right kidney stone. Mild proximal right renal  collecting system fullness with wall thickening consistent with UTI.  Diffuse bladder wall thickening could be related to poor distention  however could also reflect trabeculation and/or cystitis.      Prominent colonic submucosal fat which can be seen with chronic  inflammatory disease.      Hypodensities in the central uterus and both adnexa, likely  physiologic endometrial fluid or thickening and ovarian cysts,  respectively, in a patient of this age.      Additional findings as described above.      MACRO:  None.      Signed by: Eliza Emmanuel 8/15/2024 8:31 AM  Dictation workstation:   [id number]

## 2024-08-16 NOTE — CARE PLAN
The patient's goals for the shift include  Pts pain will be controlled throughout this shift.    The clinical goals for the shift include  Pt will be afebrile throughout this shift.

## 2024-08-16 NOTE — CARE PLAN
Problem: Pain - Adult  Goal: Verbalizes/displays adequate comfort level or baseline comfort level  8/16/2024 1843 by Rebecca Zapata RN  Outcome: Progressing  8/16/2024 1425 by Rebecca Zapata RN  Flowsheets (Taken 8/16/2024 0825)  Verbalizes/displays adequate comfort level or baseline comfort level:   Encourage patient to monitor pain and request assistance   Assess pain using appropriate pain scale   Administer analgesics based on type and severity of pain and evaluate response   Implement non-pharmacological measures as appropriate and evaluate response   Consider cultural and social influences on pain and pain management   Notify Licensed Independent Practitioner if interventions unsuccessful or patient reports new pain     Problem: Safety - Adult  Goal: Free from fall injury  8/16/2024 1843 by Rebecca Zapata RN  Outcome: Progressing  8/16/2024 1425 by Rebecca Zapata RN  Flowsheets (Taken 8/16/2024 0825)  Free from fall injury: Instruct family/caregiver on patient safety     Problem: Discharge Planning  Goal: Discharge to home or other facility with appropriate resources  8/16/2024 1843 by Rebecca Zapata RN  Outcome: Progressing  8/16/2024 1425 by Rebecca Zaptaa RN  Flowsheets (Taken 8/16/2024 0825)  Discharge to home or other facility with appropriate resources:   Identify barriers to discharge with patient and caregiver   Arrange for needed discharge resources and transportation as appropriate   Identify discharge learning needs (meds, wound care, etc)   Refer to discharge planning if patient needs post-hospital services based on physician order or complex needs related to functional status, cognitive ability or social support system     Problem: Chronic Conditions and Co-morbidities  Goal: Patient's chronic conditions and co-morbidity symptoms are monitored and maintained or improved  8/16/2024 1843 by Rebecca Zapata RN  Outcome: Progressing  8/16/2024 1425 by Rebecca Zapata RN  Flowsheets  (Taken 8/16/2024 4942)  Care Plan - Patient's Chronic Conditions and Co-Morbidity Symptoms are Monitored and Maintained or Improved:   Monitor and assess patient's chronic conditions and comorbid symptoms for stability, deterioration, or improvement   Collaborate with multidisciplinary team to address chronic and comorbid conditions and prevent exacerbation or deterioration   Update acute care plan with appropriate goals if chronic or comorbid symptoms are exacerbated and prevent overall improvement and discharge     Problem: Pain  Goal: Turns in bed with improved pain control throughout the shift  Outcome: Progressing  Goal: Walks with improved pain control throughout the shift  Outcome: Progressing  Goal: Performs ADL's with improved pain control throughout shift  Outcome: Progressing     Problem: Nutrition  Goal: Oral intake greater 75%  Outcome: Progressing  Goal: Consume prescribed supplement  Outcome: Progressing  Goal: Adequate PO fluid intake  Outcome: Progressing  Goal: Lab values WNL  Outcome: Progressing  Goal: Electrolytes WNL  Outcome: Progressing  Goal: Promote healing  Outcome: Progressing   The patient's goals for the shift include      The clinical goals for the shift include Patient's pain and anxiety will be controlled to a tolerable level throughout this shift.    Over the shift, the patient did make progress toward care plan goals.

## 2024-08-16 NOTE — PROGRESS NOTES
Urology progress note for Friday, 8/16/2024,  Continues having some relatively moderate right flank pain aching discomfort  Renal function continues normal with serum creatinine of 0.74  Hemoglobin 11.7, WBC count this morning down to 13,800 from the admission level of 20,600  Urine culture is growing 20-80 K gram-negative's  Currently on Rocephin antibiotic coverage  Recommend continued admission until final culture and sensitivities are available to be sure she is on the correct antibiotic prior to discharge  Otherwise continue supportive care    Additional medical and historical objective data reviewed and listed below      Current Facility-Administered Medications:     acetaminophen (Tylenol) tablet 650 mg, 650 mg, oral, q4h PRN, 650 mg at 08/15/24 1633 **OR** acetaminophen (Tylenol) oral liquid 650 mg, 650 mg, nasogastric tube, q4h PRN **OR** acetaminophen (Tylenol) suppository 650 mg, 650 mg, rectal, q4h PRN, Sin Pearce MD    cefTRIAXone (Rocephin) 1 g in dextrose (iso) IV 50 mL, 1 g, intravenous, q24h, Sin Pearce MD, Stopped at 08/16/24 1106    hydrOXYzine HCL (Atarax) tablet 50 mg, 50 mg, oral, q4h PRN, Sin Pearce MD, 50 mg at 08/16/24 1036    ketorolac (Toradol) injection 15 mg, 15 mg, intravenous, q6h PRN, Sin Pearce MD, 15 mg at 08/16/24 0848    lactated Ringer's infusion, 100 mL/hr, intravenous, Continuous, Sin Pearce MD, Last Rate: 100 mL/hr at 08/16/24 1037, 100 mL/hr at 08/16/24 1037    LORazepam (Ativan) injection  - Omnicell Override Pull, , , ,     LORazepam (Ativan) injection 0.5 mg, 0.5 mg, intravenous, Once PRN, Sin Pearce MD    mirtazapine (Remeron) tablet 15 mg, 15 mg, oral, Nightly, Sin Pearec MD, 15 mg at 08/15/24 2120    ondansetron (Zofran) injection 4 mg, 4 mg, intravenous, q6h PRN, Sin Pearce MD, 4 mg at 08/16/24 0342    oxyCODONE (Roxicodone) immediate release tablet 5 mg, 5 mg, oral, q6h PRN, Sin Pearce MD, 5 mg at 08/15/24 2121    pantoprazole  (ProtoNix) EC tablet 40 mg, 40 mg, oral, Daily before breakfast, Sin Pearce MD, 40 mg at 08/16/24 0534    polyethylene glycol (Glycolax, Miralax) packet 17 g, 17 g, oral, Daily PRN, Sin Pearce MD    prochlorperazine (Compazine) tablet 10 mg, 10 mg, oral, q6h PRN **OR** prochlorperazine (Compazine) injection 10 mg, 10 mg, intravenous, q6h PRN, 10 mg at 08/16/24 0838 **OR** prochlorperazine (Compazine) suppository 25 mg, 25 mg, rectal, q12h PRN, Sin Pearce MD     Results for orders placed or performed during the hospital encounter of 08/15/24 (from the past 96 hour(s))   hCG, Urine, Qualitative   Result Value Ref Range    HCG, Urine NEGATIVE NEGATIVE   CBC and Auto Differential   Result Value Ref Range    WBC 20.6 (H) 4.4 - 11.3 x10*3/uL    nRBC 0.0 0.0 - 0.0 /100 WBCs    RBC 5.31 (H) 4.00 - 5.20 x10*6/uL    Hemoglobin 13.8 12.0 - 16.0 g/dL    Hematocrit 40.5 36.0 - 46.0 %    MCV 76 (L) 80 - 100 fL    MCH 26.0 26.0 - 34.0 pg    MCHC 34.1 32.0 - 36.0 g/dL    RDW 16.1 (H) 11.5 - 14.5 %    Platelets 313 150 - 450 x10*3/uL    Neutrophils % 86.1 40.0 - 80.0 %    Immature Granulocytes %, Automated 0.7 0.0 - 0.9 %    Lymphocytes % 5.6 13.0 - 44.0 %    Monocytes % 7.2 2.0 - 10.0 %    Eosinophils % 0.2 0.0 - 6.0 %    Basophils % 0.2 0.0 - 2.0 %    Neutrophils Absolute 17.73 (H) 1.20 - 7.70 x10*3/uL    Immature Granulocytes Absolute, Automated 0.14 0.00 - 0.70 x10*3/uL    Lymphocytes Absolute 1.16 (L) 1.20 - 4.80 x10*3/uL    Monocytes Absolute 1.49 (H) 0.10 - 1.00 x10*3/uL    Eosinophils Absolute 0.04 0.00 - 0.70 x10*3/uL    Basophils Absolute 0.04 0.00 - 0.10 x10*3/uL   Comprehensive metabolic panel   Result Value Ref Range    Glucose 123 (H) 74 - 99 mg/dL    Sodium 137 136 - 145 mmol/L    Potassium 3.5 3.5 - 5.3 mmol/L    Chloride 102 98 - 107 mmol/L    Bicarbonate 23 21 - 32 mmol/L    Anion Gap 16 10 - 20 mmol/L    Urea Nitrogen 7 6 - 23 mg/dL    Creatinine 0.74 0.50 - 1.05 mg/dL    eGFR >90 >60 mL/min/1.73m*2     Calcium 9.8 8.6 - 10.3 mg/dL    Albumin 4.5 3.4 - 5.0 g/dL    Alkaline Phosphatase 77 33 - 110 U/L    Total Protein 8.6 (H) 6.4 - 8.2 g/dL    AST 12 9 - 39 U/L    Bilirubin, Total 1.1 0.0 - 1.2 mg/dL    ALT 9 7 - 45 U/L   Lipase   Result Value Ref Range    Lipase 15 9 - 82 U/L   Lactate   Result Value Ref Range    Lactate 2.0 0.4 - 2.0 mmol/L   Amylase   Result Value Ref Range    Amylase 65 29 - 103 U/L   Urinalysis with Reflex Culture and Microscopic   Result Value Ref Range    Color, Urine Light-Yellow Light-Yellow, Yellow, Dark-Yellow    Appearance, Urine Turbid (N) Clear    Specific Gravity, Urine 1.010 1.005 - 1.035    pH, Urine 8.5 (N) 5.0, 5.5, 6.0, 6.5, 7.0, 7.5, 8.0    Protein, Urine 100 (2+) (A) NEGATIVE, 10 (TRACE), 20 (TRACE) mg/dL    Glucose, Urine Normal Normal mg/dL    Blood, Urine 0.06 (1+) (A) NEGATIVE    Ketones, Urine 40 (2+) (A) NEGATIVE mg/dL    Bilirubin, Urine NEGATIVE NEGATIVE    Urobilinogen, Urine Normal Normal mg/dL    Nitrite, Urine NEGATIVE NEGATIVE    Leukocyte Esterase, Urine 500 Lola/µL (A) NEGATIVE   Extra Urine Gray Tube   Result Value Ref Range    Extra Tube Hold for add-ons.    Microscopic Only, Urine   Result Value Ref Range    WBC, Urine >50 (A) 1-5, NONE /HPF    RBC, Urine >20 (A) NONE, 1-2, 3-5 /HPF    Squamous Epithelial Cells, Urine 1-9 (SPARSE) Reference range not established. /HPF   Urine Culture    Specimen: Clean Catch/Voided; Urine   Result Value Ref Range    Urine Culture 20,000 - 80,000 Gram Negative Bacilli (A)    SST TOP   Result Value Ref Range    Extra Tube Hold for add-ons.    Basic Metabolic Panel   Result Value Ref Range    Glucose 108 (H) 74 - 99 mg/dL    Sodium 137 136 - 145 mmol/L    Potassium 3.0 (L) 3.5 - 5.3 mmol/L    Chloride 104 98 - 107 mmol/L    Bicarbonate 25 21 - 32 mmol/L    Anion Gap 11 10 - 20 mmol/L    Urea Nitrogen 6 6 - 23 mg/dL    Creatinine 0.74 0.50 - 1.05 mg/dL    eGFR >90 >60 mL/min/1.73m*2    Calcium 9.0 8.6 - 10.3 mg/dL   CBC and Auto  Differential   Result Value Ref Range    WBC 13.8 (H) 4.4 - 11.3 x10*3/uL    nRBC 0.0 0.0 - 0.0 /100 WBCs    RBC 4.52 4.00 - 5.20 x10*6/uL    Hemoglobin 11.7 (L) 12.0 - 16.0 g/dL    Hematocrit 34.4 (L) 36.0 - 46.0 %    MCV 76 (L) 80 - 100 fL    MCH 25.9 (L) 26.0 - 34.0 pg    MCHC 34.0 32.0 - 36.0 g/dL    RDW 16.1 (H) 11.5 - 14.5 %    Platelets 229 150 - 450 x10*3/uL    Neutrophils % 86.3 40.0 - 80.0 %    Immature Granulocytes %, Automated 0.4 0.0 - 0.9 %    Lymphocytes % 5.2 13.0 - 44.0 %    Monocytes % 7.3 2.0 - 10.0 %    Eosinophils % 0.6 0.0 - 6.0 %    Basophils % 0.2 0.0 - 2.0 %    Neutrophils Absolute 11.88 (H) 1.20 - 7.70 x10*3/uL    Immature Granulocytes Absolute, Automated 0.06 0.00 - 0.70 x10*3/uL    Lymphocytes Absolute 0.72 (L) 1.20 - 4.80 x10*3/uL    Monocytes Absolute 1.00 0.10 - 1.00 x10*3/uL    Eosinophils Absolute 0.08 0.00 - 0.70 x10*3/uL    Basophils Absolute 0.03 0.00 - 0.10 x10*3/uL   Magnesium   Result Value Ref Range    Magnesium 1.86 1.60 - 2.40 mg/dL     CT abdomen pelvis wo IV contrast    Result Date: 8/15/2024  Interpreted By:  Eliza Emmanuel, STUDY: CT ABDOMEN PELVIS WO IV CONTRAST;  8/15/2024 8:07 am   INDICATION: Signs/Symptoms:Right flank pain.   COMPARISON: 06/1924   ACCESSION NUMBER(S): [id number]   ORDERING CLINICIAN: ALEXANDER CRYSTAL   TECHNIQUE: CT of the abdomen and pelvis was performed. Sagittal and coronal reconstructions were generated. No intravenous contrast given for the exam.   FINDINGS: Solid organ and vessel evaluation limited without IV contrast.   ABDOMINAL ORGANS:   LIVER: No focal lesion within limits of unenhanced exam.   GALL BLADDER AND BILIARY TREE: No calcified gallstone   SPLEEN: No focal lesion within limits of unenhanced exam.   PANCREAS: No focal lesion within limits of unenhanced exam.   ADRENALS: No adrenal mass   KIDNEYS AND URETERS: No focal renal mass within limits of unenhanced exam. Mild fullness and wall thickening in the proximal right renal  collecting system. Possible punctate stone in the inferior pole of the right kidney. Limited delineation of the distal ureters. No obvious ureteral calculus.   BOWEL: No abnormally dilated large or small bowel loops. Probable prominent colonic submucosal fat similar to the prior exam.   PERITONEUM, RETROPERITONEUM, NODES: No significant free fluid. No free air. No significant retroperitoneal adenopathy within limits of unenhanced exam.   VESSELS:  Lack of IV contrast precludes vascular luminal assessment. Scattered atherosclerotic calcifications. No abdominal aortic aneurysm.   PELVIS: Partially distended slightly thick-walled urinary bladder. Prominent anteverted uterus. Subtle central uterine hypodensity. Small rounded hypodensities in each adnexa measuring up to 2 cm on the right.   ABDOMINAL WALL: No sizable abdominal wall hernia.   BONES: Stable subcentimeter right acetabular sclerotic lesion, possible bone island. No new focal concerning lytic or blastic osseous lesion.   LOWER CHEST: No airspace consolidation or pleural effusion in the included areas.       Possible punctate right kidney stone. Mild proximal right renal collecting system fullness with wall thickening consistent with UTI. Diffuse bladder wall thickening could be related to poor distention however could also reflect trabeculation and/or cystitis.   Prominent colonic submucosal fat which can be seen with chronic inflammatory disease.   Hypodensities in the central uterus and both adnexa, likely physiologic endometrial fluid or thickening and ovarian cysts, respectively, in a patient of this age.   Additional findings as described above.   MACRO: None.   Signed by: Eliza Emmanuel 8/15/2024 8:31 AM Dictation workstation:   [id number]

## 2024-08-17 VITALS
WEIGHT: 133.6 LBS | OXYGEN SATURATION: 97 % | DIASTOLIC BLOOD PRESSURE: 89 MMHG | BODY MASS INDEX: 22.81 KG/M2 | RESPIRATION RATE: 16 BRPM | TEMPERATURE: 97.9 F | SYSTOLIC BLOOD PRESSURE: 127 MMHG | HEART RATE: 104 BPM | HEIGHT: 64 IN

## 2024-08-17 LAB
ANION GAP SERPL CALC-SCNC: 12 MMOL/L (ref 10–20)
BACTERIA UR CULT: ABNORMAL
BASOPHILS # BLD AUTO: 0.02 X10*3/UL (ref 0–0.1)
BASOPHILS NFR BLD AUTO: 0.3 %
BUN SERPL-MCNC: 5 MG/DL (ref 6–23)
CALCIUM SERPL-MCNC: 8.8 MG/DL (ref 8.6–10.3)
CHLORIDE SERPL-SCNC: 108 MMOL/L (ref 98–107)
CO2 SERPL-SCNC: 23 MMOL/L (ref 21–32)
CREAT SERPL-MCNC: 0.73 MG/DL (ref 0.5–1.05)
EGFRCR SERPLBLD CKD-EPI 2021: >90 ML/MIN/1.73M*2
EOSINOPHIL # BLD AUTO: 0.08 X10*3/UL (ref 0–0.7)
EOSINOPHIL NFR BLD AUTO: 1.2 %
ERYTHROCYTE [DISTWIDTH] IN BLOOD BY AUTOMATED COUNT: 16.3 % (ref 11.5–14.5)
GLUCOSE SERPL-MCNC: 102 MG/DL (ref 74–99)
HCT VFR BLD AUTO: 34.3 % (ref 36–46)
HGB BLD-MCNC: 11.5 G/DL (ref 12–16)
HOLD SPECIMEN: NORMAL
IMM GRANULOCYTES # BLD AUTO: 0.02 X10*3/UL (ref 0–0.7)
IMM GRANULOCYTES NFR BLD AUTO: 0.3 % (ref 0–0.9)
LYMPHOCYTES # BLD AUTO: 1.07 X10*3/UL (ref 1.2–4.8)
LYMPHOCYTES NFR BLD AUTO: 15.9 %
MAGNESIUM SERPL-MCNC: 1.83 MG/DL (ref 1.6–2.4)
MCH RBC QN AUTO: 25.7 PG (ref 26–34)
MCHC RBC AUTO-ENTMCNC: 33.5 G/DL (ref 32–36)
MCV RBC AUTO: 77 FL (ref 80–100)
MONOCYTES # BLD AUTO: 0.78 X10*3/UL (ref 0.1–1)
MONOCYTES NFR BLD AUTO: 11.6 %
NEUTROPHILS # BLD AUTO: 4.74 X10*3/UL (ref 1.2–7.7)
NEUTROPHILS NFR BLD AUTO: 70.7 %
NRBC BLD-RTO: 0 /100 WBCS (ref 0–0)
PLATELET # BLD AUTO: 221 X10*3/UL (ref 150–450)
POTASSIUM SERPL-SCNC: 3.8 MMOL/L (ref 3.5–5.3)
RBC # BLD AUTO: 4.48 X10*6/UL (ref 4–5.2)
SODIUM SERPL-SCNC: 139 MMOL/L (ref 136–145)
WBC # BLD AUTO: 6.7 X10*3/UL (ref 4.4–11.3)

## 2024-08-17 PROCEDURE — 83735 ASSAY OF MAGNESIUM: CPT | Performed by: STUDENT IN AN ORGANIZED HEALTH CARE EDUCATION/TRAINING PROGRAM

## 2024-08-17 PROCEDURE — 85025 COMPLETE CBC W/AUTO DIFF WBC: CPT | Performed by: STUDENT IN AN ORGANIZED HEALTH CARE EDUCATION/TRAINING PROGRAM

## 2024-08-17 PROCEDURE — 80048 BASIC METABOLIC PNL TOTAL CA: CPT | Performed by: STUDENT IN AN ORGANIZED HEALTH CARE EDUCATION/TRAINING PROGRAM

## 2024-08-17 PROCEDURE — 2500000001 HC RX 250 WO HCPCS SELF ADMINISTERED DRUGS (ALT 637 FOR MEDICARE OP): Performed by: STUDENT IN AN ORGANIZED HEALTH CARE EDUCATION/TRAINING PROGRAM

## 2024-08-17 PROCEDURE — 2500000004 HC RX 250 GENERAL PHARMACY W/ HCPCS (ALT 636 FOR OP/ED): Performed by: STUDENT IN AN ORGANIZED HEALTH CARE EDUCATION/TRAINING PROGRAM

## 2024-08-17 PROCEDURE — 99239 HOSP IP/OBS DSCHRG MGMT >30: CPT | Performed by: STUDENT IN AN ORGANIZED HEALTH CARE EDUCATION/TRAINING PROGRAM

## 2024-08-17 PROCEDURE — 36415 COLL VENOUS BLD VENIPUNCTURE: CPT | Performed by: STUDENT IN AN ORGANIZED HEALTH CARE EDUCATION/TRAINING PROGRAM

## 2024-08-17 RX ORDER — ACETAMINOPHEN 500 MG
5 TABLET ORAL NIGHTLY PRN
Status: DISCONTINUED | OUTPATIENT
Start: 2024-08-17 | End: 2024-08-17 | Stop reason: HOSPADM

## 2024-08-17 RX ORDER — LEVOFLOXACIN 750 MG/1
750 TABLET ORAL DAILY
Qty: 10 TABLET | Refills: 0 | Status: SHIPPED | OUTPATIENT
Start: 2024-08-17 | End: 2024-08-27

## 2024-08-17 RX ORDER — HYDROXYZINE HYDROCHLORIDE 50 MG/1
50 TABLET, FILM COATED ORAL EVERY 4 HOURS PRN
Qty: 30 TABLET | Refills: 1 | Status: SHIPPED | OUTPATIENT
Start: 2024-08-17

## 2024-08-17 RX ORDER — CALCIUM CARBONATE 200(500)MG
500 TABLET,CHEWABLE ORAL 4 TIMES DAILY PRN
Status: DISCONTINUED | OUTPATIENT
Start: 2024-08-17 | End: 2024-08-17 | Stop reason: HOSPADM

## 2024-08-17 ASSESSMENT — COGNITIVE AND FUNCTIONAL STATUS - GENERAL
DAILY ACTIVITIY SCORE: 24
MOBILITY SCORE: 24
TOILETING: A LITTLE
MOBILITY SCORE: 24
DRESSING REGULAR UPPER BODY CLOTHING: A LITTLE
DAILY ACTIVITIY SCORE: 21
DRESSING REGULAR LOWER BODY CLOTHING: A LITTLE

## 2024-08-17 ASSESSMENT — PAIN SCALES - GENERAL
PAINLEVEL_OUTOF10: 0 - NO PAIN
PAINLEVEL_OUTOF10: 0 - NO PAIN

## 2024-08-17 NOTE — DISCHARGE SUMMARY
Medical Group Discharge Summary  DISCHARGE DIAGNOSIS     Acute pyelonephritis  Complicated UTI  Right nephrolithiasis  Leukocytosis, resolved  Panic attack  Generalized anxiety    HOSPITAL COURSE AND DETAILS     This is a 44-year-old female with a significant past medical history of generalized anxiety, GERD that presented from home with chief complaints of right flank and lower abdominal pain as well as dysuria.    Patient was admitted for acute pyelonephritis as well as nephrolithiasis.  During hospitalization, she was seen by urology recommended no further interventions at this time.  Patient had a panic attack on 8/16 improved with 1 dose of IV Ativan.  Urine cultures finalized with E. coli sensitive to fluoroquinolones.  Was treated with IV ceftriaxone during hospitalization.    Patient is being discharged home today, will complete another 10 days of oral antibiotics.  Leukocytosis has resolved, WBC is down to 6.7.  No longer anxious, tolerating oral intake and no longer having right flank pain.  She was advised to follow-up with her PCP to discuss hospitalization in 2 weeks.  Also referred to the [facility] for outpatient psychiatric care.    Total time spent on discharge: >30min      ---Of note, this documentation is completed using the Dragon Dictation system (voice recognition software). There may be spelling and/or grammatical errors that were not corrected prior to final submission.---    Sin Pearce MD    DISCHARGE PHYSICAL EXAM     Last Recorded Vitals:  Vitals:    08/16/24 2010 08/16/24 2345 08/17/24 0409 08/17/24 0805   BP: 114/75 118/71 119/79 127/89   BP Location: Right arm Right arm Right arm Right arm   Patient Position: Lying Lying Lying Sitting   Pulse: (!) 121 105 99 104   Resp: 18 16 16    Temp: 36.6 °C (97.9 °F) 37.2 °C (99 °F) 35.9 °C (96.6 °F) 36.6 °C (97.9 °F)   TempSrc: Temporal Temporal Temporal Temporal   SpO2: 98% 96% 99% 97%   Weight:       Height:         Physical  Exam  Vitals reviewed.   Constitutional:       General: She is not in acute distress.     Appearance: Normal appearance. She is not toxic-appearing.   HENT:      Head: Normocephalic and atraumatic.   Eyes:      Extraocular Movements: Extraocular movements intact.      Conjunctiva/sclera: Conjunctivae normal.   Cardiovascular:      Rate and Rhythm: Normal rate and regular rhythm.      Pulses: Normal pulses.      Heart sounds: Normal heart sounds.   Pulmonary:      Effort: Pulmonary effort is normal. No respiratory distress.      Breath sounds: No wheezing.   Abdominal:      General: Bowel sounds are normal. There is no distension.      Palpations: Abdomen is soft.      Tenderness: There is no abdominal tenderness. There is no guarding.   Musculoskeletal:         General: Normal range of motion.      Cervical back: Normal range of motion and neck supple.   Neurological:      General: No focal deficit present.      Mental Status: She is alert and oriented to person, place, and time. Mental status is at baseline.   Psychiatric:      Comments: Had a panic attack during 2nd exam today       DISCHARGE MEDICATIONS        Your medication list        START taking these medications        Instructions Last Dose Given Next Dose Due   hydrOXYzine HCL 50 mg tablet  Commonly known as: Atarax      Take 1 tablet (50 mg) by mouth every 4 hours if needed for anxiety.       levoFLOXacin 750 mg tablet  Commonly known as: Levaquin      Take 1 tablet (750 mg) by mouth once daily for 10 days.              CONTINUE taking these medications        Instructions Last Dose Given Next Dose Due   ascorbic acid 100 mg tablet  Commonly known as: Vitamin C           mirtazapine 15 mg tablet  Commonly known as: Remeron           omeprazole 20 mg capsule,delayed release(DR/EC)  Commonly known as: PriLOSEC      Take 1 capsule (20 mg) by mouth once daily in the morning. Take before meals.       ondansetron ODT 4 mg disintegrating tablet  Commonly known  as: Zofran-ODT           sucralfate 100 mg/mL suspension  Commonly known as: Carafate                     Where to Get Your Medications        These medications were sent to [id number] - [place], [place] - [address] AT [facility] [address] [facility] [place] [address]      Phone: [phone]   hydrOXYzine HCL 50 mg tablet  levoFLOXacin 750 mg tablet       OUTPATIENT FOLLOW-UP     Future Appointments   Date Time Provider Department Center   9/10/2024 10:40 AM Preethi Díaz APRN-CNP [id number] [place]

## 2024-08-17 NOTE — PROGRESS NOTES
Urology progress note for Saturday, 8/17/2024  Discharge planning in progress for later today  Comfortable otherwise  Final urine CNS still pending but okay for discharge on antibiotics  Follow-up  Routine lab work including urine analysis and culture and kidney bladder ultrasound and office visit in 1 month urologically  Thank you  Additional medical and historical objective data reviewed and listed below.med    Current Facility-Administered Medications:     acetaminophen (Tylenol) tablet 650 mg, 650 mg, oral, q4h PRN, 650 mg at 08/16/24 1546 **OR** acetaminophen (Tylenol) oral liquid 650 mg, 650 mg, nasogastric tube, q4h PRN **OR** acetaminophen (Tylenol) suppository 650 mg, 650 mg, rectal, q4h PRN, Sin Pearce MD    calcium carbonate (Tums) chewable tablet 500 mg, 500 mg, oral, 4x daily PRN, Titus Bryant MD, 500 mg at 08/17/24 0316    cefTRIAXone (Rocephin) 1 g in dextrose (iso) IV 50 mL, 1 g, intravenous, q24h, Sin Pearce MD, Stopped at 08/17/24 0915    hydrOXYzine HCL (Atarax) tablet 50 mg, 50 mg, oral, q4h PRN, Sin Pearce MD, 50 mg at 08/16/24 2002    ketorolac (Toradol) injection 15 mg, 15 mg, intravenous, q6h PRN, Sin Pearce MD, 15 mg at 08/16/24 0848    melatonin tablet 5 mg, 5 mg, oral, Nightly PRN, Titus Bryant MD    mirtazapine (Remeron) tablet 15 mg, 15 mg, oral, Nightly, Sin Pearce MD, 15 mg at 08/16/24 2044    ondansetron (Zofran) injection 4 mg, 4 mg, intravenous, q6h PRN, Sin Pearce MD, 4 mg at 08/16/24 0342    oxyCODONE (Roxicodone) immediate release tablet 5 mg, 5 mg, oral, q6h PRN, Sin Pearce MD, 5 mg at 08/15/24 2121    pantoprazole (ProtoNix) EC tablet 40 mg, 40 mg, oral, Daily before breakfast, Sin Pearce MD, 40 mg at 08/17/24 0606    polyethylene glycol (Glycolax, Miralax) packet 17 g, 17 g, oral, Daily PRN, Sin Pearce MD    prochlorperazine (Compazine) tablet 10 mg, 10 mg, oral, q6h PRN **OR** prochlorperazine (Compazine) injection 10 mg, 10 mg,  intravenous, q6h PRN, 10 mg at 08/16/24 0838 **OR** prochlorperazine (Compazine) suppository 25 mg, 25 mg, rectal, q12h PRN, Sin Pearce MD    sucralfate (Carafate) tablet 1 g, 1 g, oral, q6h IRVIN, Sin Pearce MD, 1 g at 08/17/24 0521     Results for orders placed or performed during the hospital encounter of 08/15/24 (from the past 96 hour(s))   hCG, Urine, Qualitative   Result Value Ref Range    HCG, Urine NEGATIVE NEGATIVE   CBC and Auto Differential   Result Value Ref Range    WBC 20.6 (H) 4.4 - 11.3 x10*3/uL    nRBC 0.0 0.0 - 0.0 /100 WBCs    RBC 5.31 (H) 4.00 - 5.20 x10*6/uL    Hemoglobin 13.8 12.0 - 16.0 g/dL    Hematocrit 40.5 36.0 - 46.0 %    MCV 76 (L) 80 - 100 fL    MCH 26.0 26.0 - 34.0 pg    MCHC 34.1 32.0 - 36.0 g/dL    RDW 16.1 (H) 11.5 - 14.5 %    Platelets 313 150 - 450 x10*3/uL    Neutrophils % 86.1 40.0 - 80.0 %    Immature Granulocytes %, Automated 0.7 0.0 - 0.9 %    Lymphocytes % 5.6 13.0 - 44.0 %    Monocytes % 7.2 2.0 - 10.0 %    Eosinophils % 0.2 0.0 - 6.0 %    Basophils % 0.2 0.0 - 2.0 %    Neutrophils Absolute 17.73 (H) 1.20 - 7.70 x10*3/uL    Immature Granulocytes Absolute, Automated 0.14 0.00 - 0.70 x10*3/uL    Lymphocytes Absolute 1.16 (L) 1.20 - 4.80 x10*3/uL    Monocytes Absolute 1.49 (H) 0.10 - 1.00 x10*3/uL    Eosinophils Absolute 0.04 0.00 - 0.70 x10*3/uL    Basophils Absolute 0.04 0.00 - 0.10 x10*3/uL   Comprehensive metabolic panel   Result Value Ref Range    Glucose 123 (H) 74 - 99 mg/dL    Sodium 137 136 - 145 mmol/L    Potassium 3.5 3.5 - 5.3 mmol/L    Chloride 102 98 - 107 mmol/L    Bicarbonate 23 21 - 32 mmol/L    Anion Gap 16 10 - 20 mmol/L    Urea Nitrogen 7 6 - 23 mg/dL    Creatinine 0.74 0.50 - 1.05 mg/dL    eGFR >90 >60 mL/min/1.73m*2    Calcium 9.8 8.6 - 10.3 mg/dL    Albumin 4.5 3.4 - 5.0 g/dL    Alkaline Phosphatase 77 33 - 110 U/L    Total Protein 8.6 (H) 6.4 - 8.2 g/dL    AST 12 9 - 39 U/L    Bilirubin, Total 1.1 0.0 - 1.2 mg/dL    ALT 9 7 - 45 U/L   Lipase    Result Value Ref Range    Lipase 15 9 - 82 U/L   Lactate   Result Value Ref Range    Lactate 2.0 0.4 - 2.0 mmol/L   Amylase   Result Value Ref Range    Amylase 65 29 - 103 U/L   Urinalysis with Reflex Culture and Microscopic   Result Value Ref Range    Color, Urine Light-Yellow Light-Yellow, Yellow, Dark-Yellow    Appearance, Urine Turbid (N) Clear    Specific Gravity, Urine 1.010 1.005 - 1.035    pH, Urine 8.5 (N) 5.0, 5.5, 6.0, 6.5, 7.0, 7.5, 8.0    Protein, Urine 100 (2+) (A) NEGATIVE, 10 (TRACE), 20 (TRACE) mg/dL    Glucose, Urine Normal Normal mg/dL    Blood, Urine 0.06 (1+) (A) NEGATIVE    Ketones, Urine 40 (2+) (A) NEGATIVE mg/dL    Bilirubin, Urine NEGATIVE NEGATIVE    Urobilinogen, Urine Normal Normal mg/dL    Nitrite, Urine NEGATIVE NEGATIVE    Leukocyte Esterase, Urine 500 Lola/µL (A) NEGATIVE   Extra Urine Gray Tube   Result Value Ref Range    Extra Tube Hold for add-ons.    Microscopic Only, Urine   Result Value Ref Range    WBC, Urine >50 (A) 1-5, NONE /HPF    RBC, Urine >20 (A) NONE, 1-2, 3-5 /HPF    Squamous Epithelial Cells, Urine 1-9 (SPARSE) Reference range not established. /HPF   Urine Culture    Specimen: Clean Catch/Voided; Urine   Result Value Ref Range    Urine Culture 20,000 - 80,000 Gram Negative Bacilli (A)    SST TOP   Result Value Ref Range    Extra Tube Hold for add-ons.    Basic Metabolic Panel   Result Value Ref Range    Glucose 108 (H) 74 - 99 mg/dL    Sodium 137 136 - 145 mmol/L    Potassium 3.0 (L) 3.5 - 5.3 mmol/L    Chloride 104 98 - 107 mmol/L    Bicarbonate 25 21 - 32 mmol/L    Anion Gap 11 10 - 20 mmol/L    Urea Nitrogen 6 6 - 23 mg/dL    Creatinine 0.74 0.50 - 1.05 mg/dL    eGFR >90 >60 mL/min/1.73m*2    Calcium 9.0 8.6 - 10.3 mg/dL   CBC and Auto Differential   Result Value Ref Range    WBC 13.8 (H) 4.4 - 11.3 x10*3/uL    nRBC 0.0 0.0 - 0.0 /100 WBCs    RBC 4.52 4.00 - 5.20 x10*6/uL    Hemoglobin 11.7 (L) 12.0 - 16.0 g/dL    Hematocrit 34.4 (L) 36.0 - 46.0 %    MCV 76 (L)  80 - 100 fL    MCH 25.9 (L) 26.0 - 34.0 pg    MCHC 34.0 32.0 - 36.0 g/dL    RDW 16.1 (H) 11.5 - 14.5 %    Platelets 229 150 - 450 x10*3/uL    Neutrophils % 86.3 40.0 - 80.0 %    Immature Granulocytes %, Automated 0.4 0.0 - 0.9 %    Lymphocytes % 5.2 13.0 - 44.0 %    Monocytes % 7.3 2.0 - 10.0 %    Eosinophils % 0.6 0.0 - 6.0 %    Basophils % 0.2 0.0 - 2.0 %    Neutrophils Absolute 11.88 (H) 1.20 - 7.70 x10*3/uL    Immature Granulocytes Absolute, Automated 0.06 0.00 - 0.70 x10*3/uL    Lymphocytes Absolute 0.72 (L) 1.20 - 4.80 x10*3/uL    Monocytes Absolute 1.00 0.10 - 1.00 x10*3/uL    Eosinophils Absolute 0.08 0.00 - 0.70 x10*3/uL    Basophils Absolute 0.03 0.00 - 0.10 x10*3/uL   Magnesium   Result Value Ref Range    Magnesium 1.86 1.60 - 2.40 mg/dL   SST TOP   Result Value Ref Range    Extra Tube Hold for add-ons.    Basic Metabolic Panel   Result Value Ref Range    Glucose 102 (H) 74 - 99 mg/dL    Sodium 139 136 - 145 mmol/L    Potassium 3.8 3.5 - 5.3 mmol/L    Chloride 108 (H) 98 - 107 mmol/L    Bicarbonate 23 21 - 32 mmol/L    Anion Gap 12 10 - 20 mmol/L    Urea Nitrogen 5 (L) 6 - 23 mg/dL    Creatinine 0.73 0.50 - 1.05 mg/dL    eGFR >90 >60 mL/min/1.73m*2    Calcium 8.8 8.6 - 10.3 mg/dL   CBC and Auto Differential   Result Value Ref Range    WBC 6.7 4.4 - 11.3 x10*3/uL    nRBC 0.0 0.0 - 0.0 /100 WBCs    RBC 4.48 4.00 - 5.20 x10*6/uL    Hemoglobin 11.5 (L) 12.0 - 16.0 g/dL    Hematocrit 34.3 (L) 36.0 - 46.0 %    MCV 77 (L) 80 - 100 fL    MCH 25.7 (L) 26.0 - 34.0 pg    MCHC 33.5 32.0 - 36.0 g/dL    RDW 16.3 (H) 11.5 - 14.5 %    Platelets 221 150 - 450 x10*3/uL    Neutrophils % 70.7 40.0 - 80.0 %    Immature Granulocytes %, Automated 0.3 0.0 - 0.9 %    Lymphocytes % 15.9 13.0 - 44.0 %    Monocytes % 11.6 2.0 - 10.0 %    Eosinophils % 1.2 0.0 - 6.0 %    Basophils % 0.3 0.0 - 2.0 %    Neutrophils Absolute 4.74 1.20 - 7.70 x10*3/uL    Immature Granulocytes Absolute, Automated 0.02 0.00 - 0.70 x10*3/uL    Lymphocytes  Absolute 1.07 (L) 1.20 - 4.80 x10*3/uL    Monocytes Absolute 0.78 0.10 - 1.00 x10*3/uL    Eosinophils Absolute 0.08 0.00 - 0.70 x10*3/uL    Basophils Absolute 0.02 0.00 - 0.10 x10*3/uL   Magnesium   Result Value Ref Range    Magnesium 1.83 1.60 - 2.40 mg/dL     CT abdomen pelvis wo IV contrast    Result Date: 8/15/2024  Interpreted By:  Eliza Emmanuel, STUDY: CT ABDOMEN PELVIS WO IV CONTRAST;  8/15/2024 8:07 am   INDICATION: Signs/Symptoms:Right flank pain.   COMPARISON: 06/1924   ACCESSION NUMBER(S): [id number]   ORDERING CLINICIAN: ALEXANDER CRYSTAL   TECHNIQUE: CT of the abdomen and pelvis was performed. Sagittal and coronal reconstructions were generated. No intravenous contrast given for the exam.   FINDINGS: Solid organ and vessel evaluation limited without IV contrast.   ABDOMINAL ORGANS:   LIVER: No focal lesion within limits of unenhanced exam.   GALL BLADDER AND BILIARY TREE: No calcified gallstone   SPLEEN: No focal lesion within limits of unenhanced exam.   PANCREAS: No focal lesion within limits of unenhanced exam.   ADRENALS: No adrenal mass   KIDNEYS AND URETERS: No focal renal mass within limits of unenhanced exam. Mild fullness and wall thickening in the proximal right renal collecting system. Possible punctate stone in the inferior pole of the right kidney. Limited delineation of the distal ureters. No obvious ureteral calculus.   BOWEL: No abnormally dilated large or small bowel loops. Probable prominent colonic submucosal fat similar to the prior exam.   PERITONEUM, RETROPERITONEUM, NODES: No significant free fluid. No free air. No significant retroperitoneal adenopathy within limits of unenhanced exam.   VESSELS:  Lack of IV contrast precludes vascular luminal assessment. Scattered atherosclerotic calcifications. No abdominal aortic aneurysm.   PELVIS: Partially distended slightly thick-walled urinary bladder. Prominent anteverted uterus. Subtle central uterine hypodensity. Small rounded  hypodensities in each adnexa measuring up to 2 cm on the right.   ABDOMINAL WALL: No sizable abdominal wall hernia.   BONES: Stable subcentimeter right acetabular sclerotic lesion, possible bone island. No new focal concerning lytic or blastic osseous lesion.   LOWER CHEST: No airspace consolidation or pleural effusion in the included areas.       Possible punctate right kidney stone. Mild proximal right renal collecting system fullness with wall thickening consistent with UTI. Diffuse bladder wall thickening could be related to poor distention however could also reflect trabeculation and/or cystitis.   Prominent colonic submucosal fat which can be seen with chronic inflammatory disease.   Hypodensities in the central uterus and both adnexa, likely physiologic endometrial fluid or thickening and ovarian cysts, respectively, in a patient of this age.   Additional findings as described above.   MACRO: None.   Signed by: Eliza Emmanuel 8/15/2024 8:31 AM Dictation workstation:   [id number]

## 2024-08-17 NOTE — NURSING NOTE
"Patient c/o nausea at approximately 0838. Compazine 10mg IV given. Shortly after receiving compazine, patient c/o feeling \"sweaty\". Temperature checked and patient afebrile at that time. Fan provided for comfort. At 1030, patient c/o feeling very anxious. Respirations easy and patient noted to have HR of 105. Hydroxyzine administered per prn order. At 1105, patient began to c/o dyspnea. Patient had difficulty speaking. Physician notified and oxygen initiated @ 2LPM/NC. Physician in room at 1109. Patient's friend arrived in room shortly before physician and stated that patient has a history of panic attacks, but he had \"never seen one as bad as this.\" Attempted non-pharmacological interventions of coaching patient through deep breathing in through her nose and out through her mouth and  having patient breathe into a bag to assist with dyspnea without effect. Physician ordered 0.5mg lorazepam IV which was administered at 1115. Patient calmer with easy respirations at 1135. Patient sleeping with unlabored, easy respirations at 1155. Patient c/o feeling anxious at 1440. Hydroxyzine administered per prn order at 1451. Patient stated anxiety was lessened at 1545.   "

## 2024-08-17 NOTE — CARE PLAN
The patient's goals for the shift include      The clinical goals for the shift include Patient's pain and anxiety will be controlled to tolerable level throughout the shift      Problem: Pain - Adult  Goal: Verbalizes/displays adequate comfort level or baseline comfort level  Outcome: Progressing     Problem: Safety - Adult  Goal: Free from fall injury  Outcome: Progressing     Problem: Pain  Goal: Turns in bed with improved pain control throughout the shift  Outcome: Progressing  Goal: Walks with improved pain control throughout the shift  Outcome: Progressing  Goal: Performs ADL's with improved pain control throughout shift  Outcome: Progressing     Problem: Nutrition  Goal: Oral intake greater 75%  Outcome: Progressing  Goal: Consume prescribed supplement  Outcome: Progressing  Goal: Adequate PO fluid intake  Outcome: Progressing  Goal: Lab values WNL  Outcome: Progressing  Goal: Electrolytes WNL  Outcome: Progressing  Goal: Promote healing  Outcome: Progressing

## 2024-08-17 NOTE — CARE PLAN
The patient's goals for the shift include get discharged     The clinical goals for the shift include Safety

## 2024-09-10 ENCOUNTER — APPOINTMENT (OUTPATIENT)
Dept: CARDIOLOGY | Facility: HOSPITAL | Age: 44
End: 2024-09-10
Payer: COMMERCIAL

## 2024-09-10 ENCOUNTER — HOSPITAL ENCOUNTER (EMERGENCY)
Facility: HOSPITAL | Age: 44
Discharge: HOME | End: 2024-09-10
Payer: COMMERCIAL

## 2024-09-10 ENCOUNTER — OFFICE VISIT (OUTPATIENT)
Dept: GASTROENTEROLOGY | Facility: CLINIC | Age: 44
End: 2024-09-10
Payer: COMMERCIAL

## 2024-09-10 VITALS
TEMPERATURE: 97.7 F | RESPIRATION RATE: 16 BRPM | SYSTOLIC BLOOD PRESSURE: 113 MMHG | HEART RATE: 94 BPM | OXYGEN SATURATION: 98 % | BODY MASS INDEX: 22.2 KG/M2 | HEIGHT: 64 IN | WEIGHT: 130 LBS | DIASTOLIC BLOOD PRESSURE: 72 MMHG

## 2024-09-10 DIAGNOSIS — F41.9 ANXIETY: ICD-10-CM

## 2024-09-10 DIAGNOSIS — F32.A DEPRESSION, UNSPECIFIED DEPRESSION TYPE: Primary | ICD-10-CM

## 2024-09-10 LAB
ALBUMIN SERPL BCP-MCNC: 4.3 G/DL (ref 3.4–5)
ALP SERPL-CCNC: 69 U/L (ref 33–110)
ALT SERPL W P-5'-P-CCNC: 7 U/L (ref 7–45)
AMPHETAMINES UR QL SCN: ABNORMAL
ANION GAP SERPL CALC-SCNC: 13 MMOL/L (ref 10–20)
APAP SERPL-MCNC: <10 UG/ML
APPEARANCE UR: CLEAR
AST SERPL W P-5'-P-CCNC: 14 U/L (ref 9–39)
B-HCG SERPL-ACNC: <2 MIU/ML
BARBITURATES UR QL SCN: ABNORMAL
BASOPHILS # BLD AUTO: 0.01 X10*3/UL (ref 0–0.1)
BASOPHILS NFR BLD AUTO: 0.1 %
BENZODIAZ UR QL SCN: ABNORMAL
BILIRUB SERPL-MCNC: 0.6 MG/DL (ref 0–1.2)
BILIRUB UR STRIP.AUTO-MCNC: NEGATIVE MG/DL
BUN SERPL-MCNC: 8 MG/DL (ref 6–23)
BZE UR QL SCN: ABNORMAL
CALCIUM SERPL-MCNC: 9.5 MG/DL (ref 8.6–10.3)
CANNABINOIDS UR QL SCN: ABNORMAL
CHLORIDE SERPL-SCNC: 104 MMOL/L (ref 98–107)
CO2 SERPL-SCNC: 25 MMOL/L (ref 21–32)
COLOR UR: ABNORMAL
CREAT SERPL-MCNC: 0.72 MG/DL (ref 0.5–1.05)
EGFRCR SERPLBLD CKD-EPI 2021: >90 ML/MIN/1.73M*2
EOSINOPHIL # BLD AUTO: 0.21 X10*3/UL (ref 0–0.7)
EOSINOPHIL NFR BLD AUTO: 2.4 %
ERYTHROCYTE [DISTWIDTH] IN BLOOD BY AUTOMATED COUNT: 16.1 % (ref 11.5–14.5)
ETHANOL SERPL-MCNC: <10 MG/DL
FENTANYL+NORFENTANYL UR QL SCN: ABNORMAL
GLUCOSE SERPL-MCNC: 95 MG/DL (ref 74–99)
GLUCOSE UR STRIP.AUTO-MCNC: NORMAL MG/DL
HCT VFR BLD AUTO: 39.7 % (ref 36–46)
HGB BLD-MCNC: 13.4 G/DL (ref 12–16)
HOLD SPECIMEN: NORMAL
IMM GRANULOCYTES # BLD AUTO: 0.03 X10*3/UL (ref 0–0.7)
IMM GRANULOCYTES NFR BLD AUTO: 0.3 % (ref 0–0.9)
KETONES UR STRIP.AUTO-MCNC: ABNORMAL MG/DL
LEUKOCYTE ESTERASE UR QL STRIP.AUTO: NEGATIVE
LYMPHOCYTES # BLD AUTO: 1.44 X10*3/UL (ref 1.2–4.8)
LYMPHOCYTES NFR BLD AUTO: 16.6 %
MCH RBC QN AUTO: 25.9 PG (ref 26–34)
MCHC RBC AUTO-ENTMCNC: 33.8 G/DL (ref 32–36)
MCV RBC AUTO: 77 FL (ref 80–100)
METHADONE UR QL SCN: ABNORMAL
MONOCYTES # BLD AUTO: 0.49 X10*3/UL (ref 0.1–1)
MONOCYTES NFR BLD AUTO: 5.6 %
NEUTROPHILS # BLD AUTO: 6.52 X10*3/UL (ref 1.2–7.7)
NEUTROPHILS NFR BLD AUTO: 75 %
NITRITE UR QL STRIP.AUTO: NEGATIVE
NRBC BLD-RTO: 0 /100 WBCS (ref 0–0)
OPIATES UR QL SCN: ABNORMAL
OXYCODONE+OXYMORPHONE UR QL SCN: ABNORMAL
PCP UR QL SCN: ABNORMAL
PH UR STRIP.AUTO: 7.5 [PH]
PLATELET # BLD AUTO: 302 X10*3/UL (ref 150–450)
POTASSIUM SERPL-SCNC: 3.5 MMOL/L (ref 3.5–5.3)
PROT SERPL-MCNC: 8.1 G/DL (ref 6.4–8.2)
PROT UR STRIP.AUTO-MCNC: NEGATIVE MG/DL
RBC # BLD AUTO: 5.18 X10*6/UL (ref 4–5.2)
RBC # UR STRIP.AUTO: NEGATIVE /UL
SALICYLATES SERPL-MCNC: <3 MG/DL
SARS-COV-2 RNA RESP QL NAA+PROBE: NOT DETECTED
SODIUM SERPL-SCNC: 138 MMOL/L (ref 136–145)
SP GR UR STRIP.AUTO: 1.01
TSH SERPL-ACNC: 1.22 MIU/L (ref 0.44–3.98)
UROBILINOGEN UR STRIP.AUTO-MCNC: NORMAL MG/DL
WBC # BLD AUTO: 8.7 X10*3/UL (ref 4.4–11.3)

## 2024-09-10 PROCEDURE — 99284 EMERGENCY DEPT VISIT MOD MDM: CPT | Mod: 25,CS

## 2024-09-10 PROCEDURE — 84443 ASSAY THYROID STIM HORMONE: CPT | Performed by: PHYSICIAN ASSISTANT

## 2024-09-10 PROCEDURE — 96361 HYDRATE IV INFUSION ADD-ON: CPT

## 2024-09-10 PROCEDURE — 81003 URINALYSIS AUTO W/O SCOPE: CPT | Performed by: PHYSICIAN ASSISTANT

## 2024-09-10 PROCEDURE — 36415 COLL VENOUS BLD VENIPUNCTURE: CPT | Performed by: PHYSICIAN ASSISTANT

## 2024-09-10 PROCEDURE — 85025 COMPLETE CBC W/AUTO DIFF WBC: CPT | Performed by: PHYSICIAN ASSISTANT

## 2024-09-10 PROCEDURE — 96374 THER/PROPH/DIAG INJ IV PUSH: CPT

## 2024-09-10 PROCEDURE — 87635 SARS-COV-2 COVID-19 AMP PRB: CPT | Performed by: PHYSICIAN ASSISTANT

## 2024-09-10 PROCEDURE — 84075 ASSAY ALKALINE PHOSPHATASE: CPT | Performed by: PHYSICIAN ASSISTANT

## 2024-09-10 PROCEDURE — 84702 CHORIONIC GONADOTROPIN TEST: CPT | Performed by: PHYSICIAN ASSISTANT

## 2024-09-10 PROCEDURE — 80307 DRUG TEST PRSMV CHEM ANLYZR: CPT | Performed by: PHYSICIAN ASSISTANT

## 2024-09-10 PROCEDURE — 2500000001 HC RX 250 WO HCPCS SELF ADMINISTERED DRUGS (ALT 637 FOR MEDICARE OP): Performed by: PHYSICIAN ASSISTANT

## 2024-09-10 PROCEDURE — 80143 DRUG ASSAY ACETAMINOPHEN: CPT | Performed by: PHYSICIAN ASSISTANT

## 2024-09-10 PROCEDURE — 93005 ELECTROCARDIOGRAM TRACING: CPT

## 2024-09-10 PROCEDURE — 2500000004 HC RX 250 GENERAL PHARMACY W/ HCPCS (ALT 636 FOR OP/ED): Performed by: PHYSICIAN ASSISTANT

## 2024-09-10 RX ORDER — ONDANSETRON HYDROCHLORIDE 2 MG/ML
4 INJECTION, SOLUTION INTRAVENOUS ONCE
Status: COMPLETED | OUTPATIENT
Start: 2024-09-10 | End: 2024-09-10

## 2024-09-10 RX ORDER — HYDROXYZINE HYDROCHLORIDE 25 MG/1
25 TABLET, FILM COATED ORAL ONCE
Status: COMPLETED | OUTPATIENT
Start: 2024-09-10 | End: 2024-09-10

## 2024-09-10 SDOH — HEALTH STABILITY: MENTAL HEALTH: HAVE YOU EVER TRIED TO KILL YOURSELF?: NO

## 2024-09-10 SDOH — HEALTH STABILITY: MENTAL HEALTH: NON-SPECIFIC ACTIVE SUICIDAL THOUGHTS (PAST 1 MONTH): NO

## 2024-09-10 SDOH — HEALTH STABILITY: MENTAL HEALTH: SUICIDAL BEHAVIOR (LIFETIME): NO

## 2024-09-10 SDOH — HEALTH STABILITY: MENTAL HEALTH: DEPRESSION SYMPTOMS: APPETITE CHANGE;SLEEP DISTURBANCE;FEELINGS OF HELPLESSNESS

## 2024-09-10 SDOH — HEALTH STABILITY: MENTAL HEALTH: IN THE PAST WEEK, HAVE YOU BEEN HAVING THOUGHTS ABOUT KILLING YOURSELF?: NO

## 2024-09-10 SDOH — HEALTH STABILITY: MENTAL HEALTH: ARE YOU HAVING THOUGHTS OF KILLING YOURSELF RIGHT NOW?: NO

## 2024-09-10 SDOH — HEALTH STABILITY: MENTAL HEALTH: ANXIETY SYMPTOMS: GENERALIZED;PANIC ATTACK;EXCESSIVE SWEATING

## 2024-09-10 SDOH — ECONOMIC STABILITY: HOUSING INSECURITY: FEELS SAFE LIVING IN HOME: YES

## 2024-09-10 SDOH — HEALTH STABILITY: MENTAL HEALTH: IN THE PAST FEW WEEKS, HAVE YOU WISHED YOU WERE DEAD?: NO

## 2024-09-10 SDOH — HEALTH STABILITY: MENTAL HEALTH: WISH TO BE DEAD (PAST 1 MONTH): NO

## 2024-09-10 SDOH — HEALTH STABILITY: MENTAL HEALTH: IN THE PAST FEW WEEKS, HAVE YOU FELT THAT YOU OR YOUR FAMILY WOULD BE BETTER OFF IF YOU WERE DEAD?: NO

## 2024-09-10 ASSESSMENT — LIFESTYLE VARIABLES
SUBSTANCE_ABUSE_PAST_12_MONTHS: YES
EVER FELT BAD OR GUILTY ABOUT YOUR DRINKING: NO
HAVE YOU EVER FELT YOU SHOULD CUT DOWN ON YOUR DRINKING: NO
TOTAL SCORE: 0
EVER HAD A DRINK FIRST THING IN THE MORNING TO STEADY YOUR NERVES TO GET RID OF A HANGOVER: NO
HAVE PEOPLE ANNOYED YOU BY CRITICIZING YOUR DRINKING: NO
PRESCIPTION_ABUSE_PAST_12_MONTHS: YES

## 2024-09-10 ASSESSMENT — PAIN SCALES - GENERAL: PAINLEVEL_OUTOF10: 0 - NO PAIN

## 2024-09-10 ASSESSMENT — PAIN - FUNCTIONAL ASSESSMENT: PAIN_FUNCTIONAL_ASSESSMENT: 0-10

## 2024-09-10 NOTE — ED PROVIDER NOTES
HPI   Chief Complaint   Patient presents with    Anxiety    Vomiting     Pt c/o severe anxiety for a few days and vomiting that began around 2am this morning.       A 44-year-old female patient comes in the emergency department today with complaints of severe anxiety and depression.  States has been ongoing for 2 to 3 weeks and getting worse and worse.  She describes that she has absolutely no appetite because of it she is not sleeping she constantly feels like she needs to run to get out of her house for no reason.  States that she is hardly drinking any fluids as everything makes her feel nauseous.  States she is coming more more concerned as her symptoms seem to be getting worse.  She denies any suicidal thoughts.  But understands out down she feels and desperately wants to feel better.  For this purpose came to the emergency department today further evaluation.              Patient History   Past Medical History:   Diagnosis Date    Acid reflux     Gastritis      Past Surgical History:   Procedure Laterality Date     SECTION, CLASSIC  1999     SECTION, CLASSIC  10/21/2003     Family History   Problem Relation Name Age of Onset    Hypertension Mother       Social History     Tobacco Use    Smoking status: Never     Passive exposure: Never    Smokeless tobacco: Never   Vaping Use    Vaping status: Never Used   Substance Use Topics    Alcohol use: Never    Drug use: Yes     Types: Marijuana     Comment: daily       Physical Exam   ED Triage Vitals [09/10/24 0651]   Temperature Heart Rate Respirations BP   36.5 °C (97.7 °F) (!) 107 20 (!) 142/93      Pulse Ox Temp Source Heart Rate Source Patient Position   96 % Temporal Monitor Sitting      BP Location FiO2 (%)     Right arm --       Physical Exam  Constitutional:       General: She is in acute distress.      Appearance: She is normal weight. She is not ill-appearing.   HENT:      Head: Normocephalic and atraumatic.      Nose: Nose normal.    Eyes:      Extraocular Movements: Extraocular movements intact.      Conjunctiva/sclera: Conjunctivae normal.      Pupils: Pupils are equal, round, and reactive to light.   Cardiovascular:      Rate and Rhythm: Normal rate and regular rhythm.   Pulmonary:      Effort: Pulmonary effort is normal. No respiratory distress.      Breath sounds: Normal breath sounds. No stridor. No wheezing.   Abdominal:      General: Abdomen is flat.      Palpations: Abdomen is soft.      Tenderness: There is no right CVA tenderness or left CVA tenderness.   Musculoskeletal:         General: Normal range of motion.      Cervical back: Normal range of motion.   Skin:     General: Skin is warm and dry.   Neurological:      General: No focal deficit present.      Mental Status: She is alert and oriented to person, place, and time. Mental status is at baseline.   Psychiatric:      Comments: Tearful, crying, anxious           ED Course & MDM   Diagnoses as of 09/10/24 0936   Depression, unspecified depression type   Anxiety                 No data recorded     Eric Coma Scale Score: 15 (09/10/24 0815 : Marixa Browne RN)                           Medical Decision Making  A 44-year-old female patient comes in the emergency department today with complaints of severe anxiety and depression.  States has been ongoing for 2 to 3 weeks and getting worse and worse.  She describes that she has absolutely no appetite because of it she is not sleeping she constantly feels like she needs to run to get out of her house for no reason.  States that she is hardly drinking any fluids as everything makes her feel nauseous.  States she is coming more more concerned as her symptoms seem to be getting worse.  She denies any suicidal thoughts.  But understands out down she feels and desperately wants to feel better.  For this purpose came to the emergency department today further evaluation.    EKG, laboratory studies ordered to rule out electrolyte  abnormalities, leukocytosis, left shift, COVID-19, acute kidney injury, dehydration.  IV fluids IV Zofran p.o. Atarax ordered for the patient    Patient is very tearful, crying.    Patient was evaluated by psychiatry.  They discussed a plan to go to [facility] to seek further help and they gave a printout of times are available they have walk-in clinic so she can just show up.  I was informed that she felt good about this plan.    Patient's laboratory studies show negative for COVID TSH negative positive for cannabis which patient confirmed.  Patient negative pregnancy test metabolic panel within normal limits, urinalysis has ketones but otherwise normal most likely mild dehydration secondary to lack of fluid intake for patient's history.  Patient has no leukocytosis or left shift.  Will discharge patient home with plan to follow-up with [facility].  Patient agrees with this plan expressed verbal understanding.  All questions answered.    Historians patient    Diagnosis: Depression, anxiety          Labs Reviewed   CBC WITH AUTO DIFFERENTIAL - Abnormal       Result Value    WBC 8.7      nRBC 0.0      RBC 5.18      Hemoglobin 13.4      Hematocrit 39.7      MCV 77 (*)     MCH 25.9 (*)     MCHC 33.8      RDW 16.1 (*)     Platelets 302      Neutrophils % 75.0      Immature Granulocytes %, Automated 0.3      Lymphocytes % 16.6      Monocytes % 5.6      Eosinophils % 2.4      Basophils % 0.1      Neutrophils Absolute 6.52      Immature Granulocytes Absolute, Automated 0.03      Lymphocytes Absolute 1.44      Monocytes Absolute 0.49      Eosinophils Absolute 0.21      Basophils Absolute 0.01     DRUG SCREEN,URINE - Abnormal    Amphetamine Screen, Urine Presumptive Negative      Barbiturate Screen, Urine Presumptive Negative      Benzodiazepines Screen, Urine Presumptive Negative      Cannabinoid Screen, Urine Presumptive Positive (*)     Cocaine Metabolite Screen, Urine Presumptive Negative      Fentanyl Screen, Urine  Presumptive Negative      Opiate Screen, Urine Presumptive Negative      Oxycodone Screen, Urine Presumptive Negative      PCP Screen, Urine Presumptive Negative      Methadone Screen, Urine Presumptive Negative      Narrative:     Drug screen results are presumptive and should not be used to assess   compliance with prescribed medication. Contact the performing [facility] laboratory   to add-on definitive confirmatory testing if clinically indicated.    Toxicology screening results are reported qualitatively. The concentration must   be greater than or equal to the cutoff to be reported as positive. The concentration   at which the screening test can detect an individual drug or metabolite varies.   The absence of expected drug(s) and/or drug metabolite(s) may indicate non-compliance,   inappropriate timing of specimen collection relative to drug administration, poor drug   absorption, diluted/adulterated urine, or limitations of testing. For medical purposes   only; not valid for forensic use.    Interpretive questions should be directed to the laboratory medical directors.   URINALYSIS WITH REFLEX CULTURE AND MICROSCOPIC - Abnormal    Color, Urine Light-Yellow      Appearance, Urine Clear      Specific Gravity, Urine 1.013      pH, Urine 7.5      Protein, Urine NEGATIVE      Glucose, Urine Normal      Blood, Urine NEGATIVE      Ketones, Urine 40 (2+) (*)     Bilirubin, Urine NEGATIVE      Urobilinogen, Urine Normal      Nitrite, Urine NEGATIVE      Leukocyte Esterase, Urine NEGATIVE     COMPREHENSIVE METABOLIC PANEL - Normal    Glucose 95      Sodium 138      Potassium 3.5      Chloride 104      Bicarbonate 25      Anion Gap 13      Urea Nitrogen 8      Creatinine 0.72      eGFR >90      Calcium 9.5      Albumin 4.3      Alkaline Phosphatase 69      Total Protein 8.1      AST 14      Bilirubin, Total 0.6      ALT 7     ACUTE TOXICOLOGY PANEL, BLOOD - Normal    Acetaminophen <10.0      Salicylate  <3      Alcohol <10      HUMAN CHORIONIC GONADOTROPIN, SERUM QUANTITATIVE - Normal    HCG, Beta-Quantitative <2      Narrative:      Total HCG measurement is performed using the [employer] Access   Immunoassay which detects intact HCG and free beta HCG subunit.    This test is not indicated for use as a tumor marker.   HCG testing is performed using a different test methodology at [facility] than other [facility]. Direct result comparison   should only be made within the same method.       TSH WITH REFLEX TO FREE T4 IF ABNORMAL - Normal    Thyroid Stimulating Hormone 1.22      Narrative:     TSH testing is performed using different testing methodology at [facility] than at other [facility]. Direct result comparisons should only be made within the same method.     SARS-COV-2 PCR - Normal    Coronavirus 2019, PCR Not Detected      Narrative:     This assay has received FDA Emergency Use Authorization (EUA) and is only authorized for the duration of time that circumstances exist to justify the authorization of the emergency use of in vitro diagnostic tests for the detection of SARS-CoV-2 virus and/or diagnosis of COVID-19 infection under section 564(b)(1) of the Act, 21 U.S.C. 360bbb-3(b)(1). This assay is an in vitro diagnostic nucleic acid amplification test for the qualitative detection of SARS-CoV-2 from nasopharyngeal specimens and has been validated for use at [facility]. Negative results do not preclude COVID-19 infections and should not be used as the sole basis for diagnosis, treatment, or other management decisions.     URINALYSIS WITH REFLEX CULTURE AND MICROSCOPIC    Narrative:     The following orders were created for panel order Urinalysis with Reflex Culture and Microscopic.  Procedure                               Abnormality         Status                     ---------                               -----------         ------                     Urinalysis  with Reflex C...[[id number]]  Abnormal            Final result               Extra Urine Gray Tube[[id number]]                            In process                   Please view results for these tests on the individual orders.   EXTRA URINE GRAY TUBE        No orders to display         Procedure  Electrocardiogram, 12-lead    Performed by: Carlos Benitez PA-C  Authorized by: Carlos Benitez PA-C    Interpretation:     Details:  My EKG interpretation  Rate:     ECG rate:  75    ECG rate assessment: normal    Rhythm:     Rhythm: sinus rhythm    ST segments:     ST segments:  Normal  T waves:     T waves: normal         Carlos Benitez PA-C  09/10/24 0936

## 2024-09-10 NOTE — PROGRESS NOTES
EPAT - Social Work Psychiatric Assessment    Arrival Details  Mode of Arrival: Ambulatory  Admission Source: Home  Admission Type: Voluntary  EPAT Assessment Start Date: 09/10/24  EPAT Assessment Start Time: 0755  Name of : Jose Harvey    History of Present Illness  Admission Reason: Anxiety  HPI: Patient presented to the ED with increased anxiety. She explained over the past week she has not been able to sleep, racing thoughts, decreased eating and low energy. She denied any SI, HI, AH, VH. A review of her Triage, Provider and Jackson Was conducted.    SW Readmission Information   Readmission within 30 Days: No    Psychiatric Symptoms  Anxiety Symptoms: Generalized, Panic attack, Excessive sweating  Depression Symptoms: Appetite change, Sleep disturbance, Feelings of helplessness  Nedra Symptoms: No problems reported or observed.    Psychosis Symptoms  Hallucination Type: No problems reported or observed.  Delusion Type: No problems reported or observed.    Additional Symptoms - Adult  Generalized Anxiety Disorder: Excessive anxiety/worry  Obsessive Compulsive Disorder: No problems reported or observed.  Panic Attack: No problems reported or observed.  Post Traumatic Stress Disorder: No problems reported or observed.  Delirium: No problems reported or observed.  Review of Symptoms Comments: Please see above    Past Psychiatric History/Meds/Treatments  Past Psychiatric History: Patient has a history of Anxiety and Depression. She currently has a PCP but no outpatient mental health providers. She has no history of past suicide attempts or inpatient stays. She denies any substance history.  Past Psychiatric Meds/Treatments: none  Past Violence/Victimization History: patient denies    Current Mental Health Contacts   Name/Phone Number: none   Last Appointment Date: none  Provider Name/Phone Number: none  Provider Last Appointment Date: none    Support System: Immediate family    Living  Arrangement: House    Home Safety  Feels Safe Living in Home: Yes    Income Information  Employment Status: Employed    [place] Service/Education History  Current or Previous  Service: None  Education Level: Less than high school  History of Learning Problems: No  History of School Behavior Problems: No  School History: 11th grade    Social/Cultural History  Social History: Patient is her own guardian.  Spiritual Requests During Hospitalization: q  Important Activities: Family    Legal  Legal Concerns: none    Drug Screening  Have you used any substances (canabis, cocaine, heroin, hallucinogens, inhalants, etc.) in the past 12 months?: Yes  Have you used any prescription drugs other than prescribed in the past 12 months?: Yes  Is a toxicology screen needed?: Yes    Stage of Change  Stage of Change: Precontemplation  History of Treatment:  (none)  Type of Treatment Offered:  (none)  Treatment Offered: Declined  Duration of Substance Use: n/a  Frequency of Substance Use: n/a  Age of First Substance Use: n/a    Psychosocial  Psychosocial (WDL): Exceptions to WDL  Behaviors/Mood: Anxious, Cooperative, Crying  Affect: Appropriate to circumstances  Parent/Guardian/Significant Other Involvement: Attentive to patient needs  Family Behaviors: Appropriate for situation  Visitor Behaviors: Appropriate for situation    Orientation  Orientation Level: Oriented X4    General Appearance  Motor Activity: Unremarkable  Speech Pattern: Other (Comment)  General Attitude: Cooperative  Appearance/Hygiene: Unremarkable    Thought Process  Coherency: Other (Comment)  Content: Unremarkable  Delusions: Other (Comment)  Perception: Not altered  Hallucination: None  Judgment/Insight:  (fair)  Confusion: None  Cognition: Appropriate safety awareness, Follows commands, Appropriate attention/concentration    Sleep Pattern  Sleep Pattern: Difficulty falling asleep    Risk Factors  Self Harm/Suicidal Ideation Plan: Patient denies  Previous  Self Harm/Suicidal Plans: none  Risk Factors: None    Violence Risk Assessment  Assessment of Violence: None noted  Thoughts of Harm to Others: No    Ability to Assess Risk Screen  Risk Screen - Ability to Assess: Able to be screened  Ask Suicide-Screening Questions  1. In the past few weeks, have you wished you were dead?: No  2. In the past few weeks, have you felt that you or your family would be better off if you were dead?: No  3. In the past week, have you been having thoughts about killing yourself?: No  4. Have you ever tried to kill yourself?: No  5. Are you having thoughts of killing yourself right now?: No  Calculated Risk Score: No intervention is necessary  Reagan Suicide Severity Rating Scale (Screener/Recent Self-Report)  1. Wish to be Dead (Past 1 Month): No  2. Non-Specific Active Suicidal Thoughts (Past 1 Month): No  6. Suicidal Behavior (Lifetime): No  Calculated C-SSRS Risk Score (Lifetime/Recent): No Risk Indicated  Step 1: Risk Factors  Current & Past Psychiatric Dx: Mood disorder  Presenting Symptoms: Anxiety and/or panic  Precipitants/Stressors: Triggering events leading to humiliation, shame, and/or despair (e.g. loss of relationship, financial or health status) (real or anticipated)  Change in Treatment: Non-compliant or not receiving treatment  Access to Lethal Methods : No  Step 2: Protective Factors   Protective Factors Internal: Identifies reasons for living, Ability to cope with stress  Protective Factors External: Cultural, spiritual and/or moral attitudes against suicide, Supportive social network or family or friends  Step 3: Suicidal Ideation Intensity  How Many Times Have You Had These Thoughts: Less than once a week  When You Have the Thoughts How Long do They Last : Fleeting - few seconds or minutes  Could/Can You Stop Thinking About Killing Yourself or Wanting to Die if You Want to: Does not attempt to control thoughts  Are There Things - Anyone or Anything - That Stopped You  "From Wanting to Die or Acting on: Does not apply  What Sort of Reasons Did You Have For Thinking About Wanting to Die or Killing Yourself: Does not apply  Total Score: 2  Step 5: Documentation  Risk Level: Low suicide risk (Patient is low risk. NP Eli agrees.)    Psychiatric Impression and Plan of Care  Assessment and Plan: Patient is a 44 year old female with a history of Anxiety and Depression. She came to the ED on her own due to increased anxiety over the past few days. She shared she has not been able to sleep, her mind has been racing and she has lost weight. She shared it is hard for her to \"get thru a full day\". She was tearful during this assessment. She currently does not take any medications or see a therapist. She has been self medicating with Marijuana the past few days. The patient denies any suicidal thoughts or history of suicide attempts. She denied any homicidal thoughts or hallucinations. She is help seeking. We discussed treatment options. She was open to going to [facility] for a walk in intake today or tomorrow. She has good family supports. Continues to go to work.  Specific Resources Provided to Patient: [facility] Notified: no  PHP/IOP Recommended: none  Specific Information Provided for PHP/IOP: none  Plan Comments: discharge with referral    Outcome/Disposition  Patient's Perception of Outcome Achieved: patient is help seeking/future oriented.  Assessment, Recommendations and Risk Level Reviewed with: discharge  Contact Name: Alex Guevara  Contact Number(s): [phone]  Contact Relationship: sibling  EPAT Assessment Completed Date: 09/10/24  EPAT Assessment Completed Time: 0832  Patient Disposition: Home      "

## 2024-09-15 LAB
ATRIAL RATE: 75 BPM
P AXIS: 64 DEGREES
P OFFSET: 196 MS
P ONSET: 144 MS
PR INTERVAL: 156 MS
Q ONSET: 222 MS
QRS COUNT: 12 BEATS
QRS DURATION: 78 MS
QT INTERVAL: 390 MS
QTC CALCULATION(BAZETT): 435 MS
QTC FREDERICIA: 419 MS
R AXIS: 63 DEGREES
T AXIS: 65 DEGREES
T OFFSET: 417 MS
VENTRICULAR RATE: 75 BPM

## 2024-09-16 ENCOUNTER — HOSPITAL ENCOUNTER (OUTPATIENT)
Dept: RADIOLOGY | Facility: HOSPITAL | Age: 44
Discharge: HOME | End: 2024-09-16
Payer: COMMERCIAL

## 2024-09-16 DIAGNOSIS — N39.0 URINARY TRACT INFECTION, SITE NOT SPECIFIED: ICD-10-CM

## 2024-09-16 PROCEDURE — 76770 US EXAM ABDO BACK WALL COMP: CPT | Performed by: RADIOLOGY

## 2024-09-16 PROCEDURE — 76770 US EXAM ABDO BACK WALL COMP: CPT

## 2024-09-23 ENCOUNTER — APPOINTMENT (OUTPATIENT)
Dept: RADIOLOGY | Facility: HOSPITAL | Age: 44
End: 2024-09-23
Payer: COMMERCIAL

## 2024-09-23 ENCOUNTER — APPOINTMENT (OUTPATIENT)
Dept: CARDIOLOGY | Facility: HOSPITAL | Age: 44
End: 2024-09-23
Payer: COMMERCIAL

## 2024-09-23 ENCOUNTER — HOSPITAL ENCOUNTER (EMERGENCY)
Facility: HOSPITAL | Age: 44
Discharge: HOME | End: 2024-09-24
Attending: STUDENT IN AN ORGANIZED HEALTH CARE EDUCATION/TRAINING PROGRAM
Payer: COMMERCIAL

## 2024-09-23 DIAGNOSIS — K29.00 ACUTE GASTRITIS WITHOUT HEMORRHAGE, UNSPECIFIED GASTRITIS TYPE: Primary | ICD-10-CM

## 2024-09-23 LAB
ALBUMIN SERPL BCP-MCNC: 4.2 G/DL (ref 3.4–5)
ALP SERPL-CCNC: 64 U/L (ref 33–110)
ALT SERPL W P-5'-P-CCNC: 14 U/L (ref 7–45)
ANION GAP SERPL CALC-SCNC: 12 MMOL/L (ref 10–20)
AST SERPL W P-5'-P-CCNC: 14 U/L (ref 9–39)
B-HCG SERPL-ACNC: <2 MIU/ML
BASOPHILS # BLD AUTO: 0.03 X10*3/UL (ref 0–0.1)
BASOPHILS NFR BLD AUTO: 0.2 %
BILIRUB SERPL-MCNC: 0.6 MG/DL (ref 0–1.2)
BUN SERPL-MCNC: 8 MG/DL (ref 6–23)
CALCIUM SERPL-MCNC: 9.6 MG/DL (ref 8.6–10.3)
CARDIAC TROPONIN I PNL SERPL HS: 80 NG/L (ref 0–13)
CHLORIDE SERPL-SCNC: 104 MMOL/L (ref 98–107)
CO2 SERPL-SCNC: 24 MMOL/L (ref 21–32)
CREAT SERPL-MCNC: 0.65 MG/DL (ref 0.5–1.05)
EGFRCR SERPLBLD CKD-EPI 2021: >90 ML/MIN/1.73M*2
EOSINOPHIL # BLD AUTO: 0.22 X10*3/UL (ref 0–0.7)
EOSINOPHIL NFR BLD AUTO: 1.7 %
ERYTHROCYTE [DISTWIDTH] IN BLOOD BY AUTOMATED COUNT: 16 % (ref 11.5–14.5)
FLUAV RNA RESP QL NAA+PROBE: NOT DETECTED
FLUBV RNA RESP QL NAA+PROBE: NOT DETECTED
GLUCOSE SERPL-MCNC: 95 MG/DL (ref 74–99)
HCT VFR BLD AUTO: 37.9 % (ref 36–46)
HGB BLD-MCNC: 13 G/DL (ref 12–16)
IMM GRANULOCYTES # BLD AUTO: 0.05 X10*3/UL (ref 0–0.7)
IMM GRANULOCYTES NFR BLD AUTO: 0.4 % (ref 0–0.9)
LIPASE SERPL-CCNC: 26 U/L (ref 9–82)
LYMPHOCYTES # BLD AUTO: 1.98 X10*3/UL (ref 1.2–4.8)
LYMPHOCYTES NFR BLD AUTO: 15 %
MCH RBC QN AUTO: 26.1 PG (ref 26–34)
MCHC RBC AUTO-ENTMCNC: 34.3 G/DL (ref 32–36)
MCV RBC AUTO: 76 FL (ref 80–100)
MONOCYTES # BLD AUTO: 0.69 X10*3/UL (ref 0.1–1)
MONOCYTES NFR BLD AUTO: 5.2 %
NEUTROPHILS # BLD AUTO: 10.23 X10*3/UL (ref 1.2–7.7)
NEUTROPHILS NFR BLD AUTO: 77.5 %
NRBC BLD-RTO: 0 /100 WBCS (ref 0–0)
PLATELET # BLD AUTO: 307 X10*3/UL (ref 150–450)
POTASSIUM SERPL-SCNC: 3.2 MMOL/L (ref 3.5–5.3)
PROT SERPL-MCNC: 7.8 G/DL (ref 6.4–8.2)
RBC # BLD AUTO: 4.98 X10*6/UL (ref 4–5.2)
SARS-COV-2 RNA RESP QL NAA+PROBE: NOT DETECTED
SODIUM SERPL-SCNC: 137 MMOL/L (ref 136–145)
WBC # BLD AUTO: 13.2 X10*3/UL (ref 4.4–11.3)

## 2024-09-23 PROCEDURE — 84484 ASSAY OF TROPONIN QUANT: CPT | Performed by: STUDENT IN AN ORGANIZED HEALTH CARE EDUCATION/TRAINING PROGRAM

## 2024-09-23 PROCEDURE — 71045 X-RAY EXAM CHEST 1 VIEW: CPT

## 2024-09-23 PROCEDURE — 96374 THER/PROPH/DIAG INJ IV PUSH: CPT | Mod: 59

## 2024-09-23 PROCEDURE — 76705 ECHO EXAM OF ABDOMEN: CPT

## 2024-09-23 PROCEDURE — 2500000005 HC RX 250 GENERAL PHARMACY W/O HCPCS: Performed by: STUDENT IN AN ORGANIZED HEALTH CARE EDUCATION/TRAINING PROGRAM

## 2024-09-23 PROCEDURE — 2550000001 HC RX 255 CONTRASTS: Performed by: STUDENT IN AN ORGANIZED HEALTH CARE EDUCATION/TRAINING PROGRAM

## 2024-09-23 PROCEDURE — 2500000004 HC RX 250 GENERAL PHARMACY W/ HCPCS (ALT 636 FOR OP/ED)

## 2024-09-23 PROCEDURE — 76705 ECHO EXAM OF ABDOMEN: CPT | Performed by: SURGERY

## 2024-09-23 PROCEDURE — 74177 CT ABD & PELVIS W/CONTRAST: CPT

## 2024-09-23 PROCEDURE — 71045 X-RAY EXAM CHEST 1 VIEW: CPT | Performed by: RADIOLOGY

## 2024-09-23 PROCEDURE — 74177 CT ABD & PELVIS W/CONTRAST: CPT | Performed by: STUDENT IN AN ORGANIZED HEALTH CARE EDUCATION/TRAINING PROGRAM

## 2024-09-23 PROCEDURE — 83690 ASSAY OF LIPASE: CPT | Performed by: STUDENT IN AN ORGANIZED HEALTH CARE EDUCATION/TRAINING PROGRAM

## 2024-09-23 PROCEDURE — 93005 ELECTROCARDIOGRAM TRACING: CPT | Mod: 59

## 2024-09-23 PROCEDURE — 99285 EMERGENCY DEPT VISIT HI MDM: CPT | Mod: 25

## 2024-09-23 PROCEDURE — 84702 CHORIONIC GONADOTROPIN TEST: CPT | Performed by: STUDENT IN AN ORGANIZED HEALTH CARE EDUCATION/TRAINING PROGRAM

## 2024-09-23 PROCEDURE — 93005 ELECTROCARDIOGRAM TRACING: CPT

## 2024-09-23 PROCEDURE — 85025 COMPLETE CBC W/AUTO DIFF WBC: CPT | Performed by: STUDENT IN AN ORGANIZED HEALTH CARE EDUCATION/TRAINING PROGRAM

## 2024-09-23 PROCEDURE — 96376 TX/PRO/DX INJ SAME DRUG ADON: CPT

## 2024-09-23 PROCEDURE — 87636 SARSCOV2 & INF A&B AMP PRB: CPT | Performed by: STUDENT IN AN ORGANIZED HEALTH CARE EDUCATION/TRAINING PROGRAM

## 2024-09-23 PROCEDURE — 36415 COLL VENOUS BLD VENIPUNCTURE: CPT | Performed by: STUDENT IN AN ORGANIZED HEALTH CARE EDUCATION/TRAINING PROGRAM

## 2024-09-23 PROCEDURE — 2500000004 HC RX 250 GENERAL PHARMACY W/ HCPCS (ALT 636 FOR OP/ED): Performed by: STUDENT IN AN ORGANIZED HEALTH CARE EDUCATION/TRAINING PROGRAM

## 2024-09-23 PROCEDURE — 96361 HYDRATE IV INFUSION ADD-ON: CPT

## 2024-09-23 PROCEDURE — 96375 TX/PRO/DX INJ NEW DRUG ADDON: CPT

## 2024-09-23 PROCEDURE — 80053 COMPREHEN METABOLIC PANEL: CPT | Performed by: STUDENT IN AN ORGANIZED HEALTH CARE EDUCATION/TRAINING PROGRAM

## 2024-09-23 RX ORDER — DIPHENHYDRAMINE HYDROCHLORIDE 50 MG/ML
25 INJECTION INTRAMUSCULAR; INTRAVENOUS ONCE
Status: COMPLETED | OUTPATIENT
Start: 2024-09-23 | End: 2024-09-23

## 2024-09-23 RX ORDER — PROCHLORPERAZINE EDISYLATE 5 MG/ML
10 INJECTION INTRAMUSCULAR; INTRAVENOUS ONCE
Status: COMPLETED | OUTPATIENT
Start: 2024-09-23 | End: 2024-09-23

## 2024-09-23 RX ORDER — PANTOPRAZOLE SODIUM 40 MG/10ML
40 INJECTION, POWDER, LYOPHILIZED, FOR SOLUTION INTRAVENOUS ONCE
Status: COMPLETED | OUTPATIENT
Start: 2024-09-23 | End: 2024-09-23

## 2024-09-23 RX ORDER — FAMOTIDINE 10 MG/ML
20 INJECTION INTRAVENOUS ONCE
Status: COMPLETED | OUTPATIENT
Start: 2024-09-23 | End: 2024-09-23

## 2024-09-23 RX ORDER — DIPHENHYDRAMINE HYDROCHLORIDE 50 MG/ML
INJECTION INTRAMUSCULAR; INTRAVENOUS
Status: COMPLETED
Start: 2024-09-23 | End: 2024-09-23

## 2024-09-23 RX ADMIN — IOHEXOL 75 ML: 350 INJECTION, SOLUTION INTRAVENOUS at 23:44

## 2024-09-23 RX ADMIN — PROCHLORPERAZINE EDISYLATE 10 MG: 5 INJECTION INTRAMUSCULAR; INTRAVENOUS at 22:30

## 2024-09-23 RX ADMIN — FAMOTIDINE 20 MG: 10 INJECTION, SOLUTION INTRAVENOUS at 22:21

## 2024-09-23 RX ADMIN — DIPHENHYDRAMINE HYDROCHLORIDE 25 MG: 50 INJECTION, SOLUTION INTRAMUSCULAR; INTRAVENOUS at 22:48

## 2024-09-23 RX ADMIN — SODIUM CHLORIDE, POTASSIUM CHLORIDE, SODIUM LACTATE AND CALCIUM CHLORIDE 1000 ML: 600; 310; 30; 20 INJECTION, SOLUTION INTRAVENOUS at 22:30

## 2024-09-23 RX ADMIN — DIPHENHYDRAMINE HYDROCHLORIDE 25 MG: 50 INJECTION, SOLUTION INTRAMUSCULAR; INTRAVENOUS at 23:55

## 2024-09-23 RX ADMIN — DIPHENHYDRAMINE HYDROCHLORIDE AND LIDOCAINE HYDROCHLORIDE AND ALUMINUM HYDROXIDE AND MAGNESIUM HYDRO 10 ML: KIT at 22:21

## 2024-09-23 RX ADMIN — PANTOPRAZOLE SODIUM 40 MG: 40 INJECTION, POWDER, FOR SOLUTION INTRAVENOUS at 22:21

## 2024-09-23 ASSESSMENT — PAIN SCALES - GENERAL: PAINLEVEL_OUTOF10: 7

## 2024-09-23 ASSESSMENT — COLUMBIA-SUICIDE SEVERITY RATING SCALE - C-SSRS
6. HAVE YOU EVER DONE ANYTHING, STARTED TO DO ANYTHING, OR PREPARED TO DO ANYTHING TO END YOUR LIFE?: NO
2. HAVE YOU ACTUALLY HAD ANY THOUGHTS OF KILLING YOURSELF?: NO
1. IN THE PAST MONTH, HAVE YOU WISHED YOU WERE DEAD OR WISHED YOU COULD GO TO SLEEP AND NOT WAKE UP?: NO

## 2024-09-23 ASSESSMENT — PAIN DESCRIPTION - FREQUENCY: FREQUENCY: CONSTANT/CONTINUOUS

## 2024-09-23 ASSESSMENT — PAIN DESCRIPTION - PAIN TYPE: TYPE: ACUTE PAIN

## 2024-09-23 ASSESSMENT — PAIN DESCRIPTION - ORIENTATION: ORIENTATION: MID;UPPER

## 2024-09-23 ASSESSMENT — PAIN - FUNCTIONAL ASSESSMENT: PAIN_FUNCTIONAL_ASSESSMENT: 0-10

## 2024-09-23 ASSESSMENT — PAIN DESCRIPTION - DESCRIPTORS: DESCRIPTORS: DISCOMFORT

## 2024-09-23 ASSESSMENT — PAIN DESCRIPTION - LOCATION: LOCATION: ABDOMEN

## 2024-09-24 VITALS
HEART RATE: 104 BPM | TEMPERATURE: 98.1 F | OXYGEN SATURATION: 100 % | WEIGHT: 130 LBS | BODY MASS INDEX: 22.2 KG/M2 | RESPIRATION RATE: 16 BRPM | DIASTOLIC BLOOD PRESSURE: 98 MMHG | HEIGHT: 64 IN | SYSTOLIC BLOOD PRESSURE: 135 MMHG

## 2024-09-24 LAB
APPEARANCE UR: CLEAR
BILIRUB UR STRIP.AUTO-MCNC: NEGATIVE MG/DL
CARDIAC TROPONIN I PNL SERPL HS: 78 NG/L (ref 0–13)
COLOR UR: COLORLESS
GLUCOSE UR STRIP.AUTO-MCNC: NORMAL MG/DL
HOLD SPECIMEN: NORMAL
KETONES UR STRIP.AUTO-MCNC: ABNORMAL MG/DL
LEUKOCYTE ESTERASE UR QL STRIP.AUTO: ABNORMAL
NITRITE UR QL STRIP.AUTO: NEGATIVE
PH UR STRIP.AUTO: 7 [PH]
PROT UR STRIP.AUTO-MCNC: NEGATIVE MG/DL
RBC # UR STRIP.AUTO: NEGATIVE /UL
RBC #/AREA URNS AUTO: ABNORMAL /HPF
SP GR UR STRIP.AUTO: >1.05
UROBILINOGEN UR STRIP.AUTO-MCNC: NORMAL MG/DL
WBC #/AREA URNS AUTO: ABNORMAL /HPF

## 2024-09-24 PROCEDURE — 87086 URINE CULTURE/COLONY COUNT: CPT | Mod: ELYLAB | Performed by: STUDENT IN AN ORGANIZED HEALTH CARE EDUCATION/TRAINING PROGRAM

## 2024-09-24 PROCEDURE — 84484 ASSAY OF TROPONIN QUANT: CPT | Performed by: STUDENT IN AN ORGANIZED HEALTH CARE EDUCATION/TRAINING PROGRAM

## 2024-09-24 PROCEDURE — 81001 URINALYSIS AUTO W/SCOPE: CPT | Performed by: STUDENT IN AN ORGANIZED HEALTH CARE EDUCATION/TRAINING PROGRAM

## 2024-09-24 PROCEDURE — 36415 COLL VENOUS BLD VENIPUNCTURE: CPT | Performed by: STUDENT IN AN ORGANIZED HEALTH CARE EDUCATION/TRAINING PROGRAM

## 2024-09-24 PROCEDURE — 2500000001 HC RX 250 WO HCPCS SELF ADMINISTERED DRUGS (ALT 637 FOR MEDICARE OP): Performed by: STUDENT IN AN ORGANIZED HEALTH CARE EDUCATION/TRAINING PROGRAM

## 2024-09-24 RX ORDER — FAMOTIDINE 20 MG/1
20 TABLET, FILM COATED ORAL 2 TIMES DAILY
Qty: 30 TABLET | Refills: 0 | Status: SHIPPED | OUTPATIENT
Start: 2024-09-24 | End: 2024-10-09

## 2024-09-24 RX ORDER — POTASSIUM CHLORIDE 1.5 G/1.58G
40 POWDER, FOR SOLUTION ORAL ONCE
Status: COMPLETED | OUTPATIENT
Start: 2024-09-24 | End: 2024-09-24

## 2024-09-24 RX ORDER — LORAZEPAM 0.5 MG/1
0.5 TABLET ORAL ONCE
Status: COMPLETED | OUTPATIENT
Start: 2024-09-24 | End: 2024-09-24

## 2024-09-24 RX ORDER — MIRTAZAPINE 15 MG/1
15 TABLET, ORALLY DISINTEGRATING ORAL ONCE
Status: COMPLETED | OUTPATIENT
Start: 2024-09-24 | End: 2024-09-24

## 2024-09-24 RX ADMIN — LORAZEPAM 0.5 MG: 0.5 TABLET ORAL at 02:14

## 2024-09-24 RX ADMIN — MIRTAZAPINE 15 MG: 15 TABLET, ORALLY DISINTEGRATING ORAL at 00:59

## 2024-09-24 RX ADMIN — POTASSIUM CHLORIDE 40 MEQ: 1.5 POWDER, FOR SOLUTION ORAL at 02:13

## 2024-09-24 NOTE — ED PROVIDER NOTES
HPI   Chief Complaint   Patient presents with    Vomiting     This morning I ate a sandwich from [employer] and I started throwing up real bad and my stomach hurts       Patient is a 44-year-old female with history of gastritis who presents for epigastric pain.  Patient states she had a sandwich this morning and began having vomiting and abdominal pain.  No blood in her vomit.  Denies chest pain, shortness of breath, fevers, cough, Raynaud's, sore throat, diarrhea.  Dors is mild chills with her symptoms.  States her pain is in her upper abdomen.  States her urine did have a slight smell to it, denies dysuria, hematuria, polyuria.  No vaginal bleeding or discharge.  Endorses daily marijuana use, no tobacco or alcohol.  No history of MI, CVA, DVT or PE.  No history of cardiac stents.  States this feels similar to her past episodes.          Patient History   Past Medical History:   Diagnosis Date    Acid reflux     Gastritis      Past Surgical History:   Procedure Laterality Date     SECTION, CLASSIC  1999     SECTION, CLASSIC  10/21/2003     Family History   Problem Relation Name Age of Onset    Hypertension Mother       Social History     Tobacco Use    Smoking status: Never     Passive exposure: Never    Smokeless tobacco: Never   Vaping Use    Vaping status: Never Used   Substance Use Topics    Alcohol use: Never    Drug use: Yes     Types: Marijuana     Comment: daily       Physical Exam   ED Triage Vitals [24 2146]   Temperature Heart Rate Respirations BP   36.7 °C (98.1 °F) 95 16 115/80      Pulse Ox Temp Source Heart Rate Source Patient Position   100 % Temporal Monitor Sitting      BP Location FiO2 (%)     Right arm --       Physical Exam  Constitutional:       General: She is not in acute distress.  HENT:      Head: Normocephalic.   Eyes:      Extraocular Movements: Extraocular movements intact.      Conjunctiva/sclera: Conjunctivae normal.      Pupils: Pupils are equal, round, and  reactive to light.   Cardiovascular:      Rate and Rhythm: Normal rate and regular rhythm.      Pulses: Normal pulses.      Heart sounds: Normal heart sounds.   Pulmonary:      Effort: Pulmonary effort is normal.      Breath sounds: Normal breath sounds.   Abdominal:      General: There is no distension.      Palpations: Abdomen is soft. There is no mass.      Tenderness: There is abdominal tenderness. There is guarding (epigastric).   Musculoskeletal:         General: No deformity.      Cervical back: Normal range of motion and neck supple.      Right lower leg: No edema.      Left lower leg: No edema.   Skin:     General: Skin is warm and dry.      Findings: No lesion or rash.   Neurological:      General: No focal deficit present.      Mental Status: She is alert and oriented to person, place, and time. Mental status is at baseline.      Cranial Nerves: No cranial nerve deficit.      Sensory: No sensory deficit.      Motor: No weakness.   Psychiatric:         Mood and Affect: Mood normal.       ED Course & MDM   Diagnoses as of 09/24/24 0327   Acute gastritis without hemorrhage, unspecified gastritis type                 No data recorded     [place] Coma Scale Score: 15 (09/23/24 2146 : Minerva Conte RN)                           Medical Decision Making  EKG on my interpretation: Rate 71, rhythm irregular, axis normal, , QRS 80, QTc 419, T waves: Unremarkable, ST segments: No elevations or depressions, interpretation: Normal sinus rhythm with sinus arrhythmia, no STEMI    EKG my interpretation: Rate 113, rhythm regular, axis normal, , QRS 76, QTc 466, T waves: Unremarkable, ST segments: No elevations or depressions, interpretation: Sinus tachycardia, no STEMI    Patient is a 44-year-old female with above-stated past medical history presents for epigastric pain.  Patient's EKGs are nonischemic, she does have mildly elevated troponins which are consistent with her past values, she did have an  echocardiogram which was negative in July after she had similar results, low suspicion for ACS given she is not having chest pain and her symptoms are consistent with her past presentations.  Patient's ultrasound did not show signs of acute findings.  Patient's urinalysis shows white cells, but no bacteria, low suspicion for UTI at this time.  CT scan did not show signs of acute findings.  Low suspicion for pyelonephritis.  Chest x-ray did not show signs of pneumonia pneumothorax.  Low suspicion for pulmonary embolism, dissection, Boerhaave's, pericardial effusion as a cause for symptoms.  Patient CT scan did show signs of esophagitis.  Patient states she has not followed up with GI.  I did provide an appointment for her and gave her a prescription for famotidine to complement the omeprazole she is already taking.  Patient's heart rate improved.  I did advise her to follow-up with [facility] as she was instructed to, though has not done yet.  Patient is clinically well-appearing nontoxic.  I do not believe she requires admission at this time.  Patient was discharged home with return precautions and follow-up instructions.    Disclaimer: This note was dictated using speech recognition software. Minor errors in transcription may be present. Please call if questions.     Cal Turner MD  [facility]  Contact on [employer]          Problems Addressed:  Acute gastritis without hemorrhage, unspecified gastritis type: acute illness or injury    Amount and/or Complexity of Data Reviewed  Labs: ordered.  Radiology: ordered.  ECG/medicine tests: ordered and independent interpretation performed.        Procedure  Procedures     Cal Turner MD  09/24/24 5896

## 2024-09-25 LAB
ATRIAL RATE: 113 BPM
ATRIAL RATE: 71 BPM
P AXIS: 61 DEGREES
P AXIS: 69 DEGREES
P OFFSET: 194 MS
P OFFSET: 203 MS
P ONSET: 140 MS
P ONSET: 141 MS
PR INTERVAL: 160 MS
PR INTERVAL: 164 MS
Q ONSET: 221 MS
Q ONSET: 222 MS
QRS COUNT: 12 BEATS
QRS COUNT: 19 BEATS
QRS DURATION: 76 MS
QRS DURATION: 80 MS
QT INTERVAL: 340 MS
QT INTERVAL: 386 MS
QTC CALCULATION(BAZETT): 419 MS
QTC CALCULATION(BAZETT): 466 MS
QTC FREDERICIA: 408 MS
QTC FREDERICIA: 419 MS
R AXIS: 59 DEGREES
R AXIS: 63 DEGREES
T AXIS: 54 DEGREES
T AXIS: 63 DEGREES
T OFFSET: 392 MS
T OFFSET: 414 MS
VENTRICULAR RATE: 113 BPM
VENTRICULAR RATE: 71 BPM

## 2024-09-26 LAB — BACTERIA UR CULT: ABNORMAL

## 2024-09-27 ENCOUNTER — TELEPHONE (OUTPATIENT)
Dept: PHARMACY | Facility: HOSPITAL | Age: 44
End: 2024-09-27
Payer: COMMERCIAL

## 2024-09-27 NOTE — PROGRESS NOTES
EDPD Note: Rapid Result Review    This call was interpreted by the [employer] [facility]    Reviewed Mr./Mrs./Ms. Neela Elaine 's chart regarding a positive urine culture/result that was taken during their recent emergency room visit. The patient was not told about these results prior to leaving the emergency department. Therefore, patient was contacted and given proper education. Patient was asymptomatic in ED and still reports no urinary symptoms at the time of call. Since asymptomatic UTI, patient is not indicated for antibiotic at this time. Advised patient to follow up with PCP or urgent care if urinary symptoms due arise.     Susceptibility data from last 90 days.  Collected Specimen Info Organism Ampicillin Cefazolin Cefazolin (uncomplicated UTIs only) Ciprofloxacin Gentamicin Nitrofurantoin Piperacillin/Tazobactam Trimethoprim/Sulfamethoxazole   09/24/24 Urine from Clean Catch/Voided Escherichia coli  S  S  S  S  S  S  S  S   08/15/24 Urine from Clean Catch/Voided Escherichia coli  S  S  S  S  S  S  S  S       No further follow up needed from EDPD Team.     Maya Shay, PharmD

## 2024-10-18 ENCOUNTER — HOSPITAL ENCOUNTER (EMERGENCY)
Facility: HOSPITAL | Age: 44
Discharge: HOME | End: 2024-10-18
Attending: INTERNAL MEDICINE
Payer: COMMERCIAL

## 2024-10-18 ENCOUNTER — APPOINTMENT (OUTPATIENT)
Dept: RADIOLOGY | Facility: HOSPITAL | Age: 44
End: 2024-10-18
Payer: COMMERCIAL

## 2024-10-18 ENCOUNTER — APPOINTMENT (OUTPATIENT)
Dept: CARDIOLOGY | Facility: HOSPITAL | Age: 44
End: 2024-10-18
Payer: COMMERCIAL

## 2024-10-18 VITALS
SYSTOLIC BLOOD PRESSURE: 123 MMHG | TEMPERATURE: 98.4 F | HEIGHT: 64 IN | HEART RATE: 79 BPM | WEIGHT: 130.73 LBS | RESPIRATION RATE: 16 BRPM | BODY MASS INDEX: 22.32 KG/M2 | DIASTOLIC BLOOD PRESSURE: 77 MMHG | OXYGEN SATURATION: 99 %

## 2024-10-18 DIAGNOSIS — R79.89 ELEVATED TROPONIN: ICD-10-CM

## 2024-10-18 DIAGNOSIS — R10.13 ABDOMINAL PAIN, EPIGASTRIC: Primary | ICD-10-CM

## 2024-10-18 DIAGNOSIS — F12.90 CANNABINOID HYPEREMESIS SYNDROME: ICD-10-CM

## 2024-10-18 DIAGNOSIS — R11.2 NAUSEA AND VOMITING, UNSPECIFIED VOMITING TYPE: ICD-10-CM

## 2024-10-18 DIAGNOSIS — R11.2 CANNABINOID HYPEREMESIS SYNDROME: ICD-10-CM

## 2024-10-18 LAB
ALBUMIN SERPL BCP-MCNC: 4.5 G/DL (ref 3.4–5)
ALP SERPL-CCNC: 70 U/L (ref 33–110)
ALT SERPL W P-5'-P-CCNC: 13 U/L (ref 7–45)
ANION GAP SERPL CALC-SCNC: 14 MMOL/L (ref 10–20)
AST SERPL W P-5'-P-CCNC: 16 U/L (ref 9–39)
B-HCG SERPL-ACNC: <2 MIU/ML
BASOPHILS # BLD AUTO: 0.03 X10*3/UL (ref 0–0.1)
BASOPHILS NFR BLD AUTO: 0.3 %
BILIRUB SERPL-MCNC: 0.6 MG/DL (ref 0–1.2)
BUN SERPL-MCNC: 8 MG/DL (ref 6–23)
CALCIUM SERPL-MCNC: 9.3 MG/DL (ref 8.6–10.3)
CARDIAC TROPONIN I PNL SERPL HS: 54 NG/L (ref 0–13)
CARDIAC TROPONIN I PNL SERPL HS: 60 NG/L (ref 0–13)
CHLORIDE SERPL-SCNC: 104 MMOL/L (ref 98–107)
CO2 SERPL-SCNC: 24 MMOL/L (ref 21–32)
CREAT SERPL-MCNC: 0.7 MG/DL (ref 0.5–1.05)
EGFRCR SERPLBLD CKD-EPI 2021: >90 ML/MIN/1.73M*2
EOSINOPHIL # BLD AUTO: 0.21 X10*3/UL (ref 0–0.7)
EOSINOPHIL NFR BLD AUTO: 2.3 %
ERYTHROCYTE [DISTWIDTH] IN BLOOD BY AUTOMATED COUNT: 15.7 % (ref 11.5–14.5)
GLUCOSE SERPL-MCNC: 86 MG/DL (ref 74–99)
HCT VFR BLD AUTO: 40.2 % (ref 36–46)
HGB BLD-MCNC: 13.9 G/DL (ref 12–16)
IMM GRANULOCYTES # BLD AUTO: 0.02 X10*3/UL (ref 0–0.7)
IMM GRANULOCYTES NFR BLD AUTO: 0.2 % (ref 0–0.9)
LIPASE SERPL-CCNC: 25 U/L (ref 9–82)
LYMPHOCYTES # BLD AUTO: 2.38 X10*3/UL (ref 1.2–4.8)
LYMPHOCYTES NFR BLD AUTO: 25.5 %
MAGNESIUM SERPL-MCNC: 1.95 MG/DL (ref 1.6–2.4)
MCH RBC QN AUTO: 26.5 PG (ref 26–34)
MCHC RBC AUTO-ENTMCNC: 34.6 G/DL (ref 32–36)
MCV RBC AUTO: 77 FL (ref 80–100)
MONOCYTES # BLD AUTO: 0.57 X10*3/UL (ref 0.1–1)
MONOCYTES NFR BLD AUTO: 6.1 %
NEUTROPHILS # BLD AUTO: 6.11 X10*3/UL (ref 1.2–7.7)
NEUTROPHILS NFR BLD AUTO: 65.6 %
NRBC BLD-RTO: 0 /100 WBCS (ref 0–0)
PLATELET # BLD AUTO: 335 X10*3/UL (ref 150–450)
POTASSIUM SERPL-SCNC: 3.4 MMOL/L (ref 3.5–5.3)
PROT SERPL-MCNC: 8.3 G/DL (ref 6.4–8.2)
RBC # BLD AUTO: 5.25 X10*6/UL (ref 4–5.2)
SODIUM SERPL-SCNC: 139 MMOL/L (ref 136–145)
WBC # BLD AUTO: 9.3 X10*3/UL (ref 4.4–11.3)

## 2024-10-18 PROCEDURE — 99284 EMERGENCY DEPT VISIT MOD MDM: CPT

## 2024-10-18 PROCEDURE — 2500000004 HC RX 250 GENERAL PHARMACY W/ HCPCS (ALT 636 FOR OP/ED): Performed by: INTERNAL MEDICINE

## 2024-10-18 PROCEDURE — 96374 THER/PROPH/DIAG INJ IV PUSH: CPT

## 2024-10-18 PROCEDURE — 83735 ASSAY OF MAGNESIUM: CPT | Performed by: INTERNAL MEDICINE

## 2024-10-18 PROCEDURE — 84484 ASSAY OF TROPONIN QUANT: CPT | Performed by: INTERNAL MEDICINE

## 2024-10-18 PROCEDURE — 36415 COLL VENOUS BLD VENIPUNCTURE: CPT | Performed by: INTERNAL MEDICINE

## 2024-10-18 PROCEDURE — 93005 ELECTROCARDIOGRAM TRACING: CPT | Mod: 59

## 2024-10-18 PROCEDURE — 85025 COMPLETE CBC W/AUTO DIFF WBC: CPT | Performed by: INTERNAL MEDICINE

## 2024-10-18 PROCEDURE — 96375 TX/PRO/DX INJ NEW DRUG ADDON: CPT

## 2024-10-18 PROCEDURE — 71045 X-RAY EXAM CHEST 1 VIEW: CPT

## 2024-10-18 PROCEDURE — 71045 X-RAY EXAM CHEST 1 VIEW: CPT | Performed by: RADIOLOGY

## 2024-10-18 PROCEDURE — 80053 COMPREHEN METABOLIC PANEL: CPT | Performed by: INTERNAL MEDICINE

## 2024-10-18 PROCEDURE — 83690 ASSAY OF LIPASE: CPT | Performed by: INTERNAL MEDICINE

## 2024-10-18 PROCEDURE — 93005 ELECTROCARDIOGRAM TRACING: CPT

## 2024-10-18 PROCEDURE — 96376 TX/PRO/DX INJ SAME DRUG ADON: CPT

## 2024-10-18 PROCEDURE — 84702 CHORIONIC GONADOTROPIN TEST: CPT | Performed by: INTERNAL MEDICINE

## 2024-10-18 RX ORDER — DIPHENHYDRAMINE HYDROCHLORIDE 50 MG/ML
12.5 INJECTION INTRAMUSCULAR; INTRAVENOUS ONCE
Status: COMPLETED | OUTPATIENT
Start: 2024-10-18 | End: 2024-10-18

## 2024-10-18 RX ORDER — LORAZEPAM 2 MG/ML
1 INJECTION INTRAMUSCULAR ONCE
Status: COMPLETED | OUTPATIENT
Start: 2024-10-18 | End: 2024-10-18

## 2024-10-18 RX ORDER — ONDANSETRON 4 MG/1
4 TABLET, ORALLY DISINTEGRATING ORAL EVERY 8 HOURS PRN
Qty: 15 TABLET | Refills: 0 | Status: SHIPPED | OUTPATIENT
Start: 2024-10-18 | End: 2024-10-25

## 2024-10-18 RX ORDER — FAMOTIDINE 10 MG/ML
20 INJECTION INTRAVENOUS ONCE
Status: COMPLETED | OUTPATIENT
Start: 2024-10-18 | End: 2024-10-18

## 2024-10-18 RX ORDER — HALOPERIDOL 5 MG/ML
2 INJECTION INTRAMUSCULAR ONCE
Status: COMPLETED | OUTPATIENT
Start: 2024-10-18 | End: 2024-10-18

## 2024-10-18 ASSESSMENT — COLUMBIA-SUICIDE SEVERITY RATING SCALE - C-SSRS
1. IN THE PAST MONTH, HAVE YOU WISHED YOU WERE DEAD OR WISHED YOU COULD GO TO SLEEP AND NOT WAKE UP?: NO
2. HAVE YOU ACTUALLY HAD ANY THOUGHTS OF KILLING YOURSELF?: NO
6. HAVE YOU EVER DONE ANYTHING, STARTED TO DO ANYTHING, OR PREPARED TO DO ANYTHING TO END YOUR LIFE?: NO

## 2024-10-18 ASSESSMENT — PAIN - FUNCTIONAL ASSESSMENT: PAIN_FUNCTIONAL_ASSESSMENT: 0-10

## 2024-10-18 ASSESSMENT — PAIN DESCRIPTION - LOCATION: LOCATION: ABDOMEN

## 2024-10-18 ASSESSMENT — PAIN SCALES - GENERAL: PAINLEVEL_OUTOF10: 8

## 2024-10-18 NOTE — ED PROVIDER NOTES
HPI   Chief Complaint   Patient presents with    Abdominal Pain    Nausea    Vomiting       Patient presented for evaluation of abdominal pain and vomiting.  Patient states it started yesterday she thinks may have been related to a pork chop that she ate she noted will consistently today.  Patient also did vomit.  Denies constipation or diarrhea.  Patient denies blood in the stool or vomit.  Patient notes she does take omeprazole Zofran and Pepcid regularly.  Patient notes he is are not helping with the pain.  Patient denies recent alcohol use.  Patient denies any possibility of pregnancy.  Patient states she does smoke marijuana daily.  Patient states she smokes marijuana all day every day.  Patient notes that she did take a hot shower this morning that improved her symptoms.              Patient History   Past Medical History:   Diagnosis Date    Acid reflux     Gastritis      Past Surgical History:   Procedure Laterality Date     SECTION, CLASSIC  1999     SECTION, CLASSIC  10/21/2003     Family History   Problem Relation Name Age of Onset    Hypertension Mother       Social History     Tobacco Use    Smoking status: Never     Passive exposure: Never    Smokeless tobacco: Never   Vaping Use    Vaping status: Never Used   Substance Use Topics    Alcohol use: Never    Drug use: Yes     Types: Marijuana     Comment: daily       Physical Exam   ED Triage Vitals [10/18/24 1515]   Temperature Heart Rate Respirations BP   36.9 °C (98.4 °F) (!) 104 18 (!) 152/95      Pulse Ox Temp Source Heart Rate Source Patient Position   97 % Temporal Monitor Sitting      BP Location FiO2 (%)     -- --       Physical Exam  Vitals and nursing note reviewed.   Constitutional:       Appearance: Normal appearance.   HENT:      Head: Atraumatic.      Right Ear: External ear normal.      Left Ear: External ear normal.      Nose: Nose normal.      Mouth/Throat:      Mouth: Mucous membranes are moist.   Eyes:       Extraocular Movements: Extraocular movements intact.      Pupils: Pupils are equal, round, and reactive to light.   Cardiovascular:      Rate and Rhythm: Normal rate and regular rhythm.      Pulses: Normal pulses.   Pulmonary:      Effort: Pulmonary effort is normal.      Breath sounds: Normal breath sounds.   Abdominal:      Palpations: Abdomen is soft.      Tenderness: There is abdominal tenderness in the epigastric area. There is no right CVA tenderness or left CVA tenderness.   Musculoskeletal:         General: No tenderness. Normal range of motion.      Cervical back: Normal range of motion and neck supple. No rigidity or tenderness.   Skin:     General: Skin is warm and dry.   Neurological:      General: No focal deficit present.      Mental Status: She is alert and oriented to person, place, and time. Mental status is at baseline.   Psychiatric:         Mood and Affect: Mood normal.         Behavior: Behavior normal.           ED Course & MDM   ED Course as of 10/18/24 1853   Fri Oct 18, 2024   1720 Patient reportedly had a reaction after receiving Haldol and Benadryl for abdominal pain and vomiting.  Concern for reaction to Haldol.  Will administer additional diphenhydramine. [JA]   1847 Reevaluated patient.  Patient is feeling better.  Updated with EKG, lab, CT findings.  Patient agrees to follow-up as outpatient.  Patient agrees to attempt to decrease her marijuana usage.  Patient agrees to return to the ED if having worsening symptoms or other concerns. [JA]      ED Course User Index  [JA] Zach Morrell DO         Diagnoses as of 10/18/24 1853   Abdominal pain, epigastric   Nausea and vomiting, unspecified vomiting type   Cannabinoid hyperemesis syndrome   Elevated troponin                 No data recorded     Eric Coma Scale Score: 15 (10/18/24 1518 : Genesis Linton RN)                           Medical Decision Making  Differential diagnosis: Cannabinol hyperemesis, pancreatitis, gastritis, MI,  other    Reassessed patient.  Patient notes that she has recurrent epigastric pain.  Patient states she has had multiple episodes similar to this.  Patient has relief from warm showers.  Denies chest pain or shortness of breath.  EKG appears nonischemic.  Repeat EKG unchanged.  Troponin elevated with negative delta however patient always has an elevated troponin.  Patient has been evaluated for this in the past.  Patient denies chest pain or shortness of breath.  Patient is feeling better and tolerating p.o. at this time.  Patient agrees to follow-up as outpatient to return to the ED if having worsening symptoms or other concerns.  Patient has been prescribed antacids.  Will refill Zofran.        Procedure  ECG 12 lead    Performed by: Zach Morrell DO  Authorized by: Zach Morrell DO    ECG interpreted by ED Physician in the absence of a cardiologist: yes    Comments:      10/18/2024 16: 10 sinus rhythm, rate 78, normal axis, ST segments normal, T waves normal, normal EKG.  EKG interpreted by myself.    Repeat EKG 10/18/2024 18: 38 sinus rhythm, rate 99, normal axis, ST segments normal, T waves normal, normal EKG.  EKG interpreted by myself.       Zach Morrell DO  10/18/24 5236

## 2024-10-20 LAB
ATRIAL RATE: 78 BPM
ATRIAL RATE: 99 BPM
P AXIS: 63 DEGREES
P AXIS: 65 DEGREES
P OFFSET: 189 MS
P OFFSET: 193 MS
P ONSET: 135 MS
P ONSET: 139 MS
PR INTERVAL: 166 MS
PR INTERVAL: 176 MS
Q ONSET: 222 MS
Q ONSET: 223 MS
QRS COUNT: 13 BEATS
QRS COUNT: 16 BEATS
QRS DURATION: 76 MS
QRS DURATION: 82 MS
QT INTERVAL: 362 MS
QT INTERVAL: 370 MS
QTC CALCULATION(BAZETT): 421 MS
QTC CALCULATION(BAZETT): 464 MS
QTC FREDERICIA: 403 MS
QTC FREDERICIA: 427 MS
R AXIS: 58 DEGREES
R AXIS: 59 DEGREES
T AXIS: 56 DEGREES
T AXIS: 56 DEGREES
T OFFSET: 404 MS
T OFFSET: 407 MS
VENTRICULAR RATE: 78 BPM
VENTRICULAR RATE: 99 BPM

## 2024-10-28 ENCOUNTER — TELEPHONE (OUTPATIENT)
Dept: GASTROENTEROLOGY | Facility: CLINIC | Age: 44
End: 2024-10-28
Payer: COMMERCIAL

## 2024-11-19 ENCOUNTER — HOSPITAL ENCOUNTER (EMERGENCY)
Facility: HOSPITAL | Age: 44
Discharge: HOME | End: 2024-11-20
Payer: COMMERCIAL

## 2024-11-19 ENCOUNTER — APPOINTMENT (OUTPATIENT)
Dept: RADIOLOGY | Facility: HOSPITAL | Age: 44
End: 2024-11-19
Payer: COMMERCIAL

## 2024-11-19 ENCOUNTER — APPOINTMENT (OUTPATIENT)
Dept: CARDIOLOGY | Facility: HOSPITAL | Age: 44
End: 2024-11-19
Payer: COMMERCIAL

## 2024-11-19 ENCOUNTER — HOSPITAL ENCOUNTER (EMERGENCY)
Facility: HOSPITAL | Age: 44
Discharge: HOME | End: 2024-11-19
Payer: COMMERCIAL

## 2024-11-19 VITALS
SYSTOLIC BLOOD PRESSURE: 130 MMHG | TEMPERATURE: 97.7 F | DIASTOLIC BLOOD PRESSURE: 85 MMHG | BODY MASS INDEX: 23.9 KG/M2 | HEART RATE: 88 BPM | WEIGHT: 140 LBS | HEIGHT: 64 IN | OXYGEN SATURATION: 100 % | RESPIRATION RATE: 16 BRPM

## 2024-11-19 DIAGNOSIS — K29.00 ACUTE GASTRITIS WITHOUT HEMORRHAGE, UNSPECIFIED GASTRITIS TYPE: Primary | ICD-10-CM

## 2024-11-19 DIAGNOSIS — R10.13 EPIGASTRIC PAIN: Primary | ICD-10-CM

## 2024-11-19 DIAGNOSIS — K29.00 ACUTE GASTRITIS WITHOUT HEMORRHAGE, UNSPECIFIED GASTRITIS TYPE: ICD-10-CM

## 2024-11-19 LAB
ALBUMIN SERPL BCP-MCNC: 4.5 G/DL (ref 3.4–5)
ALP SERPL-CCNC: 77 U/L (ref 33–110)
ALT SERPL W P-5'-P-CCNC: 12 U/L (ref 7–45)
ANION GAP SERPL CALC-SCNC: 12 MMOL/L (ref 10–20)
APPEARANCE UR: ABNORMAL
AST SERPL W P-5'-P-CCNC: 16 U/L (ref 9–39)
ATRIAL RATE: 74 BPM
B-HCG SERPL-ACNC: <2 MIU/ML
BACTERIA #/AREA URNS AUTO: ABNORMAL /HPF
BASOPHILS # BLD AUTO: 0.04 X10*3/UL (ref 0–0.1)
BASOPHILS # BLD AUTO: 0.04 X10*3/UL (ref 0–0.1)
BASOPHILS NFR BLD AUTO: 0.4 %
BASOPHILS NFR BLD AUTO: 0.4 %
BILIRUB SERPL-MCNC: 0.6 MG/DL (ref 0–1.2)
BILIRUB UR STRIP.AUTO-MCNC: NEGATIVE MG/DL
BUN SERPL-MCNC: 9 MG/DL (ref 6–23)
CALCIUM SERPL-MCNC: 9.4 MG/DL (ref 8.6–10.3)
CARDIAC TROPONIN I PNL SERPL HS: 60 NG/L (ref 0–13)
CARDIAC TROPONIN I PNL SERPL HS: 67 NG/L (ref 0–13)
CHLORIDE SERPL-SCNC: 105 MMOL/L (ref 98–107)
CO2 SERPL-SCNC: 25 MMOL/L (ref 21–32)
COLOR UR: ABNORMAL
CREAT SERPL-MCNC: 0.68 MG/DL (ref 0.5–1.05)
EGFRCR SERPLBLD CKD-EPI 2021: >90 ML/MIN/1.73M*2
EOSINOPHIL # BLD AUTO: 0.14 X10*3/UL (ref 0–0.7)
EOSINOPHIL # BLD AUTO: 0.26 X10*3/UL (ref 0–0.7)
EOSINOPHIL NFR BLD AUTO: 1.5 %
EOSINOPHIL NFR BLD AUTO: 2.3 %
ERYTHROCYTE [DISTWIDTH] IN BLOOD BY AUTOMATED COUNT: 14.8 % (ref 11.5–14.5)
ERYTHROCYTE [DISTWIDTH] IN BLOOD BY AUTOMATED COUNT: 15.1 % (ref 11.5–14.5)
GLUCOSE SERPL-MCNC: 93 MG/DL (ref 74–99)
GLUCOSE UR STRIP.AUTO-MCNC: NORMAL MG/DL
HCT VFR BLD AUTO: 38.8 % (ref 36–46)
HCT VFR BLD AUTO: 39.1 % (ref 36–46)
HGB BLD-MCNC: 13.3 G/DL (ref 12–16)
HGB BLD-MCNC: 13.4 G/DL (ref 12–16)
IMM GRANULOCYTES # BLD AUTO: 0.02 X10*3/UL (ref 0–0.7)
IMM GRANULOCYTES # BLD AUTO: 0.03 X10*3/UL (ref 0–0.7)
IMM GRANULOCYTES NFR BLD AUTO: 0.2 % (ref 0–0.9)
IMM GRANULOCYTES NFR BLD AUTO: 0.3 % (ref 0–0.9)
KETONES UR STRIP.AUTO-MCNC: ABNORMAL MG/DL
LACTATE SERPL-SCNC: 1.2 MMOL/L (ref 0.4–2)
LEUKOCYTE ESTERASE UR QL STRIP.AUTO: ABNORMAL
LIPASE SERPL-CCNC: 21 U/L (ref 9–82)
LYMPHOCYTES # BLD AUTO: 1.65 X10*3/UL (ref 1.2–4.8)
LYMPHOCYTES # BLD AUTO: 2.14 X10*3/UL (ref 1.2–4.8)
LYMPHOCYTES NFR BLD AUTO: 17.3 %
LYMPHOCYTES NFR BLD AUTO: 18.9 %
MAGNESIUM SERPL-MCNC: 1.89 MG/DL (ref 1.6–2.4)
MCH RBC QN AUTO: 26.3 PG (ref 26–34)
MCH RBC QN AUTO: 26.4 PG (ref 26–34)
MCHC RBC AUTO-ENTMCNC: 34 G/DL (ref 32–36)
MCHC RBC AUTO-ENTMCNC: 34.5 G/DL (ref 32–36)
MCV RBC AUTO: 76 FL (ref 80–100)
MCV RBC AUTO: 78 FL (ref 80–100)
MONOCYTES # BLD AUTO: 0.63 X10*3/UL (ref 0.1–1)
MONOCYTES # BLD AUTO: 0.81 X10*3/UL (ref 0.1–1)
MONOCYTES NFR BLD AUTO: 6.6 %
MONOCYTES NFR BLD AUTO: 7.2 %
MUCOUS THREADS #/AREA URNS AUTO: ABNORMAL /LPF
NEUTROPHILS # BLD AUTO: 7.08 X10*3/UL (ref 1.2–7.7)
NEUTROPHILS # BLD AUTO: 8.03 X10*3/UL (ref 1.2–7.7)
NEUTROPHILS NFR BLD AUTO: 70.9 %
NEUTROPHILS NFR BLD AUTO: 74 %
NITRITE UR QL STRIP.AUTO: ABNORMAL
NRBC BLD-RTO: 0 /100 WBCS (ref 0–0)
NRBC BLD-RTO: 0 /100 WBCS (ref 0–0)
P AXIS: 62 DEGREES
P OFFSET: 192 MS
P ONSET: 142 MS
PH UR STRIP.AUTO: 6 [PH]
PLATELET # BLD AUTO: 307 X10*3/UL (ref 150–450)
PLATELET # BLD AUTO: 322 X10*3/UL (ref 150–450)
POTASSIUM SERPL-SCNC: 3.7 MMOL/L (ref 3.5–5.3)
PR INTERVAL: 160 MS
PROT SERPL-MCNC: 8.4 G/DL (ref 6.4–8.2)
PROT UR STRIP.AUTO-MCNC: ABNORMAL MG/DL
Q ONSET: 222 MS
QRS COUNT: 12 BEATS
QRS DURATION: 78 MS
QT INTERVAL: 394 MS
QTC CALCULATION(BAZETT): 437 MS
QTC FREDERICIA: 422 MS
R AXIS: 67 DEGREES
RBC # BLD AUTO: 5.04 X10*6/UL (ref 4–5.2)
RBC # BLD AUTO: 5.1 X10*6/UL (ref 4–5.2)
RBC # UR STRIP.AUTO: ABNORMAL /UL
RBC #/AREA URNS AUTO: ABNORMAL /HPF
SODIUM SERPL-SCNC: 138 MMOL/L (ref 136–145)
SP GR UR STRIP.AUTO: 1.01
SQUAMOUS #/AREA URNS AUTO: ABNORMAL /HPF
T AXIS: 59 DEGREES
T OFFSET: 419 MS
UROBILINOGEN UR STRIP.AUTO-MCNC: NORMAL MG/DL
VENTRICULAR RATE: 74 BPM
WBC # BLD AUTO: 11.3 X10*3/UL (ref 4.4–11.3)
WBC # BLD AUTO: 9.6 X10*3/UL (ref 4.4–11.3)
WBC #/AREA URNS AUTO: ABNORMAL /HPF
WBC CLUMPS #/AREA URNS AUTO: ABNORMAL /HPF

## 2024-11-19 PROCEDURE — 81001 URINALYSIS AUTO W/SCOPE: CPT | Performed by: EMERGENCY MEDICINE

## 2024-11-19 PROCEDURE — 81001 URINALYSIS AUTO W/SCOPE: CPT

## 2024-11-19 PROCEDURE — 83690 ASSAY OF LIPASE: CPT

## 2024-11-19 PROCEDURE — 84075 ASSAY ALKALINE PHOSPHATASE: CPT

## 2024-11-19 PROCEDURE — 2500000001 HC RX 250 WO HCPCS SELF ADMINISTERED DRUGS (ALT 637 FOR MEDICARE OP)

## 2024-11-19 PROCEDURE — 2500000004 HC RX 250 GENERAL PHARMACY W/ HCPCS (ALT 636 FOR OP/ED)

## 2024-11-19 PROCEDURE — 99284 EMERGENCY DEPT VISIT MOD MDM: CPT | Mod: 25

## 2024-11-19 PROCEDURE — 80053 COMPREHEN METABOLIC PANEL: CPT | Performed by: PHYSICIAN ASSISTANT

## 2024-11-19 PROCEDURE — 84484 ASSAY OF TROPONIN QUANT: CPT

## 2024-11-19 PROCEDURE — 83605 ASSAY OF LACTIC ACID: CPT

## 2024-11-19 PROCEDURE — 84702 CHORIONIC GONADOTROPIN TEST: CPT

## 2024-11-19 PROCEDURE — 74176 CT ABD & PELVIS W/O CONTRAST: CPT

## 2024-11-19 PROCEDURE — 85025 COMPLETE CBC W/AUTO DIFF WBC: CPT

## 2024-11-19 PROCEDURE — 93005 ELECTROCARDIOGRAM TRACING: CPT

## 2024-11-19 PROCEDURE — 2500000005 HC RX 250 GENERAL PHARMACY W/O HCPCS: Performed by: PHYSICIAN ASSISTANT

## 2024-11-19 PROCEDURE — 83735 ASSAY OF MAGNESIUM: CPT

## 2024-11-19 PROCEDURE — 71045 X-RAY EXAM CHEST 1 VIEW: CPT | Performed by: RADIOLOGY

## 2024-11-19 PROCEDURE — 71045 X-RAY EXAM CHEST 1 VIEW: CPT

## 2024-11-19 PROCEDURE — 96375 TX/PRO/DX INJ NEW DRUG ADDON: CPT

## 2024-11-19 PROCEDURE — 2500000001 HC RX 250 WO HCPCS SELF ADMINISTERED DRUGS (ALT 637 FOR MEDICARE OP): Performed by: PHYSICIAN ASSISTANT

## 2024-11-19 PROCEDURE — 36415 COLL VENOUS BLD VENIPUNCTURE: CPT

## 2024-11-19 PROCEDURE — 85025 COMPLETE CBC W/AUTO DIFF WBC: CPT | Performed by: PHYSICIAN ASSISTANT

## 2024-11-19 PROCEDURE — 74176 CT ABD & PELVIS W/O CONTRAST: CPT | Mod: FOREIGN READ | Performed by: RADIOLOGY

## 2024-11-19 PROCEDURE — 83690 ASSAY OF LIPASE: CPT | Performed by: PHYSICIAN ASSISTANT

## 2024-11-19 PROCEDURE — 96365 THER/PROPH/DIAG IV INF INIT: CPT

## 2024-11-19 PROCEDURE — 80053 COMPREHEN METABOLIC PANEL: CPT

## 2024-11-19 RX ORDER — ALUMINUM HYDROXIDE, MAGNESIUM HYDROXIDE, AND SIMETHICONE 1200; 120; 1200 MG/30ML; MG/30ML; MG/30ML
30 SUSPENSION ORAL ONCE
Status: COMPLETED | OUTPATIENT
Start: 2024-11-19 | End: 2024-11-19

## 2024-11-19 RX ORDER — SUCRALFATE 1 G/10ML
1 SUSPENSION ORAL 4 TIMES DAILY
Qty: 400 ML | Refills: 0 | Status: SHIPPED | OUTPATIENT
Start: 2024-11-19 | End: 2024-11-29

## 2024-11-19 RX ORDER — SUCRALFATE 1 G/1
1 TABLET ORAL ONCE
Status: COMPLETED | OUTPATIENT
Start: 2024-11-19 | End: 2024-11-19

## 2024-11-19 RX ORDER — PROMETHAZINE HYDROCHLORIDE 12.5 MG/1
12.5 TABLET ORAL EVERY 6 HOURS PRN
Qty: 28 TABLET | Refills: 0 | Status: SHIPPED | OUTPATIENT
Start: 2024-11-19 | End: 2024-11-26

## 2024-11-19 RX ORDER — ONDANSETRON 4 MG/1
4 TABLET, ORALLY DISINTEGRATING ORAL ONCE
Status: COMPLETED | OUTPATIENT
Start: 2024-11-19 | End: 2024-11-19

## 2024-11-19 RX ORDER — FAMOTIDINE 20 MG/1
20 TABLET, FILM COATED ORAL 2 TIMES DAILY
Qty: 20 TABLET | Refills: 0 | Status: SHIPPED | OUTPATIENT
Start: 2024-11-19 | End: 2024-11-29

## 2024-11-19 RX ORDER — FAMOTIDINE 10 MG/ML
20 INJECTION INTRAVENOUS ONCE
Status: COMPLETED | OUTPATIENT
Start: 2024-11-19 | End: 2024-11-19

## 2024-11-19 ASSESSMENT — COLUMBIA-SUICIDE SEVERITY RATING SCALE - C-SSRS
6. HAVE YOU EVER DONE ANYTHING, STARTED TO DO ANYTHING, OR PREPARED TO DO ANYTHING TO END YOUR LIFE?: NO
6. HAVE YOU EVER DONE ANYTHING, STARTED TO DO ANYTHING, OR PREPARED TO DO ANYTHING TO END YOUR LIFE?: NO
1. IN THE PAST MONTH, HAVE YOU WISHED YOU WERE DEAD OR WISHED YOU COULD GO TO SLEEP AND NOT WAKE UP?: NO
2. HAVE YOU ACTUALLY HAD ANY THOUGHTS OF KILLING YOURSELF?: NO
1. IN THE PAST MONTH, HAVE YOU WISHED YOU WERE DEAD OR WISHED YOU COULD GO TO SLEEP AND NOT WAKE UP?: NO
2. HAVE YOU ACTUALLY HAD ANY THOUGHTS OF KILLING YOURSELF?: NO

## 2024-11-19 ASSESSMENT — PAIN DESCRIPTION - LOCATION
LOCATION: ABDOMEN
LOCATION: ABDOMEN

## 2024-11-19 ASSESSMENT — PAIN - FUNCTIONAL ASSESSMENT
PAIN_FUNCTIONAL_ASSESSMENT: 0-10

## 2024-11-19 ASSESSMENT — PAIN SCALES - GENERAL
PAINLEVEL_OUTOF10: 8

## 2024-11-19 ASSESSMENT — LIFESTYLE VARIABLES
EVER FELT BAD OR GUILTY ABOUT YOUR DRINKING: NO
TOTAL SCORE: 0
HAVE YOU EVER FELT YOU SHOULD CUT DOWN ON YOUR DRINKING: NO
HAVE PEOPLE ANNOYED YOU BY CRITICIZING YOUR DRINKING: NO
EVER HAD A DRINK FIRST THING IN THE MORNING TO STEADY YOUR NERVES TO GET RID OF A HANGOVER: NO

## 2024-11-19 ASSESSMENT — PAIN DESCRIPTION - DESCRIPTORS: DESCRIPTORS: SORE

## 2024-11-19 ASSESSMENT — PAIN DESCRIPTION - ORIENTATION
ORIENTATION: RIGHT;LEFT;MID;UPPER
ORIENTATION: MID;UPPER

## 2024-11-19 ASSESSMENT — PAIN DESCRIPTION - FREQUENCY: FREQUENCY: CONSTANT/CONTINUOUS

## 2024-11-19 ASSESSMENT — PAIN DESCRIPTION - PAIN TYPE
TYPE: ACUTE PAIN
TYPE: ACUTE PAIN

## 2024-11-19 NOTE — ED PROVIDER NOTES
"HPI   Chief Complaint   Patient presents with    Abdominal Pain     Upper abd pain +n/v X2 days, pt has hx of anxiety and GERD       History provided by: Patient    Limitations to history: None    CC: Abdominal pain    HPI: 44-year-old female with a history of generalized anxiety, depression, gastritis, GERD presents the emergency department to be evaluated for upper abdominal pain.  Patient states that the abdominal pain started yesterday.  She characterized the pain as \"aching and burning \"and localizes to the epigastric region.  The pain is worse with eating and drinking as well as vomiting.  Patient takes Zofran for nausea, she states that it helped somewhat but still had a few episodes of nonbloody vomiting.  She does admit to marijuana use.  Patient has had gastritis and GERD in the past and states it feels similar to her previous exacerbations.  She takes Protonix for this but denies any use of Pepcid or Carafate.  She has an appointment with Carin Mckeon, gastroenterology CNP.  Denies fever and chills.  Denies cough runny nose, congestion, sore throat.  Denies chest pain and shortness of breath.  Denies history of heart or lung disease.  Denies tearing or spreading pain in the chest or back.  Denies lower abdominal pain, vaginal bleeding or discharge, urgency frequency and dysuria.  Denies blood in the urine or stool.  Denies all other systemic symptoms.    ROS: Negative unless mentioned in HPI    Medical Hx: Allergy to Abilify, fluoxetine, Haldol.  Immunizations up-to-date.    Physical exam:    Constitutional: Patient is well-nourished and well-developed.  Sitting comfortably in the room and in no distress.  Oriented to person, place, time, and situation.    HEENT: Head is normocephalic, atraumatic. Patient's airway is patent.  Tympanic membranes are clear bilaterally.  Nasal mucosa clear.  Mouth with normal mucosa.  Throat is not erythematous and there are no oropharyngeal exudates, uvula is midline.  " No obvious facial deformities.    Eyes: Clear bilaterally.  Pupils are equal round and reactive to light and accommodation.  Extraocular movements intact.      Cardiac: Regular rate, regular rhythm.  Heart sounds S1, S2.  No murmurs, rubs, or gallops.  PMI nondisplaced.  No JVD.    Respiratory: Regular respiratory rate and effort.  Breath sounds are clear and equal bilaterally, no adventitious lung sounds.  Patient is speaking in full sentences and is in no apparent respiratory distress. No use of accessory muscles.      Gastrointestinal:  Epigastric abdominal tenderness to palpation.  Abdomen is soft and nondistended. There are no obvious deformities.  No rebound tenderness or guarding.  Bowel sounds are normal active.    Genitourinary: No CVA or flank tenderness.    Musculoskeletal: No reproducible tenderness.  No obvious skin or bony deformities.  Patient has equal range of motion in all extremities and no strength deficiencies.  No muscle or joint tenderness. No back or neck tenderness.  Capillary refill less than 3 seconds.  Strong peripheral pulses.  No sensory deficits.    Neurological: Patient is alert and oriented.  No focal deficits.  5/5 strength in all extremities.  Cranial nerves II through XII intact. GCS15.     Skin: Skin is normal color for race and is warm, dry, and intact.  No evidence of trauma.  No lesions, rashes, bruising, jaundice, or masses.    Psych: Appropriate mood and affect.  No apparent risk to self or others.    Heme/lymph: No adenopathy, lymphadenopathy, or splenomegaly    Physical exam is otherwise negative unless stated above or in history of present illness.                Patient History   Past Medical History:   Diagnosis Date    Acid reflux     Gastritis      Past Surgical History:   Procedure Laterality Date     SECTION, CLASSIC  1999     SECTION, CLASSIC  10/21/2003     Family History   Problem Relation Name Age of Onset    Hypertension Mother        Social History     Tobacco Use    Smoking status: Never     Passive exposure: Never    Smokeless tobacco: Never   Vaping Use    Vaping status: Never Used   Substance Use Topics    Alcohol use: Never    Drug use: Yes     Types: Marijuana     Comment: daily       Physical Exam   ED Triage Vitals [11/19/24 1328]   Temperature Heart Rate Respirations BP   36.5 °C (97.7 °F) 83 16 129/86      Pulse Ox Temp Source Heart Rate Source Patient Position   100 % Temporal Monitor Sitting      BP Location FiO2 (%)     Right arm --       Physical Exam      ED Course & MDM   Diagnoses as of 11/19/24 1533   Epigastric pain   Acute gastritis without hemorrhage, unspecified gastritis type        Patient updated on plan for lab testing, IV insertion, radiology imaging, and medications to be administered while in the ER (if indicated). Patient updated on expected wait times for testing and results. Patient provided my name and told to ask any staff member for questions or concerns if they should arise. Electronic medical record reviewed.     MDM    Upon initial assessment, patient was healthy non-toxic appearing and in no apparent distress.     Patient presented to the emergency department with the chief complaint abdominal pain.  Epigastric abdominal tenderness to palpation.  Abdomen is soft and nondistended. There are no obvious deformities.  No rebound tenderness or guarding.  Bowel sounds are normal active.  Examination of the patient's heart and lungs is unremarkable.  On arrival to the emergency department, vital signs were within normal limits    Based on the patient's history and physical exam as well as her previous exacerbations I have a high clinical suspicion for either gastritis or gastric or duodenal ulcer.  Given her soft abdomen and her general presentation have a low suspicion for perforated ulcer.    Will give the patient IV Pepcid, IV Phenergan, and oral Carafate for her symptoms.  Will obtain basic blood work, EKG  and troponin given the location of her pain, lactate and lipase.    Patient has no history or physical exam findings that would suggest dissection, PE, pneumothorax.  Low suspicion for cholecystitis, cholangitis, appendicitis, diverticulitis, obstruction.    Patient's EKG was performed at 1414 interpreted by me.  Normal sinus rhythm with sinus arrhythmia 74 beats minute.  No ST elevation or depression.  No prolonged QT.    Urinalysis does confirm a urinary tract infection, she was given IV Rocephin.  Pregnancy test is negative.  Lipase is 21.  Magnesium is 129.  CMP is unremarkable.  Lactate is 1.2.  CBC reveals no leukocytosis or anemia.  Patient's troponin is 67, will get a repeat and a chest x-ray.  I reviewed the patient's previous labs which shows that her troponin is usually within this range.  She did follow-up with Dr. Tamez who had an outpatient echocardiogram performed which showed no acute abnormalities.  This was done in July 2024.  Again the patient denies chest pain, shortness of breath, pleuritic pain, hemoptysis.  Patient's elevated troponin is likely chronic, low suspicion for acute cardiopulmonary process at this time.    EKG was performed at 1450 and interpreted by me.  Normal sinus rhythm 92 beats minute.  Normal axis.  No ST elevation or depression.  No prolonged QT.    Patient's repeat troponin is reassuring at 60.  Her chest x-ray reveals no acute cardiopulmonary process.  Patient feels much better and feels comfortable going home.  I do believe she is likely experiencing gastritis or an ulcer.  She will follow-up with GI tomorrow.  She be discharged with Carafate, Phenergan, Pepcid.  I discussed supportive treatment that will help with her gastritis including diet and lifestyle changes.  All questions and concerns addressed.  Reasons to return to ER discussed.  Patient verbalized understanding and agreement with the treatment plan and they remained hemodynamically stable in the  ER.    This note was dictated using a speech recognition program.  While an attempt was made at proof-reading to minimize errors, minor errors in transcription may be present         No data recorded     Eric Coma Scale Score: 15 (11/19/24 1329 : Bee Martinez RN)                           Medical Decision Making      Procedure  Procedures     Angel Lock PA-C  11/19/24 1534       Angel Lock PA-C  11/19/24 1541       Angel Lokc PA-C  11/19/24 1542

## 2024-11-20 ENCOUNTER — APPOINTMENT (OUTPATIENT)
Dept: GASTROENTEROLOGY | Facility: CLINIC | Age: 44
End: 2024-11-20
Payer: COMMERCIAL

## 2024-11-20 VITALS
HEART RATE: 84 BPM | DIASTOLIC BLOOD PRESSURE: 75 MMHG | TEMPERATURE: 97.5 F | WEIGHT: 135 LBS | BODY MASS INDEX: 23.05 KG/M2 | OXYGEN SATURATION: 100 % | HEIGHT: 64 IN | RESPIRATION RATE: 17 BRPM | SYSTOLIC BLOOD PRESSURE: 150 MMHG

## 2024-11-20 DIAGNOSIS — K29.00 ACUTE GASTRITIS WITHOUT HEMORRHAGE, UNSPECIFIED GASTRITIS TYPE: ICD-10-CM

## 2024-11-20 DIAGNOSIS — R10.13 EPIGASTRIC PAIN: Primary | ICD-10-CM

## 2024-11-20 LAB
ALBUMIN SERPL BCP-MCNC: 4.4 G/DL (ref 3.4–5)
ALP SERPL-CCNC: 78 U/L (ref 33–110)
ALT SERPL W P-5'-P-CCNC: 12 U/L (ref 7–45)
ANION GAP SERPL CALC-SCNC: 16 MMOL/L (ref 10–20)
AST SERPL W P-5'-P-CCNC: 14 U/L (ref 9–39)
BILIRUB SERPL-MCNC: 0.8 MG/DL (ref 0–1.2)
BUN SERPL-MCNC: 10 MG/DL (ref 6–23)
CALCIUM SERPL-MCNC: 9.4 MG/DL (ref 8.6–10.3)
CHLORIDE SERPL-SCNC: 104 MMOL/L (ref 98–107)
CO2 SERPL-SCNC: 21 MMOL/L (ref 21–32)
CREAT SERPL-MCNC: 0.78 MG/DL (ref 0.5–1.05)
EGFRCR SERPLBLD CKD-EPI 2021: >90 ML/MIN/1.73M*2
GLUCOSE SERPL-MCNC: 102 MG/DL (ref 74–99)
HOLD SPECIMEN: NORMAL
LIPASE SERPL-CCNC: 23 U/L (ref 9–82)
POTASSIUM SERPL-SCNC: 3.2 MMOL/L (ref 3.5–5.3)
PROT SERPL-MCNC: 8.3 G/DL (ref 6.4–8.2)
SODIUM SERPL-SCNC: 138 MMOL/L (ref 136–145)

## 2024-11-20 PROCEDURE — 2500000002 HC RX 250 W HCPCS SELF ADMINISTERED DRUGS (ALT 637 FOR MEDICARE OP, ALT 636 FOR OP/ED): Performed by: PHYSICIAN ASSISTANT

## 2024-11-20 PROCEDURE — 2500000004 HC RX 250 GENERAL PHARMACY W/ HCPCS (ALT 636 FOR OP/ED): Performed by: PHYSICIAN ASSISTANT

## 2024-11-20 PROCEDURE — 99213 OFFICE O/P EST LOW 20 MIN: CPT | Performed by: NURSE PRACTITIONER

## 2024-11-20 PROCEDURE — 1036F TOBACCO NON-USER: CPT | Performed by: NURSE PRACTITIONER

## 2024-11-20 PROCEDURE — 96372 THER/PROPH/DIAG INJ SC/IM: CPT | Performed by: PHYSICIAN ASSISTANT

## 2024-11-20 PROCEDURE — 2500000001 HC RX 250 WO HCPCS SELF ADMINISTERED DRUGS (ALT 637 FOR MEDICARE OP): Performed by: PHYSICIAN ASSISTANT

## 2024-11-20 RX ORDER — FAMOTIDINE 20 MG/1
20 TABLET, FILM COATED ORAL ONCE
Status: COMPLETED | OUTPATIENT
Start: 2024-11-20 | End: 2024-11-20

## 2024-11-20 RX ORDER — POTASSIUM CHLORIDE 20 MEQ/1
40 TABLET, EXTENDED RELEASE ORAL ONCE
Status: COMPLETED | OUTPATIENT
Start: 2024-11-20 | End: 2024-11-20

## 2024-11-20 RX ORDER — KETOROLAC TROMETHAMINE 30 MG/ML
15 INJECTION, SOLUTION INTRAMUSCULAR; INTRAVENOUS ONCE
Status: COMPLETED | OUTPATIENT
Start: 2024-11-20 | End: 2024-11-20

## 2024-11-20 NOTE — PROGRESS NOTES
Assessment/Plan   Neela Elaine is a 44 y.o. female with medical history significant for acid reflux, H. pylori gastritis who presents to GI clinic for epigastric pain associated with nausea and vomiting.     ASSESSMENT/PLAN:  1.  Epigastric pain associated with nausea and vomiting  2.  Gastroesophageal reflux disease without esophagitis  3.  Remote history of H. Pylori  4.  Daily marijuana use    Increase omeprazole to 40 mg p.o. twice daily x4 weeks then decrease dose back to 40 mg p.o. once daily. Take medication 30 minutes before meals.  Continue famotidine 20 mg p.o. twice daily  Continue sucralfate 1 g p.o. 4 times daily with meals and at at bedtime x 2-4 weeks  Avoid NSAIDs i.e. aspirin, Excedrin, Advil, Aleve, ibuprofen, Motrin, Naprosyn, naproxen  Antireflux lifestyle  Consider cannabinoid hyperemesis syndrome -Complete marijuana cessation strongly recommended  Check stool for H. pylori, treat if indicated  Consider repeat EGD  Please obtain previous EGD and colonoscopy reports and scan into EMR, GI  aware.  Follow-up with GI if symptoms worsen or fail to improve    Antireflux lifestyle modifications   Avoid alcoholic beverages, Caffeine, carbonated beverages, smoking, trigger foods. Trigger foods include citrus fruits, chocolate, fatty and fried foods, garlic and onions, mint flavorings, spicy foods and tomato-based foods like spaghetti sauce, salsa, chili or pizza.  Avoid aspirin and NSAIDs if possible  Eat small frequent meals. Eat at least 2 hours prior to going to bed.   Try raising the head of the bed to prevent stomach acid from coming back up.   Recommend stress reduction, weight reduction, and physical exercise can help reduce symptoms of GERD.   Call or return to office if GERD symptoms become worse or if you have difficulty swallowing, pain with swallowing, are vomiting blood, notice black or bloody stools, or have unexplained weight loss.      Carin Mckeon,  "APRN-CNP      Subjective     History of Present Illness:   Neela Elaine is a 44 y.o. female with medical history significant for acid reflux, H. pylori gastritis who presents to GI clinic for epigastric pain associated with nausea and vomiting.  Patient was seen in the emergency room twice yesterday for persistent epigastric pain associated with nausea and vomiting.  Patient states she was unable to keep any food or water down.  Patient reports having a sharp pain in her upper abdomen that is consistent with pain she has had in the past with gastritis.  He denies diarrhea, constipation or black or bloody stools.  Patient has been taking omeprazole 40 mg p.o. once daily.  The ER gave her prescriptions for famotidine 20 mg p.o. twice daily and Carafate 1 g p.o. 4 times daily with meals and at at bedtime however patient does received her prescriptions last evening.  Patient does have minimal improvement however still has upset stomach, nausea and intermittent pain.  Patient states she has had \"stomach issues\" since she was 12 years old.  Did have H. pylori gastritis in the past however her most recent EGD in  was normal.  No routine NSAID use.  Patient does smoke marijuana daily but denies alcohol use.  Denies fever, chills, chest pain, palpitations, dizziness, lightheadedness, shortness of breath, cough, diarrhea or dysuria.  CBC, CMP, Lipase and lactate unremarkable.  CT abdomen pelvis without IV contrast with no acute intra-abdominal or pelvic process identified.  Note patient has several ER visits for similar presentations.  She has been counseled in the past to abstain from marijuana 2/2 concern for cannabis hyperemesis syndrome but she declines to quit using marijuana. Patient does smoke marijuana on a daily basis for many years. PPI helps with GERD symptoms.    EGD -normal upper endoscopy.  No H. pylori    Surgical History    section    Review of Systems  ROS Negative unless otherwise " stated above.    Past Medical History   has a past medical history of Acid reflux and Gastritis.     Social History   reports that she has never smoked. She has never been exposed to tobacco smoke. She has never used smokeless tobacco. She reports current drug use. Drug: Marijuana. She reports that she does not drink alcohol.     Family History  family history includes Hypertension in her mother.     Allergies  Allergies   Allergen Reactions    Aripiprazole Nausea/vomiting and Nausea And Vomiting     Patient received one dose of Abilify and began vomiting within 2 hours. The vomiting resolved over the next 24 hours and Abilify was discontinued.    Fluoxetine GI Upset and Nausea And Vomiting     nausea    Haldol [Haloperidol] Anxiety and Other       Medications  Current Outpatient Medications   Medication Instructions    ascorbic acid (VITAMIN C) 100 mg, Daily RT    famotidine (PEPCID) 20 mg, oral, 2 times daily    famotidine (PEPCID) 20 mg, oral, 2 times daily    hydrOXYzine HCL (ATARAX) 50 mg, oral, Every 4 hours PRN    mirtazapine (REMERON) 15 mg, Nightly    omeprazole (PRILOSEC) 20 mg, oral, Daily before breakfast    promethazine (PHENERGAN) 12.5 mg, oral, Every 6 hours PRN    sucralfate (CARAFATE) 1 g, 4 times daily PRN    sucralfate (CARAFATE) 1 g, oral, 4 times daily        Objective   Virtual visit   General: A&Ox3, NAD.  HEENT: AT/NC.   Skin: No Jaundice.   Neuro: No focal deficits.   Psych: Normal mood and affect.     Lab Results   Component Value Date    WBC 11.3 11/19/2024    WBC 9.6 11/19/2024    WBC 9.3 10/18/2024    HGB 13.4 11/19/2024    HGB 13.3 11/19/2024    HGB 13.9 10/18/2024    HCT 38.8 11/19/2024    HCT 39.1 11/19/2024    HCT 40.2 10/18/2024     11/19/2024     11/19/2024     10/18/2024     Lab Results   Component Value Date     11/19/2024    K 3.2 (L) 11/19/2024     11/19/2024    CO2 21 11/19/2024    BUN 10 11/19/2024    CREATININE 0.78 11/19/2024    GLUCOSE 102  (H) 11/19/2024    CALCIUM 9.4 11/19/2024       Lab Results   Component Value Date    ALKPHOS 78 11/19/2024    ALKPHOS 77 11/19/2024    ALKPHOS 70 10/18/2024    BILITOT 0.8 11/19/2024    BILITOT 0.6 11/19/2024    BILITOT 0.6 10/18/2024    BILIDIR 0.0 06/19/2024    BILIDIR 0.1 02/23/2022    BILIDIR 0.1 02/05/2022    PROT 8.3 (H) 11/19/2024    PROT 8.4 (H) 11/19/2024    PROT 8.3 (H) 10/18/2024    ALT 12 11/19/2024    ALT 12 11/19/2024    ALT 13 10/18/2024    AST 14 11/19/2024    AST 16 11/19/2024    AST 16 10/18/2024      Lab Results   Component Value Date    INR 1.1 03/14/2024     === 11/19/24 ===    CT ABDOMEN PELVIS WO IV CONTRAST    - Impression -  Right adnexal low-attenuation lesion as described most compatible with  an ovarian cysts.  Consider dedicated ultrasound evaluation as  clinically warranted.  No distinct acute intra-abdominal or pelvic process otherwise  identified.  Signed by Horacio Zaidi MD

## 2024-11-20 NOTE — ED PROVIDER NOTES
HPI   Chief Complaint   Patient presents with    Abdominal Pain     Abdominal pain and vomiting still, did not get prescriptions filled that were given to her earlier       44-year-old female with a history of acid reflux, anxiety and depression presenting to the ER today with continued pain in her upper abdomen.  Patient states that she was seen here earlier today for the same pain, she went to the pharmacy to get her prescriptions and they were not ready so she just went home.  She still has not gotten her prescriptions filled so she is still having the same pain.  She states that she has had this sharp pain in her upper abdomen on and off for a long time and she is scheduled to see GI tomorrow morning.  The pain makes her feel nauseous and she does vomit.  She states that she likes to drink cold water because it makes her stomach feel better but that it will make her pain worse.  She has not had diarrhea or constipation, dark or bloody stools.  She does not have any lower abdominal pain, painful urination or blood in the urine.  She has no concerns for pregnancy.  She does smoke marijuana daily she states, denies alcohol use.  She has had 2 previous C-sections but no other abdominal surgeries.  She denies any chest pain or shortness of breath, back pain, cough or fever.  No further complaints.      History provided by:  Patient          Patient History   Past Medical History:   Diagnosis Date    Acid reflux     Gastritis      Past Surgical History:   Procedure Laterality Date     SECTION, CLASSIC  1999     SECTION, CLASSIC  10/21/2003     Family History   Problem Relation Name Age of Onset    Hypertension Mother       Social History     Tobacco Use    Smoking status: Never     Passive exposure: Never    Smokeless tobacco: Never   Vaping Use    Vaping status: Never Used   Substance Use Topics    Alcohol use: Never    Drug use: Yes     Types: Marijuana     Comment: daily       Physical Exam    ED Triage Vitals [11/19/24 2306]   Temperature Heart Rate Respirations BP   36.4 °C (97.5 °F) 92 16 (!) 154/97      Pulse Ox Temp Source Heart Rate Source Patient Position   100 % Temporal Monitor Sitting      BP Location FiO2 (%)     Right arm --       Physical Exam  Constitutional:       General: She is not in acute distress.  Eyes:      Conjunctiva/sclera: Conjunctivae normal.   Cardiovascular:      Rate and Rhythm: Normal rate and regular rhythm.      Pulses: Normal pulses.      Heart sounds: Normal heart sounds.   Pulmonary:      Effort: Pulmonary effort is normal.      Breath sounds: Normal breath sounds.   Abdominal:      General: Bowel sounds are normal. There is no distension.      Palpations: Abdomen is soft. There is no mass.      Tenderness: There is abdominal tenderness. There is no right CVA tenderness, left CVA tenderness, guarding or rebound.      Hernia: No hernia is present.      Comments: Epigastric tenderness without guarding or rebound.  No further abdominal tenderness on palpation.   Musculoskeletal:      Comments: No tenderness or signs of trauma throughout the thoracic and lumbar spinal or paraspinal regions.   Skin:     General: Skin is warm.   Neurological:      Mental Status: She is alert and oriented to person, place, and time.           ED Course & MDM   Diagnoses as of 11/20/24 0044   Acute gastritis without hemorrhage, unspecified gastritis type                 No data recorded     Eric Coma Scale Score: 15 (11/19/24 2305 : Minerva Conte RN)                           Medical Decision Making  44-year-old female with a history of acid reflux, anxiety and depression presenting to the ER today with continued epigastric pain.  She has had this pain on and off for a long time and is scheduled to see GI tomorrow however her pain has been flaring over the past 4 to 5 days.  She does smoke marijuana daily but denies any alcohol use or other drug use.  The pain is making her feel  nauseous and she has vomited.  Patient was seen here earlier today for this pain, was discharged home with pain medication but did not get them from the pharmacy.  She denies any further complaints and arrives afebrile, hypertensive with otherwise stable vital signs.  She is resting on exam without signs of acute distress.  On my exam heart RRR, lungs are clear and abdomen is soft and nondistended with normal bowel sounds.  She is tender in the epigastric region no guarding or rebound.  Rest of the abdomen is nontender.  Exam is otherwise within normal limits.  Laboratory studies and CT are ordered as well as GI cocktail, Zofran.  Patient is requesting water and ice chips.    CBC with stable H&H and no leukocytosis.  CMP without renal insufficiency.  Potassium is 3.2 but no other acute electrolyte abnormalities.  Lipase is within normal limits.  CT abdomen pelvis performed today shows a right adnexal low-attenuation lesion compatible with a cyst but no other acute abnormality is noted.  Patient is not having any lower abdominal pain, or tenderness when I palpate, I do not suspect ovarian torsion at this time.  Patient did have cardiac workup on visit earlier today, this was unremarkable and patient is not having any chest pain or other cardiac like symptoms at this time.  She did require something further for her pain so I did order Pepcid and Toradol.    After Pepcid and Toradol, patient states that her pain has resolved.  I did discuss her results with her today.  She is already on omeprazole, states that it does not seem to be really helping her symptoms.  She is scheduled to see GI this afternoon at 3:30 PM.  I discussed that she should go to the pharmacy this morning and  her prescriptions that were prescribed earlier of Pepcid, Carafate and Phenergan.  I did discuss warning signs return to the ED and she expressed understanding and agreed with the plan of care today.      Labs Reviewed   CBC WITH AUTO  DIFFERENTIAL - Abnormal       Result Value    WBC 11.3      nRBC 0.0      RBC 5.10      Hemoglobin 13.4      Hematocrit 38.8      MCV 76 (*)     MCH 26.3      MCHC 34.5      RDW 14.8 (*)     Platelets 322      Neutrophils % 70.9      Immature Granulocytes %, Automated 0.3      Lymphocytes % 18.9      Monocytes % 7.2      Eosinophils % 2.3      Basophils % 0.4      Neutrophils Absolute 8.03 (*)     Immature Granulocytes Absolute, Automated 0.03      Lymphocytes Absolute 2.14      Monocytes Absolute 0.81      Eosinophils Absolute 0.26      Basophils Absolute 0.04     COMPREHENSIVE METABOLIC PANEL - Abnormal    Glucose 102 (*)     Sodium 138      Potassium 3.2 (*)     Chloride 104      Bicarbonate 21      Anion Gap 16      Urea Nitrogen 10      Creatinine 0.78      eGFR >90      Calcium 9.4      Albumin 4.4      Alkaline Phosphatase 78      Total Protein 8.3 (*)     AST 14      Bilirubin, Total 0.8      ALT 12     LIPASE - Normal    Lipase 23      Narrative:     Venipuncture immediately after or during the administration of Metamizole may lead to falsely low results. Testing should be performed immediately prior to Metamizole dosing.       CT abdomen pelvis wo IV contrast   Final Result   Right adnexal low-attenuation lesion as described most compatible with   an ovarian cysts.  Consider dedicated ultrasound evaluation as   clinically warranted.   No distinct acute intra-abdominal or pelvic process otherwise   identified.   Signed by Horacio Zaidi MD            Procedure  Procedures     Rosio Pacheco PA-C  11/20/24 0046

## 2024-11-21 ENCOUNTER — HOSPITAL ENCOUNTER (OUTPATIENT)
Dept: CARDIOLOGY | Facility: HOSPITAL | Age: 44
Discharge: HOME | End: 2024-11-21
Payer: COMMERCIAL

## 2024-11-21 ENCOUNTER — APPOINTMENT (OUTPATIENT)
Dept: LAB | Facility: LAB | Age: 44
End: 2024-11-21
Payer: COMMERCIAL

## 2024-11-21 LAB
ATRIAL RATE: 92 BPM
P AXIS: 69 DEGREES
P OFFSET: 184 MS
P ONSET: 137 MS
PR INTERVAL: 168 MS
Q ONSET: 221 MS
QRS COUNT: 16 BEATS
QRS DURATION: 78 MS
QT INTERVAL: 388 MS
QTC CALCULATION(BAZETT): 479 MS
QTC FREDERICIA: 447 MS
R AXIS: 62 DEGREES
T AXIS: 67 DEGREES
T OFFSET: 415 MS
VENTRICULAR RATE: 92 BPM

## 2024-11-21 PROCEDURE — 93005 ELECTROCARDIOGRAM TRACING: CPT

## 2024-11-25 ENCOUNTER — LAB (OUTPATIENT)
Dept: LAB | Facility: LAB | Age: 44
End: 2024-11-25
Payer: COMMERCIAL

## 2024-11-25 DIAGNOSIS — K29.00 ACUTE GASTRITIS WITHOUT HEMORRHAGE, UNSPECIFIED GASTRITIS TYPE: ICD-10-CM

## 2024-11-25 PROCEDURE — 87449 NOS EACH ORGANISM AG IA: CPT

## 2024-11-26 ENCOUNTER — APPOINTMENT (OUTPATIENT)
Dept: GASTROENTEROLOGY | Facility: CLINIC | Age: 44
End: 2024-11-26
Payer: COMMERCIAL

## 2024-11-27 LAB — H PYLORI AG STL QL IA: NEGATIVE

## 2025-01-07 ENCOUNTER — HOSPITAL ENCOUNTER (EMERGENCY)
Facility: HOSPITAL | Age: 45
Discharge: HOME | End: 2025-01-07
Payer: COMMERCIAL

## 2025-01-07 VITALS
HEIGHT: 64 IN | SYSTOLIC BLOOD PRESSURE: 146 MMHG | HEART RATE: 100 BPM | DIASTOLIC BLOOD PRESSURE: 90 MMHG | RESPIRATION RATE: 18 BRPM | WEIGHT: 133 LBS | TEMPERATURE: 97.9 F | OXYGEN SATURATION: 99 % | BODY MASS INDEX: 22.71 KG/M2

## 2025-01-07 DIAGNOSIS — R11.2 NAUSEA AND VOMITING, UNSPECIFIED VOMITING TYPE: Primary | ICD-10-CM

## 2025-01-07 LAB
ALBUMIN SERPL BCP-MCNC: 4.5 G/DL (ref 3.4–5)
ALP SERPL-CCNC: 74 U/L (ref 33–110)
ALT SERPL W P-5'-P-CCNC: 11 U/L (ref 7–45)
ANION GAP SERPL CALC-SCNC: 16 MMOL/L (ref 10–20)
AST SERPL W P-5'-P-CCNC: 15 U/L (ref 9–39)
BASOPHILS # BLD AUTO: 0.03 X10*3/UL (ref 0–0.1)
BASOPHILS NFR BLD AUTO: 0.3 %
BILIRUB SERPL-MCNC: 0.9 MG/DL (ref 0–1.2)
BUN SERPL-MCNC: 7 MG/DL (ref 6–23)
CALCIUM SERPL-MCNC: 10 MG/DL (ref 8.6–10.3)
CHLORIDE SERPL-SCNC: 103 MMOL/L (ref 98–107)
CO2 SERPL-SCNC: 23 MMOL/L (ref 21–32)
CREAT SERPL-MCNC: 0.77 MG/DL (ref 0.5–1.05)
EGFRCR SERPLBLD CKD-EPI 2021: >90 ML/MIN/1.73M*2
EOSINOPHIL # BLD AUTO: 0.18 X10*3/UL (ref 0–0.7)
EOSINOPHIL NFR BLD AUTO: 1.6 %
ERYTHROCYTE [DISTWIDTH] IN BLOOD BY AUTOMATED COUNT: 15.2 % (ref 11.5–14.5)
GLUCOSE SERPL-MCNC: 89 MG/DL (ref 74–99)
HCT VFR BLD AUTO: 39.1 % (ref 36–46)
HGB BLD-MCNC: 13.6 G/DL (ref 12–16)
IMM GRANULOCYTES # BLD AUTO: 0.04 X10*3/UL (ref 0–0.7)
IMM GRANULOCYTES NFR BLD AUTO: 0.4 % (ref 0–0.9)
LACTATE SERPL-SCNC: 0.9 MMOL/L (ref 0.4–2)
LIPASE SERPL-CCNC: 23 U/L (ref 9–82)
LYMPHOCYTES # BLD AUTO: 2.52 X10*3/UL (ref 1.2–4.8)
LYMPHOCYTES NFR BLD AUTO: 22.8 %
MCH RBC QN AUTO: 26.3 PG (ref 26–34)
MCHC RBC AUTO-ENTMCNC: 34.8 G/DL (ref 32–36)
MCV RBC AUTO: 76 FL (ref 80–100)
MONOCYTES # BLD AUTO: 0.85 X10*3/UL (ref 0.1–1)
MONOCYTES NFR BLD AUTO: 7.7 %
NEUTROPHILS # BLD AUTO: 7.45 X10*3/UL (ref 1.2–7.7)
NEUTROPHILS NFR BLD AUTO: 67.2 %
NRBC BLD-RTO: 0 /100 WBCS (ref 0–0)
PLATELET # BLD AUTO: 306 X10*3/UL (ref 150–450)
POTASSIUM SERPL-SCNC: 4.2 MMOL/L (ref 3.5–5.3)
PROT SERPL-MCNC: 8.3 G/DL (ref 6.4–8.2)
RBC # BLD AUTO: 5.17 X10*6/UL (ref 4–5.2)
SODIUM SERPL-SCNC: 138 MMOL/L (ref 136–145)
WBC # BLD AUTO: 11.1 X10*3/UL (ref 4.4–11.3)

## 2025-01-07 PROCEDURE — 96374 THER/PROPH/DIAG INJ IV PUSH: CPT

## 2025-01-07 PROCEDURE — 99284 EMERGENCY DEPT VISIT MOD MDM: CPT | Mod: 25

## 2025-01-07 PROCEDURE — 85025 COMPLETE CBC W/AUTO DIFF WBC: CPT | Performed by: PHYSICIAN ASSISTANT

## 2025-01-07 PROCEDURE — 83605 ASSAY OF LACTIC ACID: CPT | Performed by: PHYSICIAN ASSISTANT

## 2025-01-07 PROCEDURE — 2500000004 HC RX 250 GENERAL PHARMACY W/ HCPCS (ALT 636 FOR OP/ED): Performed by: PHYSICIAN ASSISTANT

## 2025-01-07 PROCEDURE — 84075 ASSAY ALKALINE PHOSPHATASE: CPT | Performed by: PHYSICIAN ASSISTANT

## 2025-01-07 PROCEDURE — 96375 TX/PRO/DX INJ NEW DRUG ADDON: CPT

## 2025-01-07 PROCEDURE — 36415 COLL VENOUS BLD VENIPUNCTURE: CPT | Performed by: PHYSICIAN ASSISTANT

## 2025-01-07 PROCEDURE — 96361 HYDRATE IV INFUSION ADD-ON: CPT

## 2025-01-07 PROCEDURE — 96372 THER/PROPH/DIAG INJ SC/IM: CPT | Performed by: PHYSICIAN ASSISTANT

## 2025-01-07 PROCEDURE — 83690 ASSAY OF LIPASE: CPT | Performed by: PHYSICIAN ASSISTANT

## 2025-01-07 RX ORDER — FAMOTIDINE 20 MG/1
20 TABLET, FILM COATED ORAL 2 TIMES DAILY
Qty: 30 TABLET | Refills: 0 | Status: SHIPPED | OUTPATIENT
Start: 2025-01-07 | End: 2025-01-22

## 2025-01-07 RX ORDER — PROMETHAZINE HYDROCHLORIDE 25 MG/1
25 SUPPOSITORY RECTAL EVERY 6 HOURS PRN
Qty: 12 SUPPOSITORY | Refills: 0 | Status: SHIPPED | OUTPATIENT
Start: 2025-01-07 | End: 2025-01-10

## 2025-01-07 RX ORDER — KETOROLAC TROMETHAMINE 30 MG/ML
15 INJECTION, SOLUTION INTRAMUSCULAR; INTRAVENOUS ONCE
Status: COMPLETED | OUTPATIENT
Start: 2025-01-07 | End: 2025-01-07

## 2025-01-07 RX ORDER — FAMOTIDINE 10 MG/ML
20 INJECTION INTRAVENOUS ONCE
Status: COMPLETED | OUTPATIENT
Start: 2025-01-07 | End: 2025-01-07

## 2025-01-07 RX ORDER — DICYCLOMINE HYDROCHLORIDE 10 MG/ML
10 INJECTION INTRAMUSCULAR ONCE
Status: COMPLETED | OUTPATIENT
Start: 2025-01-07 | End: 2025-01-07

## 2025-01-07 RX ORDER — DICYCLOMINE HYDROCHLORIDE 20 MG/1
20 TABLET ORAL 4 TIMES DAILY PRN
Qty: 20 TABLET | Refills: 0 | Status: SHIPPED | OUTPATIENT
Start: 2025-01-07 | End: 2025-01-12

## 2025-01-07 RX ADMIN — KETOROLAC TROMETHAMINE 15 MG: 30 INJECTION, SOLUTION INTRAMUSCULAR at 20:03

## 2025-01-07 RX ADMIN — FAMOTIDINE 20 MG: 10 INJECTION, SOLUTION INTRAVENOUS at 20:02

## 2025-01-07 RX ADMIN — PROMETHAZINE HYDROCHLORIDE 12.5 MG: 25 INJECTION INTRAMUSCULAR; INTRAVENOUS at 20:03

## 2025-01-07 RX ADMIN — DICYCLOMINE HYDROCHLORIDE 10 MG: 10 INJECTION, SOLUTION INTRAMUSCULAR at 20:02

## 2025-01-07 RX ADMIN — SODIUM CHLORIDE, POTASSIUM CHLORIDE, SODIUM LACTATE AND CALCIUM CHLORIDE 1000 ML: 600; 310; 30; 20 INJECTION, SOLUTION INTRAVENOUS at 20:01

## 2025-01-07 ASSESSMENT — LIFESTYLE VARIABLES
EVER FELT BAD OR GUILTY ABOUT YOUR DRINKING: NO
HAVE PEOPLE ANNOYED YOU BY CRITICIZING YOUR DRINKING: NO
HAVE YOU EVER FELT YOU SHOULD CUT DOWN ON YOUR DRINKING: NO
EVER HAD A DRINK FIRST THING IN THE MORNING TO STEADY YOUR NERVES TO GET RID OF A HANGOVER: NO
TOTAL SCORE: 0

## 2025-01-07 ASSESSMENT — PAIN SCALES - GENERAL
PAINLEVEL_OUTOF10: 8
PAINLEVEL_OUTOF10: 4

## 2025-01-07 ASSESSMENT — PAIN DESCRIPTION - LOCATION
LOCATION: ABDOMEN
LOCATION: ABDOMEN

## 2025-01-07 ASSESSMENT — PAIN DESCRIPTION - PAIN TYPE: TYPE: ACUTE PAIN

## 2025-01-07 ASSESSMENT — COLUMBIA-SUICIDE SEVERITY RATING SCALE - C-SSRS
6. HAVE YOU EVER DONE ANYTHING, STARTED TO DO ANYTHING, OR PREPARED TO DO ANYTHING TO END YOUR LIFE?: NO
6. HAVE YOU EVER DONE ANYTHING, STARTED TO DO ANYTHING, OR PREPARED TO DO ANYTHING TO END YOUR LIFE?: YES
2. HAVE YOU ACTUALLY HAD ANY THOUGHTS OF KILLING YOURSELF?: NO
1. IN THE PAST MONTH, HAVE YOU WISHED YOU WERE DEAD OR WISHED YOU COULD GO TO SLEEP AND NOT WAKE UP?: NO

## 2025-01-07 ASSESSMENT — PAIN DESCRIPTION - ORIENTATION: ORIENTATION: MID

## 2025-01-07 ASSESSMENT — PAIN - FUNCTIONAL ASSESSMENT
PAIN_FUNCTIONAL_ASSESSMENT: 0-10
PAIN_FUNCTIONAL_ASSESSMENT: 0-10

## 2025-01-07 NOTE — ED PROVIDER NOTES
HPI   Chief Complaint   Patient presents with   • Vomiting   • Nausea       44-year-old female presents emergency room with chief complaint of persistent nausea and vomiting.  Patient has a history of cannabinoid hyperemesis syndrome, depression, anxiety, GERD, gastritis.  Patient was seen earlier today at [facility] for similar symptoms.  She had lab work as well as a CT scan.  The patient states she was discharged home with a prescription for liquid medication however the medication was not ready at the pharmacist so she came to the ER here because of persistent vomiting.  The patient reports that she vomits daily.  She does have a that she saw back in November.  She denies any fever, chills, chest pain, hematemesis or hematochezia.  I did review the patient's EMR today CT scan showed a small hiatal hernia, small ovarian cyst but otherwise unremarkable.  Discharge diagnosis was acute gastritis and she was discharged home with prescription for Carafate.  She denies any urinary symptoms.  Denies being pregnant.  Sincere [id number] used      History provided by:  Patient and medical records          Patient History   Past Medical History:   Diagnosis Date   • Acid reflux    • Gastritis      Past Surgical History:   Procedure Laterality Date   •  SECTION, CLASSIC  1999   •  SECTION, CLASSIC  10/21/2003     Family History   Problem Relation Name Age of Onset   • Hypertension Mother       Social History     Tobacco Use   • Smoking status: Never     Passive exposure: Never   • Smokeless tobacco: Never   Vaping Use   • Vaping status: Never Used   Substance Use Topics   • Alcohol use: Never   • Drug use: Yes     Types: Marijuana     Comment: daily       Physical Exam   ED Triage Vitals [25 1823]   Temperature Heart Rate Respirations BP   36.6 °C (97.9 °F) 100 18 146/90      Pulse Ox Temp src Heart Rate Source Patient Position   99 % -- -- --      BP Location FiO2 (%)     -- --       Physical  Exam  Vitals and nursing note reviewed. Exam conducted with a chaperone present.   Constitutional:       General: She is awake. She is not in acute distress.     Appearance: Normal appearance. She is well-developed and well-groomed. She is not ill-appearing, toxic-appearing or diaphoretic.   HENT:      Head: Normocephalic and atraumatic.      Right Ear: Tympanic membrane, ear canal and external ear normal.      Left Ear: Tympanic membrane, ear canal and external ear normal.      Nose: Nose normal.      Mouth/Throat:      Mouth: Mucous membranes are moist.      Pharynx: Oropharynx is clear.   Eyes:      Extraocular Movements: Extraocular movements intact.      Conjunctiva/sclera: Conjunctivae normal.      Pupils: Pupils are equal, round, and reactive to light.   Cardiovascular:      Rate and Rhythm: Normal rate and regular rhythm.      Pulses: Normal pulses.      Heart sounds: Normal heart sounds.   Pulmonary:      Effort: Pulmonary effort is normal.      Breath sounds: Normal breath sounds. No wheezing, rhonchi or rales.   Abdominal:      General: Abdomen is flat. Bowel sounds are normal.      Palpations: Abdomen is soft. There is no mass.      Tenderness: There is no abdominal tenderness. There is no guarding.   Musculoskeletal:         General: No swelling or tenderness. Normal range of motion.      Cervical back: Normal range of motion and neck supple.   Skin:     General: Skin is warm and dry.      Capillary Refill: Capillary refill takes less than 2 seconds.      Findings: No rash.   Neurological:      General: No focal deficit present.      Mental Status: She is alert and oriented to person, place, and time. Mental status is at baseline.   Psychiatric:         Mood and Affect: Mood normal.         Behavior: Behavior normal. Behavior is cooperative.         Thought Content: Thought content normal.         Judgment: Judgment normal.           ED Course & MDM   Diagnoses as of 01/07/25 4866   Nausea and vomiting,  unspecified vomiting type                 No data recorded                                 Medical Decision Making  Temperature 36.6 heart rate 100 respirations 18 blood pressure 146/90 pulse ox is 99% on room air  Using the Grid2020  #[id number] I discussed the workup plan with the patient.  Given she already had a CT scan I do not feel she requires any further imaging at this time.  She has no abdominal tenderness on exam.  She was given a liter of LR wide open, Phenergan 12.5 mg IV piggyback, Toradol 15 mg IV push and Bentyl 10 mg IM.  She also received Pepcid 20 mg IV push  Repeat exam the patient passed an oral challenge.  The vomiting has stopped and she was able to keep fluids down.  She was discharged home with prescription for Phenergan suppositories, Bentyl and Pepcid.  I advised her to stop smoking marijuana she was encouraged to return with any concerns or worsening of symptoms all questions answered prior to discharge        Procedure  Procedures     Titus Ross PA-C  01/07/25 6468

## 2025-01-07 NOTE — Clinical Note
Neela Elaine was seen and treated in our emergency department on 1/7/2025.  She may return to work on 01/09/2025.       If you have any questions or concerns, please don't hesitate to call.      Titus Ross PA-C

## 2025-01-16 ENCOUNTER — APPOINTMENT (OUTPATIENT)
Dept: GASTROENTEROLOGY | Facility: CLINIC | Age: 45
End: 2025-01-16
Payer: COMMERCIAL

## 2025-01-28 ENCOUNTER — APPOINTMENT (OUTPATIENT)
Dept: RADIOLOGY | Facility: HOSPITAL | Age: 45
End: 2025-01-28
Payer: COMMERCIAL

## 2025-01-28 ENCOUNTER — HOSPITAL ENCOUNTER (EMERGENCY)
Facility: HOSPITAL | Age: 45
Discharge: HOME | End: 2025-01-29
Attending: EMERGENCY MEDICINE
Payer: COMMERCIAL

## 2025-01-28 DIAGNOSIS — K29.00 ACUTE SUPERFICIAL GASTRITIS WITHOUT HEMORRHAGE: ICD-10-CM

## 2025-01-28 DIAGNOSIS — R10.13 EPIGASTRIC PAIN: Primary | ICD-10-CM

## 2025-01-28 DIAGNOSIS — K52.9 COLITIS: ICD-10-CM

## 2025-01-28 LAB
ALBUMIN SERPL BCP-MCNC: 4.5 G/DL (ref 3.4–5)
ALP SERPL-CCNC: 77 U/L (ref 33–110)
ALT SERPL W P-5'-P-CCNC: 23 U/L (ref 7–45)
ANION GAP SERPL CALC-SCNC: 14 MMOL/L (ref 10–20)
AST SERPL W P-5'-P-CCNC: 16 U/L (ref 9–39)
BASOPHILS # BLD AUTO: 0.03 X10*3/UL (ref 0–0.1)
BASOPHILS NFR BLD AUTO: 0.3 %
BILIRUB DIRECT SERPL-MCNC: 0.1 MG/DL (ref 0–0.3)
BILIRUB SERPL-MCNC: 0.5 MG/DL (ref 0–1.2)
BUN SERPL-MCNC: 6 MG/DL (ref 6–23)
CALCIUM SERPL-MCNC: 9.9 MG/DL (ref 8.6–10.3)
CHLORIDE SERPL-SCNC: 106 MMOL/L (ref 98–107)
CO2 SERPL-SCNC: 23 MMOL/L (ref 21–32)
CREAT SERPL-MCNC: 0.63 MG/DL (ref 0.5–1.05)
EGFRCR SERPLBLD CKD-EPI 2021: >90 ML/MIN/1.73M*2
EOSINOPHIL # BLD AUTO: 0.23 X10*3/UL (ref 0–0.7)
EOSINOPHIL NFR BLD AUTO: 1.9 %
ERYTHROCYTE [DISTWIDTH] IN BLOOD BY AUTOMATED COUNT: 15.7 % (ref 11.5–14.5)
GLUCOSE SERPL-MCNC: 97 MG/DL (ref 74–99)
HCT VFR BLD AUTO: 38.9 % (ref 36–46)
HGB BLD-MCNC: 13.4 G/DL (ref 12–16)
IMM GRANULOCYTES # BLD AUTO: 0.03 X10*3/UL (ref 0–0.7)
IMM GRANULOCYTES NFR BLD AUTO: 0.3 % (ref 0–0.9)
LACTATE SERPL-SCNC: 1.4 MMOL/L (ref 0.4–2)
LYMPHOCYTES # BLD AUTO: 2.49 X10*3/UL (ref 1.2–4.8)
LYMPHOCYTES NFR BLD AUTO: 21 %
MCH RBC QN AUTO: 26.6 PG (ref 26–34)
MCHC RBC AUTO-ENTMCNC: 34.4 G/DL (ref 32–36)
MCV RBC AUTO: 77 FL (ref 80–100)
MONOCYTES # BLD AUTO: 0.62 X10*3/UL (ref 0.1–1)
MONOCYTES NFR BLD AUTO: 5.2 %
NEUTROPHILS # BLD AUTO: 8.48 X10*3/UL (ref 1.2–7.7)
NEUTROPHILS NFR BLD AUTO: 71.3 %
NRBC BLD-RTO: 0 /100 WBCS (ref 0–0)
PLATELET # BLD AUTO: 364 X10*3/UL (ref 150–450)
POTASSIUM SERPL-SCNC: 3 MMOL/L (ref 3.5–5.3)
PROT SERPL-MCNC: 8.1 G/DL (ref 6.4–8.2)
RBC # BLD AUTO: 5.03 X10*6/UL (ref 4–5.2)
SODIUM SERPL-SCNC: 140 MMOL/L (ref 136–145)
WBC # BLD AUTO: 11.9 X10*3/UL (ref 4.4–11.3)

## 2025-01-28 PROCEDURE — 96375 TX/PRO/DX INJ NEW DRUG ADDON: CPT | Mod: 59

## 2025-01-28 PROCEDURE — 2550000001 HC RX 255 CONTRASTS: Performed by: EMERGENCY MEDICINE

## 2025-01-28 PROCEDURE — 2500000004 HC RX 250 GENERAL PHARMACY W/ HCPCS (ALT 636 FOR OP/ED): Performed by: EMERGENCY MEDICINE

## 2025-01-28 PROCEDURE — 71260 CT THORAX DX C+: CPT | Performed by: RADIOLOGY

## 2025-01-28 PROCEDURE — 2500000001 HC RX 250 WO HCPCS SELF ADMINISTERED DRUGS (ALT 637 FOR MEDICARE OP): Performed by: EMERGENCY MEDICINE

## 2025-01-28 PROCEDURE — 99285 EMERGENCY DEPT VISIT HI MDM: CPT | Mod: 25 | Performed by: EMERGENCY MEDICINE

## 2025-01-28 PROCEDURE — 84075 ASSAY ALKALINE PHOSPHATASE: CPT | Performed by: EMERGENCY MEDICINE

## 2025-01-28 PROCEDURE — 80048 BASIC METABOLIC PNL TOTAL CA: CPT | Performed by: EMERGENCY MEDICINE

## 2025-01-28 PROCEDURE — 71260 CT THORAX DX C+: CPT

## 2025-01-28 PROCEDURE — A9698 NON-RAD CONTRAST MATERIALNOC: HCPCS | Performed by: EMERGENCY MEDICINE

## 2025-01-28 PROCEDURE — 2500000005 HC RX 250 GENERAL PHARMACY W/O HCPCS: Performed by: EMERGENCY MEDICINE

## 2025-01-28 PROCEDURE — 36415 COLL VENOUS BLD VENIPUNCTURE: CPT | Performed by: EMERGENCY MEDICINE

## 2025-01-28 PROCEDURE — 85025 COMPLETE CBC W/AUTO DIFF WBC: CPT | Performed by: EMERGENCY MEDICINE

## 2025-01-28 PROCEDURE — 74177 CT ABD & PELVIS W/CONTRAST: CPT | Performed by: RADIOLOGY

## 2025-01-28 PROCEDURE — 83605 ASSAY OF LACTIC ACID: CPT | Performed by: EMERGENCY MEDICINE

## 2025-01-28 PROCEDURE — 96374 THER/PROPH/DIAG INJ IV PUSH: CPT | Mod: 59

## 2025-01-28 PROCEDURE — 96361 HYDRATE IV INFUSION ADD-ON: CPT

## 2025-01-28 RX ORDER — PANTOPRAZOLE SODIUM 40 MG/10ML
40 INJECTION, POWDER, LYOPHILIZED, FOR SOLUTION INTRAVENOUS ONCE
Status: COMPLETED | OUTPATIENT
Start: 2025-01-28 | End: 2025-01-28

## 2025-01-28 RX ORDER — ONDANSETRON HYDROCHLORIDE 2 MG/ML
4 INJECTION, SOLUTION INTRAVENOUS ONCE
Status: COMPLETED | OUTPATIENT
Start: 2025-01-28 | End: 2025-01-28

## 2025-01-28 RX ORDER — LORAZEPAM 1 MG/1
1 TABLET ORAL ONCE
Status: COMPLETED | OUTPATIENT
Start: 2025-01-28 | End: 2025-01-28

## 2025-01-28 RX ORDER — ALUMINUM HYDROXIDE, MAGNESIUM HYDROXIDE, AND SIMETHICONE 1200; 120; 1200 MG/30ML; MG/30ML; MG/30ML
30 SUSPENSION ORAL ONCE
Status: COMPLETED | OUTPATIENT
Start: 2025-01-28 | End: 2025-01-29

## 2025-01-28 RX ORDER — SUCRALFATE 1 G/1
1 TABLET ORAL
Qty: 120 TABLET | Refills: 0 | Status: SHIPPED | OUTPATIENT
Start: 2025-01-28 | End: 2025-02-27

## 2025-01-28 RX ORDER — PANTOPRAZOLE SODIUM 40 MG/10ML
40 INJECTION, POWDER, LYOPHILIZED, FOR SOLUTION INTRAVENOUS DAILY
Status: DISCONTINUED | OUTPATIENT
Start: 2025-01-29 | End: 2025-01-28

## 2025-01-28 RX ORDER — MORPHINE SULFATE 2 MG/ML
2 INJECTION, SOLUTION INTRAMUSCULAR; INTRAVENOUS ONCE
Status: COMPLETED | OUTPATIENT
Start: 2025-01-28 | End: 2025-01-28

## 2025-01-28 RX ORDER — LIDOCAINE HYDROCHLORIDE 20 MG/ML
15 SOLUTION OROPHARYNGEAL ONCE
Status: COMPLETED | OUTPATIENT
Start: 2025-01-28 | End: 2025-01-28

## 2025-01-28 RX ADMIN — LIDOCAINE HYDROCHLORIDE 15 ML: 20 SOLUTION ORAL; TOPICAL at 23:59

## 2025-01-28 RX ADMIN — ONDANSETRON 4 MG: 2 INJECTION, SOLUTION INTRAMUSCULAR; INTRAVENOUS at 21:55

## 2025-01-28 RX ADMIN — SODIUM CHLORIDE 1000 ML: 9 INJECTION, SOLUTION INTRAVENOUS at 21:57

## 2025-01-28 RX ADMIN — MORPHINE SULFATE 2 MG: 2 INJECTION, SOLUTION INTRAMUSCULAR; INTRAVENOUS at 21:57

## 2025-01-28 RX ADMIN — IOHEXOL 75 ML: 350 INJECTION, SOLUTION INTRAVENOUS at 23:04

## 2025-01-28 RX ADMIN — IOHEXOL 500 ML: 12 SOLUTION ORAL at 23:05

## 2025-01-28 RX ADMIN — LORAZEPAM 1 MG: 1 TABLET ORAL at 23:59

## 2025-01-28 RX ADMIN — PANTOPRAZOLE SODIUM 40 MG: 40 INJECTION, POWDER, FOR SOLUTION INTRAVENOUS at 21:58

## 2025-01-28 ASSESSMENT — PAIN - FUNCTIONAL ASSESSMENT: PAIN_FUNCTIONAL_ASSESSMENT: 0-10

## 2025-01-28 ASSESSMENT — LIFESTYLE VARIABLES
EVER HAD A DRINK FIRST THING IN THE MORNING TO STEADY YOUR NERVES TO GET RID OF A HANGOVER: NO
HAVE PEOPLE ANNOYED YOU BY CRITICIZING YOUR DRINKING: NO
EVER FELT BAD OR GUILTY ABOUT YOUR DRINKING: NO
HAVE YOU EVER FELT YOU SHOULD CUT DOWN ON YOUR DRINKING: NO
TOTAL SCORE: 0

## 2025-01-28 ASSESSMENT — PAIN DESCRIPTION - LOCATION: LOCATION: ABDOMEN

## 2025-01-28 ASSESSMENT — PAIN SCALES - GENERAL: PAINLEVEL_OUTOF10: 7

## 2025-01-29 VITALS
SYSTOLIC BLOOD PRESSURE: 128 MMHG | BODY MASS INDEX: 22.2 KG/M2 | OXYGEN SATURATION: 100 % | HEIGHT: 64 IN | TEMPERATURE: 98.2 F | WEIGHT: 130 LBS | HEART RATE: 86 BPM | RESPIRATION RATE: 18 BRPM | DIASTOLIC BLOOD PRESSURE: 83 MMHG

## 2025-01-29 PROCEDURE — 96376 TX/PRO/DX INJ SAME DRUG ADON: CPT

## 2025-01-29 PROCEDURE — 2500000001 HC RX 250 WO HCPCS SELF ADMINISTERED DRUGS (ALT 637 FOR MEDICARE OP): Performed by: EMERGENCY MEDICINE

## 2025-01-29 PROCEDURE — 2500000004 HC RX 250 GENERAL PHARMACY W/ HCPCS (ALT 636 FOR OP/ED): Performed by: STUDENT IN AN ORGANIZED HEALTH CARE EDUCATION/TRAINING PROGRAM

## 2025-01-29 PROCEDURE — 2500000001 HC RX 250 WO HCPCS SELF ADMINISTERED DRUGS (ALT 637 FOR MEDICARE OP): Performed by: STUDENT IN AN ORGANIZED HEALTH CARE EDUCATION/TRAINING PROGRAM

## 2025-01-29 RX ORDER — ONDANSETRON 4 MG/1
4 TABLET, ORALLY DISINTEGRATING ORAL EVERY 8 HOURS PRN
Qty: 20 TABLET | Refills: 0 | Status: SHIPPED | OUTPATIENT
Start: 2025-01-29 | End: 2025-02-05

## 2025-01-29 RX ORDER — ONDANSETRON HYDROCHLORIDE 2 MG/ML
4 INJECTION, SOLUTION INTRAVENOUS ONCE
Status: COMPLETED | OUTPATIENT
Start: 2025-01-29 | End: 2025-01-29

## 2025-01-29 RX ORDER — AMOXICILLIN AND CLAVULANATE POTASSIUM 875; 125 MG/1; MG/1
1 TABLET, FILM COATED ORAL EVERY 12 HOURS
Qty: 14 TABLET | Refills: 0 | Status: SHIPPED | OUTPATIENT
Start: 2025-01-29 | End: 2025-02-05

## 2025-01-29 RX ORDER — AMOXICILLIN AND CLAVULANATE POTASSIUM 875; 125 MG/1; MG/1
1 TABLET, FILM COATED ORAL ONCE
Status: COMPLETED | OUTPATIENT
Start: 2025-01-29 | End: 2025-01-29

## 2025-01-29 RX ADMIN — ALUMINUM HYDROXIDE, MAGNESIUM HYDROXIDE, AND SIMETHICONE 30 ML: 200; 200; 20 SUSPENSION ORAL at 00:00

## 2025-01-29 RX ADMIN — ONDANSETRON 4 MG: 2 INJECTION INTRAMUSCULAR; INTRAVENOUS at 00:35

## 2025-01-29 RX ADMIN — AMOXICILLIN AND CLAVULANATE POTASSIUM 1 TABLET: 875; 125 TABLET, FILM COATED ORAL at 00:47

## 2025-01-29 ASSESSMENT — PAIN SCALES - GENERAL: PAINLEVEL_OUTOF10: 4

## 2025-01-29 ASSESSMENT — PAIN - FUNCTIONAL ASSESSMENT: PAIN_FUNCTIONAL_ASSESSMENT: 0-10

## 2025-01-29 NOTE — ED PROVIDER NOTES
HPI   Chief Complaint   Patient presents with    Abdominal Pain     PATIENT HAD ENDOSCOPY TODAY AT [facility], STATES NOW FEELS ANXIOUS AND NAUSEA AND VOMITING       Patient is a 44-year-old female with a history of gastritis who had a EGD done today for nausea vomiting epigastric pain comes in complaining of feeling anxious and nauseous vomiting and having pain, states that she has had EGD before and never felt like this.  No fever no chills              Patient History   Past Medical History:   Diagnosis Date    Acid reflux     Gastritis      Past Surgical History:   Procedure Laterality Date     SECTION, CLASSIC  1999     SECTION, CLASSIC  10/21/2003     Family History   Problem Relation Name Age of Onset    Hypertension Mother       Social History     Tobacco Use    Smoking status: Never     Passive exposure: Never    Smokeless tobacco: Never   Vaping Use    Vaping status: Never Used   Substance Use Topics    Alcohol use: Never    Drug use: Yes     Types: Marijuana     Comment: daily       Physical Exam   ED Triage Vitals   Temperature Heart Rate Respirations BP   25   36.6 °C (97.9 °F) 92 20 134/84      Pulse Ox Temp Source Heart Rate Source Patient Position   25   100 % Temporal Monitor Sitting      BP Location FiO2 (%)     25 --     Right arm        Physical Exam  Vitals reviewed.   Constitutional:       Appearance: She is well-developed.   Cardiovascular:      Rate and Rhythm: Normal rate.   Pulmonary:      Effort: Pulmonary effort is normal.      Breath sounds: Normal breath sounds.   Abdominal:      Palpations: Abdomen is soft.      Tenderness: There is abdominal tenderness in the epigastric area.   Skin:     General: Skin is warm.   Neurological:      Mental Status: She is alert.           ED Course & MDM   Diagnoses as of 25 8051   Epigastric pain   Acute superficial  gastritis without hemorrhage                 No data recorded     Forney Coma Scale Score: 15 (01/28/25 2115 : Sharmaine Fontenot RN)                           Medical Decision Making  My differential diagnosis  Acute gastritis acute perforation acute electrolyte imbalance acute dehydration  I ordered IV Ativan IV morphine IV Protonix IV fluids along with CBC chemistry lactate liver function time CT scan of the chest abdomen pelvis to evaluate for any perforation.  Further workup and management were done based upon result test ordered.  BC chemistries unremarkable had a long discussion with the patient I explained to her that the reason for doing the endoscopy was she was having epigastric pain nausea and burning, also explained to her that she is supposed to stops smoking marijuana and take the prescriptions as prescribed and follow-up with her GI doctor for the results of the biopsy and any further intervention if needed.  The CT scan is pending and if it does not show any perforation patient will be discharged to home.        Procedure  Procedures

## 2025-01-29 NOTE — PROGRESS NOTES
Emergency Medicine Transition of Care Note.    I received Neela Elaine in signout from Dr. Patten.  Please see the previous ED provider note for all HPI, PE and MDM up to the time of signout at 1200. This is in addition to the primary record.    In brief Neela Elaine is an 44 y.o. female presenting for   Chief Complaint   Patient presents with   • Abdominal Pain     PATIENT HAD ENDOSCOPY TODAY AT [facility], STATES NOW FEELS ANXIOUS AND NAUSEA AND VOMITING     At the time of signout we were awaiting: CT rslts, re-evaluation, dispo    Diagnoses as of 01/29/25 0547   Epigastric pain   Acute superficial gastritis without hemorrhage   Colitis       Medical Decision Making  Patient is a 44-year-old female with above-stated past medical history who presents for abdominal pain.  Patient was signed out pending CT scan results.  CT scan showed signs of gastritis and colitis, no signs of perforation.  I reevaluated the patient and she continues to have nausea and is unable to tolerate p.o.  Patient was given an additional dose of Zofran.  Given her colitis, she was given a dose of Augmentin and a prescription for the same.  She was given a prescription for Zofran.  She feels comfortable turning home.  I believe this is reasonable.  Patient is clinically well-appearing nontoxic.  Patient follow-up with her physician who performed her EGD.    Disclaimer: This note was dictated using speech recognition software. Minor errors in transcription may be present. Please call if questions.     Cal Turner MD  [facility] Emergency Medicine  Contact on [employer]            Final diagnoses:   [R10.13] Epigastric pain   [K29.00] Acute superficial gastritis without hemorrhage           Procedure  Procedures    Cal Turner MD

## 2025-02-04 ENCOUNTER — APPOINTMENT (OUTPATIENT)
Dept: CARDIOLOGY | Facility: HOSPITAL | Age: 45
End: 2025-02-04
Payer: COMMERCIAL

## 2025-02-04 ENCOUNTER — HOSPITAL ENCOUNTER (EMERGENCY)
Facility: HOSPITAL | Age: 45
Discharge: HOME | End: 2025-02-04
Attending: EMERGENCY MEDICINE
Payer: COMMERCIAL

## 2025-02-04 VITALS
RESPIRATION RATE: 20 BRPM | DIASTOLIC BLOOD PRESSURE: 82 MMHG | OXYGEN SATURATION: 100 % | HEIGHT: 64 IN | BODY MASS INDEX: 23.05 KG/M2 | WEIGHT: 135 LBS | TEMPERATURE: 97.7 F | SYSTOLIC BLOOD PRESSURE: 139 MMHG | HEART RATE: 96 BPM

## 2025-02-04 DIAGNOSIS — F12.288 CANNABIS HYPEREMESIS SYNDROME CONCURRENT WITH AND DUE TO CANNABIS DEPENDENCE (MULTI): ICD-10-CM

## 2025-02-04 DIAGNOSIS — R10.13 EPIGASTRIC PAIN: Primary | ICD-10-CM

## 2025-02-04 LAB
ALBUMIN SERPL BCP-MCNC: 4.5 G/DL (ref 3.4–5)
ALP SERPL-CCNC: 72 U/L (ref 33–110)
ALT SERPL W P-5'-P-CCNC: 11 U/L (ref 7–45)
ANION GAP SERPL CALC-SCNC: 16 MMOL/L (ref 10–20)
APPEARANCE UR: CLEAR
AST SERPL W P-5'-P-CCNC: 16 U/L (ref 9–39)
ATRIAL RATE: 98 BPM
BASOPHILS # BLD AUTO: 0.03 X10*3/UL (ref 0–0.1)
BASOPHILS NFR BLD AUTO: 0.3 %
BILIRUB SERPL-MCNC: 0.8 MG/DL (ref 0–1.2)
BILIRUB UR STRIP.AUTO-MCNC: NEGATIVE MG/DL
BUN SERPL-MCNC: 8 MG/DL (ref 6–23)
CALCIUM SERPL-MCNC: 10 MG/DL (ref 8.6–10.3)
CHLORIDE SERPL-SCNC: 105 MMOL/L (ref 98–107)
CO2 SERPL-SCNC: 20 MMOL/L (ref 21–32)
COLOR UR: ABNORMAL
CREAT SERPL-MCNC: 0.72 MG/DL (ref 0.5–1.05)
EGFRCR SERPLBLD CKD-EPI 2021: >90 ML/MIN/1.73M*2
EOSINOPHIL # BLD AUTO: 0.25 X10*3/UL (ref 0–0.7)
EOSINOPHIL NFR BLD AUTO: 2.6 %
ERYTHROCYTE [DISTWIDTH] IN BLOOD BY AUTOMATED COUNT: 15.6 % (ref 11.5–14.5)
GLUCOSE SERPL-MCNC: 104 MG/DL (ref 74–99)
GLUCOSE UR STRIP.AUTO-MCNC: NORMAL MG/DL
HCT VFR BLD AUTO: 40.4 % (ref 36–46)
HGB BLD-MCNC: 14.1 G/DL (ref 12–16)
HOLD SPECIMEN: NORMAL
IMM GRANULOCYTES # BLD AUTO: 0.03 X10*3/UL (ref 0–0.7)
IMM GRANULOCYTES NFR BLD AUTO: 0.3 % (ref 0–0.9)
KETONES UR STRIP.AUTO-MCNC: ABNORMAL MG/DL
LACTATE SERPL-SCNC: 1.8 MMOL/L (ref 0.4–2)
LEUKOCYTE ESTERASE UR QL STRIP.AUTO: NEGATIVE
LIPASE SERPL-CCNC: 42 U/L (ref 9–82)
LYMPHOCYTES # BLD AUTO: 1.93 X10*3/UL (ref 1.2–4.8)
LYMPHOCYTES NFR BLD AUTO: 20.4 %
MAGNESIUM SERPL-MCNC: 1.85 MG/DL (ref 1.6–2.4)
MCH RBC QN AUTO: 26.8 PG (ref 26–34)
MCHC RBC AUTO-ENTMCNC: 34.9 G/DL (ref 32–36)
MCV RBC AUTO: 77 FL (ref 80–100)
MONOCYTES # BLD AUTO: 0.58 X10*3/UL (ref 0.1–1)
MONOCYTES NFR BLD AUTO: 6.1 %
NEUTROPHILS # BLD AUTO: 6.66 X10*3/UL (ref 1.2–7.7)
NEUTROPHILS NFR BLD AUTO: 70.3 %
NITRITE UR QL STRIP.AUTO: NEGATIVE
NRBC BLD-RTO: 0 /100 WBCS (ref 0–0)
P AXIS: 79 DEGREES
P OFFSET: 194 MS
P ONSET: 144 MS
PH UR STRIP.AUTO: 8 [PH]
PLATELET # BLD AUTO: 307 X10*3/UL (ref 150–450)
POTASSIUM SERPL-SCNC: 4 MMOL/L (ref 3.5–5.3)
PR INTERVAL: 158 MS
PROT SERPL-MCNC: 8.7 G/DL (ref 6.4–8.2)
PROT UR STRIP.AUTO-MCNC: NEGATIVE MG/DL
Q ONSET: 223 MS
QRS COUNT: 14 BEATS
QRS DURATION: 72 MS
QT INTERVAL: 356 MS
QTC CALCULATION(BAZETT): 428 MS
QTC FREDERICIA: 402 MS
R AXIS: 59 DEGREES
RBC # BLD AUTO: 5.26 X10*6/UL (ref 4–5.2)
RBC # UR STRIP.AUTO: NEGATIVE /UL
SODIUM SERPL-SCNC: 137 MMOL/L (ref 136–145)
SP GR UR STRIP.AUTO: 1.01
T AXIS: 59 DEGREES
T OFFSET: 401 MS
UROBILINOGEN UR STRIP.AUTO-MCNC: NORMAL MG/DL
VENTRICULAR RATE: 87 BPM
WBC # BLD AUTO: 9.5 X10*3/UL (ref 4.4–11.3)

## 2025-02-04 PROCEDURE — 85025 COMPLETE CBC W/AUTO DIFF WBC: CPT | Performed by: REGISTERED NURSE

## 2025-02-04 PROCEDURE — 96372 THER/PROPH/DIAG INJ SC/IM: CPT | Performed by: REGISTERED NURSE

## 2025-02-04 PROCEDURE — 93005 ELECTROCARDIOGRAM TRACING: CPT

## 2025-02-04 PROCEDURE — 36415 COLL VENOUS BLD VENIPUNCTURE: CPT | Performed by: REGISTERED NURSE

## 2025-02-04 PROCEDURE — 81003 URINALYSIS AUTO W/O SCOPE: CPT | Performed by: REGISTERED NURSE

## 2025-02-04 PROCEDURE — 83690 ASSAY OF LIPASE: CPT | Performed by: REGISTERED NURSE

## 2025-02-04 PROCEDURE — 99284 EMERGENCY DEPT VISIT MOD MDM: CPT | Performed by: EMERGENCY MEDICINE

## 2025-02-04 PROCEDURE — 2500000001 HC RX 250 WO HCPCS SELF ADMINISTERED DRUGS (ALT 637 FOR MEDICARE OP): Performed by: REGISTERED NURSE

## 2025-02-04 PROCEDURE — 83735 ASSAY OF MAGNESIUM: CPT | Performed by: REGISTERED NURSE

## 2025-02-04 PROCEDURE — 83605 ASSAY OF LACTIC ACID: CPT | Performed by: REGISTERED NURSE

## 2025-02-04 PROCEDURE — 80053 COMPREHEN METABOLIC PANEL: CPT | Performed by: REGISTERED NURSE

## 2025-02-04 PROCEDURE — 96374 THER/PROPH/DIAG INJ IV PUSH: CPT

## 2025-02-04 PROCEDURE — 2500000004 HC RX 250 GENERAL PHARMACY W/ HCPCS (ALT 636 FOR OP/ED): Performed by: REGISTERED NURSE

## 2025-02-04 PROCEDURE — 96361 HYDRATE IV INFUSION ADD-ON: CPT

## 2025-02-04 PROCEDURE — 2500000005 HC RX 250 GENERAL PHARMACY W/O HCPCS: Performed by: REGISTERED NURSE

## 2025-02-04 PROCEDURE — 96375 TX/PRO/DX INJ NEW DRUG ADDON: CPT

## 2025-02-04 RX ORDER — FAMOTIDINE 10 MG/ML
20 INJECTION INTRAVENOUS ONCE
Status: COMPLETED | OUTPATIENT
Start: 2025-02-04 | End: 2025-02-04

## 2025-02-04 RX ORDER — ONDANSETRON HYDROCHLORIDE 2 MG/ML
4 INJECTION, SOLUTION INTRAVENOUS ONCE
Status: COMPLETED | OUTPATIENT
Start: 2025-02-04 | End: 2025-02-04

## 2025-02-04 RX ORDER — LIDOCAINE HYDROCHLORIDE 20 MG/ML
15 SOLUTION OROPHARYNGEAL ONCE
Status: COMPLETED | OUTPATIENT
Start: 2025-02-04 | End: 2025-02-04

## 2025-02-04 RX ORDER — ALUMINUM HYDROXIDE, MAGNESIUM HYDROXIDE, AND SIMETHICONE 1200; 120; 1200 MG/30ML; MG/30ML; MG/30ML
30 SUSPENSION ORAL ONCE
Status: COMPLETED | OUTPATIENT
Start: 2025-02-04 | End: 2025-02-04

## 2025-02-04 RX ORDER — DICYCLOMINE HYDROCHLORIDE 10 MG/ML
10 INJECTION INTRAMUSCULAR ONCE
Status: COMPLETED | OUTPATIENT
Start: 2025-02-04 | End: 2025-02-04

## 2025-02-04 RX ADMIN — FAMOTIDINE 20 MG: 10 INJECTION, SOLUTION INTRAVENOUS at 06:56

## 2025-02-04 RX ADMIN — ONDANSETRON 4 MG: 2 INJECTION INTRAMUSCULAR; INTRAVENOUS at 06:57

## 2025-02-04 RX ADMIN — SODIUM CHLORIDE 1000 ML: 9 INJECTION, SOLUTION INTRAVENOUS at 06:38

## 2025-02-04 RX ADMIN — LIDOCAINE HYDROCHLORIDE 15 ML: 20 SOLUTION ORAL at 06:41

## 2025-02-04 RX ADMIN — DICYCLOMINE HYDROCHLORIDE 10 MG: 10 INJECTION, SOLUTION INTRAMUSCULAR at 06:45

## 2025-02-04 RX ADMIN — ALUMINUM HYDROXIDE, MAGNESIUM HYDROXIDE, AND SIMETHICONE 30 ML: 200; 200; 20 SUSPENSION ORAL at 06:41

## 2025-02-04 ASSESSMENT — PAIN SCALES - GENERAL
PAINLEVEL_OUTOF10: 9
PAINLEVEL_OUTOF10: 9

## 2025-02-04 ASSESSMENT — PAIN DESCRIPTION - PAIN TYPE: TYPE: ACUTE PAIN

## 2025-02-04 ASSESSMENT — PAIN - FUNCTIONAL ASSESSMENT: PAIN_FUNCTIONAL_ASSESSMENT: 0-10

## 2025-02-04 ASSESSMENT — PAIN DESCRIPTION - FREQUENCY: FREQUENCY: CONSTANT/CONTINUOUS

## 2025-02-04 ASSESSMENT — PAIN DESCRIPTION - LOCATION: LOCATION: ABDOMEN

## 2025-02-04 ASSESSMENT — PAIN DESCRIPTION - ORIENTATION: ORIENTATION: MID

## 2025-02-04 ASSESSMENT — PAIN DESCRIPTION - DESCRIPTORS: DESCRIPTORS: PATIENT UNABLE TO DESCRIBE

## 2025-02-04 NOTE — ED PROVIDER NOTES
"HPI   Chief Complaint   Patient presents with    Anxiety     Anxiety a couple days and now my stomach is hurting so much and it made me throw up.     44-year-old female with past medical history significant for anxiety, gastritis, and daily cannabis use presents emergency department today for evaluation of anxiety and abdominal pain.  Patient has had several visits for similar complaints.  Patient had a endoscopy at the end of last month for which she does not know the results yet.  Patient tells me she typically use marijuana \"all day every day.\"  However she stopped using 2 days ago.  Patient called her provider who prescribed her Atarax.  Patient tells me that her provider feels like her most recent anxiety is being caused by the cessation of her marijuana use.  Patient tells me she has a doctor's appointment today to discuss daily antianxiety medications.  Patient denies any suicidal homicidal ideation.  Patient reports pain in her epigastric area for      History provided by:  Medical records and patient   used: No            Patient History   Past Medical History:   Diagnosis Date    Acid reflux     Gastritis      Past Surgical History:   Procedure Laterality Date     SECTION, CLASSIC  1999     SECTION, CLASSIC  10/21/2003     Family History   Problem Relation Name Age of Onset    Hypertension Mother       Social History     Tobacco Use    Smoking status: Never     Passive exposure: Never    Smokeless tobacco: Never   Vaping Use    Vaping status: Never Used   Substance Use Topics    Alcohol use: Never    Drug use: Yes     Types: Marijuana     Comment: daily       Physical Exam   ED Triage Vitals [25 0557]   Temperature Heart Rate Respirations BP   36.5 °C (97.7 °F) (!) 107 16 139/88      Pulse Ox Temp Source Heart Rate Source Patient Position   99 % Temporal Monitor Sitting      BP Location FiO2 (%)     Right arm --       Physical Exam  Vitals and nursing note " reviewed.   Constitutional:       Appearance: Normal appearance.   HENT:      Mouth/Throat:      Mouth: Mucous membranes are moist.   Cardiovascular:      Rate and Rhythm: Regular rhythm. Tachycardia present.      Pulses: Normal pulses.      Heart sounds: Normal heart sounds.   Pulmonary:      Effort: Pulmonary effort is normal.      Breath sounds: Normal breath sounds.   Abdominal:      Tenderness: There is abdominal tenderness in the epigastric area.   Skin:     General: Skin is warm and dry.      Capillary Refill: Capillary refill takes less than 2 seconds.   Neurological:      General: No focal deficit present.      Mental Status: She is alert and oriented to person, place, and time.   Psychiatric:         Mood and Affect: Mood normal.         Behavior: Behavior normal.           ED Course & MDM   Diagnoses as of 02/04/25 0821   Epigastric pain   Cannabis hyperemesis syndrome concurrent with and due to cannabis dependence (Multi)                 No data recorded     Eric Coma Scale Score: 15 (02/04/25 0557 : Minerva Conte RN)                           Medical Decision Making      Patient seen exam emergency department; patient is healthy nontoxic in appearance and does not appear to be any acute distress.  Patient's lung sounds are clear to auscultation without any adventitious noise.  Heart rate is slightly tachycardic but rhythm is regular.  I do think that the tachycardia is likely related to patient's poor p.o. intake over the last several days.  Abdomen is soft tenderness noted in epigastric area.  Patient is neurologically intact on any focal deficits.    Patient has had several CAT scans but I did review.  Will check basic labs, urinalysis, and lipase.  Will also order an EKG for medication administration.  I did discuss with patient that is important that she gets to her appointment today therefore we will try to make sure she is there by noon.  Patient given GI cocktail, IM Bentyl, and IV  Phenergan for her symptoms as well as IV fluids.    EKG at 03:16 with ventricular rate of 87, as interpreted by me, shows a normal rate and rhythm with sinus arrhythmia, normal axis, normal intervals. And normal ST and T wave pattern with no evidence of acute ischemia or other acute findings    Patient is declining Phenergan, she tells the bedside nurse that makes her symptoms worse.  She is willing to take Zofran and Pepcid therefore I did place these orders for her.    Patient's urinalysis is negative for leukocytes and nitrites, lipase is 42.  Magnesium is 1.85.  CBC and CMP are without significant findings.  Lactate is 1.8.  Given that there is no abnormal findings on patient's laboratory workup I do not think that we need to pursue repeat imaging at this time.    On reevaluation patient's heart rate has improved post administration of fluids.  Patient has experienced some relief of symptoms while here.  I did encourage patient to keep her PCP appointment today at noon to discuss daily antianxiety medications.  All patient's questions and concerns were addressed prior to discharge.  Patient discharged home in stable condition.        Shared AFIA Attestation:    I personally saw the patient and made/approved the management plan and take responsibility for the patient management.     History: 44-year-old female presents with anxiety.    Exam: Mildly tachycardic but regular rhythm cardiac exam with clear breath sounds bilaterally.  Abdomen is soft and nontender.  Neurological exam is grossly intact.    MDM: Organic disease, tox, psych    Labs Reviewed   CBC WITH AUTO DIFFERENTIAL - Abnormal       Result Value    WBC 9.5      nRBC 0.0      RBC 5.26 (*)     Hemoglobin 14.1      Hematocrit 40.4      MCV 77 (*)     MCH 26.8      MCHC 34.9      RDW 15.6 (*)     Platelets 307      Neutrophils % 70.3      Immature Granulocytes %, Automated 0.3      Lymphocytes % 20.4      Monocytes % 6.1      Eosinophils % 2.6       Basophils % 0.3      Neutrophils Absolute 6.66      Immature Granulocytes Absolute, Automated 0.03      Lymphocytes Absolute 1.93      Monocytes Absolute 0.58      Eosinophils Absolute 0.25      Basophils Absolute 0.03     COMPREHENSIVE METABOLIC PANEL - Abnormal    Glucose 104 (*)     Sodium 137      Potassium 4.0      Chloride 105      Bicarbonate 20 (*)     Anion Gap 16      Urea Nitrogen 8      Creatinine 0.72      eGFR >90      Calcium 10.0      Albumin 4.5      Alkaline Phosphatase 72      Total Protein 8.7 (*)     AST 16      Bilirubin, Total 0.8      ALT 11     MAGNESIUM - Normal    Magnesium 1.85     LACTATE - Normal    Lactate 1.8      Narrative:     Venipuncture immediately after or during the administration of Metamizole may lead to falsely low results. Testing should be performed immediately prior to Metamizole dosing.   LIPASE - Normal    Lipase 42      Narrative:     Venipuncture immediately after or during the administration of Metamizole may lead to falsely low results. Testing should be performed immediately prior to Metamizole dosing.   URINALYSIS WITH REFLEX CULTURE AND MICROSCOPIC    Narrative:     The following orders were created for panel order Urinalysis with Reflex Culture and Microscopic.  Procedure                               Abnormality         Status                     ---------                               -----------         ------                     Urinalysis with Reflex C...[[id number]]                                                 Extra Urine Gray Tube[[id number]]                                                         Please view results for these tests on the individual orders.   DRUG SCREEN,URINE   URINALYSIS WITH REFLEX CULTURE AND MICROSCOPIC   EXTRA URINE GRAY TUBE       No orders to display           Srikanth Cabrera MD      Procedure  Procedures     Stefany Serrato, APRN-CNP  02/04/25 0834

## 2025-02-26 LAB
ATRIAL RATE: 98 BPM
P AXIS: 79 DEGREES
P OFFSET: 194 MS
P ONSET: 144 MS
PR INTERVAL: 158 MS
Q ONSET: 223 MS
QRS COUNT: 14 BEATS
QRS DURATION: 72 MS
QT INTERVAL: 356 MS
QTC CALCULATION(BAZETT): 428 MS
QTC FREDERICIA: 402 MS
R AXIS: 59 DEGREES
T AXIS: 59 DEGREES
T OFFSET: 401 MS
VENTRICULAR RATE: 87 BPM

## 2025-03-05 ENCOUNTER — HOSPITAL ENCOUNTER (EMERGENCY)
Facility: HOSPITAL | Age: 45
Discharge: HOME | End: 2025-03-05
Attending: EMERGENCY MEDICINE
Payer: COMMERCIAL

## 2025-03-05 VITALS
OXYGEN SATURATION: 100 % | WEIGHT: 135 LBS | BODY MASS INDEX: 23.05 KG/M2 | HEART RATE: 84 BPM | HEIGHT: 64 IN | TEMPERATURE: 97.5 F | SYSTOLIC BLOOD PRESSURE: 121 MMHG | RESPIRATION RATE: 18 BRPM | DIASTOLIC BLOOD PRESSURE: 82 MMHG

## 2025-03-05 DIAGNOSIS — F41.9 ACUTE ANXIETY: Primary | ICD-10-CM

## 2025-03-05 PROCEDURE — 99283 EMERGENCY DEPT VISIT LOW MDM: CPT | Performed by: EMERGENCY MEDICINE

## 2025-03-05 PROCEDURE — 2500000004 HC RX 250 GENERAL PHARMACY W/ HCPCS (ALT 636 FOR OP/ED): Performed by: EMERGENCY MEDICINE

## 2025-03-05 PROCEDURE — 2500000001 HC RX 250 WO HCPCS SELF ADMINISTERED DRUGS (ALT 637 FOR MEDICARE OP): Performed by: EMERGENCY MEDICINE

## 2025-03-05 RX ORDER — ONDANSETRON 4 MG/1
4 TABLET, ORALLY DISINTEGRATING ORAL ONCE
Status: COMPLETED | OUTPATIENT
Start: 2025-03-05 | End: 2025-03-05

## 2025-03-05 RX ORDER — LORAZEPAM 1 MG/1
1 TABLET ORAL ONCE
Status: COMPLETED | OUTPATIENT
Start: 2025-03-05 | End: 2025-03-05

## 2025-03-05 RX ORDER — LORAZEPAM 1 MG/1
1 TABLET ORAL 3 TIMES DAILY PRN
Qty: 15 TABLET | Refills: 0 | Status: SHIPPED | OUTPATIENT
Start: 2025-03-05 | End: 2025-03-10

## 2025-03-05 RX ADMIN — ONDANSETRON 4 MG: 4 TABLET, ORALLY DISINTEGRATING ORAL at 07:55

## 2025-03-05 RX ADMIN — LORAZEPAM 1 MG: 1 TABLET ORAL at 07:55

## 2025-03-05 SDOH — HEALTH STABILITY: MENTAL HEALTH
OTHER SUICIDE PRECAUTIONS INCLUDE: PATIENT PLACED IN AN EASILY OBSERVABLE ROOM WITH DOOR/CURTAIN REMAINING OPEN;PATIENT PLACED IN GOWN (SNAPS OR PAPER GOWNS PREFERRED) AND WANDED;REMAINING RISKS IDENTIFIED AND MITIGATED;PATIENT PLACED IN PSYCH SAFE ROOM (IF AVAILABLE);PROVIDER NOTIFIED;ELOPEMENT RISK IDENTIFIED

## 2025-03-05 SDOH — HEALTH STABILITY: MENTAL HEALTH: CONTENT: UNREMARKABLE

## 2025-03-05 SDOH — SOCIAL STABILITY: SOCIAL NETWORK: EMOTIONAL SUPPORT GIVEN: REASSURE

## 2025-03-05 SDOH — HEALTH STABILITY: MENTAL HEALTH: SUICIDE ASSESSMENT: ADULT (C-SSRS)

## 2025-03-05 SDOH — HEALTH STABILITY: MENTAL HEALTH: HAVE YOU WISHED YOU WERE DEAD OR WISHED YOU COULD GO TO SLEEP AND NOT WAKE UP?: NO

## 2025-03-05 SDOH — SOCIAL STABILITY: SOCIAL NETWORK: VISITOR BEHAVIORS: UNABLE TO ASSESS

## 2025-03-05 SDOH — SOCIAL STABILITY: SOCIAL INSECURITY: FAMILY BEHAVIORS: UNABLE TO ASSESS

## 2025-03-05 SDOH — HEALTH STABILITY: MENTAL HEALTH: SLEEP PATTERN: UNABLE TO ASSESS

## 2025-03-05 SDOH — SOCIAL STABILITY: SOCIAL NETWORK: PARENT/GUARDIAN/SIGNIFICANT OTHER INVOLVEMENT: OTHER (COMMENT)

## 2025-03-05 SDOH — HEALTH STABILITY: MENTAL HEALTH: BEHAVIORS/MOOD: CALM;COOPERATIVE

## 2025-03-05 SDOH — HEALTH STABILITY: MENTAL HEALTH: HAVE YOU EVER DONE ANYTHING, STARTED TO DO ANYTHING, OR PREPARED TO DO ANYTHING TO END YOUR LIFE?: NO

## 2025-03-05 SDOH — HEALTH STABILITY: MENTAL HEALTH

## 2025-03-05 SDOH — HEALTH STABILITY: MENTAL HEALTH: FOR HIGH RISK PATIENTS: ALL INTERVENTIONS ABOVE, PLUS:

## 2025-03-05 SDOH — HEALTH STABILITY: MENTAL HEALTH: DELUSIONS: CONTROLLED

## 2025-03-05 SDOH — HEALTH STABILITY: MENTAL HEALTH: NEEDS EXPRESSED: DENIES

## 2025-03-05 SDOH — HEALTH STABILITY: MENTAL HEALTH: HAVE YOU ACTUALLY HAD ANY THOUGHTS OF KILLING YOURSELF?: NO

## 2025-03-05 SDOH — HEALTH STABILITY: MENTAL HEALTH: BEHAVIORAL HEALTH(WDL): EXCEPTIONS TO WDL

## 2025-03-05 ASSESSMENT — PAIN DESCRIPTION - DESCRIPTORS: DESCRIPTORS: TIGHTNESS

## 2025-03-05 ASSESSMENT — PAIN - FUNCTIONAL ASSESSMENT
PAIN_FUNCTIONAL_ASSESSMENT: 0-10
PAIN_FUNCTIONAL_ASSESSMENT: 0-10

## 2025-03-05 ASSESSMENT — PAIN DESCRIPTION - LOCATION: LOCATION: ABDOMEN

## 2025-03-05 ASSESSMENT — PAIN SCALES - GENERAL
PAINLEVEL_OUTOF10: 7
PAINLEVEL_OUTOF10: 0 - NO PAIN

## 2025-03-05 ASSESSMENT — PAIN DESCRIPTION - PAIN TYPE: TYPE: ACUTE PAIN

## 2025-03-05 ASSESSMENT — PAIN DESCRIPTION - FREQUENCY: FREQUENCY: CONSTANT/CONTINUOUS

## 2025-03-05 NOTE — ED PROVIDER NOTES
HPI   Chief Complaint   Patient presents with    Anxiety     I have anxiety, I took my medication last night and I slept but now my stomach feels in a knot and I vomited       HPI  Patient is a healthy 44-year-old female who suffers from anxiety and depression who presents today with complaints of acute worsening of her chronic anxiety.  She states that she takes mirtazapine and gabapentin at night to help her sleep and that this typically works and she slept well last night.  But she awoke early this morning with overwhelming anxiety.  She states it makes her very tearful.  She does not see a counselor or therapist to discuss her feelings and her PCP is the one that prescribes these medications for her.  She denies any suicidal or homicidal thoughts or ideations.  She denies any previous self-harm or suicidal ideation.  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  She admits that this has created more stress in her life.  She states that when her anxiety is severe it creates epigastric pain and it caused her to vomit this morning.  She states that this is typical and that she also suffers from gastritis as well.  She states she is feeling better in regards to that at this moment but still feeling very anxious and tearful.  She states she would just like something to help get some relief from this anxiety.        Patient History   Past Medical History:   Diagnosis Date    Acid reflux     Gastritis      Past Surgical History:   Procedure Laterality Date     SECTION, CLASSIC  1999     SECTION, CLASSIC  10/21/2003     Family History   Problem Relation Name Age of Onset    Hypertension Mother       Social History     Tobacco Use    Smoking status: Never     Passive exposure: Never    Smokeless tobacco: Never   Vaping Use    Vaping status: Never Used   Substance Use Topics    Alcohol use: Never    Drug use: Yes     Types:  Marijuana     Comment: daily       Physical Exam   ED Triage Vitals [03/05/25 0658]   Temperature Heart Rate Respirations BP   36.4 °C (97.5 °F) 90 15 123/87      Pulse Ox Temp Source Heart Rate Source Patient Position   100 % Temporal Monitor Sitting      BP Location FiO2 (%)     Right arm --       Physical Exam  General: Awake, alert, in no acute distress  Eyes: Gaze conjugate.  No scleral icterus or injection  HENT: Normo-cephalic, atraumatic. No stridor  CV: Regular rate, regular rhythm. Radial pulses 2+ bilaterally  Resp: Breathing non-labored, speaking in full sentences.  Clear to auscultation bilaterally  GI: Soft, non-distended, non-tender. No rebound or guarding.  :   No CVA tenderness.  MSK/Extremities: No gross bony deformities. Moving all extremities  Skin: Warm. Appropriate color  Neuro: Alert. Oriented. Face symmetric. Speech is fluent.  Gross strength and sensation intact in b/l UE and LEs  Psych:   Patient tearful and appears anxious.      ED Course & MDM   Diagnoses as of 03/05/25 5818   Acute anxiety       Patient is not acutely suicidal and does not likely warrant any type of medical evaluation at this time.  She states physically she is doing well she is more concerned about the acute anxiety.  We discussed options and I recommended that she see a therapist or counselor to discuss her feelings as I feel that this could be very therapeutic for her.  We also discussed other modalities for calming which include meditation, grounding or going out nature.  We discussed a plan here for the emergency room which include some Zofran and Ativan to see if she can help relax her.  She states she would like that very much.  We discussed options for home and I explained that I cannot prescribe her a benzodiazepine for long-term use but that I would use it short-term until she can find a counselor or potentially a psychiatrist to prescribe her potentially other medications to help manage her anxiety.  She  agrees with this plan and is very pleased.     I spoke at length with the patient and her friend.  Her friend is at bedside to discuss management of her anxiety.  We discussed multiple modalities to help treat her anxiety which include going out in nature, being at the water side, doing meditation practices, grounding on the earth, journaling and in addition to all of those things that she can do at home I strongly encouraged that she follow-up with a psychiatrist for medicinal management and she probably likely could benefit from a therapist and I will therefore refer her to the [facility] for this as well.  She is very pleased with this plan and agrees.  I explained I will give her a few Ativan to take only as a last resort at home as needed for severe anxiety after she has tried all the above modalities.  We discussed reasons to return to the emergency room and she voices understanding this plan and agrees.  All questions were answered                No data recorded     Eric Coma Scale Score: 15 (03/05/25 0658 : Minerva Conte RN)                           Medical Decision Making      Procedure  Procedures     Elana Ignacio MD  03/05/25 5208       Elana Ignacio MD  03/05/25 7891       Elana Ignacio MD  03/05/25 3015

## 2025-04-15 ENCOUNTER — HOSPITAL ENCOUNTER (EMERGENCY)
Facility: HOSPITAL | Age: 45
Discharge: HOME | End: 2025-04-15
Payer: COMMERCIAL

## 2025-04-15 ENCOUNTER — HOSPITAL ENCOUNTER (EMERGENCY)
Facility: HOSPITAL | Age: 45
Discharge: HOME | End: 2025-04-15
Attending: EMERGENCY MEDICINE
Payer: COMMERCIAL

## 2025-04-15 ENCOUNTER — APPOINTMENT (OUTPATIENT)
Dept: RADIOLOGY | Facility: HOSPITAL | Age: 45
End: 2025-04-15
Payer: COMMERCIAL

## 2025-04-15 VITALS
TEMPERATURE: 98.4 F | SYSTOLIC BLOOD PRESSURE: 126 MMHG | DIASTOLIC BLOOD PRESSURE: 80 MMHG | OXYGEN SATURATION: 100 % | HEART RATE: 87 BPM | RESPIRATION RATE: 19 BRPM | WEIGHT: 135 LBS | HEIGHT: 64 IN | BODY MASS INDEX: 23.05 KG/M2

## 2025-04-15 VITALS
WEIGHT: 135 LBS | HEIGHT: 64 IN | SYSTOLIC BLOOD PRESSURE: 111 MMHG | BODY MASS INDEX: 23.05 KG/M2 | HEART RATE: 83 BPM | DIASTOLIC BLOOD PRESSURE: 89 MMHG | OXYGEN SATURATION: 100 % | RESPIRATION RATE: 17 BRPM | TEMPERATURE: 97.9 F

## 2025-04-15 DIAGNOSIS — F41.9 ANXIETY: ICD-10-CM

## 2025-04-15 DIAGNOSIS — R10.13 EPIGASTRIC ABDOMINAL PAIN: ICD-10-CM

## 2025-04-15 DIAGNOSIS — F41.9 ANXIETY: Primary | ICD-10-CM

## 2025-04-15 DIAGNOSIS — K29.50 CHRONIC GASTRITIS WITHOUT BLEEDING, UNSPECIFIED GASTRITIS TYPE: Primary | ICD-10-CM

## 2025-04-15 LAB
ALBUMIN SERPL BCP-MCNC: 4.6 G/DL (ref 3.4–5)
ALBUMIN SERPL BCP-MCNC: 4.8 G/DL (ref 3.4–5)
ALP SERPL-CCNC: 83 U/L (ref 33–110)
ALP SERPL-CCNC: 90 U/L (ref 33–110)
ALT SERPL W P-5'-P-CCNC: 13 U/L (ref 7–45)
ALT SERPL W P-5'-P-CCNC: 14 U/L (ref 7–45)
ANION GAP SERPL CALC-SCNC: 14 MMOL/L (ref 10–20)
ANION GAP SERPL CALC-SCNC: 16 MMOL/L (ref 10–20)
APPEARANCE UR: CLEAR
AST SERPL W P-5'-P-CCNC: 13 U/L (ref 9–39)
AST SERPL W P-5'-P-CCNC: 17 U/L (ref 9–39)
BACTERIA #/AREA URNS AUTO: ABNORMAL /HPF
BASOPHILS # BLD AUTO: 0.03 X10*3/UL (ref 0–0.1)
BASOPHILS # BLD AUTO: 0.05 X10*3/UL (ref 0–0.1)
BASOPHILS NFR BLD AUTO: 0.3 %
BASOPHILS NFR BLD AUTO: 0.5 %
BILIRUB DIRECT SERPL-MCNC: 0.2 MG/DL (ref 0–0.3)
BILIRUB SERPL-MCNC: 0.7 MG/DL (ref 0–1.2)
BILIRUB SERPL-MCNC: 0.7 MG/DL (ref 0–1.2)
BILIRUB UR STRIP.AUTO-MCNC: NEGATIVE MG/DL
BUN SERPL-MCNC: 8 MG/DL (ref 6–23)
BUN SERPL-MCNC: 8 MG/DL (ref 6–23)
CALCIUM SERPL-MCNC: 10.5 MG/DL (ref 8.6–10.3)
CALCIUM SERPL-MCNC: 9.8 MG/DL (ref 8.6–10.3)
CHLORIDE SERPL-SCNC: 102 MMOL/L (ref 98–107)
CHLORIDE SERPL-SCNC: 104 MMOL/L (ref 98–107)
CO2 SERPL-SCNC: 23 MMOL/L (ref 21–32)
CO2 SERPL-SCNC: 24 MMOL/L (ref 21–32)
COLOR UR: ABNORMAL
CREAT SERPL-MCNC: 0.68 MG/DL (ref 0.5–1.05)
CREAT SERPL-MCNC: 0.68 MG/DL (ref 0.5–1.05)
EGFRCR SERPLBLD CKD-EPI 2021: >90 ML/MIN/1.73M*2
EGFRCR SERPLBLD CKD-EPI 2021: >90 ML/MIN/1.73M*2
EOSINOPHIL # BLD AUTO: 0.1 X10*3/UL (ref 0–0.7)
EOSINOPHIL # BLD AUTO: 0.16 X10*3/UL (ref 0–0.7)
EOSINOPHIL NFR BLD AUTO: 0.8 %
EOSINOPHIL NFR BLD AUTO: 1.6 %
ERYTHROCYTE [DISTWIDTH] IN BLOOD BY AUTOMATED COUNT: 14.6 % (ref 11.5–14.5)
ERYTHROCYTE [DISTWIDTH] IN BLOOD BY AUTOMATED COUNT: 14.7 % (ref 11.5–14.5)
GLUCOSE SERPL-MCNC: 107 MG/DL (ref 74–99)
GLUCOSE SERPL-MCNC: 107 MG/DL (ref 74–99)
GLUCOSE UR STRIP.AUTO-MCNC: NORMAL MG/DL
HCG UR QL IA.RAPID: NEGATIVE
HCT VFR BLD AUTO: 39.9 % (ref 36–46)
HCT VFR BLD AUTO: 43.3 % (ref 36–46)
HGB BLD-MCNC: 13.8 G/DL (ref 12–16)
HGB BLD-MCNC: 14.9 G/DL (ref 12–16)
IMM GRANULOCYTES # BLD AUTO: 0.03 X10*3/UL (ref 0–0.7)
IMM GRANULOCYTES # BLD AUTO: 0.04 X10*3/UL (ref 0–0.7)
IMM GRANULOCYTES NFR BLD AUTO: 0.3 % (ref 0–0.9)
IMM GRANULOCYTES NFR BLD AUTO: 0.3 % (ref 0–0.9)
KETONES UR STRIP.AUTO-MCNC: ABNORMAL MG/DL
LACTATE SERPL-SCNC: 1.5 MMOL/L (ref 0.4–2)
LEUKOCYTE ESTERASE UR QL STRIP.AUTO: NEGATIVE
LIPASE SERPL-CCNC: 22 U/L (ref 9–82)
LYMPHOCYTES # BLD AUTO: 1.83 X10*3/UL (ref 1.2–4.8)
LYMPHOCYTES # BLD AUTO: 2.03 X10*3/UL (ref 1.2–4.8)
LYMPHOCYTES NFR BLD AUTO: 17 %
LYMPHOCYTES NFR BLD AUTO: 17.8 %
MAGNESIUM SERPL-MCNC: 1.78 MG/DL (ref 1.6–2.4)
MCH RBC QN AUTO: 27.1 PG (ref 26–34)
MCH RBC QN AUTO: 27.1 PG (ref 26–34)
MCHC RBC AUTO-ENTMCNC: 34.4 G/DL (ref 32–36)
MCHC RBC AUTO-ENTMCNC: 34.6 G/DL (ref 32–36)
MCV RBC AUTO: 78 FL (ref 80–100)
MCV RBC AUTO: 79 FL (ref 80–100)
MONOCYTES # BLD AUTO: 0.61 X10*3/UL (ref 0.1–1)
MONOCYTES # BLD AUTO: 0.86 X10*3/UL (ref 0.1–1)
MONOCYTES NFR BLD AUTO: 5.9 %
MONOCYTES NFR BLD AUTO: 7.2 %
MUCOUS THREADS #/AREA URNS AUTO: ABNORMAL /LPF
NEUTROPHILS # BLD AUTO: 7.61 X10*3/UL (ref 1.2–7.7)
NEUTROPHILS # BLD AUTO: 8.91 X10*3/UL (ref 1.2–7.7)
NEUTROPHILS NFR BLD AUTO: 73.9 %
NEUTROPHILS NFR BLD AUTO: 74.4 %
NITRITE UR QL STRIP.AUTO: ABNORMAL
NRBC BLD-RTO: 0 /100 WBCS (ref 0–0)
NRBC BLD-RTO: 0 /100 WBCS (ref 0–0)
PH UR STRIP.AUTO: 8 [PH]
PLATELET # BLD AUTO: 320 X10*3/UL (ref 150–450)
PLATELET # BLD AUTO: 321 X10*3/UL (ref 150–450)
POTASSIUM SERPL-SCNC: 3.3 MMOL/L (ref 3.5–5.3)
POTASSIUM SERPL-SCNC: 3.3 MMOL/L (ref 3.5–5.3)
PROT SERPL-MCNC: 8.3 G/DL (ref 6.4–8.2)
PROT SERPL-MCNC: 9 G/DL (ref 6.4–8.2)
PROT UR STRIP.AUTO-MCNC: ABNORMAL MG/DL
RBC # BLD AUTO: 5.1 X10*6/UL (ref 4–5.2)
RBC # BLD AUTO: 5.49 X10*6/UL (ref 4–5.2)
RBC # UR STRIP.AUTO: NEGATIVE MG/DL
RBC #/AREA URNS AUTO: ABNORMAL /HPF
SODIUM SERPL-SCNC: 138 MMOL/L (ref 136–145)
SODIUM SERPL-SCNC: 139 MMOL/L (ref 136–145)
SP GR UR STRIP.AUTO: 1.02
SQUAMOUS #/AREA URNS AUTO: ABNORMAL /HPF
UROBILINOGEN UR STRIP.AUTO-MCNC: NORMAL MG/DL
WBC # BLD AUTO: 10.3 X10*3/UL (ref 4.4–11.3)
WBC # BLD AUTO: 12 X10*3/UL (ref 4.4–11.3)
WBC #/AREA URNS AUTO: ABNORMAL /HPF

## 2025-04-15 PROCEDURE — 99284 EMERGENCY DEPT VISIT MOD MDM: CPT

## 2025-04-15 PROCEDURE — 80053 COMPREHEN METABOLIC PANEL: CPT | Performed by: PHYSICIAN ASSISTANT

## 2025-04-15 PROCEDURE — 83735 ASSAY OF MAGNESIUM: CPT | Performed by: PHYSICIAN ASSISTANT

## 2025-04-15 PROCEDURE — 2500000001 HC RX 250 WO HCPCS SELF ADMINISTERED DRUGS (ALT 637 FOR MEDICARE OP): Performed by: PHYSICIAN ASSISTANT

## 2025-04-15 PROCEDURE — 2500000001 HC RX 250 WO HCPCS SELF ADMINISTERED DRUGS (ALT 637 FOR MEDICARE OP): Performed by: NURSE PRACTITIONER

## 2025-04-15 PROCEDURE — 96372 THER/PROPH/DIAG INJ SC/IM: CPT | Performed by: PHYSICIAN ASSISTANT

## 2025-04-15 PROCEDURE — 87491 CHLMYD TRACH DNA AMP PROBE: CPT | Mod: ELYLAB | Performed by: PHYSICIAN ASSISTANT

## 2025-04-15 PROCEDURE — 2500000004 HC RX 250 GENERAL PHARMACY W/ HCPCS (ALT 636 FOR OP/ED): Performed by: NURSE PRACTITIONER

## 2025-04-15 PROCEDURE — 83605 ASSAY OF LACTIC ACID: CPT | Performed by: PHYSICIAN ASSISTANT

## 2025-04-15 PROCEDURE — 82248 BILIRUBIN DIRECT: CPT | Performed by: PHYSICIAN ASSISTANT

## 2025-04-15 PROCEDURE — 96375 TX/PRO/DX INJ NEW DRUG ADDON: CPT

## 2025-04-15 PROCEDURE — 96374 THER/PROPH/DIAG INJ IV PUSH: CPT

## 2025-04-15 PROCEDURE — 96376 TX/PRO/DX INJ SAME DRUG ADON: CPT

## 2025-04-15 PROCEDURE — 80053 COMPREHEN METABOLIC PANEL: CPT | Performed by: NURSE PRACTITIONER

## 2025-04-15 PROCEDURE — 81001 URINALYSIS AUTO W/SCOPE: CPT | Performed by: PHYSICIAN ASSISTANT

## 2025-04-15 PROCEDURE — 85025 COMPLETE CBC W/AUTO DIFF WBC: CPT | Performed by: PHYSICIAN ASSISTANT

## 2025-04-15 PROCEDURE — 74177 CT ABD & PELVIS W/CONTRAST: CPT

## 2025-04-15 PROCEDURE — 87661 TRICHOMONAS VAGINALIS AMPLIF: CPT | Mod: ELYLAB | Performed by: PHYSICIAN ASSISTANT

## 2025-04-15 PROCEDURE — 96372 THER/PROPH/DIAG INJ SC/IM: CPT | Performed by: NURSE PRACTITIONER

## 2025-04-15 PROCEDURE — 2550000001 HC RX 255 CONTRASTS: Performed by: EMERGENCY MEDICINE

## 2025-04-15 PROCEDURE — 81025 URINE PREGNANCY TEST: CPT | Performed by: PHYSICIAN ASSISTANT

## 2025-04-15 PROCEDURE — 83690 ASSAY OF LIPASE: CPT | Performed by: PHYSICIAN ASSISTANT

## 2025-04-15 PROCEDURE — 96361 HYDRATE IV INFUSION ADD-ON: CPT

## 2025-04-15 PROCEDURE — 36415 COLL VENOUS BLD VENIPUNCTURE: CPT | Performed by: PHYSICIAN ASSISTANT

## 2025-04-15 PROCEDURE — 2500000004 HC RX 250 GENERAL PHARMACY W/ HCPCS (ALT 636 FOR OP/ED): Performed by: PHYSICIAN ASSISTANT

## 2025-04-15 PROCEDURE — 85025 COMPLETE CBC W/AUTO DIFF WBC: CPT | Performed by: NURSE PRACTITIONER

## 2025-04-15 PROCEDURE — 96374 THER/PROPH/DIAG INJ IV PUSH: CPT | Mod: 59

## 2025-04-15 PROCEDURE — 36415 COLL VENOUS BLD VENIPUNCTURE: CPT | Performed by: NURSE PRACTITIONER

## 2025-04-15 PROCEDURE — 87086 URINE CULTURE/COLONY COUNT: CPT | Mod: ELYLAB | Performed by: PHYSICIAN ASSISTANT

## 2025-04-15 PROCEDURE — 99285 EMERGENCY DEPT VISIT HI MDM: CPT | Mod: 25 | Performed by: EMERGENCY MEDICINE

## 2025-04-15 RX ORDER — ONDANSETRON 4 MG/1
4 TABLET, ORALLY DISINTEGRATING ORAL EVERY 8 HOURS PRN
Qty: 20 TABLET | Refills: 0 | Status: SHIPPED | OUTPATIENT
Start: 2025-04-15 | End: 2025-04-22

## 2025-04-15 RX ORDER — ONDANSETRON HYDROCHLORIDE 2 MG/ML
4 INJECTION, SOLUTION INTRAVENOUS ONCE
Status: COMPLETED | OUTPATIENT
Start: 2025-04-15 | End: 2025-04-15

## 2025-04-15 RX ORDER — HYDROXYZINE HYDROCHLORIDE 25 MG/1
50 TABLET, FILM COATED ORAL ONCE
Status: COMPLETED | OUTPATIENT
Start: 2025-04-15 | End: 2025-04-15

## 2025-04-15 RX ORDER — PANTOPRAZOLE SODIUM 20 MG/1
20 TABLET, DELAYED RELEASE ORAL DAILY
Qty: 20 TABLET | Refills: 0 | Status: SHIPPED | OUTPATIENT
Start: 2025-04-15 | End: 2025-05-05

## 2025-04-15 RX ORDER — MORPHINE SULFATE 4 MG/ML
4 INJECTION, SOLUTION INTRAMUSCULAR; INTRAVENOUS ONCE
Status: COMPLETED | OUTPATIENT
Start: 2025-04-15 | End: 2025-04-15

## 2025-04-15 RX ORDER — SUCRALFATE 1 G/1
1 TABLET ORAL
Qty: 40 TABLET | Refills: 0 | Status: SHIPPED | OUTPATIENT
Start: 2025-04-15 | End: 2025-04-25

## 2025-04-15 RX ORDER — SUCRALFATE 1 G/1
1 TABLET ORAL ONCE
Status: COMPLETED | OUTPATIENT
Start: 2025-04-15 | End: 2025-04-15

## 2025-04-15 RX ORDER — DICYCLOMINE HYDROCHLORIDE 10 MG/ML
20 INJECTION INTRAMUSCULAR ONCE
Status: COMPLETED | OUTPATIENT
Start: 2025-04-15 | End: 2025-04-15

## 2025-04-15 RX ORDER — FAMOTIDINE 10 MG/ML
20 INJECTION, SOLUTION INTRAVENOUS ONCE
Status: COMPLETED | OUTPATIENT
Start: 2025-04-15 | End: 2025-04-15

## 2025-04-15 RX ORDER — KETOROLAC TROMETHAMINE 30 MG/ML
15 INJECTION, SOLUTION INTRAMUSCULAR; INTRAVENOUS ONCE
Status: COMPLETED | OUTPATIENT
Start: 2025-04-15 | End: 2025-04-15

## 2025-04-15 RX ORDER — LORAZEPAM 2 MG/ML
0.5 INJECTION INTRAMUSCULAR ONCE
Status: COMPLETED | OUTPATIENT
Start: 2025-04-15 | End: 2025-04-15

## 2025-04-15 RX ADMIN — SODIUM CHLORIDE, SODIUM LACTATE, POTASSIUM CHLORIDE, AND CALCIUM CHLORIDE 1000 ML: .6; .31; .03; .02 INJECTION, SOLUTION INTRAVENOUS at 20:48

## 2025-04-15 RX ADMIN — LORAZEPAM 0.5 MG: 2 INJECTION INTRAMUSCULAR; INTRAVENOUS at 07:33

## 2025-04-15 RX ADMIN — ONDANSETRON 4 MG: 2 INJECTION INTRAMUSCULAR; INTRAVENOUS at 07:32

## 2025-04-15 RX ADMIN — SUCRALFATE 1 G: 1 TABLET ORAL at 22:23

## 2025-04-15 RX ADMIN — KETOROLAC TROMETHAMINE 15 MG: 30 INJECTION, SOLUTION INTRAMUSCULAR at 20:48

## 2025-04-15 RX ADMIN — FAMOTIDINE 20 MG: 10 INJECTION, SOLUTION INTRAVENOUS at 22:22

## 2025-04-15 RX ADMIN — IOHEXOL 75 ML: 350 INJECTION, SOLUTION INTRAVENOUS at 21:10

## 2025-04-15 RX ADMIN — DICYCLOMINE HYDROCHLORIDE 20 MG: 10 INJECTION, SOLUTION INTRAMUSCULAR at 08:43

## 2025-04-15 RX ADMIN — ONDANSETRON 4 MG: 2 INJECTION INTRAMUSCULAR; INTRAVENOUS at 20:49

## 2025-04-15 RX ADMIN — ONDANSETRON 4 MG: 2 INJECTION INTRAMUSCULAR; INTRAVENOUS at 08:55

## 2025-04-15 RX ADMIN — HYDROXYZINE HYDROCHLORIDE 50 MG: 25 TABLET ORAL at 09:16

## 2025-04-15 RX ADMIN — MORPHINE SULFATE 4 MG: 4 INJECTION, SOLUTION INTRAMUSCULAR; INTRAVENOUS at 20:49

## 2025-04-15 RX ADMIN — DICYCLOMINE HYDROCHLORIDE 20 MG: 10 INJECTION, SOLUTION INTRAMUSCULAR at 22:22

## 2025-04-15 ASSESSMENT — PAIN SCALES - GENERAL
PAINLEVEL_OUTOF10: 0 - NO PAIN
PAINLEVEL_OUTOF10: 8
PAINLEVEL_OUTOF10: 0 - NO PAIN
PAINLEVEL_OUTOF10: 10 - WORST POSSIBLE PAIN
PAINLEVEL_OUTOF10: 9

## 2025-04-15 ASSESSMENT — PAIN DESCRIPTION - FREQUENCY: FREQUENCY: INTERMITTENT

## 2025-04-15 ASSESSMENT — COLUMBIA-SUICIDE SEVERITY RATING SCALE - C-SSRS
6. HAVE YOU EVER DONE ANYTHING, STARTED TO DO ANYTHING, OR PREPARED TO DO ANYTHING TO END YOUR LIFE?: NO
1. IN THE PAST MONTH, HAVE YOU WISHED YOU WERE DEAD OR WISHED YOU COULD GO TO SLEEP AND NOT WAKE UP?: NO
2. HAVE YOU ACTUALLY HAD ANY THOUGHTS OF KILLING YOURSELF?: NO
2. HAVE YOU ACTUALLY HAD ANY THOUGHTS OF KILLING YOURSELF?: NO
1. IN THE PAST MONTH, HAVE YOU WISHED YOU WERE DEAD OR WISHED YOU COULD GO TO SLEEP AND NOT WAKE UP?: NO
6. HAVE YOU EVER DONE ANYTHING, STARTED TO DO ANYTHING, OR PREPARED TO DO ANYTHING TO END YOUR LIFE?: NO

## 2025-04-15 ASSESSMENT — PAIN DESCRIPTION - LOCATION
LOCATION: BACK
LOCATION: ABDOMEN
LOCATION: OTHER (COMMENT)

## 2025-04-15 ASSESSMENT — PAIN DESCRIPTION - PAIN TYPE: TYPE: ACUTE PAIN

## 2025-04-15 ASSESSMENT — PAIN - FUNCTIONAL ASSESSMENT
PAIN_FUNCTIONAL_ASSESSMENT: 0-10

## 2025-04-15 ASSESSMENT — PAIN DESCRIPTION - DESCRIPTORS: DESCRIPTORS: SHARP;STABBING

## 2025-04-15 NOTE — Clinical Note
Neela Elaine was seen and treated in our emergency department on 4/15/2025.  She may return to work on 04/17/2025.       If you have any questions or concerns, please don't hesitate to call.      Roddy Phillips, DO

## 2025-04-15 NOTE — ED PROVIDER NOTES
HPI   Chief Complaint   Patient presents with    Vomiting     Pt complaint of waking with abdominal pain with nausea and vomiting this morning and increased anxiety. No fever/chills. No cp/sob. Has not been around anyone recently ill. Pt a/ox4 from home       45-year-old female presents emergency department patient states history of anxiety, woke up this morning with epigastric abdominal pain, feeling upset stomach, feeling very anxious.  States her anxiety has presented this way many times before.  She does take anxiety medication, took it last night.    States this presentation is very similar to her previous anxiety attacks.    Denies any fevers or chills, no shortness of breath or chest discomfort.  No sick contacts.      History provided by:  Patient   used: No            Patient History   Past Medical History:   Diagnosis Date    Acid reflux     Gastritis      Past Surgical History:   Procedure Laterality Date     SECTION, CLASSIC  1999     SECTION, CLASSIC  10/21/2003     Family History   Problem Relation Name Age of Onset    Hypertension Mother       Social History     Tobacco Use    Smoking status: Never     Passive exposure: Never    Smokeless tobacco: Never   Vaping Use    Vaping status: Never Used   Substance Use Topics    Alcohol use: Never    Drug use: Yes     Types: Marijuana     Comment: daily       Physical Exam   ED Triage Vitals [04/15/25 0639]   Temperature Heart Rate Respirations BP   36.6 °C (97.9 °F) 93 16 130/87      Pulse Ox Temp Source Heart Rate Source Patient Position   99 % Temporal Monitor --      BP Location FiO2 (%)     -- --       Physical Exam  Constitutional: Vitals noted, no distress. Afebrile.  Tearful, anxious  Cardiovascular: Regular, rate, rhythm, no murmur.   Pulmonary: Lungs clear bilaterally with good aeration. No adventitious breath sounds.   Gastrointestinal: Soft, nonsurgical. Nontender. No peritoneal signs. Normoactive bowel  sounds.   Musculoskeletal: No peripheral edema. Negative Homans bilaterally, no cords.   Skin: No rash.   Neuro: No focal neurologic deficits, NIH score of 0.      ED Course & MDM   Diagnoses as of 04/15/25 0914   Anxiety          Labs Reviewed   CBC WITH AUTO DIFFERENTIAL - Abnormal       Result Value    WBC 10.3      nRBC 0.0      RBC 5.49 (*)     Hemoglobin 14.9      Hematocrit 43.3      MCV 79 (*)     MCH 27.1      MCHC 34.4      RDW 14.7 (*)     Platelets 320      Neutrophils % 73.9      Immature Granulocytes %, Automated 0.3      Lymphocytes % 17.8      Monocytes % 5.9      Eosinophils % 1.6      Basophils % 0.5      Neutrophils Absolute 7.61      Immature Granulocytes Absolute, Automated 0.03      Lymphocytes Absolute 1.83      Monocytes Absolute 0.61      Eosinophils Absolute 0.16      Basophils Absolute 0.05     COMPREHENSIVE METABOLIC PANEL - Abnormal    Glucose 107 (*)     Sodium 139      Potassium 3.3 (*)     Chloride 102      Bicarbonate 24      Anion Gap 16      Urea Nitrogen 8      Creatinine 0.68      eGFR >90      Calcium 10.5 (*)     Albumin 4.8      Alkaline Phosphatase 90      Total Protein 9.0 (*)     AST 17      Bilirubin, Total 0.7      ALT 14          No orders to display            No data recorded     Vanderbilt Coma Scale Score: 15 (04/15/25 0641 : Minerva Whitt, RN)                   Medical Decision Making    Patient presents, complaints of anxiety causing knots in her stomach.  States she has had this same sensation many times before.  She does take medication for anxiety, took her normal dose last night.    Basic labs were obtained to rule out metabolic process causing her anxiety, white count 10.3, platelets 320, hemoglobin 14.9.  Metabolic panel unremarkable, glucose 107, potassium 3.3, creatinine 0.68, unremarkable LFTs with total bili 0.7.    Initially gave her 4 mg of IV Zofran and 1/2 mg of IV Ativan.  She still complained of not in her stomach so was given 20 mg of IM Bentyl and  4 mg of additional Zofran.    Resting comfortably but still tells me that she is having an anxious feelings will give 50 mg of hydroxyzine prior to discharge.    Patient should continue with her anxiety medications, rest and hydrate, discussed follow-up with primary care, return with any worsening symptoms or any additional concerns.    Procedure  Procedures     MARYA Campos-CNP  04/15/25 0917

## 2025-04-16 LAB
C TRACH RRNA SPEC QL NAA+PROBE: NEGATIVE
HOLD SPECIMEN: NORMAL
N GONORRHOEA DNA SPEC QL PROBE+SIG AMP: NEGATIVE
T VAGINALIS RRNA SPEC QL NAA+PROBE: NEGATIVE

## 2025-04-16 NOTE — ED PROVIDER NOTES
HPI   Chief Complaint   Patient presents with    Flank Pain     I was here earlier, still nauseous, having pain right here (points to right flank).       This is a 45-year-old female who presents emergency room with chief complaint of persistent anxiety with epigastric abdominal pain with nausea and vomiting.  Patient states that she has chronic anxiety and was seen emergency room earlier today for similar symptoms.  Patient states she was discharged home with prescription for Atarax as well as Ativan.  Patient says she is taking medication but her anxiety persists.  The pain is described as sharp and stabbing is constant nothing makes it worse or better.  Patient states she has had kidney stones before and feels she has with a kidney stone.  She denies any fevers, chills, chest pain, palpitations, cough or shortness of breath.  Rates her flank pain a 10 out of a 10.  Past medical history GERD, pyelonephritis, gastritis, anxiety.      History provided by:  Patient and medical records          Patient History   Past Medical History:   Diagnosis Date    Acid reflux     Gastritis      Past Surgical History:   Procedure Laterality Date     SECTION, CLASSIC  1999     SECTION, CLASSIC  10/21/2003     Family History   Problem Relation Name Age of Onset    Hypertension Mother       Social History     Tobacco Use    Smoking status: Never     Passive exposure: Never    Smokeless tobacco: Never   Vaping Use    Vaping status: Never Used   Substance Use Topics    Alcohol use: Never    Drug use: Yes     Types: Marijuana     Comment: daily       Physical Exam   ED Triage Vitals [04/15/25 1950]   Temperature Heart Rate Respirations BP   36.9 °C (98.4 °F) 95 16 131/84      Pulse Ox Temp Source Heart Rate Source Patient Position   99 % Temporal Monitor Sitting      BP Location FiO2 (%)     Right arm --       Physical Exam  Vitals and nursing note reviewed. Exam conducted with a chaperone present.    Constitutional:       General: She is awake.      Appearance: Normal appearance. She is well-groomed and normal weight. She is ill-appearing.   HENT:      Head: Normocephalic and atraumatic.      Right Ear: Tympanic membrane, ear canal and external ear normal.      Left Ear: Tympanic membrane, ear canal and external ear normal.      Nose: Nose normal.      Mouth/Throat:      Mouth: Mucous membranes are moist.      Pharynx: Oropharynx is clear.   Eyes:      Extraocular Movements: Extraocular movements intact.      Conjunctiva/sclera: Conjunctivae normal.      Pupils: Pupils are equal, round, and reactive to light.   Cardiovascular:      Rate and Rhythm: Normal rate and regular rhythm.      Pulses: Normal pulses.      Heart sounds: Normal heart sounds.   Pulmonary:      Effort: Pulmonary effort is normal.      Breath sounds: Normal breath sounds. No wheezing, rhonchi or rales.   Abdominal:      General: Abdomen is flat. Bowel sounds are normal.      Palpations: Abdomen is soft. There is no mass.      Tenderness: There is abdominal tenderness in the epigastric area. There is right CVA tenderness. There is no guarding.       Musculoskeletal:         General: No swelling or tenderness. Normal range of motion.      Cervical back: Normal range of motion and neck supple.   Skin:     General: Skin is warm and dry.      Capillary Refill: Capillary refill takes less than 2 seconds.      Findings: No rash.   Neurological:      General: No focal deficit present.      Mental Status: She is alert and oriented to person, place, and time. Mental status is at baseline.   Psychiatric:         Attention and Perception: Attention and perception normal.         Mood and Affect: Affect normal. Mood is anxious.         Speech: Speech normal.         Behavior: Behavior normal. Behavior is cooperative.         Thought Content: Thought content normal.         Cognition and Memory: Cognition and memory normal.         Judgment: Judgment  normal.           ED Course & MDM   Diagnoses as of 04/15/25 2155   Chronic gastritis without bleeding, unspecified gastritis type   Anxiety   Epigastric abdominal pain                 No data recorded     Shade Coma Scale Score: 15 (04/15/25 1950 : Minerva Conte RN)                           Medical Decision Making  Temperature 36.9 heart rate 95, respirations 16, blood pressure 131/84, pulse ox is 99% on room air  The patient was given a liter of lactated Ringer's, Zofran 4 mg IV push, Toradol 15 mg IV push, morphine 4 mg IV push    CBC white count is 12, hemoglobin 13.8, hematocrit 39.9, platelet count was 321.  Metabolic panel glucose is 107, potassium 3.3 otherwise within normal limits, hepatic function shows a total protein of 8.3 otherwise within normal limits, magnesium is 1.78, lactic is 1.5, lipase is 22, urinalysis shows 2+ ketones 1+ nitrites, negative nitrites, negative blood.  Urine hCG was negative.  CT scan of the abdomen pelvis shows no acute abnormality within the abdomen or pelvis, similar mild gastric fold thickening which may relate to chronic gastritis, submucosal fatty deposition in the colon which can be seen in the setting of obesity's, chronic corticosteroid usage secondary to prior colitis and idiopathic.  Unchanged right paraovarian cyst measuring 2 x 3.5 cm.  2150 hrs. rounded on the patient up and results of workup.  I have ordered Bentyl 20 mg IM, Pepcid 20 mg IV push Carafate 1 g p.o.  The patient is requesting STD check but does not want be treated at this time.  She will do self swabs.  At this point, I do not suspect appendicitis, diverticulitis, colitis, bowel perforation, mesenteric ischemia, AAA I do not suspect PID.  She does have epigastric abdominal tenderness and she has chronic gastritis on her CT scan.  The patient will be discharged home with prescription for Carafate, Pepcid, Zofran and recommend close follow-up with the PCP as well as gastroenterology.  Sending  her anxiety she is already on medication she was advised to continue taking the medication.  She was encouraged to return back to the ER sooner with any concerns or worsening of symptoms.        Procedure  Procedures     Titus Ross PA-C  04/15/25 3430

## 2025-04-18 LAB — BACTERIA UR CULT: ABNORMAL

## 2025-04-19 ENCOUNTER — TELEPHONE (OUTPATIENT)
Dept: PHARMACY | Facility: HOSPITAL | Age: 45
End: 2025-04-19
Payer: COMMERCIAL

## 2025-04-19 NOTE — PROGRESS NOTES
EDPD Note: Rapid Result Review    I reviewed Neela Elaine 's chart regarding a positive urine culture/result that was taken during their recent emergency room visit. The patient was not told about these results prior to leaving the emergency department. Therefore, patient was contacted and given appropriate education.     Susceptibility data from last 90 days.  Collected Specimen Info Organism Amoxicillin/Clavulanate Ampicillin Ampicillin/Sulbactam Cefazolin Cefazolin (uncomplicated UTIs only) Ceftriaxone Ciprofloxacin Gentamicin Nitrofurantoin Piperacillin/Tazobactam   04/15/25 Urine from Clean Catch/Voided Escherichia coli  S  R  R  I  S  S  S  S  S  S     Collected Specimen Info Organism Trimethoprim/Sulfamethoxazole   04/15/25 Urine from Clean Catch/Voided Escherichia coli  S     Patient presented to ED for right flank pain. However, did not endorse any urinary symptoms at time or visit or at time of call. Not discharged on any antibiotics for UTI. Patient states she is feeling better now. Due to no symptoms, antibiotics continue to be unwarranted.     No further follow up needed from EDPD Team.     If there are any other questions for the ED Post-Discharge Culture Follow Up Team, please contact [phone]. Fax: [phone].    Elo George, AissatouD  [facility] PGY-1 Resident   [phone]

## 2025-05-17 ENCOUNTER — HOSPITAL ENCOUNTER (EMERGENCY)
Facility: HOSPITAL | Age: 45
Discharge: HOME | End: 2025-05-18
Attending: STUDENT IN AN ORGANIZED HEALTH CARE EDUCATION/TRAINING PROGRAM
Payer: COMMERCIAL

## 2025-05-17 VITALS
WEIGHT: 130 LBS | DIASTOLIC BLOOD PRESSURE: 97 MMHG | HEIGHT: 64 IN | SYSTOLIC BLOOD PRESSURE: 134 MMHG | TEMPERATURE: 98.6 F | OXYGEN SATURATION: 99 % | BODY MASS INDEX: 22.2 KG/M2 | RESPIRATION RATE: 17 BRPM | HEART RATE: 97 BPM

## 2025-05-17 DIAGNOSIS — N30.01 ACUTE CYSTITIS WITH HEMATURIA: ICD-10-CM

## 2025-05-17 DIAGNOSIS — R10.13 EPIGASTRIC PAIN: Primary | ICD-10-CM

## 2025-05-17 LAB
ALBUMIN SERPL BCP-MCNC: 4.5 G/DL (ref 3.4–5)
ALP SERPL-CCNC: 65 U/L (ref 33–110)
ALT SERPL W P-5'-P-CCNC: 10 U/L (ref 7–45)
ANION GAP SERPL CALC-SCNC: 15 MMOL/L (ref 10–20)
AST SERPL W P-5'-P-CCNC: 14 U/L (ref 9–39)
BASOPHILS # BLD AUTO: 0.04 X10*3/UL (ref 0–0.1)
BASOPHILS NFR BLD AUTO: 0.3 %
BILIRUB SERPL-MCNC: 0.7 MG/DL (ref 0–1.2)
BUN SERPL-MCNC: 8 MG/DL (ref 6–23)
CALCIUM SERPL-MCNC: 10.1 MG/DL (ref 8.6–10.3)
CHLORIDE SERPL-SCNC: 104 MMOL/L (ref 98–107)
CO2 SERPL-SCNC: 23 MMOL/L (ref 21–32)
CREAT SERPL-MCNC: 0.74 MG/DL (ref 0.5–1.05)
EGFRCR SERPLBLD CKD-EPI 2021: >90 ML/MIN/1.73M*2
EOSINOPHIL # BLD AUTO: 0.3 X10*3/UL (ref 0–0.7)
EOSINOPHIL NFR BLD AUTO: 2.4 %
ERYTHROCYTE [DISTWIDTH] IN BLOOD BY AUTOMATED COUNT: 14 % (ref 11.5–14.5)
GLUCOSE SERPL-MCNC: 93 MG/DL (ref 74–99)
HCT VFR BLD AUTO: 41.2 % (ref 36–46)
HGB BLD-MCNC: 14.3 G/DL (ref 12–16)
IMM GRANULOCYTES # BLD AUTO: 0.03 X10*3/UL (ref 0–0.7)
IMM GRANULOCYTES NFR BLD AUTO: 0.2 % (ref 0–0.9)
LACTATE SERPL-SCNC: 1.1 MMOL/L (ref 0.4–2)
LIPASE SERPL-CCNC: 22 U/L (ref 9–82)
LYMPHOCYTES # BLD AUTO: 1.95 X10*3/UL (ref 1.2–4.8)
LYMPHOCYTES NFR BLD AUTO: 15.7 %
MCH RBC QN AUTO: 27.2 PG (ref 26–34)
MCHC RBC AUTO-ENTMCNC: 34.7 G/DL (ref 32–36)
MCV RBC AUTO: 79 FL (ref 80–100)
MONOCYTES # BLD AUTO: 0.87 X10*3/UL (ref 0.1–1)
MONOCYTES NFR BLD AUTO: 7 %
NEUTROPHILS # BLD AUTO: 9.25 X10*3/UL (ref 1.2–7.7)
NEUTROPHILS NFR BLD AUTO: 74.4 %
NRBC BLD-RTO: 0 /100 WBCS (ref 0–0)
PLATELET # BLD AUTO: 302 X10*3/UL (ref 150–450)
POTASSIUM SERPL-SCNC: 3.6 MMOL/L (ref 3.5–5.3)
PROT SERPL-MCNC: 8.4 G/DL (ref 6.4–8.2)
RBC # BLD AUTO: 5.25 X10*6/UL (ref 4–5.2)
SODIUM SERPL-SCNC: 138 MMOL/L (ref 136–145)
WBC # BLD AUTO: 12.4 X10*3/UL (ref 4.4–11.3)

## 2025-05-17 PROCEDURE — 83690 ASSAY OF LIPASE: CPT | Performed by: STUDENT IN AN ORGANIZED HEALTH CARE EDUCATION/TRAINING PROGRAM

## 2025-05-17 PROCEDURE — 2500000004 HC RX 250 GENERAL PHARMACY W/ HCPCS (ALT 636 FOR OP/ED): Mod: JZ | Performed by: STUDENT IN AN ORGANIZED HEALTH CARE EDUCATION/TRAINING PROGRAM

## 2025-05-17 PROCEDURE — 96374 THER/PROPH/DIAG INJ IV PUSH: CPT

## 2025-05-17 PROCEDURE — 96375 TX/PRO/DX INJ NEW DRUG ADDON: CPT

## 2025-05-17 PROCEDURE — 36415 COLL VENOUS BLD VENIPUNCTURE: CPT | Performed by: STUDENT IN AN ORGANIZED HEALTH CARE EDUCATION/TRAINING PROGRAM

## 2025-05-17 PROCEDURE — 85025 COMPLETE CBC W/AUTO DIFF WBC: CPT | Performed by: STUDENT IN AN ORGANIZED HEALTH CARE EDUCATION/TRAINING PROGRAM

## 2025-05-17 PROCEDURE — 96361 HYDRATE IV INFUSION ADD-ON: CPT

## 2025-05-17 PROCEDURE — 83605 ASSAY OF LACTIC ACID: CPT | Performed by: STUDENT IN AN ORGANIZED HEALTH CARE EDUCATION/TRAINING PROGRAM

## 2025-05-17 PROCEDURE — 99284 EMERGENCY DEPT VISIT MOD MDM: CPT | Performed by: STUDENT IN AN ORGANIZED HEALTH CARE EDUCATION/TRAINING PROGRAM

## 2025-05-17 PROCEDURE — 80053 COMPREHEN METABOLIC PANEL: CPT | Performed by: STUDENT IN AN ORGANIZED HEALTH CARE EDUCATION/TRAINING PROGRAM

## 2025-05-17 RX ORDER — ONDANSETRON HYDROCHLORIDE 2 MG/ML
4 INJECTION, SOLUTION INTRAVENOUS ONCE
Status: COMPLETED | OUTPATIENT
Start: 2025-05-17 | End: 2025-05-17

## 2025-05-17 RX ORDER — KETOROLAC TROMETHAMINE 30 MG/ML
15 INJECTION, SOLUTION INTRAMUSCULAR; INTRAVENOUS ONCE
Status: COMPLETED | OUTPATIENT
Start: 2025-05-17 | End: 2025-05-17

## 2025-05-17 RX ADMIN — ONDANSETRON 4 MG: 2 INJECTION INTRAMUSCULAR; INTRAVENOUS at 23:38

## 2025-05-17 RX ADMIN — SODIUM CHLORIDE, SODIUM LACTATE, POTASSIUM CHLORIDE, AND CALCIUM CHLORIDE 1000 ML: .6; .31; .03; .02 INJECTION, SOLUTION INTRAVENOUS at 23:38

## 2025-05-17 RX ADMIN — KETOROLAC TROMETHAMINE 15 MG: 30 INJECTION, SOLUTION INTRAMUSCULAR at 23:38

## 2025-05-17 ASSESSMENT — PAIN DESCRIPTION - ORIENTATION: ORIENTATION: MID;UPPER

## 2025-05-17 ASSESSMENT — LIFESTYLE VARIABLES
TOTAL SCORE: 0
EVER FELT BAD OR GUILTY ABOUT YOUR DRINKING: NO
HAVE PEOPLE ANNOYED YOU BY CRITICIZING YOUR DRINKING: NO
EVER HAD A DRINK FIRST THING IN THE MORNING TO STEADY YOUR NERVES TO GET RID OF A HANGOVER: NO
HAVE YOU EVER FELT YOU SHOULD CUT DOWN ON YOUR DRINKING: NO

## 2025-05-17 ASSESSMENT — PAIN DESCRIPTION - FREQUENCY: FREQUENCY: CONSTANT/CONTINUOUS

## 2025-05-17 ASSESSMENT — PAIN SCALES - GENERAL: PAINLEVEL_OUTOF10: 10 - WORST POSSIBLE PAIN

## 2025-05-17 ASSESSMENT — PAIN DESCRIPTION - DESCRIPTORS: DESCRIPTORS: SHARP

## 2025-05-17 ASSESSMENT — PAIN DESCRIPTION - PAIN TYPE: TYPE: ACUTE PAIN

## 2025-05-17 ASSESSMENT — PAIN DESCRIPTION - LOCATION: LOCATION: ABDOMEN

## 2025-05-17 ASSESSMENT — PAIN - FUNCTIONAL ASSESSMENT: PAIN_FUNCTIONAL_ASSESSMENT: 0-10

## 2025-05-18 ENCOUNTER — APPOINTMENT (OUTPATIENT)
Dept: RADIOLOGY | Facility: HOSPITAL | Age: 45
End: 2025-05-18
Payer: COMMERCIAL

## 2025-05-18 ENCOUNTER — APPOINTMENT (OUTPATIENT)
Dept: CARDIOLOGY | Facility: HOSPITAL | Age: 45
End: 2025-05-18
Payer: COMMERCIAL

## 2025-05-18 VITALS
RESPIRATION RATE: 16 BRPM | DIASTOLIC BLOOD PRESSURE: 78 MMHG | HEART RATE: 85 BPM | HEIGHT: 64 IN | OXYGEN SATURATION: 99 % | TEMPERATURE: 98.1 F | WEIGHT: 130 LBS | BODY MASS INDEX: 22.2 KG/M2 | SYSTOLIC BLOOD PRESSURE: 135 MMHG

## 2025-05-18 DIAGNOSIS — K29.70 GASTRITIS WITHOUT BLEEDING, UNSPECIFIED CHRONICITY, UNSPECIFIED GASTRITIS TYPE: Primary | ICD-10-CM

## 2025-05-18 LAB
ALBUMIN SERPL BCP-MCNC: 4.5 G/DL (ref 3.4–5)
ALP SERPL-CCNC: 62 U/L (ref 33–110)
ALT SERPL W P-5'-P-CCNC: 10 U/L (ref 7–45)
AMPHETAMINES UR QL SCN: ABNORMAL
ANION GAP SERPL CALC-SCNC: 14 MMOL/L (ref 10–20)
APPEARANCE UR: ABNORMAL
AST SERPL W P-5'-P-CCNC: 14 U/L (ref 9–39)
ATRIAL RATE: 75 BPM
ATRIAL RATE: 83 BPM
BACTERIA #/AREA URNS AUTO: ABNORMAL /HPF
BARBITURATES UR QL SCN: ABNORMAL
BASOPHILS # BLD AUTO: 0.02 X10*3/UL (ref 0–0.1)
BASOPHILS NFR BLD AUTO: 0.2 %
BENZODIAZ UR QL SCN: ABNORMAL
BILIRUB SERPL-MCNC: 0.8 MG/DL (ref 0–1.2)
BILIRUB UR STRIP.AUTO-MCNC: NEGATIVE MG/DL
BUN SERPL-MCNC: 10 MG/DL (ref 6–23)
BZE UR QL SCN: ABNORMAL
CALCIUM SERPL-MCNC: 10 MG/DL (ref 8.6–10.3)
CANNABINOIDS UR QL SCN: ABNORMAL
CARDIAC TROPONIN I PNL SERPL HS: 60 NG/L (ref 0–13)
CARDIAC TROPONIN I PNL SERPL HS: 71 NG/L (ref 0–13)
CHLORIDE SERPL-SCNC: 105 MMOL/L (ref 98–107)
CO2 SERPL-SCNC: 22 MMOL/L (ref 21–32)
COLOR UR: ABNORMAL
CREAT SERPL-MCNC: 0.83 MG/DL (ref 0.5–1.05)
EGFRCR SERPLBLD CKD-EPI 2021: 89 ML/MIN/1.73M*2
EOSINOPHIL # BLD AUTO: 0.17 X10*3/UL (ref 0–0.7)
EOSINOPHIL NFR BLD AUTO: 1.7 %
ERYTHROCYTE [DISTWIDTH] IN BLOOD BY AUTOMATED COUNT: 13.9 % (ref 11.5–14.5)
FENTANYL+NORFENTANYL UR QL SCN: ABNORMAL
GLUCOSE SERPL-MCNC: 96 MG/DL (ref 74–99)
GLUCOSE UR STRIP.AUTO-MCNC: NORMAL MG/DL
HCG UR QL IA.RAPID: NEGATIVE
HCT VFR BLD AUTO: 41.3 % (ref 36–46)
HGB BLD-MCNC: 14.3 G/DL (ref 12–16)
HOLD SPECIMEN: NORMAL
HYALINE CASTS #/AREA URNS AUTO: ABNORMAL /LPF
IMM GRANULOCYTES # BLD AUTO: 0.02 X10*3/UL (ref 0–0.7)
IMM GRANULOCYTES NFR BLD AUTO: 0.2 % (ref 0–0.9)
KETONES UR STRIP.AUTO-MCNC: ABNORMAL MG/DL
LACTATE SERPL-SCNC: 1.6 MMOL/L (ref 0.4–2)
LEUKOCYTE ESTERASE UR QL STRIP.AUTO: ABNORMAL
LIPASE SERPL-CCNC: 23 U/L (ref 9–82)
LYMPHOCYTES # BLD AUTO: 1.55 X10*3/UL (ref 1.2–4.8)
LYMPHOCYTES NFR BLD AUTO: 15.8 %
MAGNESIUM SERPL-MCNC: 1.93 MG/DL (ref 1.6–2.4)
MCH RBC QN AUTO: 27.2 PG (ref 26–34)
MCHC RBC AUTO-ENTMCNC: 34.6 G/DL (ref 32–36)
MCV RBC AUTO: 79 FL (ref 80–100)
METHADONE UR QL SCN: ABNORMAL
MONOCYTES # BLD AUTO: 0.59 X10*3/UL (ref 0.1–1)
MONOCYTES NFR BLD AUTO: 6 %
MUCOUS THREADS #/AREA URNS AUTO: ABNORMAL /LPF
NEUTROPHILS # BLD AUTO: 7.43 X10*3/UL (ref 1.2–7.7)
NEUTROPHILS NFR BLD AUTO: 76.1 %
NITRITE UR QL STRIP.AUTO: ABNORMAL
NRBC BLD-RTO: 0 /100 WBCS (ref 0–0)
OPIATES UR QL SCN: ABNORMAL
OXYCODONE+OXYMORPHONE UR QL SCN: ABNORMAL
P AXIS: 65 DEGREES
P AXIS: 80 DEGREES
P OFFSET: 196 MS
P OFFSET: 198 MS
P ONSET: 142 MS
P ONSET: 150 MS
PCP UR QL SCN: ABNORMAL
PH UR STRIP.AUTO: 7 [PH]
PLATELET # BLD AUTO: 312 X10*3/UL (ref 150–450)
POTASSIUM SERPL-SCNC: 3.8 MMOL/L (ref 3.5–5.3)
PR INTERVAL: 146 MS
PR INTERVAL: 164 MS
PROT SERPL-MCNC: 8.3 G/DL (ref 6.4–8.2)
PROT UR STRIP.AUTO-MCNC: ABNORMAL MG/DL
Q ONSET: 223 MS
Q ONSET: 224 MS
QRS COUNT: 13 BEATS
QRS COUNT: 14 BEATS
QRS DURATION: 76 MS
QRS DURATION: 78 MS
QT INTERVAL: 382 MS
QT INTERVAL: 384 MS
QTC CALCULATION(BAZETT): 428 MS
QTC CALCULATION(BAZETT): 448 MS
QTC FREDERICIA: 413 MS
QTC FREDERICIA: 425 MS
R AXIS: 62 DEGREES
R AXIS: 63 DEGREES
RBC # BLD AUTO: 5.25 X10*6/UL (ref 4–5.2)
RBC # UR STRIP.AUTO: ABNORMAL MG/DL
RBC #/AREA URNS AUTO: >20 /HPF
SODIUM SERPL-SCNC: 137 MMOL/L (ref 136–145)
SP GR UR STRIP.AUTO: 1.01
SQUAMOUS #/AREA URNS AUTO: ABNORMAL /HPF
T AXIS: 58 DEGREES
T AXIS: 66 DEGREES
T OFFSET: 414 MS
T OFFSET: 416 MS
UROBILINOGEN UR STRIP.AUTO-MCNC: NORMAL MG/DL
VENTRICULAR RATE: 75 BPM
VENTRICULAR RATE: 83 BPM
WBC # BLD AUTO: 9.8 X10*3/UL (ref 4.4–11.3)
WBC #/AREA URNS AUTO: >50 /HPF

## 2025-05-18 PROCEDURE — 93005 ELECTROCARDIOGRAM TRACING: CPT

## 2025-05-18 PROCEDURE — 71046 X-RAY EXAM CHEST 2 VIEWS: CPT

## 2025-05-18 PROCEDURE — 81025 URINE PREGNANCY TEST: CPT | Performed by: STUDENT IN AN ORGANIZED HEALTH CARE EDUCATION/TRAINING PROGRAM

## 2025-05-18 PROCEDURE — 71046 X-RAY EXAM CHEST 2 VIEWS: CPT | Performed by: RADIOLOGY

## 2025-05-18 PROCEDURE — 2500000004 HC RX 250 GENERAL PHARMACY W/ HCPCS (ALT 636 FOR OP/ED): Mod: JZ | Performed by: PHYSICIAN ASSISTANT

## 2025-05-18 PROCEDURE — 71275 CT ANGIOGRAPHY CHEST: CPT | Performed by: RADIOLOGY

## 2025-05-18 PROCEDURE — 2500000005 HC RX 250 GENERAL PHARMACY W/O HCPCS: Performed by: PHYSICIAN ASSISTANT

## 2025-05-18 PROCEDURE — 84484 ASSAY OF TROPONIN QUANT: CPT | Performed by: PHYSICIAN ASSISTANT

## 2025-05-18 PROCEDURE — 2500000001 HC RX 250 WO HCPCS SELF ADMINISTERED DRUGS (ALT 637 FOR MEDICARE OP): Performed by: PHYSICIAN ASSISTANT

## 2025-05-18 PROCEDURE — 81001 URINALYSIS AUTO W/SCOPE: CPT | Performed by: STUDENT IN AN ORGANIZED HEALTH CARE EDUCATION/TRAINING PROGRAM

## 2025-05-18 PROCEDURE — 99285 EMERGENCY DEPT VISIT HI MDM: CPT | Mod: 25 | Performed by: STUDENT IN AN ORGANIZED HEALTH CARE EDUCATION/TRAINING PROGRAM

## 2025-05-18 PROCEDURE — 96375 TX/PRO/DX INJ NEW DRUG ADDON: CPT | Mod: 59

## 2025-05-18 PROCEDURE — 83735 ASSAY OF MAGNESIUM: CPT | Performed by: PHYSICIAN ASSISTANT

## 2025-05-18 PROCEDURE — 87086 URINE CULTURE/COLONY COUNT: CPT | Mod: ELYLAB | Performed by: STUDENT IN AN ORGANIZED HEALTH CARE EDUCATION/TRAINING PROGRAM

## 2025-05-18 PROCEDURE — 2550000001 HC RX 255 CONTRASTS: Performed by: STUDENT IN AN ORGANIZED HEALTH CARE EDUCATION/TRAINING PROGRAM

## 2025-05-18 PROCEDURE — 80307 DRUG TEST PRSMV CHEM ANLYZR: CPT | Performed by: PHYSICIAN ASSISTANT

## 2025-05-18 PROCEDURE — 83690 ASSAY OF LIPASE: CPT | Performed by: PHYSICIAN ASSISTANT

## 2025-05-18 PROCEDURE — 96376 TX/PRO/DX INJ SAME DRUG ADON: CPT | Mod: 59

## 2025-05-18 PROCEDURE — 36415 COLL VENOUS BLD VENIPUNCTURE: CPT | Performed by: PHYSICIAN ASSISTANT

## 2025-05-18 PROCEDURE — 85025 COMPLETE CBC W/AUTO DIFF WBC: CPT | Performed by: PHYSICIAN ASSISTANT

## 2025-05-18 PROCEDURE — 96372 THER/PROPH/DIAG INJ SC/IM: CPT | Performed by: PHYSICIAN ASSISTANT

## 2025-05-18 PROCEDURE — 71275 CT ANGIOGRAPHY CHEST: CPT

## 2025-05-18 PROCEDURE — 96374 THER/PROPH/DIAG INJ IV PUSH: CPT | Mod: 59

## 2025-05-18 PROCEDURE — 80053 COMPREHEN METABOLIC PANEL: CPT | Performed by: PHYSICIAN ASSISTANT

## 2025-05-18 PROCEDURE — 83605 ASSAY OF LACTIC ACID: CPT | Performed by: PHYSICIAN ASSISTANT

## 2025-05-18 PROCEDURE — 96361 HYDRATE IV INFUSION ADD-ON: CPT

## 2025-05-18 PROCEDURE — 2500000005 HC RX 250 GENERAL PHARMACY W/O HCPCS: Performed by: STUDENT IN AN ORGANIZED HEALTH CARE EDUCATION/TRAINING PROGRAM

## 2025-05-18 PROCEDURE — 74174 CTA ABD&PLVS W/CONTRAST: CPT | Performed by: RADIOLOGY

## 2025-05-18 PROCEDURE — 2500000001 HC RX 250 WO HCPCS SELF ADMINISTERED DRUGS (ALT 637 FOR MEDICARE OP): Performed by: STUDENT IN AN ORGANIZED HEALTH CARE EDUCATION/TRAINING PROGRAM

## 2025-05-18 PROCEDURE — 2500000004 HC RX 250 GENERAL PHARMACY W/ HCPCS (ALT 636 FOR OP/ED): Mod: JZ | Performed by: STUDENT IN AN ORGANIZED HEALTH CARE EDUCATION/TRAINING PROGRAM

## 2025-05-18 RX ORDER — CEPHALEXIN 500 MG/1
500 CAPSULE ORAL 2 TIMES DAILY
Qty: 14 CAPSULE | Refills: 0 | Status: SHIPPED | OUTPATIENT
Start: 2025-05-18 | End: 2025-05-25

## 2025-05-18 RX ORDER — FAMOTIDINE 20 MG/1
20 TABLET, FILM COATED ORAL 2 TIMES DAILY PRN
Qty: 30 TABLET | Refills: 0 | Status: SHIPPED | OUTPATIENT
Start: 2025-05-18

## 2025-05-18 RX ORDER — KETOROLAC TROMETHAMINE 30 MG/ML
15 INJECTION, SOLUTION INTRAMUSCULAR; INTRAVENOUS ONCE
Status: COMPLETED | OUTPATIENT
Start: 2025-05-18 | End: 2025-05-18

## 2025-05-18 RX ORDER — ONDANSETRON HYDROCHLORIDE 2 MG/ML
4 INJECTION, SOLUTION INTRAVENOUS ONCE
Status: COMPLETED | OUTPATIENT
Start: 2025-05-18 | End: 2025-05-18

## 2025-05-18 RX ORDER — ALUMINUM HYDROXIDE, MAGNESIUM HYDROXIDE, AND SIMETHICONE 1200; 120; 1200 MG/30ML; MG/30ML; MG/30ML
30 SUSPENSION ORAL ONCE
Status: COMPLETED | OUTPATIENT
Start: 2025-05-18 | End: 2025-05-18

## 2025-05-18 RX ORDER — NITROGLYCERIN 0.4 MG/1
0.4 TABLET SUBLINGUAL ONCE
Status: COMPLETED | OUTPATIENT
Start: 2025-05-18 | End: 2025-05-18

## 2025-05-18 RX ORDER — LORAZEPAM 2 MG/ML
0.5 INJECTION INTRAMUSCULAR ONCE
Status: COMPLETED | OUTPATIENT
Start: 2025-05-18 | End: 2025-05-18

## 2025-05-18 RX ORDER — LIDOCAINE HYDROCHLORIDE 20 MG/ML
15 SOLUTION OROPHARYNGEAL ONCE
Status: COMPLETED | OUTPATIENT
Start: 2025-05-18 | End: 2025-05-18

## 2025-05-18 RX ORDER — PANTOPRAZOLE SODIUM 40 MG/10ML
40 INJECTION, POWDER, LYOPHILIZED, FOR SOLUTION INTRAVENOUS ONCE
Status: COMPLETED | OUTPATIENT
Start: 2025-05-18 | End: 2025-05-18

## 2025-05-18 RX ORDER — LIDOCAINE HYDROCHLORIDE 20 MG/ML
1.25 SOLUTION OROPHARYNGEAL ONCE
Status: COMPLETED | OUTPATIENT
Start: 2025-05-18 | End: 2025-05-18

## 2025-05-18 RX ORDER — DICYCLOMINE HYDROCHLORIDE 10 MG/ML
20 INJECTION INTRAMUSCULAR ONCE
Status: COMPLETED | OUTPATIENT
Start: 2025-05-18 | End: 2025-05-18

## 2025-05-18 RX ORDER — ONDANSETRON 4 MG/1
4 TABLET, ORALLY DISINTEGRATING ORAL EVERY 8 HOURS PRN
Qty: 15 TABLET | Refills: 0 | Status: SHIPPED | OUTPATIENT
Start: 2025-05-18

## 2025-05-18 RX ORDER — LORAZEPAM 1 MG/1
1 TABLET ORAL ONCE
Status: DISCONTINUED | OUTPATIENT
Start: 2025-05-18 | End: 2025-05-18

## 2025-05-18 RX ORDER — LORAZEPAM 2 MG/ML
0.5 INJECTION INTRAMUSCULAR ONCE
Status: DISCONTINUED | OUTPATIENT
Start: 2025-05-18 | End: 2025-05-18

## 2025-05-18 RX ORDER — NAPROXEN SODIUM 220 MG/1
324 TABLET, FILM COATED ORAL ONCE
Status: COMPLETED | OUTPATIENT
Start: 2025-05-18 | End: 2025-05-18

## 2025-05-18 RX ORDER — FAMOTIDINE 10 MG/ML
20 INJECTION, SOLUTION INTRAVENOUS ONCE
Status: COMPLETED | OUTPATIENT
Start: 2025-05-18 | End: 2025-05-18

## 2025-05-18 RX ADMIN — ONDANSETRON 4 MG: 2 INJECTION INTRAMUSCULAR; INTRAVENOUS at 10:26

## 2025-05-18 RX ADMIN — LIDOCAINE HYDROCHLORIDE 1.25 ML: 20 SOLUTION ORAL at 10:26

## 2025-05-18 RX ADMIN — PANTOPRAZOLE SODIUM 40 MG: 40 INJECTION, POWDER, FOR SOLUTION INTRAVENOUS at 12:41

## 2025-05-18 RX ADMIN — NITROGLYCERIN 0.4 MG: 0.4 TABLET SUBLINGUAL at 11:24

## 2025-05-18 RX ADMIN — ALUMINUM HYDROXIDE, MAGNESIUM HYDROXIDE, AND SIMETHICONE 30 ML: 200; 200; 20 SUSPENSION ORAL at 00:26

## 2025-05-18 RX ADMIN — FAMOTIDINE 20 MG: 10 INJECTION, SOLUTION INTRAVENOUS at 10:26

## 2025-05-18 RX ADMIN — SODIUM CHLORIDE, SODIUM LACTATE, POTASSIUM CHLORIDE, AND CALCIUM CHLORIDE 1000 ML: .6; .31; .03; .02 INJECTION, SOLUTION INTRAVENOUS at 10:25

## 2025-05-18 RX ADMIN — ASPIRIN 324 MG: 81 TABLET, CHEWABLE ORAL at 11:23

## 2025-05-18 RX ADMIN — IOHEXOL 100 ML: 350 INJECTION, SOLUTION INTRAVENOUS at 14:12

## 2025-05-18 RX ADMIN — ONDANSETRON 4 MG: 2 INJECTION INTRAMUSCULAR; INTRAVENOUS at 15:51

## 2025-05-18 RX ADMIN — ALUMINUM HYDROXIDE, MAGNESIUM HYDROXIDE, AND SIMETHICONE 30 ML: 200; 200; 20 SUSPENSION ORAL at 10:26

## 2025-05-18 RX ADMIN — KETOROLAC TROMETHAMINE 15 MG: 30 INJECTION, SOLUTION INTRAMUSCULAR at 10:26

## 2025-05-18 RX ADMIN — LIDOCAINE HYDROCHLORIDE 15 ML: 20 SOLUTION ORAL at 00:26

## 2025-05-18 RX ADMIN — DICYCLOMINE HYDROCHLORIDE 20 MG: 10 INJECTION, SOLUTION INTRAMUSCULAR at 15:52

## 2025-05-18 RX ADMIN — LORAZEPAM 0.5 MG: 2 INJECTION INTRAMUSCULAR; INTRAVENOUS at 11:22

## 2025-05-18 ASSESSMENT — PAIN SCALES - GENERAL
PAINLEVEL_OUTOF10: 1
PAINLEVEL_OUTOF10: 0 - NO PAIN
PAINLEVEL_OUTOF10: 10 - WORST POSSIBLE PAIN
PAINLEVEL_OUTOF10: 0 - NO PAIN

## 2025-05-18 ASSESSMENT — PAIN DESCRIPTION - PROGRESSION
CLINICAL_PROGRESSION: NOT CHANGED
CLINICAL_PROGRESSION: RESOLVED

## 2025-05-18 ASSESSMENT — PAIN - FUNCTIONAL ASSESSMENT
PAIN_FUNCTIONAL_ASSESSMENT: 0-10

## 2025-05-18 ASSESSMENT — PAIN DESCRIPTION - DESCRIPTORS: DESCRIPTORS: SHARP

## 2025-05-18 ASSESSMENT — PAIN DESCRIPTION - LOCATION
LOCATION: ABDOMEN
LOCATION: ABDOMEN

## 2025-05-18 ASSESSMENT — PAIN DESCRIPTION - PAIN TYPE
TYPE: ACUTE PAIN
TYPE: ACUTE PAIN

## 2025-05-18 ASSESSMENT — PAIN DESCRIPTION - ORIENTATION: ORIENTATION: MID

## 2025-05-18 NOTE — ED PROVIDER NOTES
HPI   Chief Complaint   Patient presents with    Abdominal Pain       This is a 45-year-old female with PMH GERD, gastritis presenting for evaluation of epigastric burning/sharp nonradiating abdominal pain.  Was seen here last night for same and was medicated and was discharged home.  She woke up in the melan[place] with symptoms again.  They feel the same as what brought her here last night.  Unable to tolerate p.o. and reports attempts to drink at home resulted in vomiting.  She took omeprazole this morning without alleviation.  Denies fevers, chills, chest pain, shortness of breath, melena, hematochezia, lower abdominal pain, vaginal symptoms.    Attending attestation:  I saw the patient and agree with the workup and plan by the PA.    Patient is a 45-year-old female with above-stated past medical history presents for abdominal pain.  On my exam, patient is resting in her chair, no acute distress.  Heart is regular rate and rhythm with no murmurs rubs or gallops, lungs are clear to auscultation bilaterally.  Abdomen is tender in the epigastric region, she is clinically well-appearing and nontoxic.  Patient's EKGs are nonischemic, troponins are mildly elevated, downtrending, similar to her previous values.  CMP is grossly unremarkable.  No elevations in her LFTs to suggest biliary pathology.  Lipase is not consistent pancreatitis.  Patient has no significant electrolyte derangements.  CBC shows no leukocytosis or anemia.  Low suspicion for ectopic pregnancy given the location of the patient's pain and multiple similar presentations in the past.  Chest x-ray did not show signs of pneumonia or pneumothorax.  CT angio did not show signs of acute process besides gastritis patient was given symptomatic treatments.  Given her troponin elevation and no previous cardiology evaluation besides an echo last year, her case discussed with cardiology who did not feel she required admission for further evaluation of her moderately  elevated troponins.  Patient was discharged with Pepcid, Zofran and instructions to follow-up with GI.  She was given return precautions.  Patient was amenable to the plan.  She was discharged home in stable condition.    Disclaimer: This note was dictated using speech recognition software. Minor errors in transcription may be present. Please call if questions.     Cal Turner MD  [facility]  Contact on [employer]          History provided by:  Patient   used: No            Patient History   Medical History[1]  Surgical History[2]  Family History[3]  Social History[4]    Physical Exam   ED Triage Vitals   Temperature Heart Rate Respirations BP   05/18/25 0939 05/18/25 0939 05/18/25 0939 05/18/25 0939   36.7 °C (98.1 °F) 91 16 137/90      Pulse Ox Temp src Heart Rate Source Patient Position   05/18/25 0939 -- 05/18/25 1129 --   98 %  Monitor       BP Location FiO2 (%)     -- --             Physical Exam    General: Vitals noted. Afebrile.  Tearful.  Appears uncomfortable.  Is requesting something for anxiety.  EENT: Sclerae anicteric  Cardiac: Regular rate and rhythm. No murmur  Pulmonary: Lungs clear bilaterally with good aeration  Abdomen: Soft.  TTP epigastrium. No rebound. No guarding  : No CVA tenderness. exam deferred  Extremities: BETANCOURT normally  Skin: No rash on abdomen  Neuro: Alert and oriented    ED Course & MDM   Diagnoses as of 05/18/25 1823   Gastritis without bleeding, unspecified chronicity, unspecified gastritis type                 No data recorded     Mercer Coma Scale Score: 15 (05/18/25 1016 : Minerva Gerber RN)                           Medical Decision Making  DDx: Gastritis, duodenitis, GERD, esophagitis, pancreatitis, cardiac etiology    EKG interpreted by me: Normal sinus rhythm.  Sinus arrhythmia.  Rate 83.  QTc 448.  Normal axis.  No acute T wave changes.  No STEMI.  EKG interpreted by me: Normal sinus rhythm.  Sinus arrhythmia.  Rate 75.  QTc 428.   Normal axis.  No acute T wave changes.  No STEMI.    Patient is tearful.  Anxious appearing.  Appears uncomfortable.  Tender to palpation of the epigastrium.  No rebound.  No guarding.  Is requesting something for anxiety.  Patient's troponin found to be 71.  On review of the EMR patient has chronically elevated troponin usually in the 60s.  Had echo last year with an EF that was normal without any other acute findings at that time.  I can find no evidence of prior cardiac cath.  She does endorse marijuana use.  Her white count is 9.8.  Her H&H is stable.  Her lactic is negative.  The remainder of her laboratory evaluation is reassuring.  Her delta troponin is 60.  Was given IV Zofran 4 mg IV Pepcid 20 mg IV LR 1 L sublingual nitro 0.4 mg, 324 mg chewable aspirin, IV lorazepam 0.5 mg, IV Toradol 15 mg, viscous lidocaine 1.25 mL oral.  Additionally IM Bentyl 20 mg further IV Zofran 4 mg IV Protonix 40 mg.  Given her troponin elevation and the fact that she was just bouncing back after being seen last night a CT angio chest on pelvis was obtained to rule out any vascular pathology and CT read no acute chest process or thoracoabdominal aortic aneurysm or dissection probable gastric fold thickening which can be seen with gastritis and prominent subcu muscle fat in the descending colon similar to prior.  I discussed the elevated troponins with cardiology Dr. Saunders who advised she has chronic troponin elevation so very low likelihood that this is related to cardiac etiology and does not require further follow-up as an outpatient from a cardiology standpoint.  On reevaluation I discussed with the patient the need to follow-up with GI.  She is agreeable plan of being discharged with Pepcid and Zofran for pain control.  Instructed to return to the nearest ED if any concerns or new or worsening symptoms. Patient verbalized understanding and agreement with plan. Discharged in stable condition.  This visit was staffed with  the attending physician Dr. Turner.      Disclaimer: This note was dictated using speech recognition software. An attempt at proofreading was made to minimize errors. Minor errors in transcription may be present.    Amount and/or Complexity of Data Reviewed  Labs: ordered.  Radiology: ordered.  ECG/medicine tests: ordered and independent interpretation performed.        Procedure  Procedures       Jacob Montoya PA-C  25 1828         [1]   Past Medical History:  Diagnosis Date    Acid reflux     Gastritis    [2]   Past Surgical History:  Procedure Laterality Date     SECTION, CLASSIC  1999     SECTION, CLASSIC  10/21/2003   [3]   Family History  Problem Relation Name Age of Onset    Hypertension Mother     [4]   Social History  Tobacco Use    Smoking status: Never     Passive exposure: Never    Smokeless tobacco: Never   Vaping Use    Vaping status: Never Used   Substance Use Topics    Alcohol use: Never    Drug use: Yes     Types: Marijuana     Comment: daily        Cal Turner MD  25 2960

## 2025-05-18 NOTE — ED PROVIDER NOTES
HPI   Chief Complaint   Patient presents with    Abdominal Pain     Vomiting and abdominal pain starting today. Hx of gastritis, and acid reflux        Patient is a 45-year-old female with past medical history of GERD and gastritis who presents the ED for upper abdominal pain nausea and vomiting.  Patient states that for started this morning it has been constant throughout the day.  She states she has gotten the point where she is unable to keep down any food or liquids.  The pain is in her upper abdomen and does not radiate.  She denies any fever or chills.  She denies any change in bowel movements.  She denies any urinary symptoms.              Patient History   Medical History[1]  Surgical History[2]  Family History[3]  Social History[4]    Physical Exam   ED Triage Vitals [05/17/25 2314]   Temperature Heart Rate Respirations BP   37 °C (98.6 °F) 97 17 (!) 134/97      Pulse Ox Temp Source Heart Rate Source Patient Position   99 % Temporal -- Sitting      BP Location FiO2 (%)     Right arm --       Physical Exam  Vitals and nursing note reviewed.   Constitutional:       Appearance: She is well-developed. She is not toxic-appearing.   Cardiovascular:      Rate and Rhythm: Normal rate and regular rhythm.      Heart sounds: Normal heart sounds.   Pulmonary:      Effort: Pulmonary effort is normal. No respiratory distress.      Breath sounds: Normal breath sounds. No wheezing.   Abdominal:      General: Abdomen is flat. There is no distension.      Palpations: Abdomen is soft.      Tenderness: There is abdominal tenderness in the epigastric area. There is no right CVA tenderness or left CVA tenderness.   Skin:     General: Skin is warm and dry.   Neurological:      Mental Status: She is alert.           ED Course & MDM   Diagnoses as of 05/18/25 0130   Epigastric pain   Acute cystitis with hematuria                 No data recorded     Eric Coma Scale Score: 15 (05/17/25 2314 : Sara Gayle RN)                            Medical Decision Making  On arrival to the ED the patient is nontoxic.  No distress.  Her pain is epigastric.  She has a history of gastritis and previous presentations.  She had a CT done approximately 1 month ago which showed no acute process.  Will start with blood work and urinalysis.  Patient was given fluids as well as Zofran and Toradol.    Work is overall reassuring.  Patient was given a GI cocktail.    Urine does show some signs of potential UTI so we will write her prescription for oral Keflex.    On reexamination she is feeling much better.  We discussed the plan of discharge.  We discussed the need for follow-up with her primary care physician previous fritz return precautions.  Patient was discharged in stable condition.        Procedure  Procedures       [1]   Past Medical History:  Diagnosis Date    Acid reflux     Gastritis    [2]   Past Surgical History:  Procedure Laterality Date     SECTION, CLASSIC  1999     SECTION, CLASSIC  10/21/2003   [3]   Family History  Problem Relation Name Age of Onset    Hypertension Mother     [4]   Social History  Tobacco Use    Smoking status: Never     Passive exposure: Never    Smokeless tobacco: Never   Vaping Use    Vaping status: Never Used   Substance Use Topics    Alcohol use: Never    Drug use: Yes     Types: Marijuana     Comment: daily        Angel Ponce DO  25 0131

## 2025-05-20 LAB — BACTERIA UR CULT: ABNORMAL

## 2025-05-21 ENCOUNTER — TELEPHONE (OUTPATIENT)
Dept: PHARMACY | Facility: HOSPITAL | Age: 45
End: 2025-05-21
Payer: COMMERCIAL

## 2025-05-21 NOTE — PROGRESS NOTES
EDPD Note: Antibiotics Reviewed and Warranted    Contacted Mr./Mrs./Ms. Neela Elaine regarding a positive urine culture/result that was taken during their recent emergency room visit. I completed education with patient . The patient is being treated appropriately with Keflex 500mg BID x7.     Spoke with patient via  #[phone]  Patient presented to the ED for abdominal pain, vomiting. PMH gastritis & GERD. Denied urinary symptoms. At time of call, patient reports improvement in symptoms, and is doing well. She followed up with her PCP yesterday, and has an additional follow up appointment in 1 month. Advised patient to finish full course of antibiotic and follow up with PCP or urgent care if symptoms do not completely resolve.      Susceptibility data from last 90 days.  Collected Specimen Info Organism Amoxicillin/Clavulanate Ampicillin Ampicillin/Sulbactam Cefazolin Cefazolin (uncomplicated UTIs only) Ceftriaxone Ciprofloxacin Gentamicin Levofloxacin Nitrofurantoin Piperacillin/Tazobactam   05/18/25 Urine from Clean Catch/Voided Escherichia coli  S  R  S  S  S   S  S  S  S  S   04/15/25 Urine from Clean Catch/Voided Escherichia coli  S  R  R  I  S  S  S  S   S  S     Collected Specimen Info Organism Trimethoprim/Sulfamethoxazole   05/18/25 Urine from Clean Catch/Voided Escherichia coli  S   04/15/25 Urine from Clean Catch/Voided Escherichia coli  S        No further follow up needed from EDPD Team.     Crista Valladares, PharmD

## 2025-05-30 ENCOUNTER — HOSPITAL ENCOUNTER (EMERGENCY)
Facility: HOSPITAL | Age: 45
Discharge: HOME | End: 2025-05-31
Payer: COMMERCIAL

## 2025-05-30 DIAGNOSIS — G89.29 ABDOMINAL PAIN, CHRONIC, EPIGASTRIC: Primary | ICD-10-CM

## 2025-05-30 DIAGNOSIS — K29.50 CHRONIC GASTRITIS WITHOUT BLEEDING, UNSPECIFIED GASTRITIS TYPE: ICD-10-CM

## 2025-05-30 DIAGNOSIS — R10.13 ABDOMINAL PAIN, CHRONIC, EPIGASTRIC: Primary | ICD-10-CM

## 2025-05-30 PROCEDURE — 93005 ELECTROCARDIOGRAM TRACING: CPT

## 2025-05-30 PROCEDURE — 99284 EMERGENCY DEPT VISIT MOD MDM: CPT

## 2025-05-30 RX ORDER — FAMOTIDINE 10 MG/ML
20 INJECTION, SOLUTION INTRAVENOUS ONCE
Status: COMPLETED | OUTPATIENT
Start: 2025-05-30 | End: 2025-05-31

## 2025-05-30 RX ORDER — ALUMINUM HYDROXIDE, MAGNESIUM HYDROXIDE, AND SIMETHICONE 1200; 120; 1200 MG/30ML; MG/30ML; MG/30ML
30 SUSPENSION ORAL ONCE
Status: COMPLETED | OUTPATIENT
Start: 2025-05-30 | End: 2025-05-31

## 2025-05-30 RX ORDER — LIDOCAINE HYDROCHLORIDE 20 MG/ML
10 SOLUTION OROPHARYNGEAL ONCE
Status: COMPLETED | OUTPATIENT
Start: 2025-05-30 | End: 2025-05-31

## 2025-05-30 RX ORDER — FENTANYL CITRATE 50 UG/ML
25 INJECTION, SOLUTION INTRAMUSCULAR; INTRAVENOUS ONCE
Status: COMPLETED | OUTPATIENT
Start: 2025-05-30 | End: 2025-05-31

## 2025-05-30 RX ORDER — SUCRALFATE 1 G/1
1 TABLET ORAL ONCE
Status: COMPLETED | OUTPATIENT
Start: 2025-05-30 | End: 2025-05-31

## 2025-05-30 RX ORDER — ONDANSETRON HYDROCHLORIDE 2 MG/ML
4 INJECTION, SOLUTION INTRAVENOUS ONCE
Status: COMPLETED | OUTPATIENT
Start: 2025-05-30 | End: 2025-05-31

## 2025-05-30 RX ORDER — KETOROLAC TROMETHAMINE 30 MG/ML
15 INJECTION, SOLUTION INTRAMUSCULAR; INTRAVENOUS ONCE
Status: COMPLETED | OUTPATIENT
Start: 2025-05-30 | End: 2025-05-31

## 2025-05-30 ASSESSMENT — PAIN DESCRIPTION - ORIENTATION: ORIENTATION: MID;UPPER

## 2025-05-30 ASSESSMENT — COLUMBIA-SUICIDE SEVERITY RATING SCALE - C-SSRS
2. HAVE YOU ACTUALLY HAD ANY THOUGHTS OF KILLING YOURSELF?: NO
1. IN THE PAST MONTH, HAVE YOU WISHED YOU WERE DEAD OR WISHED YOU COULD GO TO SLEEP AND NOT WAKE UP?: NO
6. HAVE YOU EVER DONE ANYTHING, STARTED TO DO ANYTHING, OR PREPARED TO DO ANYTHING TO END YOUR LIFE?: NO

## 2025-05-30 ASSESSMENT — PAIN DESCRIPTION - PAIN TYPE: TYPE: ACUTE PAIN

## 2025-05-30 ASSESSMENT — PAIN DESCRIPTION - LOCATION: LOCATION: ABDOMEN

## 2025-05-30 ASSESSMENT — PAIN DESCRIPTION - FREQUENCY: FREQUENCY: CONSTANT/CONTINUOUS

## 2025-05-30 ASSESSMENT — PAIN - FUNCTIONAL ASSESSMENT: PAIN_FUNCTIONAL_ASSESSMENT: 0-10

## 2025-05-30 ASSESSMENT — PAIN DESCRIPTION - DESCRIPTORS: DESCRIPTORS: SORE

## 2025-05-30 ASSESSMENT — PAIN SCALES - GENERAL: PAINLEVEL_OUTOF10: 10 - WORST POSSIBLE PAIN

## 2025-05-31 ENCOUNTER — APPOINTMENT (OUTPATIENT)
Dept: CARDIOLOGY | Facility: HOSPITAL | Age: 45
End: 2025-05-31
Payer: COMMERCIAL

## 2025-05-31 ENCOUNTER — HOSPITAL ENCOUNTER (EMERGENCY)
Facility: HOSPITAL | Age: 45
Discharge: HOME | End: 2025-05-31
Payer: COMMERCIAL

## 2025-05-31 VITALS
BODY MASS INDEX: 22.2 KG/M2 | HEART RATE: 90 BPM | RESPIRATION RATE: 18 BRPM | TEMPERATURE: 98.8 F | WEIGHT: 130 LBS | DIASTOLIC BLOOD PRESSURE: 79 MMHG | OXYGEN SATURATION: 99 % | HEIGHT: 64 IN | SYSTOLIC BLOOD PRESSURE: 134 MMHG

## 2025-05-31 VITALS
RESPIRATION RATE: 17 BRPM | TEMPERATURE: 97.7 F | OXYGEN SATURATION: 99 % | DIASTOLIC BLOOD PRESSURE: 87 MMHG | HEIGHT: 64 IN | BODY MASS INDEX: 22.2 KG/M2 | HEART RATE: 76 BPM | WEIGHT: 130 LBS | SYSTOLIC BLOOD PRESSURE: 124 MMHG

## 2025-05-31 DIAGNOSIS — R11.2 NAUSEA AND VOMITING, UNSPECIFIED VOMITING TYPE: ICD-10-CM

## 2025-05-31 DIAGNOSIS — F41.9 ANXIETY: Primary | ICD-10-CM

## 2025-05-31 LAB
ALBUMIN SERPL BCP-MCNC: 4.4 G/DL (ref 3.4–5)
ALBUMIN SERPL BCP-MCNC: 4.6 G/DL (ref 3.4–5)
ALP SERPL-CCNC: 72 U/L (ref 33–110)
ALP SERPL-CCNC: 74 U/L (ref 33–110)
ALT SERPL W P-5'-P-CCNC: 10 U/L (ref 7–45)
ALT SERPL W P-5'-P-CCNC: 11 U/L (ref 7–45)
ANION GAP SERPL CALC-SCNC: 13 MMOL/L (ref 10–20)
ANION GAP SERPL CALC-SCNC: 15 MMOL/L (ref 10–20)
APPEARANCE UR: CLEAR
AST SERPL W P-5'-P-CCNC: 12 U/L (ref 9–39)
AST SERPL W P-5'-P-CCNC: 14 U/L (ref 9–39)
B-HCG SERPL-ACNC: <2 MIU/ML
BASOPHILS # BLD AUTO: 0.03 X10*3/UL (ref 0–0.1)
BASOPHILS # BLD AUTO: 0.03 X10*3/UL (ref 0–0.1)
BASOPHILS NFR BLD AUTO: 0.3 %
BASOPHILS NFR BLD AUTO: 0.4 %
BILIRUB DIRECT SERPL-MCNC: 0 MG/DL (ref 0–0.3)
BILIRUB DIRECT SERPL-MCNC: 0.1 MG/DL (ref 0–0.3)
BILIRUB SERPL-MCNC: 0.8 MG/DL (ref 0–1.2)
BILIRUB SERPL-MCNC: 0.9 MG/DL (ref 0–1.2)
BILIRUB UR STRIP.AUTO-MCNC: NEGATIVE MG/DL
BUN SERPL-MCNC: 12 MG/DL (ref 6–23)
BUN SERPL-MCNC: 9 MG/DL (ref 6–23)
CALCIUM SERPL-MCNC: 10.1 MG/DL (ref 8.6–10.3)
CALCIUM SERPL-MCNC: 9.6 MG/DL (ref 8.6–10.3)
CARDIAC TROPONIN I PNL SERPL HS: 55 NG/L (ref 0–13)
CARDIAC TROPONIN I PNL SERPL HS: 56 NG/L (ref 0–13)
CHLORIDE SERPL-SCNC: 105 MMOL/L (ref 98–107)
CHLORIDE SERPL-SCNC: 106 MMOL/L (ref 98–107)
CO2 SERPL-SCNC: 21 MMOL/L (ref 21–32)
CO2 SERPL-SCNC: 26 MMOL/L (ref 21–32)
COLOR UR: ABNORMAL
CREAT SERPL-MCNC: 0.71 MG/DL (ref 0.5–1.05)
CREAT SERPL-MCNC: 0.86 MG/DL (ref 0.5–1.05)
EGFRCR SERPLBLD CKD-EPI 2021: 86 ML/MIN/1.73M*2
EGFRCR SERPLBLD CKD-EPI 2021: >90 ML/MIN/1.73M*2
EOSINOPHIL # BLD AUTO: 0.2 X10*3/UL (ref 0–0.7)
EOSINOPHIL # BLD AUTO: 0.24 X10*3/UL (ref 0–0.7)
EOSINOPHIL NFR BLD AUTO: 2.2 %
EOSINOPHIL NFR BLD AUTO: 2.5 %
ERYTHROCYTE [DISTWIDTH] IN BLOOD BY AUTOMATED COUNT: 14 % (ref 11.5–14.5)
ERYTHROCYTE [DISTWIDTH] IN BLOOD BY AUTOMATED COUNT: 14 % (ref 11.5–14.5)
GLUCOSE SERPL-MCNC: 94 MG/DL (ref 74–99)
GLUCOSE SERPL-MCNC: 96 MG/DL (ref 74–99)
GLUCOSE UR STRIP.AUTO-MCNC: NORMAL MG/DL
HCT VFR BLD AUTO: 39.5 % (ref 36–46)
HCT VFR BLD AUTO: 40.9 % (ref 36–46)
HGB BLD-MCNC: 13.7 G/DL (ref 12–16)
HGB BLD-MCNC: 14.6 G/DL (ref 12–16)
HOLD SPECIMEN: NORMAL
IMM GRANULOCYTES # BLD AUTO: 0.01 X10*3/UL (ref 0–0.7)
IMM GRANULOCYTES # BLD AUTO: 0.02 X10*3/UL (ref 0–0.7)
IMM GRANULOCYTES NFR BLD AUTO: 0.1 % (ref 0–0.9)
IMM GRANULOCYTES NFR BLD AUTO: 0.2 % (ref 0–0.9)
INR PPP: 1 (ref 0.9–1.1)
KETONES UR STRIP.AUTO-MCNC: ABNORMAL MG/DL
LACTATE SERPL-SCNC: 1.3 MMOL/L (ref 0.4–2)
LACTATE SERPL-SCNC: 1.6 MMOL/L (ref 0.4–2)
LEUKOCYTE ESTERASE UR QL STRIP.AUTO: NEGATIVE
LIPASE SERPL-CCNC: 18 U/L (ref 9–82)
LIPASE SERPL-CCNC: 19 U/L (ref 9–82)
LYMPHOCYTES # BLD AUTO: 1.87 X10*3/UL (ref 1.2–4.8)
LYMPHOCYTES # BLD AUTO: 1.98 X10*3/UL (ref 1.2–4.8)
LYMPHOCYTES NFR BLD AUTO: 17.2 %
LYMPHOCYTES NFR BLD AUTO: 25.2 %
MAGNESIUM SERPL-MCNC: 1.77 MG/DL (ref 1.6–2.4)
MCH RBC QN AUTO: 27 PG (ref 26–34)
MCH RBC QN AUTO: 27.3 PG (ref 26–34)
MCHC RBC AUTO-ENTMCNC: 34.7 G/DL (ref 32–36)
MCHC RBC AUTO-ENTMCNC: 35.7 G/DL (ref 32–36)
MCV RBC AUTO: 76 FL (ref 80–100)
MCV RBC AUTO: 78 FL (ref 80–100)
MONOCYTES # BLD AUTO: 0.61 X10*3/UL (ref 0.1–1)
MONOCYTES # BLD AUTO: 0.76 X10*3/UL (ref 0.1–1)
MONOCYTES NFR BLD AUTO: 7 %
MONOCYTES NFR BLD AUTO: 7.8 %
NEUTROPHILS # BLD AUTO: 5.02 X10*3/UL (ref 1.2–7.7)
NEUTROPHILS # BLD AUTO: 7.93 X10*3/UL (ref 1.2–7.7)
NEUTROPHILS NFR BLD AUTO: 64 %
NEUTROPHILS NFR BLD AUTO: 73.1 %
NITRITE UR QL STRIP.AUTO: NEGATIVE
NRBC BLD-RTO: 0 /100 WBCS (ref 0–0)
NRBC BLD-RTO: 0 /100 WBCS (ref 0–0)
PH UR STRIP.AUTO: 8.5 [PH]
PLATELET # BLD AUTO: 332 X10*3/UL (ref 150–450)
PLATELET # BLD AUTO: 377 X10*3/UL (ref 150–450)
POTASSIUM SERPL-SCNC: 3.3 MMOL/L (ref 3.5–5.3)
POTASSIUM SERPL-SCNC: 3.8 MMOL/L (ref 3.5–5.3)
PROT SERPL-MCNC: 8 G/DL (ref 6.4–8.2)
PROT SERPL-MCNC: 8.4 G/DL (ref 6.4–8.2)
PROT UR STRIP.AUTO-MCNC: ABNORMAL MG/DL
PROTHROMBIN TIME: 11.1 SECONDS (ref 9.8–12.4)
RBC # BLD AUTO: 5.08 X10*6/UL (ref 4–5.2)
RBC # BLD AUTO: 5.35 X10*6/UL (ref 4–5.2)
RBC # UR STRIP.AUTO: NEGATIVE MG/DL
RBC #/AREA URNS AUTO: NORMAL /HPF
SODIUM SERPL-SCNC: 139 MMOL/L (ref 136–145)
SODIUM SERPL-SCNC: 140 MMOL/L (ref 136–145)
SP GR UR STRIP.AUTO: 1.01
SQUAMOUS #/AREA URNS AUTO: NORMAL /HPF
UROBILINOGEN UR STRIP.AUTO-MCNC: ABNORMAL MG/DL
WBC # BLD AUTO: 10.9 X10*3/UL (ref 4.4–11.3)
WBC # BLD AUTO: 7.9 X10*3/UL (ref 4.4–11.3)
WBC #/AREA URNS AUTO: NORMAL /HPF

## 2025-05-31 PROCEDURE — 85025 COMPLETE CBC W/AUTO DIFF WBC: CPT | Performed by: NURSE PRACTITIONER

## 2025-05-31 PROCEDURE — 96375 TX/PRO/DX INJ NEW DRUG ADDON: CPT

## 2025-05-31 PROCEDURE — 2500000001 HC RX 250 WO HCPCS SELF ADMINISTERED DRUGS (ALT 637 FOR MEDICARE OP): Performed by: NURSE PRACTITIONER

## 2025-05-31 PROCEDURE — 83690 ASSAY OF LIPASE: CPT | Performed by: NURSE PRACTITIONER

## 2025-05-31 PROCEDURE — 96361 HYDRATE IV INFUSION ADD-ON: CPT

## 2025-05-31 PROCEDURE — 84484 ASSAY OF TROPONIN QUANT: CPT | Performed by: PHYSICIAN ASSISTANT

## 2025-05-31 PROCEDURE — 36415 COLL VENOUS BLD VENIPUNCTURE: CPT | Performed by: NURSE PRACTITIONER

## 2025-05-31 PROCEDURE — 96372 THER/PROPH/DIAG INJ SC/IM: CPT | Mod: 59 | Performed by: PHYSICIAN ASSISTANT

## 2025-05-31 PROCEDURE — 80053 COMPREHEN METABOLIC PANEL: CPT | Performed by: NURSE PRACTITIONER

## 2025-05-31 PROCEDURE — 83605 ASSAY OF LACTIC ACID: CPT | Performed by: NURSE PRACTITIONER

## 2025-05-31 PROCEDURE — 83690 ASSAY OF LIPASE: CPT | Performed by: PHYSICIAN ASSISTANT

## 2025-05-31 PROCEDURE — 96374 THER/PROPH/DIAG INJ IV PUSH: CPT

## 2025-05-31 PROCEDURE — 85025 COMPLETE CBC W/AUTO DIFF WBC: CPT | Performed by: PHYSICIAN ASSISTANT

## 2025-05-31 PROCEDURE — 96372 THER/PROPH/DIAG INJ SC/IM: CPT | Mod: 59 | Performed by: NURSE PRACTITIONER

## 2025-05-31 PROCEDURE — 2500000005 HC RX 250 GENERAL PHARMACY W/O HCPCS: Performed by: PHYSICIAN ASSISTANT

## 2025-05-31 PROCEDURE — 81001 URINALYSIS AUTO W/SCOPE: CPT | Performed by: PHYSICIAN ASSISTANT

## 2025-05-31 PROCEDURE — 84702 CHORIONIC GONADOTROPIN TEST: CPT | Performed by: PHYSICIAN ASSISTANT

## 2025-05-31 PROCEDURE — 82248 BILIRUBIN DIRECT: CPT | Performed by: PHYSICIAN ASSISTANT

## 2025-05-31 PROCEDURE — 83735 ASSAY OF MAGNESIUM: CPT | Performed by: PHYSICIAN ASSISTANT

## 2025-05-31 PROCEDURE — 83605 ASSAY OF LACTIC ACID: CPT | Performed by: PHYSICIAN ASSISTANT

## 2025-05-31 PROCEDURE — 85610 PROTHROMBIN TIME: CPT | Performed by: PHYSICIAN ASSISTANT

## 2025-05-31 PROCEDURE — 99284 EMERGENCY DEPT VISIT MOD MDM: CPT

## 2025-05-31 PROCEDURE — 2500000004 HC RX 250 GENERAL PHARMACY W/ HCPCS (ALT 636 FOR OP/ED): Mod: JZ | Performed by: NURSE PRACTITIONER

## 2025-05-31 PROCEDURE — 2500000004 HC RX 250 GENERAL PHARMACY W/ HCPCS (ALT 636 FOR OP/ED): Mod: JZ | Performed by: PHYSICIAN ASSISTANT

## 2025-05-31 PROCEDURE — 36415 COLL VENOUS BLD VENIPUNCTURE: CPT | Performed by: PHYSICIAN ASSISTANT

## 2025-05-31 PROCEDURE — 2500000005 HC RX 250 GENERAL PHARMACY W/O HCPCS: Performed by: NURSE PRACTITIONER

## 2025-05-31 PROCEDURE — 82374 ASSAY BLOOD CARBON DIOXIDE: CPT | Performed by: PHYSICIAN ASSISTANT

## 2025-05-31 PROCEDURE — 2500000001 HC RX 250 WO HCPCS SELF ADMINISTERED DRUGS (ALT 637 FOR MEDICARE OP): Performed by: PHYSICIAN ASSISTANT

## 2025-05-31 PROCEDURE — 96376 TX/PRO/DX INJ SAME DRUG ADON: CPT

## 2025-05-31 RX ORDER — LIDOCAINE HYDROCHLORIDE 20 MG/ML
15 SOLUTION OROPHARYNGEAL ONCE
Status: COMPLETED | OUTPATIENT
Start: 2025-05-31 | End: 2025-05-31

## 2025-05-31 RX ORDER — ONDANSETRON HYDROCHLORIDE 2 MG/ML
4 INJECTION, SOLUTION INTRAVENOUS ONCE
Status: COMPLETED | OUTPATIENT
Start: 2025-05-31 | End: 2025-05-31

## 2025-05-31 RX ORDER — DICYCLOMINE HYDROCHLORIDE 20 MG/1
20 TABLET ORAL 4 TIMES DAILY PRN
Qty: 20 TABLET | Refills: 0 | Status: SHIPPED | OUTPATIENT
Start: 2025-05-31 | End: 2025-06-04 | Stop reason: HOSPADM

## 2025-05-31 RX ORDER — LORAZEPAM 2 MG/ML
0.5 INJECTION INTRAMUSCULAR ONCE
Status: COMPLETED | OUTPATIENT
Start: 2025-05-31 | End: 2025-05-31

## 2025-05-31 RX ORDER — DICYCLOMINE HYDROCHLORIDE 10 MG/ML
10 INJECTION INTRAMUSCULAR ONCE
Status: COMPLETED | OUTPATIENT
Start: 2025-05-31 | End: 2025-05-31

## 2025-05-31 RX ORDER — FENTANYL CITRATE 50 UG/ML
25 INJECTION, SOLUTION INTRAMUSCULAR; INTRAVENOUS ONCE
Status: COMPLETED | OUTPATIENT
Start: 2025-05-31 | End: 2025-05-31

## 2025-05-31 RX ORDER — HYDROXYZINE HYDROCHLORIDE 25 MG/ML
25 INJECTION, SOLUTION INTRAMUSCULAR ONCE
Status: COMPLETED | OUTPATIENT
Start: 2025-05-31 | End: 2025-05-31

## 2025-05-31 RX ORDER — SUCRALFATE 1 G/1
1 TABLET ORAL
Qty: 40 TABLET | Refills: 0 | Status: SHIPPED | OUTPATIENT
Start: 2025-05-31 | End: 2025-06-04 | Stop reason: HOSPADM

## 2025-05-31 RX ORDER — FAMOTIDINE 10 MG/ML
20 INJECTION, SOLUTION INTRAVENOUS ONCE
Status: COMPLETED | OUTPATIENT
Start: 2025-05-31 | End: 2025-05-31

## 2025-05-31 RX ORDER — PANTOPRAZOLE SODIUM 40 MG/10ML
40 INJECTION, POWDER, LYOPHILIZED, FOR SOLUTION INTRAVENOUS ONCE
Status: COMPLETED | OUTPATIENT
Start: 2025-05-31 | End: 2025-05-31

## 2025-05-31 RX ORDER — ALUMINUM HYDROXIDE, MAGNESIUM HYDROXIDE, AND SIMETHICONE 1200; 120; 1200 MG/30ML; MG/30ML; MG/30ML
30 SUSPENSION ORAL ONCE
Status: COMPLETED | OUTPATIENT
Start: 2025-05-31 | End: 2025-05-31

## 2025-05-31 RX ORDER — ONDANSETRON 4 MG/1
4 TABLET, ORALLY DISINTEGRATING ORAL EVERY 8 HOURS PRN
Qty: 20 TABLET | Refills: 0 | Status: ON HOLD | OUTPATIENT
Start: 2025-05-31 | End: 2025-06-05

## 2025-05-31 RX ORDER — FAMOTIDINE 20 MG/1
20 TABLET, FILM COATED ORAL 2 TIMES DAILY
Qty: 30 TABLET | Refills: 0 | Status: ON HOLD | OUTPATIENT
Start: 2025-05-31 | End: 2025-06-05

## 2025-05-31 RX ADMIN — ONDANSETRON 4 MG: 2 INJECTION INTRAMUSCULAR; INTRAVENOUS at 00:14

## 2025-05-31 RX ADMIN — PANTOPRAZOLE SODIUM 40 MG: 40 INJECTION, POWDER, FOR SOLUTION INTRAVENOUS at 09:29

## 2025-05-31 RX ADMIN — KETOROLAC TROMETHAMINE 15 MG: 30 INJECTION, SOLUTION INTRAMUSCULAR at 00:15

## 2025-05-31 RX ADMIN — SUCRALFATE 1 G: 1 TABLET ORAL at 00:36

## 2025-05-31 RX ADMIN — FAMOTIDINE 20 MG: 10 INJECTION, SOLUTION INTRAVENOUS at 09:29

## 2025-05-31 RX ADMIN — ONDANSETRON 4 MG: 2 INJECTION INTRAMUSCULAR; INTRAVENOUS at 10:06

## 2025-05-31 RX ADMIN — HYDROXYZINE HYDROCHLORIDE 25 MG: 25 INJECTION, SOLUTION INTRAMUSCULAR at 12:00

## 2025-05-31 RX ADMIN — SODIUM CHLORIDE 1000 ML: 0.9 INJECTION, SOLUTION INTRAVENOUS at 10:07

## 2025-05-31 RX ADMIN — FAMOTIDINE 20 MG: 10 INJECTION, SOLUTION INTRAVENOUS at 00:14

## 2025-05-31 RX ADMIN — ALUMINUM HYDROXIDE, MAGNESIUM HYDROXIDE, AND SIMETHICONE 30 ML: 200; 200; 20 SUSPENSION ORAL at 00:33

## 2025-05-31 RX ADMIN — FENTANYL CITRATE 25 MCG: 50 INJECTION INTRAMUSCULAR; INTRAVENOUS at 00:15

## 2025-05-31 RX ADMIN — ALUMINUM HYDROXIDE, MAGNESIUM HYDROXIDE, AND SIMETHICONE 30 ML: 200; 200; 20 SUSPENSION ORAL at 10:36

## 2025-05-31 RX ADMIN — LIDOCAINE HYDROCHLORIDE 10 ML: 20 SOLUTION ORAL at 00:33

## 2025-05-31 RX ADMIN — DICYCLOMINE HYDROCHLORIDE 10 MG: 10 INJECTION, SOLUTION INTRAMUSCULAR at 02:20

## 2025-05-31 RX ADMIN — LIDOCAINE HYDROCHLORIDE 15 ML: 20 SOLUTION ORAL at 10:36

## 2025-05-31 RX ADMIN — SODIUM CHLORIDE, POTASSIUM CHLORIDE, SODIUM LACTATE AND CALCIUM CHLORIDE 1000 ML: 600; 310; 30; 20 INJECTION, SOLUTION INTRAVENOUS at 00:01

## 2025-05-31 RX ADMIN — LORAZEPAM 0.5 MG: 2 INJECTION INTRAMUSCULAR; INTRAVENOUS at 12:00

## 2025-05-31 RX ADMIN — FENTANYL CITRATE 25 MCG: 50 INJECTION INTRAMUSCULAR; INTRAVENOUS at 02:19

## 2025-05-31 ASSESSMENT — PAIN DESCRIPTION - DESCRIPTORS
DESCRIPTORS: NAGGING;SORE
DESCRIPTORS: ACHING

## 2025-05-31 ASSESSMENT — LIFESTYLE VARIABLES
HAVE PEOPLE ANNOYED YOU BY CRITICIZING YOUR DRINKING: NO
EVER HAD A DRINK FIRST THING IN THE MORNING TO STEADY YOUR NERVES TO GET RID OF A HANGOVER: NO
TOTAL SCORE: 0
HAVE YOU EVER FELT YOU SHOULD CUT DOWN ON YOUR DRINKING: NO
EVER FELT BAD OR GUILTY ABOUT YOUR DRINKING: NO

## 2025-05-31 ASSESSMENT — PAIN - FUNCTIONAL ASSESSMENT
PAIN_FUNCTIONAL_ASSESSMENT: 0-10

## 2025-05-31 ASSESSMENT — PAIN DESCRIPTION - LOCATION
LOCATION: ABDOMEN
LOCATION: ABDOMEN

## 2025-05-31 ASSESSMENT — PAIN DESCRIPTION - ORIENTATION: ORIENTATION: MID;UPPER

## 2025-05-31 ASSESSMENT — PAIN DESCRIPTION - PROGRESSION
CLINICAL_PROGRESSION: GRADUALLY IMPROVING
CLINICAL_PROGRESSION: NOT CHANGED

## 2025-05-31 ASSESSMENT — PAIN DESCRIPTION - PAIN TYPE
TYPE: ACUTE PAIN
TYPE: ACUTE PAIN

## 2025-05-31 ASSESSMENT — PAIN SCALES - GENERAL
PAINLEVEL_OUTOF10: 6
PAINLEVEL_OUTOF10: 10 - WORST POSSIBLE PAIN
PAINLEVEL_OUTOF10: 10 - WORST POSSIBLE PAIN
PAINLEVEL_OUTOF10: 7

## 2025-05-31 NOTE — ED PROVIDER NOTES
HPI   Chief Complaint   Patient presents with    Abdominal Pain     My stomach hurts, vomiting a lot       The patient is a 45-year-old female with a past medical history of gastritis, GERD, chronic epigastric Dagoberto pain, marijuana use, chronic anxiety presents emergency room with chief complaint of epigastric abdominal pain with nausea vomiting.  The patient states that this is a chronic issue that happens almost daily.  The patient states that she has been seen by her primary care physician 10 days ago and was prescribed omeprazole and sertraline.  She was advised to continue taking the gabapentin and mirtazapine.  She was referred to gastroenterology.  The patient states that despite these medications she has chronic epigastric pain with vomiting.  The patient denies any hematemesis or hematochezia.  She denies any chest pain, palpitations or back pain, cough or shortness of breath.  She describes a burning sensation.  This mostly epigastric.  Patient states she has had multiple ED visits for the same as well as multiple CAT scans.  I did review the patient's EMR which shows that she was seen on May 17 and then again in May 18 for similar symptoms.  Her last CT was done on May 18, 2025 which showed no evidence of an aneurysm or dissection, there was probable gastric fold thickening which can be seen with gastritis and prominent submucosal fat in the ascending colon similar to previous studies again noted in setting of chronic inflammatory disease.      History provided by:  Patient and medical records          Patient History   Medical History[1]  Surgical History[2]  Family History[3]  Social History[4]    Physical Exam   ED Triage Vitals [05/30/25 2243]   Temperature Heart Rate Respirations BP   36.7 °C (98.1 °F) 98 16 132/87      Pulse Ox Temp Source Heart Rate Source Patient Position   98 % Temporal Monitor Sitting      BP Location FiO2 (%)     Right arm --       Physical Exam  Vitals and nursing note  reviewed. Exam conducted with a chaperone present.   Constitutional:       General: She is awake.      Appearance: Normal appearance. She is well-developed, well-groomed and normal weight. She is ill-appearing.   HENT:      Head: Normocephalic and atraumatic.      Right Ear: Tympanic membrane, ear canal and external ear normal.      Left Ear: Tympanic membrane, ear canal and external ear normal.      Nose: Nose normal.      Mouth/Throat:      Mouth: Mucous membranes are moist.      Pharynx: Oropharynx is clear.   Eyes:      Extraocular Movements: Extraocular movements intact.      Conjunctiva/sclera: Conjunctivae normal.      Pupils: Pupils are equal, round, and reactive to light.   Cardiovascular:      Rate and Rhythm: Normal rate and regular rhythm.      Pulses: Normal pulses.      Heart sounds: Normal heart sounds.   Pulmonary:      Effort: Pulmonary effort is normal.      Breath sounds: Normal breath sounds. No wheezing, rhonchi or rales.   Abdominal:      General: Abdomen is flat. Bowel sounds are normal.      Palpations: Abdomen is soft. There is no mass.      Tenderness: There is abdominal tenderness in the epigastric area. There is no guarding.          Comments: Epigastric tenderness no guarding or rebound   Musculoskeletal:         General: No swelling or tenderness. Normal range of motion.      Cervical back: Normal range of motion and neck supple.   Skin:     General: Skin is warm and dry.      Capillary Refill: Capillary refill takes less than 2 seconds.      Findings: No rash.   Neurological:      General: No focal deficit present.      Mental Status: She is alert and oriented to person, place, and time. Mental status is at baseline.   Psychiatric:         Mood and Affect: Mood normal.         Behavior: Behavior normal. Behavior is cooperative.         Thought Content: Thought content normal.         Judgment: Judgment normal.           ED Course & MDM   Diagnoses as of 05/31/25 0323   Abdominal pain,  chronic, epigastric   Chronic gastritis without bleeding, unspecified gastritis type                 No data recorded     [place] Coma Scale Score: 15 (05/30/25 2243 : Minerva Conte RN)                           Medical Decision Making  The patient is a 45-year-old female with a past medical history of gastritis, GERD, chronic epigastric Dagoberto pain, marijuana use, chronic anxiety presents emergency room with chief complaint of epigastric abdominal pain with nausea vomiting.  The patient states that this is a chronic issue that happens almost daily.  The patient states that she has been seen by her primary care physician 10 days ago and was prescribed omeprazole and sertraline.  She was advised to continue taking the gabapentin and mirtazapine.  She was referred to gastroenterology.  The patient states that despite these medications she has chronic epigastric pain with vomiting.  The patient denies any hematemesis or hematochezia.  She denies any chest pain, palpitations or back pain, cough or shortness of breath.  She describes a burning sensation.  This mostly epigastric.  Patient states she has had multiple ED visits for the same as well as multiple CAT scans.  I did review the patient's EMR which shows that she was seen on May 17 and then again in May 18 for similar symptoms.  Her last CT was done on May 18, 2025 which showed no evidence of an aneurysm or dissection, there was probable gastric fold thickening which can be seen with gastritis and prominent submucosal fat in the ascending colon similar to previous studies again noted in setting of chronic inflammatory disease.  Temperature 36.7 heart rate 98, respirations 16, blood pressure 132/87, pulse ox is 90% on room air  Initial EKG interpreted by me at 2253 sinus rhythm with marked sinus arrhythmia rate 94, , QRS of 74,  QTc was 467 no STEMI  Patient received a liter of LR, Pepcid 20 mg IV push, Zofran 4 mg IV push, Toradol 15 mg IV push  Carafate 1 g p.o., Mylanta 30 mL p.o., viscous lidocaine 2 mL p.o. Fentanyl 25 mcg IV push  Lab results white count 10.9 hemoglobin 14.6 hematocrit 40.9 platelet count was 377 metabolic panel potassium 3.3 otherwise unremarkable hepatic function panel total protein 8.4 otherwise unremarkable, lipase 18 magnesium 1.77 lactic 1.6 PT 11.1 INR 1, beta quant was less than 2, urinalysis shows ketones of 2+ urobilinogen 1+, no sign of UTI.  The patient's first troponin was 50 5 repeat is 56 this is improved from troponins that were done 13 days ago when they were 71 and 60 they are downtrending.  Patient was requesting something else for pain, she was given additional fentanyl 25 mcg IV push, Carafate 1 g p.o. and Bentyl 10 mg IM.  She also received 30 mL of Mylanta and viscous lidocaine 10 mL p.o.  On repeat exam the patient states she feels improved and wants to be discharged home at this point time I do not suspect a surgical abdomen I do not suspect MI.  The patient has a history of chronic gastritis and has had multiple workups in the ER.  The patient is to follow-up with her gastroenterologist for her chronic gastritis.  She was discharged home with prescription for Carafate, Pepcid and Zofran.  Return precautions were discussed all questions answered prior to discharge        Procedure  Procedures         [1]   Past Medical History:  Diagnosis Date    Acid reflux     Gastritis    [2]   Past Surgical History:  Procedure Laterality Date     SECTION, CLASSIC  1999     SECTION, CLASSIC  10/21/2003   [3]   Family History  Problem Relation Name Age of Onset    Hypertension Mother     [4]   Social History  Tobacco Use    Smoking status: Never     Passive exposure: Never    Smokeless tobacco: Never   Vaping Use    Vaping status: Never Used   Substance Use Topics    Alcohol use: Never    Drug use: Yes     Types: Marijuana     Comment: daily        Titus Ross PA-C  [phone]

## 2025-05-31 NOTE — ED PROVIDER NOTES
HPI   Chief Complaint   Patient presents with    Abdominal Pain     Abdominal pain, seen here last night for same thing but prescriptions aren't ready        45-year-old female returns emergency department, states she has epigastric abdominal pain, started with multiple episodes of vomiting yesterday, continuing to have discomfort and nausea today.  Tells me she has had this evaluated by gastroenterologist and had a scope performed that did not show any gastritis or ulcerations.    States she was medicated for pain last night, it slightly improved but was still present at discharge.  States today it continues.      History provided by:  Patient   used: No            Patient History   Medical History[1]  Surgical History[2]  Family History[3]  Social History[4]    Physical Exam   ED Triage Vitals   Temperature Heart Rate Respirations BP   05/31/25 0901 05/31/25 0901 05/31/25 0901 05/31/25 0901   36.7 °C (98.1 °F) 88 17 139/86      Pulse Ox Temp Source Heart Rate Source Patient Position   05/31/25 0901 05/31/25 0901 05/31/25 0901 05/31/25 1125   100 % Temporal Monitor Sitting      BP Location FiO2 (%)     05/31/25 1125 --     Left arm        Physical Exam  Constitutional: Vitals noted, no distress. Afebrile.   Cardiovascular: Regular, rate, rhythm, no murmur.   Pulmonary: Lungs clear bilaterally with good aeration. No adventitious breath sounds.   Gastrointestinal: Soft, nonsurgical.  Some discomfort epigastrium. No peritoneal signs. Normoactive bowel sounds.   Musculoskeletal: No peripheral edema. Negative Homans bilaterally, no cords.   Skin: No rash.   Neuro: No focal neurologic deficits, NIH score of 0.      ED Course & MDM   Diagnoses as of 05/31/25 1129   Anxiety   Nausea and vomiting, unspecified vomiting type          Labs Reviewed   CBC WITH AUTO DIFFERENTIAL - Abnormal       Result Value    WBC 7.9      nRBC 0.0      RBC 5.08      Hemoglobin 13.7      Hematocrit 39.5      MCV 78 (*)      MCH 27.0      MCHC 34.7      RDW 14.0      Platelets 332      Neutrophils % 64.0      Immature Granulocytes %, Automated 0.1      Lymphocytes % 25.2      Monocytes % 7.8      Eosinophils % 2.5      Basophils % 0.4      Neutrophils Absolute 5.02      Immature Granulocytes Absolute, Automated 0.01      Lymphocytes Absolute 1.98      Monocytes Absolute 0.61      Eosinophils Absolute 0.20      Basophils Absolute 0.03     BASIC METABOLIC PANEL - Normal    Glucose 96      Sodium 140      Potassium 3.8      Chloride 105      Bicarbonate 26      Anion Gap 13      Urea Nitrogen 12      Creatinine 0.86      eGFR 86      Calcium 9.6     LIPASE - Normal    Lipase 19      Narrative:     Venipuncture immediately after or during the administration of Metamizole may lead to falsely low results. Testing should be performed immediately prior to Metamizole dosing.   HEPATIC FUNCTION PANEL - Normal    Albumin 4.4      Bilirubin, Total 0.9      Bilirubin, Direct 0.0      Alkaline Phosphatase 74      ALT 10      AST 12      Total Protein 8.0     LACTATE - Normal    Lactate 1.3      Narrative:     Venipuncture immediately after or during the administration of Metamizole may lead to falsely low results. Testing should be performed immediately prior to Metamizole dosing.        No orders to display            No data recorded     [place] Coma Scale Score: 15 (05/31/25 0902 : Arminda Boothe, RAUL)                   Medical Decision Making  Repeat abdominal workup was initiated, CBC all within normal limits, metabolic panel, LFTs and lactate all within normal limits as well.  Normal lipase.    I did review her previous CT imaging, was ultimately unremarkable so I did not feel that she required CT imaging again.    Gave her 40 mg of IV pantoprazole and 20 mg of IV Pepcid for gastritis, 4 mg of IV Zofran and a liter of IV fluids.    Still had some epigastric discomfort so additionally gave her GI cocktail.    Had a long conversation with the  patient, she does feel that this is her depression and anxiety causing her symptoms, states she wakes up at 4 AM feeling very panicked and then starts vomiting, vomits multiple times and then has abdominal pain.  States she saw her doctor last week and was started on Zoloft, she also has 2 medications for anxiety to use for panic attacks.  Discussed continue with the Zoloft as it might take a few weeks to take full effect, discussed that as soon as she wakes up panicked she needs to take her abortive medications, and discuss whether she needs a benzodiazepine from her doctor as well for use during the severe panic attacks.    I did also give her Vistaril and small dose of Ativan here prior to discharge home.  Discussed continued follow-up with her doctor, return with any worsening symptoms or additional concerns.    Procedure  Procedures       [1]   Past Medical History:  Diagnosis Date    Acid reflux     Gastritis    [2]   Past Surgical History:  Procedure Laterality Date     SECTION, CLASSIC  1999     SECTION, CLASSIC  10/21/2003   [3]   Family History  Problem Relation Name Age of Onset    Hypertension Mother     [4]   Social History  Tobacco Use    Smoking status: Never     Passive exposure: Never    Smokeless tobacco: Never   Vaping Use    Vaping status: Never Used   Substance Use Topics    Alcohol use: Never    Drug use: Yes     Types: Marijuana     Comment: daily        MARYA Campos-ROSALIND  [id number]

## 2025-06-01 ENCOUNTER — APPOINTMENT (OUTPATIENT)
Dept: CARDIOLOGY | Facility: HOSPITAL | Age: 45
End: 2025-06-01
Payer: COMMERCIAL

## 2025-06-01 ENCOUNTER — APPOINTMENT (OUTPATIENT)
Dept: RADIOLOGY | Facility: HOSPITAL | Age: 45
End: 2025-06-01
Payer: COMMERCIAL

## 2025-06-01 ENCOUNTER — HOSPITAL ENCOUNTER (OUTPATIENT)
Facility: HOSPITAL | Age: 45
Setting detail: OBSERVATION
End: 2025-06-01
Attending: STUDENT IN AN ORGANIZED HEALTH CARE EDUCATION/TRAINING PROGRAM | Admitting: INTERNAL MEDICINE
Payer: COMMERCIAL

## 2025-06-01 VITALS
WEIGHT: 130 LBS | SYSTOLIC BLOOD PRESSURE: 171 MMHG | RESPIRATION RATE: 14 BRPM | DIASTOLIC BLOOD PRESSURE: 84 MMHG | TEMPERATURE: 98.2 F | BODY MASS INDEX: 22.2 KG/M2 | HEIGHT: 64 IN | HEART RATE: 85 BPM | OXYGEN SATURATION: 98 %

## 2025-06-01 DIAGNOSIS — R79.89 ELEVATED TROPONIN LEVEL: ICD-10-CM

## 2025-06-01 DIAGNOSIS — R93.3 ABNORMAL CT SCAN, COLON: ICD-10-CM

## 2025-06-01 DIAGNOSIS — R10.84 GENERALIZED ABDOMINAL PAIN: ICD-10-CM

## 2025-06-01 DIAGNOSIS — R11.2 INTRACTABLE NAUSEA AND VOMITING: ICD-10-CM

## 2025-06-01 DIAGNOSIS — R10.11 RIGHT UPPER QUADRANT PAIN: ICD-10-CM

## 2025-06-01 DIAGNOSIS — R07.9 CHEST PAIN, UNSPECIFIED TYPE: ICD-10-CM

## 2025-06-01 DIAGNOSIS — R10.13 EPIGASTRIC PAIN: Primary | ICD-10-CM

## 2025-06-01 DIAGNOSIS — R11.2 NAUSEA AND VOMITING, UNSPECIFIED VOMITING TYPE: ICD-10-CM

## 2025-06-01 PROBLEM — F12.90 MARIJUANA USE: Status: ACTIVE | Noted: 2025-06-01

## 2025-06-01 PROBLEM — R11.15 CYCLIC VOMITING SYNDROME: Status: ACTIVE | Noted: 2025-06-01

## 2025-06-01 PROBLEM — K29.30 CHRONIC SUPERFICIAL GASTRITIS WITHOUT BLEEDING: Status: ACTIVE | Noted: 2025-06-01

## 2025-06-01 LAB
ALBUMIN SERPL BCP-MCNC: 4.5 G/DL (ref 3.4–5)
ALP SERPL-CCNC: 74 U/L (ref 33–110)
ALT SERPL W P-5'-P-CCNC: 11 U/L (ref 7–45)
ANION GAP SERPL CALC-SCNC: 17 MMOL/L (ref 10–20)
AST SERPL W P-5'-P-CCNC: 28 U/L (ref 9–39)
ATRIAL RATE: 94 BPM
BASOPHILS # BLD AUTO: 0.03 X10*3/UL (ref 0–0.1)
BASOPHILS NFR BLD AUTO: 0.3 %
BILIRUB SERPL-MCNC: 0.8 MG/DL (ref 0–1.2)
BUN SERPL-MCNC: 12 MG/DL (ref 6–23)
CALCIUM SERPL-MCNC: 9.3 MG/DL (ref 8.6–10.3)
CARDIAC TROPONIN I PNL SERPL HS: 60 NG/L (ref 0–13)
CHLORIDE SERPL-SCNC: 104 MMOL/L (ref 98–107)
CO2 SERPL-SCNC: 19 MMOL/L (ref 21–32)
CREAT SERPL-MCNC: 0.68 MG/DL (ref 0.5–1.05)
EGFRCR SERPLBLD CKD-EPI 2021: >90 ML/MIN/1.73M*2
EOSINOPHIL # BLD AUTO: 0.19 X10*3/UL (ref 0–0.7)
EOSINOPHIL NFR BLD AUTO: 1.8 %
ERYTHROCYTE [DISTWIDTH] IN BLOOD BY AUTOMATED COUNT: 13.7 % (ref 11.5–14.5)
GLUCOSE SERPL-MCNC: 91 MG/DL (ref 74–99)
HCT VFR BLD AUTO: 37.7 % (ref 36–46)
HGB BLD-MCNC: 13.3 G/DL (ref 12–16)
IMM GRANULOCYTES # BLD AUTO: 0.02 X10*3/UL (ref 0–0.7)
IMM GRANULOCYTES NFR BLD AUTO: 0.2 % (ref 0–0.9)
LACTATE SERPL-SCNC: 0.9 MMOL/L (ref 0.4–2)
LIPASE SERPL-CCNC: 22 U/L (ref 9–82)
LYMPHOCYTES # BLD AUTO: 2.36 X10*3/UL (ref 1.2–4.8)
LYMPHOCYTES NFR BLD AUTO: 22.7 %
MCH RBC QN AUTO: 27.4 PG (ref 26–34)
MCHC RBC AUTO-ENTMCNC: 35.3 G/DL (ref 32–36)
MCV RBC AUTO: 78 FL (ref 80–100)
MONOCYTES # BLD AUTO: 0.79 X10*3/UL (ref 0.1–1)
MONOCYTES NFR BLD AUTO: 7.6 %
NEUTROPHILS # BLD AUTO: 6.99 X10*3/UL (ref 1.2–7.7)
NEUTROPHILS NFR BLD AUTO: 67.4 %
NRBC BLD-RTO: 0 /100 WBCS (ref 0–0)
P AXIS: 79 DEGREES
P OFFSET: 196 MS
P ONSET: 145 MS
PLATELET # BLD AUTO: 337 X10*3/UL (ref 150–450)
POTASSIUM SERPL-SCNC: 3.3 MMOL/L (ref 3.5–5.3)
PR INTERVAL: 154 MS
PROT SERPL-MCNC: 8.3 G/DL (ref 6.4–8.2)
Q ONSET: 222 MS
QRS COUNT: 15 BEATS
QRS DURATION: 74 MS
QT INTERVAL: 374 MS
QTC CALCULATION(BAZETT): 467 MS
QTC FREDERICIA: 434 MS
R AXIS: 68 DEGREES
RBC # BLD AUTO: 4.85 X10*6/UL (ref 4–5.2)
SODIUM SERPL-SCNC: 137 MMOL/L (ref 136–145)
T AXIS: 67 DEGREES
T OFFSET: 409 MS
VENTRICULAR RATE: 94 BPM
WBC # BLD AUTO: 10.4 X10*3/UL (ref 4.4–11.3)

## 2025-06-01 PROCEDURE — G0378 HOSPITAL OBSERVATION PER HR: HCPCS

## 2025-06-01 PROCEDURE — 36415 COLL VENOUS BLD VENIPUNCTURE: CPT | Performed by: PHYSICIAN ASSISTANT

## 2025-06-01 PROCEDURE — 2500000004 HC RX 250 GENERAL PHARMACY W/ HCPCS (ALT 636 FOR OP/ED): Mod: JZ | Performed by: INTERNAL MEDICINE

## 2025-06-01 PROCEDURE — 85025 COMPLETE CBC W/AUTO DIFF WBC: CPT | Performed by: PHYSICIAN ASSISTANT

## 2025-06-01 PROCEDURE — 2550000001 HC RX 255 CONTRASTS: Mod: JZ | Performed by: PHYSICIAN ASSISTANT

## 2025-06-01 PROCEDURE — 99285 EMERGENCY DEPT VISIT HI MDM: CPT | Mod: 25 | Performed by: STUDENT IN AN ORGANIZED HEALTH CARE EDUCATION/TRAINING PROGRAM

## 2025-06-01 PROCEDURE — 84484 ASSAY OF TROPONIN QUANT: CPT | Performed by: PHYSICIAN ASSISTANT

## 2025-06-01 PROCEDURE — 83036 HEMOGLOBIN GLYCOSYLATED A1C: CPT | Mod: ELYLAB | Performed by: INTERNAL MEDICINE

## 2025-06-01 PROCEDURE — 83605 ASSAY OF LACTIC ACID: CPT | Performed by: PHYSICIAN ASSISTANT

## 2025-06-01 PROCEDURE — 2500000001 HC RX 250 WO HCPCS SELF ADMINISTERED DRUGS (ALT 637 FOR MEDICARE OP): Performed by: PHYSICIAN ASSISTANT

## 2025-06-01 PROCEDURE — 99223 1ST HOSP IP/OBS HIGH 75: CPT | Performed by: INTERNAL MEDICINE

## 2025-06-01 PROCEDURE — 93005 ELECTROCARDIOGRAM TRACING: CPT

## 2025-06-01 PROCEDURE — 80053 COMPREHEN METABOLIC PANEL: CPT | Performed by: PHYSICIAN ASSISTANT

## 2025-06-01 PROCEDURE — 83690 ASSAY OF LIPASE: CPT | Performed by: PHYSICIAN ASSISTANT

## 2025-06-01 PROCEDURE — 74177 CT ABD & PELVIS W/CONTRAST: CPT

## 2025-06-01 PROCEDURE — 2500000004 HC RX 250 GENERAL PHARMACY W/ HCPCS (ALT 636 FOR OP/ED): Performed by: PHYSICIAN ASSISTANT

## 2025-06-01 PROCEDURE — 74177 CT ABD & PELVIS W/CONTRAST: CPT | Performed by: RADIOLOGY

## 2025-06-01 PROCEDURE — 96374 THER/PROPH/DIAG INJ IV PUSH: CPT | Mod: 59

## 2025-06-01 PROCEDURE — 2500000005 HC RX 250 GENERAL PHARMACY W/O HCPCS: Performed by: PHYSICIAN ASSISTANT

## 2025-06-01 PROCEDURE — 96376 TX/PRO/DX INJ SAME DRUG ADON: CPT | Mod: 59

## 2025-06-01 PROCEDURE — 96375 TX/PRO/DX INJ NEW DRUG ADDON: CPT | Mod: 59

## 2025-06-01 RX ORDER — ACETAMINOPHEN 160 MG/5ML
650 SOLUTION ORAL EVERY 4 HOURS PRN
Status: DISCONTINUED | OUTPATIENT
Start: 2025-06-01 | End: 2025-06-04 | Stop reason: HOSPADM

## 2025-06-01 RX ORDER — ENOXAPARIN SODIUM 100 MG/ML
40 INJECTION SUBCUTANEOUS EVERY 24 HOURS
Status: DISCONTINUED | OUTPATIENT
Start: 2025-06-01 | End: 2025-06-04 | Stop reason: HOSPADM

## 2025-06-01 RX ORDER — ACETAMINOPHEN 325 MG/1
650 TABLET ORAL EVERY 4 HOURS PRN
Status: DISCONTINUED | OUTPATIENT
Start: 2025-06-01 | End: 2025-06-04 | Stop reason: HOSPADM

## 2025-06-01 RX ORDER — ONDANSETRON HYDROCHLORIDE 2 MG/ML
4 INJECTION, SOLUTION INTRAVENOUS EVERY 8 HOURS PRN
Status: DISCONTINUED | OUTPATIENT
Start: 2025-06-01 | End: 2025-06-04 | Stop reason: HOSPADM

## 2025-06-01 RX ORDER — LIDOCAINE HYDROCHLORIDE 20 MG/ML
15 SOLUTION OROPHARYNGEAL ONCE
Status: DISCONTINUED | OUTPATIENT
Start: 2025-06-01 | End: 2025-06-04 | Stop reason: HOSPADM

## 2025-06-01 RX ORDER — ONDANSETRON HYDROCHLORIDE 2 MG/ML
4 INJECTION, SOLUTION INTRAVENOUS ONCE
Status: COMPLETED | OUTPATIENT
Start: 2025-06-01 | End: 2025-06-01

## 2025-06-01 RX ORDER — DIPHENHYDRAMINE HYDROCHLORIDE 50 MG/ML
25 INJECTION, SOLUTION INTRAMUSCULAR; INTRAVENOUS ONCE
Status: COMPLETED | OUTPATIENT
Start: 2025-06-01 | End: 2025-06-01

## 2025-06-01 RX ORDER — PANTOPRAZOLE SODIUM 40 MG/10ML
40 INJECTION, POWDER, LYOPHILIZED, FOR SOLUTION INTRAVENOUS ONCE
Status: COMPLETED | OUTPATIENT
Start: 2025-06-01 | End: 2025-06-01

## 2025-06-01 RX ORDER — KETOROLAC TROMETHAMINE 30 MG/ML
15 INJECTION, SOLUTION INTRAMUSCULAR; INTRAVENOUS ONCE
Status: COMPLETED | OUTPATIENT
Start: 2025-06-01 | End: 2025-06-01

## 2025-06-01 RX ORDER — POLYETHYLENE GLYCOL 3350 17 G/17G
17 POWDER, FOR SOLUTION ORAL DAILY PRN
Status: DISCONTINUED | OUTPATIENT
Start: 2025-06-01 | End: 2025-06-04 | Stop reason: HOSPADM

## 2025-06-01 RX ORDER — PANTOPRAZOLE SODIUM 40 MG/10ML
40 INJECTION, POWDER, LYOPHILIZED, FOR SOLUTION INTRAVENOUS 2 TIMES DAILY
Status: DISCONTINUED | OUTPATIENT
Start: 2025-06-02 | End: 2025-06-02

## 2025-06-01 RX ORDER — ALUMINUM HYDROXIDE, MAGNESIUM HYDROXIDE, AND SIMETHICONE 1200; 120; 1200 MG/30ML; MG/30ML; MG/30ML
30 SUSPENSION ORAL ONCE
Status: DISCONTINUED | OUTPATIENT
Start: 2025-06-01 | End: 2025-06-04 | Stop reason: HOSPADM

## 2025-06-01 RX ORDER — MORPHINE SULFATE 2 MG/ML
2 INJECTION, SOLUTION INTRAMUSCULAR; INTRAVENOUS
Status: DISCONTINUED | OUTPATIENT
Start: 2025-06-01 | End: 2025-06-03

## 2025-06-01 RX ORDER — TALC
3 POWDER (GRAM) TOPICAL NIGHTLY PRN
Status: DISCONTINUED | OUTPATIENT
Start: 2025-06-01 | End: 2025-06-04 | Stop reason: HOSPADM

## 2025-06-01 RX ORDER — SODIUM CHLORIDE, SODIUM LACTATE, POTASSIUM CHLORIDE, CALCIUM CHLORIDE 600; 310; 30; 20 MG/100ML; MG/100ML; MG/100ML; MG/100ML
100 INJECTION, SOLUTION INTRAVENOUS CONTINUOUS
Status: DISCONTINUED | OUTPATIENT
Start: 2025-06-01 | End: 2025-06-03

## 2025-06-01 RX ORDER — MORPHINE SULFATE 4 MG/ML
4 INJECTION, SOLUTION INTRAMUSCULAR; INTRAVENOUS
Status: DISCONTINUED | OUTPATIENT
Start: 2025-06-01 | End: 2025-06-03

## 2025-06-01 RX ORDER — POTASSIUM CHLORIDE 20 MEQ/1
40 TABLET, EXTENDED RELEASE ORAL DAILY
Status: DISCONTINUED | OUTPATIENT
Start: 2025-06-02 | End: 2025-06-02

## 2025-06-01 RX ORDER — ONDANSETRON 4 MG/1
4 TABLET, FILM COATED ORAL EVERY 8 HOURS PRN
Status: DISCONTINUED | OUTPATIENT
Start: 2025-06-01 | End: 2025-06-04 | Stop reason: HOSPADM

## 2025-06-01 RX ORDER — DIAZEPAM 5 MG/ML
5 INJECTION, SOLUTION INTRAMUSCULAR; INTRAVENOUS ONCE
Status: COMPLETED | OUTPATIENT
Start: 2025-06-01 | End: 2025-06-02

## 2025-06-01 RX ORDER — ACETAMINOPHEN 650 MG/1
650 SUPPOSITORY RECTAL EVERY 4 HOURS PRN
Status: DISCONTINUED | OUTPATIENT
Start: 2025-06-01 | End: 2025-06-04 | Stop reason: HOSPADM

## 2025-06-01 RX ADMIN — PANTOPRAZOLE SODIUM 40 MG: 40 INJECTION, POWDER, FOR SOLUTION INTRAVENOUS at 19:20

## 2025-06-01 RX ADMIN — ONDANSETRON 4 MG: 2 INJECTION INTRAMUSCULAR; INTRAVENOUS at 19:18

## 2025-06-01 RX ADMIN — KETOROLAC TROMETHAMINE 15 MG: 30 INJECTION, SOLUTION INTRAMUSCULAR at 20:57

## 2025-06-01 RX ADMIN — DIPHENHYDRAMINE HYDROCHLORIDE 25 MG: 50 INJECTION, SOLUTION INTRAMUSCULAR; INTRAVENOUS at 20:57

## 2025-06-01 RX ADMIN — MORPHINE SULFATE 2 MG: 2 INJECTION, SOLUTION INTRAMUSCULAR; INTRAVENOUS at 23:53

## 2025-06-01 RX ADMIN — ONDANSETRON 4 MG: 2 INJECTION INTRAMUSCULAR; INTRAVENOUS at 22:29

## 2025-06-01 RX ADMIN — IOHEXOL 75 ML: 350 INJECTION, SOLUTION INTRAVENOUS at 20:32

## 2025-06-01 ASSESSMENT — PAIN DESCRIPTION - FREQUENCY
FREQUENCY: CONSTANT/CONTINUOUS
FREQUENCY: CONSTANT/CONTINUOUS

## 2025-06-01 ASSESSMENT — PAIN - FUNCTIONAL ASSESSMENT
PAIN_FUNCTIONAL_ASSESSMENT: 0-10

## 2025-06-01 ASSESSMENT — PAIN DESCRIPTION - ORIENTATION
ORIENTATION: INNER
ORIENTATION: UPPER
ORIENTATION: UPPER;MID

## 2025-06-01 ASSESSMENT — PAIN SCALES - GENERAL
PAINLEVEL_OUTOF10: 9
PAINLEVEL_OUTOF10: 8
PAINLEVEL_OUTOF10: 10 - WORST POSSIBLE PAIN
PAINLEVEL_OUTOF10: 8

## 2025-06-01 ASSESSMENT — PAIN DESCRIPTION - PROGRESSION
CLINICAL_PROGRESSION: NOT CHANGED

## 2025-06-01 ASSESSMENT — PAIN DESCRIPTION - LOCATION
LOCATION: ABDOMEN

## 2025-06-01 ASSESSMENT — PAIN DESCRIPTION - DESCRIPTORS
DESCRIPTORS: STABBING
DESCRIPTORS: DISCOMFORT

## 2025-06-01 ASSESSMENT — PAIN DESCRIPTION - PAIN TYPE
TYPE: ACUTE PAIN

## 2025-06-01 ASSESSMENT — PAIN DESCRIPTION - ONSET
ONSET: ONGOING
ONSET: ONGOING

## 2025-06-02 ENCOUNTER — APPOINTMENT (OUTPATIENT)
Dept: CARDIOLOGY | Facility: HOSPITAL | Age: 45
End: 2025-06-02
Payer: COMMERCIAL

## 2025-06-02 ENCOUNTER — APPOINTMENT (OUTPATIENT)
Dept: RADIOLOGY | Facility: HOSPITAL | Age: 45
End: 2025-06-02
Payer: COMMERCIAL

## 2025-06-02 LAB
ALBUMIN SERPL BCP-MCNC: 4 G/DL (ref 3.4–5)
ALP SERPL-CCNC: 65 U/L (ref 33–110)
ALT SERPL W P-5'-P-CCNC: 9 U/L (ref 7–45)
AMPHETAMINES UR QL SCN: ABNORMAL
ANION GAP SERPL CALC-SCNC: 12 MMOL/L (ref 10–20)
AORTIC VALVE MEAN GRADIENT: 4 MMHG
AORTIC VALVE PEAK VELOCITY: 1.44 M/S
APPEARANCE UR: CLEAR
AST SERPL W P-5'-P-CCNC: 22 U/L (ref 9–39)
ATRIAL RATE: 76 BPM
AV PEAK GRADIENT: 8 MMHG
AVA (PEAK VEL): 1.34 CM2
AVA (VTI): 1.37 CM2
BARBITURATES UR QL SCN: ABNORMAL
BENZODIAZ UR QL SCN: ABNORMAL
BILIRUB SERPL-MCNC: 0.7 MG/DL (ref 0–1.2)
BILIRUB UR STRIP.AUTO-MCNC: NEGATIVE MG/DL
BUN SERPL-MCNC: 11 MG/DL (ref 6–23)
BZE UR QL SCN: ABNORMAL
CALCIUM SERPL-MCNC: 8.7 MG/DL (ref 8.6–10.3)
CANNABINOIDS UR QL SCN: ABNORMAL
CARDIAC TROPONIN I PNL SERPL HS: 57 NG/L (ref 0–13)
CHLORIDE SERPL-SCNC: 106 MMOL/L (ref 98–107)
CHOLEST SERPL-MCNC: 173 MG/DL (ref 0–199)
CHOLESTEROL/HDL RATIO: 4.9
CO2 SERPL-SCNC: 24 MMOL/L (ref 21–32)
COLOR UR: ABNORMAL
CREAT SERPL-MCNC: 0.71 MG/DL (ref 0.5–1.05)
EGFRCR SERPLBLD CKD-EPI 2021: >90 ML/MIN/1.73M*2
EJECTION FRACTION APICAL 4 CHAMBER: 60.3
EJECTION FRACTION: 60 %
ERYTHROCYTE [DISTWIDTH] IN BLOOD BY AUTOMATED COUNT: 13.9 % (ref 11.5–14.5)
FENTANYL+NORFENTANYL UR QL SCN: ABNORMAL
GLUCOSE SERPL-MCNC: 76 MG/DL (ref 74–99)
GLUCOSE UR STRIP.AUTO-MCNC: NORMAL MG/DL
HBA1C MFR BLD: NORMAL %
HCT VFR BLD AUTO: 36.5 % (ref 36–46)
HDLC SERPL-MCNC: 35.2 MG/DL
HGB BLD-MCNC: 12.3 G/DL (ref 12–16)
HOLD SPECIMEN: 293
KETONES UR STRIP.AUTO-MCNC: ABNORMAL MG/DL
LDLC SERPL CALC-MCNC: 110 MG/DL
LEFT ATRIUM VOLUME AREA LENGTH INDEX BSA: 19.2 ML/M2
LEFT VENTRICLE INTERNAL DIMENSION DIASTOLE: 3.6 CM (ref 3.5–6)
LEFT VENTRICULAR OUTFLOW TRACT DIAMETER: 1.7 CM
LEUKOCYTE ESTERASE UR QL STRIP.AUTO: NEGATIVE
LV EJECTION FRACTION BIPLANE: 57 %
MCH RBC QN AUTO: 27.1 PG (ref 26–34)
MCHC RBC AUTO-ENTMCNC: 33.7 G/DL (ref 32–36)
MCV RBC AUTO: 80 FL (ref 80–100)
METHADONE UR QL SCN: ABNORMAL
MITRAL VALVE E/A RATIO: 1.6
MUCOUS THREADS #/AREA URNS AUTO: NORMAL /LPF
NITRITE UR QL STRIP.AUTO: NEGATIVE
NON HDL CHOLESTEROL: 138 MG/DL (ref 0–149)
NRBC BLD-RTO: 0 /100 WBCS (ref 0–0)
OPIATES UR QL SCN: ABNORMAL
OXYCODONE+OXYMORPHONE UR QL SCN: ABNORMAL
P AXIS: 63 DEGREES
P OFFSET: 185 MS
P ONSET: 135 MS
PCP UR QL SCN: ABNORMAL
PH UR STRIP.AUTO: 6 [PH]
PLATELET # BLD AUTO: 294 X10*3/UL (ref 150–450)
POTASSIUM SERPL-SCNC: 3.5 MMOL/L (ref 3.5–5.3)
PR INTERVAL: 170 MS
PROT SERPL-MCNC: 7.2 G/DL (ref 6.4–8.2)
PROT UR STRIP.AUTO-MCNC: ABNORMAL MG/DL
Q ONSET: 220 MS
QRS COUNT: 12 BEATS
QRS DURATION: 86 MS
QT INTERVAL: 408 MS
QTC CALCULATION(BAZETT): 459 MS
QTC FREDERICIA: 441 MS
R AXIS: 63 DEGREES
RBC # BLD AUTO: 4.54 X10*6/UL (ref 4–5.2)
RBC # UR STRIP.AUTO: NEGATIVE MG/DL
RBC #/AREA URNS AUTO: NORMAL /HPF
RIGHT VENTRICLE FREE WALL PEAK S': 13.2 CM/S
RIGHT VENTRICLE PEAK SYSTOLIC PRESSURE: 16 MMHG
SODIUM SERPL-SCNC: 138 MMOL/L (ref 136–145)
SP GR UR STRIP.AUTO: >1.05
SQUAMOUS #/AREA URNS AUTO: NORMAL /HPF
T AXIS: 64 DEGREES
T OFFSET: 424 MS
TRICUSPID ANNULAR PLANE SYSTOLIC EXCURSION: 2.4 CM
TRIGL SERPL-MCNC: 139 MG/DL (ref 0–149)
UROBILINOGEN UR STRIP.AUTO-MCNC: NORMAL MG/DL
VENTRICULAR RATE: 76 BPM
VLDL: 28 MG/DL (ref 0–40)
WBC # BLD AUTO: 8.1 X10*3/UL (ref 4.4–11.3)
WBC #/AREA URNS AUTO: NORMAL /HPF

## 2025-06-02 PROCEDURE — G0378 HOSPITAL OBSERVATION PER HR: HCPCS

## 2025-06-02 PROCEDURE — 81001 URINALYSIS AUTO W/SCOPE: CPT | Performed by: PHYSICIAN ASSISTANT

## 2025-06-02 PROCEDURE — 2500000004 HC RX 250 GENERAL PHARMACY W/ HCPCS (ALT 636 FOR OP/ED): Mod: JZ | Performed by: INTERNAL MEDICINE

## 2025-06-02 PROCEDURE — 2500000001 HC RX 250 WO HCPCS SELF ADMINISTERED DRUGS (ALT 637 FOR MEDICARE OP)

## 2025-06-02 PROCEDURE — 96361 HYDRATE IV INFUSION ADD-ON: CPT

## 2025-06-02 PROCEDURE — 2500000004 HC RX 250 GENERAL PHARMACY W/ HCPCS (ALT 636 FOR OP/ED): Mod: JZ

## 2025-06-02 PROCEDURE — 96375 TX/PRO/DX INJ NEW DRUG ADDON: CPT | Mod: 59

## 2025-06-02 PROCEDURE — 80053 COMPREHEN METABOLIC PANEL: CPT | Performed by: INTERNAL MEDICINE

## 2025-06-02 PROCEDURE — 80307 DRUG TEST PRSMV CHEM ANLYZR: CPT | Performed by: PHYSICIAN ASSISTANT

## 2025-06-02 PROCEDURE — 84484 ASSAY OF TROPONIN QUANT: CPT | Performed by: INTERNAL MEDICINE

## 2025-06-02 PROCEDURE — 96372 THER/PROPH/DIAG INJ SC/IM: CPT | Performed by: INTERNAL MEDICINE

## 2025-06-02 PROCEDURE — 2500000001 HC RX 250 WO HCPCS SELF ADMINISTERED DRUGS (ALT 637 FOR MEDICARE OP): Performed by: INTERNAL MEDICINE

## 2025-06-02 PROCEDURE — 2500000002 HC RX 250 W HCPCS SELF ADMINISTERED DRUGS (ALT 637 FOR MEDICARE OP, ALT 636 FOR OP/ED): Performed by: PHYSICIAN ASSISTANT

## 2025-06-02 PROCEDURE — 99254 IP/OBS CNSLTJ NEW/EST MOD 60: CPT | Performed by: INTERNAL MEDICINE

## 2025-06-02 PROCEDURE — 93306 TTE W/DOPPLER COMPLETE: CPT

## 2025-06-02 PROCEDURE — 76705 ECHO EXAM OF ABDOMEN: CPT

## 2025-06-02 PROCEDURE — 76705 ECHO EXAM OF ABDOMEN: CPT | Performed by: RADIOLOGY

## 2025-06-02 PROCEDURE — 99233 SBSQ HOSP IP/OBS HIGH 50: CPT

## 2025-06-02 PROCEDURE — 96376 TX/PRO/DX INJ SAME DRUG ADON: CPT | Mod: 59

## 2025-06-02 PROCEDURE — 2500000002 HC RX 250 W HCPCS SELF ADMINISTERED DRUGS (ALT 637 FOR MEDICARE OP, ALT 636 FOR OP/ED): Performed by: INTERNAL MEDICINE

## 2025-06-02 PROCEDURE — 85027 COMPLETE CBC AUTOMATED: CPT | Performed by: INTERNAL MEDICINE

## 2025-06-02 PROCEDURE — 99254 IP/OBS CNSLTJ NEW/EST MOD 60: CPT | Performed by: NURSE PRACTITIONER

## 2025-06-02 PROCEDURE — 93306 TTE W/DOPPLER COMPLETE: CPT | Performed by: INTERNAL MEDICINE

## 2025-06-02 PROCEDURE — 80061 LIPID PANEL: CPT | Performed by: NURSE PRACTITIONER

## 2025-06-02 PROCEDURE — 36415 COLL VENOUS BLD VENIPUNCTURE: CPT | Performed by: INTERNAL MEDICINE

## 2025-06-02 RX ORDER — AMINOPHYLLINE 25 MG/ML
125 INJECTION, SOLUTION INTRAVENOUS AS NEEDED
Status: CANCELLED | OUTPATIENT
Start: 2025-06-02

## 2025-06-02 RX ORDER — HYDROXYZINE HYDROCHLORIDE 25 MG/1
50 TABLET, FILM COATED ORAL EVERY 4 HOURS PRN
Status: DISCONTINUED | OUTPATIENT
Start: 2025-06-02 | End: 2025-06-04 | Stop reason: HOSPADM

## 2025-06-02 RX ORDER — SERTRALINE HYDROCHLORIDE 50 MG/1
50 TABLET, FILM COATED ORAL
Status: DISCONTINUED | OUTPATIENT
Start: 2025-06-02 | End: 2025-06-04

## 2025-06-02 RX ORDER — SUCRALFATE 1 G/1
1 TABLET ORAL
Status: DISCONTINUED | OUTPATIENT
Start: 2025-06-02 | End: 2025-06-03

## 2025-06-02 RX ORDER — DICYCLOMINE HYDROCHLORIDE 10 MG/1
20 CAPSULE ORAL 4 TIMES DAILY PRN
Status: DISCONTINUED | OUTPATIENT
Start: 2025-06-02 | End: 2025-06-04 | Stop reason: HOSPADM

## 2025-06-02 RX ORDER — MIRTAZAPINE 15 MG/1
15 TABLET, FILM COATED ORAL NIGHTLY
Status: DISCONTINUED | OUTPATIENT
Start: 2025-06-02 | End: 2025-06-04

## 2025-06-02 RX ORDER — GABAPENTIN 300 MG/1
300 CAPSULE ORAL NIGHTLY
COMMUNITY
Start: 2025-05-12 | End: 2025-08-10

## 2025-06-02 RX ORDER — FAMOTIDINE 20 MG/1
20 TABLET, FILM COATED ORAL 2 TIMES DAILY
Status: DISCONTINUED | OUTPATIENT
Start: 2025-06-02 | End: 2025-06-04 | Stop reason: HOSPADM

## 2025-06-02 RX ORDER — PANTOPRAZOLE SODIUM 40 MG/1
40 TABLET, DELAYED RELEASE ORAL
Status: DISCONTINUED | OUTPATIENT
Start: 2025-06-03 | End: 2025-06-03

## 2025-06-02 RX ORDER — SERTRALINE HYDROCHLORIDE 50 MG/1
50 TABLET, FILM COATED ORAL
Status: ON HOLD | COMMUNITY
Start: 2025-05-20 | End: 2025-06-04 | Stop reason: ALTCHOICE

## 2025-06-02 RX ORDER — POTASSIUM CHLORIDE 1.5 G/1.58G
40 POWDER, FOR SOLUTION ORAL ONCE
Status: COMPLETED | OUTPATIENT
Start: 2025-06-02 | End: 2025-06-02

## 2025-06-02 RX ORDER — GABAPENTIN 300 MG/1
300 CAPSULE ORAL NIGHTLY
Status: DISCONTINUED | OUTPATIENT
Start: 2025-06-02 | End: 2025-06-04 | Stop reason: HOSPADM

## 2025-06-02 RX ADMIN — SUCRALFATE 1 G: 1 TABLET ORAL at 16:05

## 2025-06-02 RX ADMIN — MORPHINE SULFATE 2 MG: 2 INJECTION, SOLUTION INTRAMUSCULAR; INTRAVENOUS at 03:59

## 2025-06-02 RX ADMIN — MORPHINE SULFATE 2 MG: 2 INJECTION, SOLUTION INTRAMUSCULAR; INTRAVENOUS at 08:19

## 2025-06-02 RX ADMIN — GABAPENTIN 300 MG: 300 CAPSULE ORAL at 20:05

## 2025-06-02 RX ADMIN — FAMOTIDINE 20 MG: 20 TABLET, FILM COATED ORAL at 11:51

## 2025-06-02 RX ADMIN — SUCRALFATE 1 G: 1 TABLET ORAL at 11:51

## 2025-06-02 RX ADMIN — ENOXAPARIN SODIUM 40 MG: 40 INJECTION SUBCUTANEOUS at 01:42

## 2025-06-02 RX ADMIN — DIAZEPAM 5 MG: 5 INJECTION INTRAMUSCULAR; INTRAVENOUS at 01:41

## 2025-06-02 RX ADMIN — MIRTAZAPINE 15 MG: 15 TABLET, FILM COATED ORAL at 20:05

## 2025-06-02 RX ADMIN — MORPHINE SULFATE 4 MG: 4 INJECTION, SOLUTION INTRAMUSCULAR; INTRAVENOUS at 17:06

## 2025-06-02 RX ADMIN — POTASSIUM CHLORIDE 40 MEQ: 1.5 POWDER, FOR SOLUTION ORAL at 11:52

## 2025-06-02 RX ADMIN — ONDANSETRON 4 MG: 2 INJECTION INTRAMUSCULAR; INTRAVENOUS at 20:05

## 2025-06-02 RX ADMIN — SUCRALFATE 1 G: 1 TABLET ORAL at 20:05

## 2025-06-02 RX ADMIN — FAMOTIDINE 20 MG: 20 TABLET, FILM COATED ORAL at 20:05

## 2025-06-02 RX ADMIN — SODIUM CHLORIDE, SODIUM LACTATE, POTASSIUM CHLORIDE, AND CALCIUM CHLORIDE 100 ML/HR: .6; .31; .03; .02 INJECTION, SOLUTION INTRAVENOUS at 01:51

## 2025-06-02 RX ADMIN — PANTOPRAZOLE SODIUM 40 MG: 40 INJECTION, POWDER, FOR SOLUTION INTRAVENOUS at 05:32

## 2025-06-02 RX ADMIN — MORPHINE SULFATE 2 MG: 2 INJECTION, SOLUTION INTRAMUSCULAR; INTRAVENOUS at 11:16

## 2025-06-02 RX ADMIN — HYDROXYZINE HYDROCHLORIDE 50 MG: 25 TABLET ORAL at 12:02

## 2025-06-02 RX ADMIN — HYDROXYZINE HYDROCHLORIDE 50 MG: 25 TABLET ORAL at 16:04

## 2025-06-02 RX ADMIN — SERTRALINE HYDROCHLORIDE 50 MG: 50 TABLET, FILM COATED ORAL at 05:33

## 2025-06-02 RX ADMIN — HYDROXYZINE HYDROCHLORIDE 50 MG: 25 TABLET ORAL at 20:07

## 2025-06-02 SDOH — ECONOMIC STABILITY: FOOD INSECURITY: WITHIN THE PAST 12 MONTHS, THE FOOD YOU BOUGHT JUST DIDN'T LAST AND YOU DIDN'T HAVE MONEY TO GET MORE.: SOMETIMES TRUE

## 2025-06-02 SDOH — ECONOMIC STABILITY: INCOME INSECURITY: IN THE PAST 12 MONTHS HAS THE ELECTRIC, GAS, OIL, OR WATER COMPANY THREATENED TO SHUT OFF SERVICES IN YOUR HOME?: NO

## 2025-06-02 SDOH — ECONOMIC STABILITY: TRANSPORTATION INSECURITY: IN THE PAST 12 MONTHS, HAS LACK OF TRANSPORTATION KEPT YOU FROM MEDICAL APPOINTMENTS OR FROM GETTING MEDICATIONS?: NO

## 2025-06-02 SDOH — SOCIAL STABILITY: SOCIAL INSECURITY: WITHIN THE LAST YEAR, HAVE YOU BEEN AFRAID OF YOUR PARTNER OR EX-PARTNER?: NO

## 2025-06-02 SDOH — ECONOMIC STABILITY: HOUSING INSECURITY: IN THE PAST 12 MONTHS, HOW MANY TIMES HAVE YOU MOVED WHERE YOU WERE LIVING?: 0

## 2025-06-02 SDOH — SOCIAL STABILITY: SOCIAL INSECURITY: WITHIN THE LAST YEAR, HAVE YOU BEEN HUMILIATED OR EMOTIONALLY ABUSED IN OTHER WAYS BY YOUR PARTNER OR EX-PARTNER?: NO

## 2025-06-02 SDOH — SOCIAL STABILITY: SOCIAL INSECURITY: ARE YOU OR HAVE YOU BEEN THREATENED OR ABUSED PHYSICALLY, EMOTIONALLY, OR SEXUALLY BY ANYONE?: NO

## 2025-06-02 SDOH — ECONOMIC STABILITY: HOUSING INSECURITY: IN THE LAST 12 MONTHS, WAS THERE A TIME WHEN YOU WERE NOT ABLE TO PAY THE MORTGAGE OR RENT ON TIME?: NO

## 2025-06-02 SDOH — ECONOMIC STABILITY: FOOD INSECURITY: HOW HARD IS IT FOR YOU TO PAY FOR THE VERY BASICS LIKE FOOD, HOUSING, MEDICAL CARE, AND HEATING?: SOMEWHAT HARD

## 2025-06-02 SDOH — SOCIAL STABILITY: SOCIAL INSECURITY
WITHIN THE LAST YEAR, HAVE YOU BEEN RAPED OR FORCED TO HAVE ANY KIND OF SEXUAL ACTIVITY BY YOUR PARTNER OR EX-PARTNER?: NO

## 2025-06-02 SDOH — SOCIAL STABILITY: SOCIAL INSECURITY
WITHIN THE LAST YEAR, HAVE YOU BEEN KICKED, HIT, SLAPPED, OR OTHERWISE PHYSICALLY HURT BY YOUR PARTNER OR EX-PARTNER?: NO

## 2025-06-02 SDOH — ECONOMIC STABILITY: FOOD INSECURITY
WITHIN THE PAST 12 MONTHS, YOU WORRIED THAT YOUR FOOD WOULD RUN OUT BEFORE YOU GOT THE MONEY TO BUY MORE.: SOMETIMES TRUE

## 2025-06-02 SDOH — SOCIAL STABILITY: SOCIAL INSECURITY: HAS ANYONE EVER THREATENED TO HURT YOUR FAMILY OR YOUR PETS?: NO

## 2025-06-02 SDOH — ECONOMIC STABILITY: HOUSING INSECURITY: AT ANY TIME IN THE PAST 12 MONTHS, WERE YOU HOMELESS OR LIVING IN A SHELTER (INCLUDING NOW)?: NO

## 2025-06-02 SDOH — SOCIAL STABILITY: SOCIAL INSECURITY: DO YOU FEEL ANYONE HAS EXPLOITED OR TAKEN ADVANTAGE OF YOU FINANCIALLY OR OF YOUR PERSONAL PROPERTY?: NO

## 2025-06-02 SDOH — SOCIAL STABILITY: SOCIAL INSECURITY: HAVE YOU HAD THOUGHTS OF HARMING ANYONE ELSE?: NO

## 2025-06-02 SDOH — SOCIAL STABILITY: SOCIAL INSECURITY: ARE THERE ANY APPARENT SIGNS OF INJURIES/BEHAVIORS THAT COULD BE RELATED TO ABUSE/NEGLECT?: NO

## 2025-06-02 SDOH — SOCIAL STABILITY: SOCIAL INSECURITY: HAVE YOU HAD ANY THOUGHTS OF HARMING ANYONE ELSE?: NO

## 2025-06-02 SDOH — SOCIAL STABILITY: SOCIAL INSECURITY: ABUSE: ADULT

## 2025-06-02 SDOH — SOCIAL STABILITY: SOCIAL INSECURITY: WERE YOU ABLE TO COMPLETE ALL THE BEHAVIORAL HEALTH SCREENINGS?: YES

## 2025-06-02 SDOH — SOCIAL STABILITY: SOCIAL INSECURITY: DO YOU FEEL UNSAFE GOING BACK TO THE PLACE WHERE YOU ARE LIVING?: NO

## 2025-06-02 SDOH — SOCIAL STABILITY: SOCIAL INSECURITY: DOES ANYONE TRY TO KEEP YOU FROM HAVING/CONTACTING OTHER FRIENDS OR DOING THINGS OUTSIDE YOUR HOME?: NO

## 2025-06-02 ASSESSMENT — LIFESTYLE VARIABLES
HOW MANY STANDARD DRINKS CONTAINING ALCOHOL DO YOU HAVE ON A TYPICAL DAY: PATIENT DOES NOT DRINK
SKIP TO QUESTIONS 9-10: 1
HOW OFTEN DO YOU HAVE 6 OR MORE DRINKS ON ONE OCCASION: NEVER
AUDIT-C TOTAL SCORE: 0
PRESCIPTION_ABUSE_PAST_12_MONTHS: NO
AUDIT-C TOTAL SCORE: 0
SUBSTANCE_ABUSE_PAST_12_MONTHS: YES
HOW OFTEN DO YOU HAVE A DRINK CONTAINING ALCOHOL: NEVER

## 2025-06-02 ASSESSMENT — PATIENT HEALTH QUESTIONNAIRE - PHQ9
2. FEELING DOWN, DEPRESSED OR HOPELESS: NOT AT ALL
1. LITTLE INTEREST OR PLEASURE IN DOING THINGS: NOT AT ALL
SUM OF ALL RESPONSES TO PHQ9 QUESTIONS 1 & 2: 0

## 2025-06-02 ASSESSMENT — COGNITIVE AND FUNCTIONAL STATUS - GENERAL
DAILY ACTIVITIY SCORE: 24
MOBILITY SCORE: 24
PATIENT BASELINE BEDBOUND: NO

## 2025-06-02 ASSESSMENT — ACTIVITIES OF DAILY LIVING (ADL)
FEEDING YOURSELF: INDEPENDENT
GROOMING: INDEPENDENT
LACK_OF_TRANSPORTATION: NO
DRESSING YOURSELF: INDEPENDENT
WALKS IN HOME: INDEPENDENT
JUDGMENT_ADEQUATE_SAFELY_COMPLETE_DAILY_ACTIVITIES: YES
LACK_OF_TRANSPORTATION: NO
BATHING: INDEPENDENT
HEARING - LEFT EAR: FUNCTIONAL
PATIENT'S MEMORY ADEQUATE TO SAFELY COMPLETE DAILY ACTIVITIES?: YES
ADEQUATE_TO_COMPLETE_ADL: YES
HEARING - RIGHT EAR: FUNCTIONAL
TOILETING: INDEPENDENT

## 2025-06-02 ASSESSMENT — PAIN DESCRIPTION - DESCRIPTORS: DESCRIPTORS: SORE

## 2025-06-02 ASSESSMENT — PAIN DESCRIPTION - ORIENTATION: ORIENTATION: UPPER

## 2025-06-02 ASSESSMENT — PAIN SCALES - GENERAL
PAINLEVEL_OUTOF10: 8
PAINLEVEL_OUTOF10: 5 - MODERATE PAIN
PAINLEVEL_OUTOF10: 0 - NO PAIN

## 2025-06-02 ASSESSMENT — PAIN - FUNCTIONAL ASSESSMENT
PAIN_FUNCTIONAL_ASSESSMENT: 0-10

## 2025-06-02 ASSESSMENT — PAIN DESCRIPTION - LOCATION: LOCATION: ABDOMEN

## 2025-06-02 NOTE — CONSULTS
Inpatient consult to Cardiology  Consult performed by: SALVADOR Chung  Consult ordered by: MARYA Miles-CNP  Reason for consult: elevated troponin                                                          Date:   6/2/2025  Patient name:  Neela Elaine  Date of admission:  6/1/2025  6:43 PM  MRN:   [id number]  YOB: 1980  Time of Consult:  9:40 AM    Consulting Cardiologist/AFIA: MARYA Zaman, CNP  Primary Cardiologist:  Dr. Canales  Referring Provider: Dr. Perry      Admission Diagnosis:     Intractable nausea and vomiting      History of Present Illness:     45-year female with past medical history of GERD, anemia, depression, generalized anxiety disorder, colon polyps, gastritis without bleeding, nausea, kyphoscoliosis and pyelonephritis who presented to [facility] emergency department with complaints of ongoing abdominal pain for the past few weeks now.  Reports that she has had sharp epigastric pain associated with intractable nausea and episodes of vomiting described as nonbilious and nonbloody.  She reports that she has had vomiting episodes most of the day for the past week.  She was seen at multiple facilities for the same reason.  Recently had an EGD that confirmed gastritis.  She denies any shortness of breath, diarrhea, fever cough or palpitations.  He denies any overt chest pain or chest pressure.  She has no history of prior cardiac disease.  She previously used nicotine but denies any smoking.  She does smoke marijuana on daily basis.  She denies any alcohol use.  In the emergency department she was awake and alert and oriented with no acute distress.  Blood pressure was significantly elevated at 179/97.  Lab work showed no leukocytosis.  She was mildly hypokalemic with a potassium of 3.3.  Troponin has been chronically elevated in the 50 and 60 range but remains flat and is currently  downtrending.  Recent urinalysis shows negative for UTI.  CT abdomen showed right neck still cystic lesion possibly paraovarian or hydrosalpinx.  A pelvic ultrasound which was recommended which she is currently undergoing.  She has some minor changes in the transverse and descending colon possible chronic inflammation.  EKG in the emergency department showed normal sinus rhythm with sinus arrhythmia.  No obvious ST or T wave abnormalities.  No STEMI criteria.  The patient was admitted to the medicine service.  General cardiology was consulted due to elevated troponin.      Previous Cardiac Testing:  I personally reviewed previous cardiac testing and agree with the current findings.    Echocardiogram:   Recent Labs     07/11/24  1327   EF 63   LVIDD 3.71   Transthoracic Echo (TTE) Complete 07/11/2024    [address], [address], [place] [address]  Tel [phone] Fax [phone]    TRANSTHORACIC ECHOCARDIOGRAM REPORT    Patient Name:      MAKENZIE CHRISTIANSEN      Reading Physician:    [id number] Rosalio Ghosh MD, [facility]  Study Date:        7/11/2024            Ordering Provider:    [id number] RONIT LYONS  MRN/PID:           [id number]             Fellow:  Accession#:        [id number]         Nurse:  Date of Birth/Age: 1980 / 44 years Sonographer:          Zeynep Edwards RDCS, RDMS, RVT  Gender:            F                    Additional Staff:  Height:            165.10 cm            Admit Date:  Weight:            60.78 kg             Admission Status:     Outpatient  BSA / BMI:         1.67 m2 / 22.30      Department Location:  [facility]  kg/[address]  Blood Pressure: 116 /72 mmHg    Study Type:    TRANSTHORACIC ECHO (TTE) COMPLETE  Diagnosis/ICD: Chest pain, unspecified-R07.9; Elevated Troponin-R79.89  Indication:    CP, Elevated Troponin  CPT Codes:     Echo  Complete w Full [id number]  Study Detail: The following Echo studies were performed: 2D, M-Mode, Doppler and  color flow.      PHYSICIAN INTERPRETATION:  Left Ventricle: Left ventricular ejection fraction is normal, by visual estimate at 60-65%. There are no regional wall motion abnormalities. The left ventricular cavity size is normal. The left ventricular septal wall thickness is normal. There is normal left ventricular posterior wall thickness. Spectral Doppler shows a normal pattern of left ventricular diastolic filling.  Left Atrium: The left atrium is normal in size. Left atrial volume index 15.9 mL/m2.  Right Ventricle: The right ventricle is normal in size. There is normal right ventricular global systolic function.  Right Atrium: The right atrium is normal in size.  Aortic Valve: The aortic valve appears structurally normal. The aortic valve dimensionless index is 0.73. There is no evidence of aortic valve regurgitation. The peak instantaneous gradient of the aortic valve is 7.4 mmHg. The mean gradient of the aortic valve is 5.0 mmHg.  Mitral Valve: The mitral valve is normal in structure. There is trace mitral valve regurgitation. Trivial mitral regurgitation.  Tricuspid Valve: The tricuspid valve is structurally normal. There is trace tricuspid regurgitation. The right ventricular systolic pressure is unable to be estimated. Trivial tricuspid regurgitation.  Pulmonic Valve: The pulmonic valve is structurally normal. There is trace pulmonic valve regurgitation.  Pericardium: There is no pericardial effusion noted.  Aorta: The aortic root is normal.  Systemic Veins: The inferior vena cava appears to be of normal size. There is IVC inspiratory collapse greater than 50%.      CONCLUSIONS:  1. Left ventricular ejection fraction is normal, by visual estimate at 60-65%.  2. There is normal right ventricular global systolic function.  3. Trivial mitral regurgitation.  4. Trivial tricuspid regurgitation.  5. No  previous available for comparison.    QUANTITATIVE DATA SUMMARY:  2D MEASUREMENTS:  Normal Ranges:  Ao Root d:     2.70 cm   (2.0-3.7cm)  LAs:           2.70 cm   (2.7-4.0cm)  RVIDd:         1.92 cm   (0.9-3.6cm)  IVSd:          0.92 cm   (0.6-1.1cm)  LVPWd:         1.07 cm   (0.6-1.1cm)  LVIDd:         3.71 cm   (3.9-5.9cm)  LVIDs:         2.73 cm  LV Mass Index: 67.0 g/m2  LV % FS        26.4 %    AORTA MEASUREMENTS:  Normal Ranges:  Asc Ao, d: 2.60 cm (2.1-3.4cm)    LV SYSTOLIC FUNCTION BY 2D PLANIMETRY (MOD):  Normal Ranges:  EF-A4C View:    54 % (>=55%)  EF-A2C View:    75 %  EF-Biplane:     67 %  EF-Visual:      63 %  LV EF Reported: 63 %    LV DIASTOLIC FUNCTION:  Normal Ranges:  MV Peak E:    0.85 m/s  (0.7-1.2 m/s)  MV Peak A:    0.65 m/s  (0.42-0.7 m/s)  E/A Ratio:    1.30      (1.0-2.2)  MV e'         0.134 m/s (>8.0)  MV lateral e' 0.16 m/s  MV medial e'  0.11 m/s  E/e' Ratio:   6.34      (<8.0)    MITRAL VALVE:  Normal Ranges:  MV DT: 169 msec (150-240msec)    AORTIC VALVE:  Normal Ranges:  AoV Vmax:                1.36 m/s (<=1.7m/s)  AoV Peak P.4 mmHg (<20mmHg)  AoV Mean P.0 mmHg (1.7-11.5mmHg)  LVOT Max Ron:            0.91 m/s (<=1.1m/s)  AoV VTI:                 29.30 cm (18-25cm)  LVOT VTI:                21.40 cm  LVOT Diameter:           1.80 cm  (1.8-2.4cm)  AoV Area, VTI:           1.86 cm2 (2.5-5.5cm2)  AoV Area,Vmax:           1.70 cm2 (2.5-4.5cm2)  AoV Dimensionless Index: 0.73    PULMONIC VALVE:  Normal Ranges:  PV Max Ron: 0.7 m/s  (0.6-0.9m/s)  PV Max P.9 mmHg      [id number] Rosalio Ghosh MD, FACC  Electronically signed on 2024 at 10:55:27 AM        ** Final **    Coronary Angiography: No results found for this or any previous visit from the past 1800 days.    Nuclear:No results found for this or any previous visit from the past 1800 days.          Allergies:     Allergies[1]      Past Medical History:     See above    Past Surgical History:       SECTION     x2    UPPER GASTROINTESTINAL ENDOSCOPY 2020 N/A EGD ESOPHAGOGASTRODUODENOSCOPY performed by Greg Hyman MD at [facility]    TUBAL LIGATION 2004 - 2004 Bilateral in [place]    COLONOSCOPY         DILATION AND CURETTAGE OF UTERUS 3/27/2023 N/A DILATATION AND CURETTAGE, Novasure Ablation, Hysteroscopy performed by Joe Almaguer DO at [facility]    UPPER GASTROINTESTINAL ENDOSCOPY 2025 N/A ESOPHAGOGASTRODUODENOSCOPY WITH BIOPSIES performed by Camryn Mcclain MD at [facility]        Family History:     Medical History Relation Name Comments   No Known Problems Brother x1     Asthma Child 1 son/1 daughter asthma (daughter)   Unknown Father       High Blood Pressure Mother       Breast Cancer Neg Hx       Colon Cancer Neg Hx          Social History:     Tobacco Use Types Packs/Day Years Used Date   Smoking Tobacco: Former Cigarettes 0.3 2 2011 -    Smokeless Tobacco: Never             Social History  Alcohol Use Standard Drinks/Week Comments   Not Currently 0 (1 standard drink = 0.6 oz pure alcohol) occassional         CURRENT INPATIENT MEDICATIONS    Scheduled Medications[2]  Continuous Medications[3]  Current Outpatient Medications   Medication Instructions    ascorbic acid (VITAMIN C) 100 mg, Daily RT    dicyclomine (BENTYL) 20 mg, oral, 4 times daily PRN, As needed for abdominal pain    famotidine (PEPCID) 20 mg, oral, 2 times daily    gabapentin (NEURONTIN) 300 mg, Nightly    hydrOXYzine HCL (ATARAX) 50 mg, oral, Every 4 hours PRN    LORazepam (ATIVAN) 1 mg, oral, 3 times daily PRN    mirtazapine (REMERON) 15 mg, Nightly    omeprazole (PRILOSEC) 20 mg, oral, Daily before breakfast    ondansetron ODT (ZOFRAN-ODT) 4 mg, oral, Every 8 hours PRN    pantoprazole (PROTONIX) 20 mg, oral, Daily, Do not crush, chew, or split.    sertraline (ZOLOFT) 50 mg, Daily RT    sucralfate (CARAFATE) 1 g, oral, 4 times daily before meals and nightly     "    Review of Systems:      12 point review of systems was obtained in detail and is negative other than that detailed above.    Vital Signs:     Vitals:    06/01/25 1842 06/01/25 2132 06/02/25 0125 06/02/25 0729   BP: (!) 179/97 171/84 130/83 124/70   Pulse: 90 85 78 74   Resp: 18 14 13 16   Temp: 37.1 °C (98.8 °F) 36.8 °C (98.2 °F) 37.2 °C (99 °F) 36.4 °C (97.5 °F)   TempSrc:  Temporal     SpO2: 99% 98% 98% 97%   Weight: 59 kg (130 lb)      Height: 1.626 m (5' 4\")          Intake/Output Summary (Last 24 hours) at 6/2/2025 0940  Last data filed at 6/2/2025 0544  Gross per 24 hour   Intake 273.33 ml   Output 400 ml   Net -126.67 ml       Wt Readings from Last 4 Encounters:   06/01/25 59 kg (130 lb)   05/31/25 59 kg (130 lb)   05/30/25 59 kg (130 lb)   05/18/25 59 kg (130 lb)       Physical Examination:     Physical Exam  Vitals and nursing note reviewed.   HENT:      Head: Normocephalic.      Mouth/Throat:      Mouth: Mucous membranes are moist.   Eyes:      Pupils: Pupils are equal, round, and reactive to light.   Cardiovascular:      Rate and Rhythm: Normal rate and regular rhythm.      Pulses: Normal pulses.   Pulmonary:      Effort: Pulmonary effort is normal.   Abdominal:      General: Bowel sounds are normal.      Palpations: Abdomen is soft.      Tenderness: There is abdominal tenderness.   Musculoskeletal:         General: Normal range of motion.   Skin:     General: Skin is warm and dry.      Capillary Refill: Capillary refill takes less than 2 seconds.   Neurological:      Mental Status: She is alert and oriented to person, place, and time.   Psychiatric:         Mood and Affect: Mood normal.           Lab:     CBC:   Results from last 7 days   Lab Units 06/02/25  0637 06/01/25  1909 05/31/25  0921   WBC AUTO x10*3/uL 8.1 10.4 7.9   RBC AUTO x10*6/uL 4.54 4.85 5.08   HEMOGLOBIN g/dL 12.3 13.3 13.7   HEMATOCRIT % 36.5 37.7 39.5   MCV fL 80 78* 78*   MCH pg 27.1 27.4 27.0   MCHC g/dL 33.7 35.3 34.7   RDW % " 13.9 13.7 14.0   PLATELETS AUTO x10*3/uL 294 337 332     CMP:    Results from last 7 days   Lab Units 06/02/25  0637 06/01/25  1909 05/31/25  0921   SODIUM mmol/L 138 137 140   POTASSIUM mmol/L 3.5 3.3* 3.8   CHLORIDE mmol/L 106 104 105   CO2 mmol/L 24 19* 26   BUN mg/dL 11 12 12   CREATININE mg/dL 0.71 0.68 0.86   GLUCOSE mg/dL 76 91 96   PROTEIN TOTAL g/dL 7.2 8.3* 8.0   CALCIUM mg/dL 8.7 9.3 9.6   BILIRUBIN TOTAL mg/dL 0.7 0.8 0.9   ALK PHOS U/L 65 74 74   AST U/L 22 28 12   ALT U/L 9 11 10     BMP:    Results from last 7 days   Lab Units 06/02/25  0637 06/01/25  1909 05/31/25  0921   SODIUM mmol/L 138 137 140   POTASSIUM mmol/L 3.5 3.3* 3.8   CHLORIDE mmol/L 106 104 105   CO2 mmol/L 24 19* 26   BUN mg/dL 11 12 12   CREATININE mg/dL 0.71 0.68 0.86   CALCIUM mg/dL 8.7 9.3 9.6   GLUCOSE mg/dL 76 91 96     Magnesium:  Results from last 7 days   Lab Units 05/31/25  0002   MAGNESIUM mg/dL 1.77     Troponin:    Results from last 7 days   Lab Units 06/02/25  0637 06/01/25  1909 05/31/25  0133   TROPHS ng/L 57* 60* 56*     BNP:     Lipid Panel:         Diagnostic Studies:   @No results found for this or any previous visit.    Results for orders placed during the hospital encounter of 07/11/24    Transthoracic Echo Complete    Narrative  [address], [address], [place] [address]  Tel [phone] Fax [phone]    TRANSTHORACIC ECHOCARDIOGRAM REPORT    Patient Name:      MAKENZIE CHRISTIANSENDON Greene Physician:    [id number] Rosalio Ghosh MD, [facility]  Study Date:        7/11/2024            Ordering Provider:    [id number] RONIT LYONS  MRN/PID:           [id number]             Fellow:  Accession#:        [id number]         Nurse:  Date of Birth/Age: 1980 / 44 years Sonographer:          Zeynep Edwards  RDCS, RDMS, RVT  Gender:            F                    Additional Staff:  Height:            165.10 cm            Admit  Date:  Weight:            60.78 kg             Admission Status:     Outpatient  BSA / BMI:         1.67 m2 / 22.30      Department Location:  [facility]  kg/m2                                      Sury Molina  Blood Pressure: 116 /72 mmHg    Study Type:    TRANSTHORACIC ECHO (TTE) COMPLETE  Diagnosis/ICD: Chest pain, unspecified-R07.9; Elevated Troponin-R79.89  Indication:    CP, Elevated Troponin  CPT Codes:     Echo Complete w Full Doppler-[id number]  Study Detail: The following Echo studies were performed: 2D, M-Mode, Doppler and  color flow.      PHYSICIAN INTERPRETATION:  Left Ventricle: Left ventricular ejection fraction is normal, by visual estimate at 60-65%. There are no regional wall motion abnormalities. The left ventricular cavity size is normal. The left ventricular septal wall thickness is normal. There is normal left ventricular posterior wall thickness. Spectral Doppler shows a normal pattern of left ventricular diastolic filling.  Left Atrium: The left atrium is normal in size. Left atrial volume index 15.9 mL/m2.  Right Ventricle: The right ventricle is normal in size. There is normal right ventricular global systolic function.  Right Atrium: The right atrium is normal in size.  Aortic Valve: The aortic valve appears structurally normal. The aortic valve dimensionless index is 0.73. There is no evidence of aortic valve regurgitation. The peak instantaneous gradient of the aortic valve is 7.4 mmHg. The mean gradient of the aortic valve is 5.0 mmHg.  Mitral Valve: The mitral valve is normal in structure. There is trace mitral valve regurgitation. Trivial mitral regurgitation.  Tricuspid Valve: The tricuspid valve is structurally normal. There is trace tricuspid regurgitation. The right ventricular systolic pressure is unable to be estimated. Trivial tricuspid regurgitation.  Pulmonic Valve: The pulmonic valve is structurally normal. There is trace pulmonic valve regurgitation.  Pericardium: There  is no pericardial effusion noted.  Aorta: The aortic root is normal.  Systemic Veins: The inferior vena cava appears to be of normal size. There is IVC inspiratory collapse greater than 50%.      CONCLUSIONS:  1. Left ventricular ejection fraction is normal, by visual estimate at 60-65%.  2. There is normal right ventricular global systolic function.  3. Trivial mitral regurgitation.  4. Trivial tricuspid regurgitation.  5. No previous available for comparison.    QUANTITATIVE DATA SUMMARY:  2D MEASUREMENTS:  Normal Ranges:  Ao Root d:     2.70 cm   (2.0-3.7cm)  LAs:           2.70 cm   (2.7-4.0cm)  RVIDd:         1.92 cm   (0.9-3.6cm)  IVSd:          0.92 cm   (0.6-1.1cm)  LVPWd:         1.07 cm   (0.6-1.1cm)  LVIDd:         3.71 cm   (3.9-5.9cm)  LVIDs:         2.73 cm  LV Mass Index: 67.0 g/m2  LV % FS        26.4 %    AORTA MEASUREMENTS:  Normal Ranges:  Asc Ao, d: 2.60 cm (2.1-3.4cm)    LV SYSTOLIC FUNCTION BY 2D PLANIMETRY (MOD):  Normal Ranges:  EF-A4C View:    54 % (>=55%)  EF-A2C View:    75 %  EF-Biplane:     67 %  EF-Visual:      63 %  LV EF Reported: 63 %    LV DIASTOLIC FUNCTION:  Normal Ranges:  MV Peak E:    0.85 m/s  (0.7-1.2 m/s)  MV Peak A:    0.65 m/s  (0.42-0.7 m/s)  E/A Ratio:    1.30      (1.0-2.2)  MV e'         0.134 m/s (>8.0)  MV lateral e' 0.16 m/s  MV medial e'  0.11 m/s  E/e' Ratio:   6.34      (<8.0)    MITRAL VALVE:  Normal Ranges:  MV DT: 169 msec (150-240msec)    AORTIC VALVE:  Normal Ranges:  AoV Vmax:                1.36 m/s (<=1.7m/s)  AoV Peak P.4 mmHg (<20mmHg)  AoV Mean P.0 mmHg (1.7-11.5mmHg)  LVOT Max Ron:            0.91 m/s (<=1.1m/s)  AoV VTI:                 29.30 cm (18-25cm)  LVOT VTI:                21.40 cm  LVOT Diameter:           1.80 cm  (1.8-2.4cm)  AoV Area, VTI:           1.86 cm2 (2.5-5.5cm2)  AoV Area,Vmax:           1.70 cm2 (2.5-4.5cm2)  AoV Dimensionless Index: 0.73    PULMONIC VALVE:  Normal Ranges:  PV Max Ron: 0.7 m/s   (0.6-0.9m/s)  PV Max P.9 mmHg      [id number] Rosalio Ghosh MD, [facility]  Electronically signed on 2024 at 10:55:27 AM        ** Final **          Radiology:     CT abdomen pelvis w IV contrast   Final Result   1. No definite acute abdominopelvic abnormality.   2. Tubular low-density right adnexal region which is increased in   size from 08/15/2024, currently measuring 3.7 by 2 cm in the axial   plane, previously 2.6 x 1.6 cm. The finding may reflect a paraovarian   cyst, hydrosalpinx, or other cystic lesion. Pelvic ultrasound may be   considered for further evaluation   3. Mild scattered submucosal fat deposition/wall thickening within   the transverse and descending colon which appears similar to prior   exams, which may be secondary to chronic inflammation among other   processes. A component of mild superimposed colitis could be present.        Signed by: Jeremiah Espinoza 2025 9:33 PM   Dictation workstation:   [id number]      Transthoracic Echo Complete    (Results Pending)   US right upper quadrant    (Results Pending)       Problem List:     Problem List[4]    Assessment:   NSTEMI likely type II  Chronic abdominal pain  GERD  Generalized anxiety disorder  Remote history of gastritis  Hypokalemia  Elevated troponin      Plan:   Admit to medicine.  Remains on telemetry.  Telemetry shows normal sinus rhythm.  Reviewed EKGs which showed normal sinus rhythm with sinus arrhythmia but no obvious ST or T wave abnormalities.  Supplemental O2  Monitor electrolytes, keep potassium greater than 4 and magnesium greater than 2  Reviewed lipid panel, unremarkable  Troponin remains elevated and flat pattern most likely NSTEMI type II secondary to nausea, vomiting and chronic pain.  She did have an echocardiogram less than a year ago which was unremarkable.  Will obtain echocardiogram this visit along with nuclear stress test.  She has had chronically elevated troponins for some time.  I do not believe she needs  invasive testing so we will proceed with noninvasive nuclear stress test and echocardiogram to further assess for any coronary blockages versus valvular abnormalities or LV dysfunction due to her minimally elevated troponin.  Will benefit from GI evaluation  Advised to stop smoking marijuana  Pain control  If stress testing and echocardiogram are unremarkable patient can be discharged from general cardiology perspective.  Will continue to follow along    I have reviewed all medications, laboratory results, and imaging pertinent for today's encounter     Raudel Mares [facility]  Adult Gerontology Acute Care Nurse Practitioner  [facility] [place]   [facility]  [phone]    Of note, this documentation is completed using the Dragon Dictation system (voice recognition software). There may be spelling and/or grammatical errors that were not corrected prior to final submission.    Electronically signed by MARYA Chung-CNP, on 6/2/2025 at 9:40 AM     I have personally interviewed and examined the patient.   I have personally and independently reviewed labs and diagnostic testing.  I have personally verified the elements of the history and physical listed above and changes, if any, are noted.   I have personally reviewed the assessment and plan as documented by MEKA Leonard, CNP and concur.    In summary, Mrs. Luís Elaine does not have any history of atherosclerotic heart or valvular heart disease.  An echocardiogram July 11, 2024 showed an estimated LV ejection fraction of 60 to 65%.  Normal valvular structure and function.  She quit smoking tobacco.  She smokes marijuana on a daily basis.  No reported history of hypertension, hyperlipidemia, or diabetes mellitus.  She has a history of gastroesophageal reflux disease.  She has a history of longstanding intermittent nausea, vomiting, and abdominal discomfort.  She presented to the  emergency department last evening with ongoing abdominal discomfort.  She describes sharp pain in the upper abdominal and lower epigastric region.  She has had intractable episodes of nausea and vomiting.  She has been seen at multiple facilities.  A recent upper endoscopy did show some gastritis.  She denies any chest pain/pressure or shortness of breath.  Initial vital signs in the emergency department were normal with exception of elevated blood pressure 179/97 mmHg.  CBC and CMP normal with exception of potassium 3.3.  She has chronic mild elevations of troponin levels in the 50-60 range.  EKG shows normal sinus rhythm without acute ST changes.  Urinalysis negative for UTI.  She was admitted to telemetry.  GI evaluation is in progress.  She currently is not able to provide much history as she has ongoing nausea and states she is miserable with abdominal discomfort.  In light of persistent mild elevation of troponins I believe she would benefit from an evaluation to exclude any underlying ischemic heart disease.  Arrangements will be made for eventual Lexiscan myocardial perfusion study.  She is unable to ambulate on a treadmill.  Further recommendations pending these results.         [1]   Allergies  Allergen Reactions    Prochlorperazine Unknown and Anaphylaxis    Phenergan [Promethazine] Other    Reglan [Metoclopramide Hcl] Other    Aripiprazole Nausea/vomiting and Nausea And Vomiting     Patient received one dose of Abilify and began vomiting within 2 hours. The vomiting resolved over the next 24 hours and Abilify was discontinued.    Fluoxetine GI Upset and Nausea And Vomiting     nausea    Haldol [Haloperidol] Anxiety and Other   [2] alum-mag hydroxide-simeth, 30 mL, oral, Once  enoxaparin, 40 mg, subcutaneous, q24h  famotidine, 20 mg, oral, BID  gabapentin, 300 mg, oral, Nightly  lidocaine, 15 mL, oral, Once  mirtazapine, 15 mg, oral, Nightly  [START ON 6/3/2025] pantoprazole, 40 mg, oral, Daily before  breakfast  pantoprazole, 40 mg, intravenous, BID  potassium chloride, 40 mEq, oral, Once  sertraline, 50 mg, oral, Daily  sucralfate, 1 g, oral, Before meals & nightly  [3] lactated Ringer's, 100 mL/hr, Last Rate: 100 mL/hr (06/02/25 6081)  [4]   Patient Active Problem List  Diagnosis    Abdominal pain    Elevated troponin level    Chest pain    GERD (gastroesophageal reflux disease)    BMI 22.0-22.9, adult    Never smoked cigarettes    Pyelonephritis    Flank pain    Intractable nausea and vomiting    Elevated troponin    Chronic superficial gastritis without bleeding    Epigastric pain    Cyclic vomiting syndrome    Marijuana use    Right upper quadrant pain

## 2025-06-02 NOTE — H&P
History Of Present Illness  Neela Elaine is a 45 y.o. female presenting with abdominal pain that is going on for several weeks now.  Patient reported sharp epigastric pain associated with intractable nausea and episodes of vomiting described as nonbilious and nonbloody.  She describes daily abdominal pain that is wax and wane during the day as well as worse when she wakes up in the morning.  She had episode of vomiting most of the day over the last week.  Patient was seen at different facilities for the same reason.  Patient did have EGD that confirmed gastritis.  Patient denies shortness of breath, diarrhea, fever, or cough.  No prior history of cardiac disease.  She denies abnormal alcohol use or illicit drug use.  Also denies smoking.  However she does smoke marijuana on daily basis and the last use was 2 days ago.    ER course: Patient was awake, alert, oriented, no acute distress.  She looked ill.  Her vitals shows elevated blood pressure 179/97.  Labs shows no leukocytosis, and has mild hypokalemia 3.3.  Patient also has elevated troponin at 60 closer to 2 days ago which was 55 and 56.  She did have recent urine analysis which was negative for UTI.  CT abdomen today was obtained showed right neck still cystic lesion possibly paraovarian or hydrosalpinx.  Pelvic ultrasound was recommended.  Patient also has some changes in the transverse and descending colon possible chronic inflammation.  Patient EKG was normal sinus rhythm no evidence of acute ischemic changes.  Patient is admitted to medicine for further workup and management of intractable nausea and abdominal pain.      Past Medical History  She has a past medical history of Acid reflux and Gastritis.    Surgical History  She has a past surgical history that includes  section, classic (1999) and  section, classic (10/21/2003).     Social History  She reports that she has never smoked. She has never been exposed to tobacco  smoke. She has never used smokeless tobacco. She reports current drug use. Drug: Marijuana. She reports that she does not drink alcohol.    Family History  Family History[1]     Allergies  Prochlorperazine, Phenergan [promethazine], Reglan [metoclopramide hcl], Aripiprazole, Fluoxetine, and Haldol [haloperidol]    Review of Systems  10/10 points review of system were conducted, negative except as above.    Physical Exam  Constitutional:       Appearance: She is normal weight. She is ill-appearing. She is not toxic-appearing or diaphoretic.   HENT:      Head: Normocephalic and atraumatic.      Nose: Nose normal. No congestion.      Mouth/Throat:      Mouth: Mucous membranes are dry.   Eyes:      General: No scleral icterus.     Extraocular Movements: Extraocular movements intact.      Conjunctiva/sclera: Conjunctivae normal.      Pupils: Pupils are equal, round, and reactive to light.   Cardiovascular:      Rate and Rhythm: Normal rate and regular rhythm.      Pulses: Normal pulses.      Heart sounds: No murmur heard.  Pulmonary:      Effort: No respiratory distress.      Breath sounds: No stridor. No wheezing, rhonchi or rales.   Chest:      Chest wall: No tenderness.   Abdominal:      General: There is no distension.      Palpations: There is no mass.      Tenderness: There is abdominal tenderness. There is guarding. There is no right CVA tenderness, left CVA tenderness or rebound.      Hernia: No hernia is present.   Musculoskeletal:         General: No swelling, tenderness, deformity or signs of injury.      Cervical back: Normal range of motion and neck supple. No rigidity or tenderness.      Right lower leg: No edema.      Left lower leg: No edema.   Skin:     General: Skin is warm and dry.      Coloration: Skin is not jaundiced or pale.      Findings: No lesion or rash.   Neurological:      General: No focal deficit present.      Mental Status: She is alert and oriented to person, place, and time. Mental  "status is at baseline.   Psychiatric:         Mood and Affect: Mood normal.         Behavior: Behavior normal.          Last Recorded Vitals  Blood pressure 171/84, pulse 85, temperature 36.8 °C (98.2 °F), temperature source Temporal, resp. rate 14, height 1.626 m (5' 4\"), weight 59 kg (130 lb), SpO2 98%.    Relevant Results  Scheduled medications  Scheduled Medications[2]  Continuous medications  Continuous Medications[3]  PRN medications  PRN Medications[4]       Results for orders placed or performed during the hospital encounter of 06/01/25 (from the past 24 hours)   CBC and Auto Differential   Result Value Ref Range    WBC 10.4 4.4 - 11.3 x10*3/uL    nRBC 0.0 0.0 - 0.0 /100 WBCs    RBC 4.85 4.00 - 5.20 x10*6/uL    Hemoglobin 13.3 12.0 - 16.0 g/dL    Hematocrit 37.7 36.0 - 46.0 %    MCV 78 (L) 80 - 100 fL    MCH 27.4 26.0 - 34.0 pg    MCHC 35.3 32.0 - 36.0 g/dL    RDW 13.7 11.5 - 14.5 %    Platelets 337 150 - 450 x10*3/uL    Neutrophils % 67.4 40.0 - 80.0 %    Immature Granulocytes %, Automated 0.2 0.0 - 0.9 %    Lymphocytes % 22.7 13.0 - 44.0 %    Monocytes % 7.6 2.0 - 10.0 %    Eosinophils % 1.8 0.0 - 6.0 %    Basophils % 0.3 0.0 - 2.0 %    Neutrophils Absolute 6.99 1.20 - 7.70 x10*3/uL    Immature Granulocytes Absolute, Automated 0.02 0.00 - 0.70 x10*3/uL    Lymphocytes Absolute 2.36 1.20 - 4.80 x10*3/uL    Monocytes Absolute 0.79 0.10 - 1.00 x10*3/uL    Eosinophils Absolute 0.19 0.00 - 0.70 x10*3/uL    Basophils Absolute 0.03 0.00 - 0.10 x10*3/uL   Comprehensive metabolic panel   Result Value Ref Range    Glucose 91 74 - 99 mg/dL    Sodium 137 136 - 145 mmol/L    Potassium 3.3 (L) 3.5 - 5.3 mmol/L    Chloride 104 98 - 107 mmol/L    Bicarbonate 19 (L) 21 - 32 mmol/L    Anion Gap 17 10 - 20 mmol/L    Urea Nitrogen 12 6 - 23 mg/dL    Creatinine 0.68 0.50 - 1.05 mg/dL    eGFR >90 >60 mL/min/1.73m*2    Calcium 9.3 8.6 - 10.3 mg/dL    Albumin 4.5 3.4 - 5.0 g/dL    Alkaline Phosphatase 74 33 - 110 U/L    Total " Protein 8.3 (H) 6.4 - 8.2 g/dL    AST 28 9 - 39 U/L    Bilirubin, Total 0.8 0.0 - 1.2 mg/dL    ALT 11 7 - 45 U/L   Lipase   Result Value Ref Range    Lipase 22 9 - 82 U/L   Lactate   Result Value Ref Range    Lactate 0.9 0.4 - 2.0 mmol/L   Troponin I, High Sensitivity   Result Value Ref Range    Troponin I, High Sensitivity 60 (HH) 0 - 13 ng/L         CT abdomen pelvis w IV contrast  Result Date: 6/1/2025  Interpreted By:  Jeremiah Espinoza, STUDY: CT ABDOMEN PELVIS W IV CONTRAST;  6/1/2025 8:38 pm   INDICATION: Signs/Symptoms:epigastric pain, nausea, vomiting.   COMPARISON: 05/18/2025, 10/10/2020, 08/15/2024   ACCESSION NUMBER(S): [id number]   ORDERING CLINICIAN: DAGMAR DE LEON   TECHNIQUE: Axial CT images of the abdomen and pelvis with coronal and sagittal reconstructed images obtained.  75 ML of Omnipaque 350 was administered intravenously without immediate complication.   FINDINGS: LOWER CHEST: Mild subsegmental atelectasis.   LIVER: Within normal limits.   BILE DUCTS: Normal caliber.   GALLBLADDER: No calcified stones. No wall thickening.   PANCREAS: Within normal limits.   SPLEEN: Within normal limits.   ADRENALS: Within normal limits.   KIDNEYS, URETERS, and BLADDER: No hydronephrosis or renal calculi. Ureters are non-dilated. Urinary bladder within normal limits.   REPRODUCTIVE: Tubular low-density right adnexal region which is increased in size from 08/15/2024, currently measuring 3.7 by 2 cm in the axial plane, previously 2.6 x 1.6 cm and may reflect a paraovarian cyst or hydrosalpinx. Likely physiologic 1.5 cm right ovarian cyst. There is fluid within the vagina.   VESSELS: Mild scattered atherosclerotic plaque. No abdominal aortic aneurysm.   RETROPERITONEUM and LYMPH NODES: No lymphadenopathy.   BOWEL: The stomach is unremarkable. Small bowel is non-dilated. Normal appendix. Mild scattered submucosal fat deposition within the transverse and descending colon which appears similar to prior exams.    PERITONEUM: No ascites or free air. No fluid collection.   BODY WALL: Within normal limits.   MUSCULOSKELETAL: No acute osseous abnormality or suspicious osseous lesions. Mild multilevel spinal degenerative change.       1. No definite acute abdominopelvic abnormality. 2. Tubular low-density right adnexal region which is increased in size from 08/15/2024, currently measuring 3.7 by 2 cm in the axial plane, previously 2.6 x 1.6 cm. The finding may reflect a paraovarian cyst, hydrosalpinx, or other cystic lesion. Pelvic ultrasound may be considered for further evaluation 3. Mild scattered submucosal fat deposition/wall thickening within the transverse and descending colon which appears similar to prior exams, which may be secondary to chronic inflammation among other processes. A component of mild superimposed colitis could be present.   Signed by: Jeremiah Espinoza 6/1/2025 9:33 PM Dictation workstation:   [id number]    ECG 12 lead  Result Date: 6/1/2025  Sinus rhythm with marked sinus arrhythmia Otherwise normal ECG When compared with ECG of 18-MAY-2025 11:20, No significant change was found See ED provider note for full interpretation and clinical correlation Confirmed by Linda Palomares ([id number]) on 6/1/2025 10:34:42 AM    CT angio chest abdomen pelvis  Result Date: 5/18/2025  Interpreted By:  Eliza Emmanuel, STUDY: CT ANGIO CHEST ABDOMEN PELVIS;  5/18/2025 2:33 pm   INDICATION: Signs/Symptoms:elevated trops, epigastric pain.     COMPARISON: 01/28/2025, 04/15/2025   ACCESSION NUMBER(S): [id number]   ORDERING CLINICIAN: ANNA MOLINA   TECHNIQUE: CT of the chest, abdomen, and pelvis was performed. Unenhanced and contrast-enhanced arterial phase images were obtained. Sagittal and coronal reconstructions were generated.  3D reconstructions were created on an independent workstation and provided for review.  100 ML Omnipaque 350 intravenous contrast given for the examination.   FINDINGS: CHEST:       CHEST WALL AND  LOWER NECK: No significant axillary lymphadenopathy.   MEDIASTINUM AND NUNO:  No significant mediastinal or hilar lymphadenopathy.   HEART AND VESSELS:  The heart is normal in size. No significant pericardial effusion. No thoracic aortic aneurysm or intraluminal dissection flap. The mid ascending aorta measures 2.3 cm   LUNGS, PLEURA, LARGE AIRWAYS: Stable 2 mm pleural-based nodule in the left lower lobe image 102/162. No focal infiltrate, pleural effusion or pneumothorax. The central airways are patent.   BONES:  No focal concerning lytic or blastic osseous lesion.     ABDOMEN/PELVIS:       ABDOMINAL ORGANS:   LIVER: No focal lesion   GALL BLADDER AND BILIARY TREE: No calcified gallstone   SPLEEN: No focal lesion. Small isodense presumed splenule adjacent to the inferior pole.   PANCREAS: No focal lesion   ADRENALS: No adrenal mass   KIDNEYS AND URETERS: No renal mass or hydronephrosis within limits of nephrogram phase images.   BOWEL: Questionable gastric wall thickening similar to the previous exam. No abnormally dilated large or small bowel loops. Probable prominent submucosal fat in the descending colon similar to the prior exam.   PERITONEUM, RETROPERITONEUM, NODES: No significant free fluid. No free air. No significant retroperitoneal adenopathy.   VESSELS:  No abdominal aortic aneurysm or intraluminal dissection flap. Grossly patent/enhancing proximal celiac axis, SMA, single left and likely bifid right renal arteries and the SUHAS. Scattered atherosclerotic calcifications.   PELVIS: Urinary bladder and anteverted uterus are grossly normal in contour. Small enhancing vessels along the margins of the uterus. 2.1 cm right adnexal hypodensity similar to the previous exam. 2.4 cm dense/enhancing ring shadow in the left adnexa.   ABDOMINAL WALL: No sizable abdominal wall hernia.   BONES: No focal concerning lytic or blastic osseous lesion.       CHEST ABDOMEN AND PELVIS:   No thoracoabdominal aortic aneurysm or  dissection.   No CT evidence of acute chest process.   Probable gastric fold thickening which can be seen with gastritis and prominent submucosal fat in the descending colon similar to 01/20/2025, again can be seen in setting of chronic inflammatory disease.   Ring-enhancing possible physiologic/involuting 2.4 cm left ovarian cyst. 2.1 cm right adnexal/ovarian cyst similar to 01/20/2025.   Other findings as described above.   MACRO: None.   Signed by: Eliza Emmanuel 5/18/2025 3:58 PM Dictation workstation:   [id number]    ECG 12 lead  Result Date: 5/18/2025  Normal sinus rhythm with sinus arrhythmia Normal ECG When compared with ECG of 18-MAY-2025 10:26, (unconfirmed) No significant change was found See ED provider note for full interpretation and clinical correlation Confirmed by Linda Palomares ([id number]) on 5/18/2025 1:12:54 PM    ECG 12 lead  Result Date: 5/18/2025  Poor data quality, interpretation may be adversely affected Normal sinus rhythm with sinus arrhythmia Normal ECG When compared with ECG of 04-FEB-2025 06:16, No significant change was found See ED provider note for full interpretation and clinical correlation Confirmed by Linda Palomares ([id number]) on 5/18/2025 1:12:48 PM    XR chest 2 views  Result Date: 5/18/2025  Interpreted By:  Eliza Emmanuel, STUDY: XR CHEST 2 VIEWS;  5/18/2025 12:03 pm   INDICATION: Signs/Symptoms:epigastric pain, elevated trop.     COMPARISON: 11/19/2024   ACCESSION NUMBER(S): [id number]   ORDERING CLINICIAN: GAY WIRE   FINDINGS: No focal infiltrate, pleural effusion or evidence of pneumothorax. The cardiac silhouette is normal in size.  Osseous thorax is grossly intact.       No acute cardiopulmonary process radiographically.   MACRO: None.   Signed by: Eliza Emmanuel 5/18/2025 12:35 PM Dictation workstation:   [id number]           Assessment/Plan   Assessment & Plan  Intractable nausea and vomiting    Elevated troponin    Chronic superficial gastritis without bleeding    Epigastric  pain    Cyclic vomiting syndrome    Marijuana use    Right upper quadrant pain      Patient presented with abdominal pain in the epigastric as well as right upper quadrant that is going on for several weeks now.  Last EGD showed gastritis.  She smokes marijuana on daily basis possible cyclic vomiting syndrome.  Her lipase and liver enzyme were normal.  She does have mild tenderness in the epigastric and right upper quadrant.  - Will obtain ultrasound of gallbladder to rule out gallbladder pathology.  - Start patient on pantoprazole 40 mg twice daily.  - GI consult for further recommendation.  - Patient does have elevated troponin no clear etiology.  Will obtain echocardiogram.  - Trend another troponin in the morning.  - Counseling was provided about quitting marijuana use completely.  - Replace potassium with IV fluid hydration overnight.  - Telemetry monitoring for any cardiac arrhythmia.  - Cardiology consult for elevated troponin.  - SCD and Lovenox for DVT prophylaxis.  - Check hemoglobin A1c rule out diabetes as a cause of gastroparesis.  - Drug screen.  - Optimize pain control  - Zofran for nausea control.    Moses Dailey MD         [1]   Family History  Problem Relation Name Age of Onset    Hypertension Mother     [2] alum-mag hydroxide-simeth, 30 mL, oral, Once  diazePAM, 5 mg, intravenous, Once  enoxaparin, 40 mg, subcutaneous, q24h  lidocaine, 15 mL, oral, Once  [START ON 6/2/2025] pantoprazole, 40 mg, intravenous, BID  [START ON 6/2/2025] potassium chloride CR, 40 mEq, oral, Daily  [3] lactated Ringer's, 100 mL/hr  [4] PRN medications: acetaminophen **OR** acetaminophen **OR** acetaminophen, melatonin, morphine, morphine, ondansetron **OR** ondansetron, polyethylene glycol

## 2025-06-02 NOTE — PROGRESS NOTES
"Daily Progress Note    Neela Elaine is a 45 y.o. female on day 0 of admission presenting with Intractable nausea and vomiting.    Subjective   Patient seen resting this morning with friend at the bedside.  Patient still having intermittent burning and nausea.  Had a long discussion with patient regarding reflux, gastritis and marijuana cessation.  Patient had an EGD this afternoon which was unremarkable.  Seen by cardiology and she will have a nonstress test tomorrow.       Objective     Physical Exam    Physical Exam  Constitutional:       Appearance: Normal appearance.   HENT:      Head: Normocephalic.      Mouth/Throat:      Mouth: Mucous membranes are moist.   Eyes:      Pupils: Pupils are equal, round, and reactive to light.   Cardiovascular:      Rate and Rhythm: Normal rate and regular rhythm.      Heart sounds: Normal heart sounds, S1 normal and S2 normal.   Pulmonary:      Effort: Pulmonary effort is normal.      Breath sounds: Normal breath sounds.   Abdominal:      General: Bowel sounds are normal.      Palpations: Abdomen is soft.   Musculoskeletal:         General: Normal range of motion.      Cervical back: Neck supple.   Skin:     General: Skin is warm.   Neurological:      Mental Status: She is alert and oriented to person, place, and time.   Psychiatric:         Mood and Affect: Mood normal.         Behavior: Behavior normal.         Last Recorded Vitals  Blood pressure 153/88, pulse 87, temperature 36.6 °C (97.9 °F), resp. rate 16, height 1.626 m (5' 4\"), weight 59 kg (130 lb), SpO2 99%.  Intake/Output last 3 Shifts:  I/O last 3 completed shifts:  In: 273.3 (4.6 mL/kg) [I.V.:273.3 (4.6 mL/kg)]  Out: 400 (6.8 mL/kg) [Urine:400 (0.2 mL/kg/hr)]  Weight: 59 kg     Medications  Scheduled medications  Scheduled Medications[1]  Continuous medications  Continuous Medications[2]  PRN medications  PRN Medications[3]    Labs  CBC:   Results from last 7 days   Lab Units 06/02/25  0637 06/01/25  9359 " 05/31/25  0921   WBC AUTO x10*3/uL 8.1 10.4 7.9   RBC AUTO x10*6/uL 4.54 4.85 5.08   HEMOGLOBIN g/dL 12.3 13.3 13.7   HEMATOCRIT % 36.5 37.7 39.5   MCV fL 80 78* 78*   MCH pg 27.1 27.4 27.0   MCHC g/dL 33.7 35.3 34.7   RDW % 13.9 13.7 14.0   PLATELETS AUTO x10*3/uL 294 337 332     CMP:    Results from last 7 days   Lab Units 06/02/25  0637 06/01/25 1909 05/31/25  0921   SODIUM mmol/L 138 137 140   POTASSIUM mmol/L 3.5 3.3* 3.8   CHLORIDE mmol/L 106 104 105   CO2 mmol/L 24 19* 26   BUN mg/dL 11 12 12   CREATININE mg/dL 0.71 0.68 0.86   GLUCOSE mg/dL 76 91 96   PROTEIN TOTAL g/dL 7.2 8.3* 8.0   CALCIUM mg/dL 8.7 9.3 9.6   BILIRUBIN TOTAL mg/dL 0.7 0.8 0.9   ALK PHOS U/L 65 74 74   AST U/L 22 28 12   ALT U/L 9 11 10     BMP:    Results from last 7 days   Lab Units 06/02/25 0637 06/01/25 1909 05/31/25  0921   SODIUM mmol/L 138 137 140   POTASSIUM mmol/L 3.5 3.3* 3.8   CHLORIDE mmol/L 106 104 105   CO2 mmol/L 24 19* 26   BUN mg/dL 11 12 12   CREATININE mg/dL 0.71 0.68 0.86   CALCIUM mg/dL 8.7 9.3 9.6   GLUCOSE mg/dL 76 91 96     Magnesium:  Results from last 7 days   Lab Units 05/31/25  0002   MAGNESIUM mg/dL 1.77     Troponin:    Results from last 7 days   Lab Units 06/02/25 0637 06/01/25 1909 05/31/25  0133   TROPHS ng/L 57* 60* 56*     BNP:     Lipid Panel:  Results from last 7 days   Lab Units 05/31/25  0002   INR  1.0   PROTIME seconds 11.1                   Relevant Results  Results from last 7 days   Lab Units 06/02/25 0637 06/01/25 1909 05/31/25  0921 05/31/25  0002   GLUCOSE mg/dL 76 91 96 94     Lab Results   Component Value Date    HGBA1C  06/01/2025      Comment:      Hemoglobin variant detected which does not interfere with determination of Hemoglobin A1c. Hemoglobin identification can be ordered to characterize the variant if clinically indicated.        Assessment/Plan    Abdominal pain  Marijuana use  Acid reflux  Gastritis  Chronic GI symptoms since age 12  Anxiety and depression    -Recent EGD  showed gastritis  -Smokes marijuana daily possible cyclic vomiting syndrome  -Ultrasound gallbladder  -Continue PPI twice daily  -GI consult repeat EGD and add Carafate with colonoscopy outpatient  -Elevated troponin on clear etiology  -Cardiology cannot scheduled stress test in a.m.  -Continuous telemetry  -Will check A1c may be because of gastroparesis  -Nausea and pain control as needed  -Counseling on marijuana cessation  -Resume home meds    DVTp: Not indicated    PLAN: Home    MARYA Miles-CNP    Plan of care was discussed extensively with patient.  Patient verbalized understanding through teach back method.  All question and concerns addressed upon examination.    Of note, this documentation is completed using the Dragon Dictation system (voice recognition software). There may be spelling and/or grammatical errors that were not corrected prior to final submission.             [1] alum-mag hydroxide-simeth, 30 mL, oral, Once  enoxaparin, 40 mg, subcutaneous, q24h  famotidine, 20 mg, oral, BID  gabapentin, 300 mg, oral, Nightly  lidocaine, 15 mL, oral, Once  mirtazapine, 15 mg, oral, Nightly  [START ON 6/3/2025] pantoprazole, 40 mg, oral, Daily before breakfast  sertraline, 50 mg, oral, Daily  sucralfate, 1 g, oral, Before meals & nightly    [2] lactated Ringer's, 100 mL/hr, Last Rate: 100 mL/hr (06/02/25 5255)    [3] PRN medications: acetaminophen **OR** acetaminophen **OR** acetaminophen, dicyclomine, hydrOXYzine HCL, melatonin, morphine, morphine, ondansetron **OR** ondansetron, polyethylene glycol

## 2025-06-02 NOTE — CARE PLAN
The clinical goals for the shift include patient's pain will be well-managed    Over the shift, the patient did make progress toward the following goals. PRN pain and anxiety medications administered.

## 2025-06-02 NOTE — ED PROVIDER NOTES
HPI   Chief Complaint   Patient presents with    Abdominal Pain       45-year-old female with a history of gastritis, anxiety presenting to the ER today with continued upper abdominal pain, nausea and vomiting.  Patient states she has been seen here in the ER multiple times for this.  She did have a scope of her stomach 2 months ago which showed gastritis and she has been taking her stomach medications regularly.  She states anytime she tries to eat or drink something her pain will worsen and she does not know what to do for it at home so she comes to the ER.  She states when her pain starts to worsen she will feel very anxious which makes the pain worse and she will start having nausea and vomiting.  Patient denies any bloody vomitus or dark or bloody stools.  She states that she has not been able to eat much so she really is not having bowel movements but she is passing gas.  She is not having any painful urination or blood in the urine.  The pain does not go up in her chest or back and she denies chest pain or shortness of breath, fever, cough or hemoptysis.  She states that she does smoke marijuana, last time she used this was 4 days ago.  She does not use any alcohol or other drugs.  No further complaints.      History provided by:  Patient          Patient History   Medical History[1]  Surgical History[2]  Family History[3]  Social History[4]    Physical Exam   ED Triage Vitals [06/01/25 1842]   Temperature Heart Rate Respirations BP   37.1 °C (98.8 °F) 90 18 (!) 179/97      Pulse Ox Temp src Heart Rate Source Patient Position   99 % -- -- --      BP Location FiO2 (%)     -- --       Physical Exam  Eyes:      Conjunctiva/sclera: Conjunctivae normal.   Cardiovascular:      Rate and Rhythm: Normal rate and regular rhythm.      Pulses: Normal pulses.      Heart sounds: Normal heart sounds.   Pulmonary:      Effort: Pulmonary effort is normal.      Breath sounds: Normal breath sounds.   Abdominal:      General:  Bowel sounds are normal. There is no distension.      Palpations: Abdomen is soft.      Tenderness: There is abdominal tenderness. There is no right CVA tenderness, left CVA tenderness, guarding or rebound.      Comments: Epigastric tenderness without guarding or rebound.   Musculoskeletal:      Comments: Normal gait.  No thoracic or lumbar spinal or paraspinal tenderness.  Chest wall nontender, atraumatic.   Skin:     General: Skin is warm.   Neurological:      Mental Status: She is alert.      Comments: Speech normal           ED Course & MDM   Diagnoses as of 06/01/25 2240   Epigastric pain   Nausea and vomiting, unspecified vomiting type                 No data recorded     [place] Coma Scale Score: 15 (06/01/25 1908 : Arminda Boothe, RAUL)                           Medical Decision Making  45-year-old female with history of gastritis and anxiety presenting to the ER today with continued epigastric pain, nausea and vomiting.  Symptoms are made worse when she tries to eat or drink anything and this will make her pain worsen and then her anxiety worsens.  This will then make her pain worse and she will nausea and vomiting.  Patient did not know what else to do for the pain so she came back to the ER.  She tells me last time she had this they gave her morphine and it helped her pain a lot.  She is passing gas.  She denies any further complaints and arrives afebrile, hypertensive with otherwise stable vital signs.  On my exam heart RRR, lungs are clear and there is no CVA tenderness.  Abdomen is soft and nondistended normal bowel sounds and she is tender in the epigastric region without guarding or rebound.  Exam is otherwise within normal limits.  Patient has had multiple ER visits and was seen at [facility] earlier today.  The medication, nausea medication are ordered as well as repeat laboratory studies.  She has not had a CT performed throughout these multiple ED visits, her last scan was 2 weeks ago on 5/18 so  with her persistent pain and vomiting I do feel she would benefit from repeat imaging at this time.    ECG per my interpretation normal sinus rhythm at 76 bpm without acute ST-T wave changes.  Lactate is 0.9.  CBC with stable hemoglobin and no leukocytosis.  CMP with potassium of 3.3 but no other acute electrolyte abnormality or renal insufficiency and lipase is within normal limits.  Troponin is elevated at 60 however her previous troponins have also been elevated at this level and she is not having any acute EKG changes or chest pain or shortness of breath.  CT abdomen pelvis formed today does not show any definite acute intra-abdominal pelvic abnormality.  There is a tubular low-density right adnexal region that could be a paraovarian cyst or other cystic lesion.  Patient does not have any tenderness in this region.  Her only tenderness on exam is epigastric.  There is some mild scattered submucosal fat deposition within the transverse and descending colon similar to her prior exams but no other acute abnormality.  Patient was given additional pain and nausea medication and states that she is still having the pain and nausea.  Since she has had multiple doses of pain medication and nausea medication we will consult medicine for possible admission.     I did discuss the case with Dr. Dailey.  He accepts patient for admission at this time.  I did discuss results with the patient today and she is much agreeable with admission.      Labs Reviewed   CBC WITH AUTO DIFFERENTIAL - Abnormal       Result Value    WBC 10.4      nRBC 0.0      RBC 4.85      Hemoglobin 13.3      Hematocrit 37.7      MCV 78 (*)     MCH 27.4      MCHC 35.3      RDW 13.7      Platelets 337      Neutrophils % 67.4      Immature Granulocytes %, Automated 0.2      Lymphocytes % 22.7      Monocytes % 7.6      Eosinophils % 1.8      Basophils % 0.3      Neutrophils Absolute 6.99      Immature Granulocytes Absolute, Automated 0.02      Lymphocytes  Absolute 2.36      Monocytes Absolute 0.79      Eosinophils Absolute 0.19      Basophils Absolute 0.03     COMPREHENSIVE METABOLIC PANEL - Abnormal    Glucose 91      Sodium 137      Potassium 3.3 (*)     Chloride 104      Bicarbonate 19 (*)     Anion Gap 17      Urea Nitrogen 12      Creatinine 0.68      eGFR >90      Calcium 9.3      Albumin 4.5      Alkaline Phosphatase 74      Total Protein 8.3 (*)     AST 28      Bilirubin, Total 0.8      ALT 11     TROPONIN I, HIGH SENSITIVITY - Abnormal    Troponin I, High Sensitivity 60 (*)     Narrative:     Less than 99th percentile of normal range cutoff-  Female and children under 18 years old <14 ng/L; Male <21 ng/L: Negative  Repeat testing should be performed if clinically indicated.     Female and children under 18 years old 14-50 ng/L; Male 21-50 ng/L:  Consistent with possible cardiac damage and possible increased clinical   risk. Serial measurements may help to assess extent of myocardial damage.     >50 ng/L: Consistent with cardiac damage, increased clinical risk and  myocardial infarction. Serial measurements may help assess extent of   myocardial damage.      NOTE: Children less than 1 year old may have higher baseline troponin   levels and results should be interpreted in conjunction with the overall   clinical context.     NOTE: Troponin I testing is performed using a different   testing methodology at [facility] than at other   [facility]. Direct result comparisons should only   be made within the same method.   LIPASE - Normal    Lipase 22      Narrative:     Venipuncture immediately after or during the administration of Metamizole may lead to falsely low results. Testing should be performed immediately prior to Metamizole dosing.   LACTATE - Normal    Lactate 0.9      Narrative:     Venipuncture immediately after or during the administration of Metamizole may lead to falsely low results. Testing should be performed immediately prior to  Metamizole dosing.   DRUG SCREEN,URINE   URINALYSIS WITH REFLEX CULTURE AND MICROSCOPIC    Narrative:     The following orders were created for panel order Urinalysis with Reflex Culture and Microscopic.  Procedure                               Abnormality         Status                     ---------                               -----------         ------                     Urinalysis with Reflex C...[[id number]]                                                 Extra Urine Gray Tube[[id number]]                                                         Please view results for these tests on the individual orders.   URINALYSIS WITH REFLEX CULTURE AND MICROSCOPIC   EXTRA URINE GRAY TUBE       CT abdomen pelvis w IV contrast   Final Result   1. No definite acute abdominopelvic abnormality.   2. Tubular low-density right adnexal region which is increased in   size from 08/15/2024, currently measuring 3.7 by 2 cm in the axial   plane, previously 2.6 x 1.6 cm. The finding may reflect a paraovarian   cyst, hydrosalpinx, or other cystic lesion. Pelvic ultrasound may be   considered for further evaluation   3. Mild scattered submucosal fat deposition/wall thickening within   the transverse and descending colon which appears similar to prior   exams, which may be secondary to chronic inflammation among other   processes. A component of mild superimposed colitis could be present.        Signed by: Jeremiah Espinoza 2025 9:33 PM   Dictation workstation:   [id number]          Procedure  Procedures       [1]   Past Medical History:  Diagnosis Date    Acid reflux     Gastritis    [2]   Past Surgical History:  Procedure Laterality Date     SECTION, CLASSIC  1999     SECTION, CLASSIC  10/21/2003   [3]   Family History  Problem Relation Name Age of Onset    Hypertension Mother     [4]   Social History  Tobacco Use    Smoking status: Never     Passive exposure: Never    Smokeless tobacco: Never   Vaping Use     Vaping status: Never Used   Substance Use Topics    Alcohol use: Never    Drug use: Yes     Types: Marijuana     Comment: daily        Rosio Sue PA-C  06/01/25 8361

## 2025-06-02 NOTE — CARE PLAN
The clinical goals for the shift include pain, nausea and anxiety control.    Over the shift, the patient did make progress toward the following goals. PRN medications administered as per orders.

## 2025-06-02 NOTE — CARE PLAN
The patient's goals for the shift include      The clinical goals for the shift include Patient's pain will be managed at an acceptable level        Problem: Pain - Adult  Goal: Verbalizes/displays adequate comfort level or baseline comfort level  Outcome: Not Progressing     Problem: Safety - Adult  Goal: Free from fall injury  Outcome: Not Progressing     Problem: Discharge Planning  Goal: Discharge to home or other facility with appropriate resources  Outcome: Not Progressing     Problem: Chronic Conditions and Co-morbidities  Goal: Patient's chronic conditions and co-morbidity symptoms are monitored and maintained or improved  Outcome: Not Progressing     Problem: Nutrition  Goal: Nutrient intake appropriate for maintaining nutritional needs  Outcome: Not Progressing     Problem: Fall/Injury  Goal: Not fall by end of shift  Outcome: Not Progressing  Goal: Be free from injury by end of the shift  Outcome: Not Progressing  Goal: Verbalize understanding of personal risk factors for fall in the hospital  Outcome: Not Progressing  Goal: Verbalize understanding of risk factor reduction measures to prevent injury from fall in the home  Outcome: Not Progressing  Goal: Use assistive devices by end of the shift  Outcome: Not Progressing  Goal: Pace activities to prevent fatigue by end of the shift  Outcome: Not Progressing     Problem: Pain  Goal: Takes deep breaths with improved pain control throughout the shift  Outcome: Not Progressing  Goal: Turns in bed with improved pain control throughout the shift  Outcome: Not Progressing  Goal: Walks with improved pain control throughout the shift  Outcome: Not Progressing  Goal: Performs ADL's with improved pain control throughout shift  Outcome: Not Progressing  Goal: Participates in PT with improved pain control throughout the shift  Outcome: Not Progressing  Goal: Free from opioid side effects throughout the shift  Outcome: Not Progressing  Goal: Free from acute confusion  related to pain meds throughout the shift  Outcome: Not Progressing

## 2025-06-02 NOTE — PROGRESS NOTES
06/02/25 1016   Discharge Planning   Living Arrangements Children   Support Systems Children   Assistance Needed PTA - none at baseline   Type of Residence Private residence   Do you have animals or pets at home? Yes   Home or Post Acute Services None   Expected Discharge Disposition Home   Does the patient need discharge transport arranged? No   Intensity of Service   Intensity of Service 0-30 min     Presented to the ER multiple times for abd pain, nausea, and vomiting. Reported recent diagnosis of Gastritis and compliance with her medications. GI and Cardiology following. HOME at [place].

## 2025-06-02 NOTE — CONSULTS
Department of Internal Medicine  Gastroenterology  Consult note    IMPRESSION/RECOMMENDATIONS  Neela Elaine is a 45 y.o. female with a PMH of GERD, gastritis without bleeding, chronic nausea, pyelonephritis, anxiety, colon polyps presents to [facility] with chief complaint of ongoing abdominal pain associated with nausea and non-bilious and non-bloody vomiting.     Chronic epigastric pain associated with nausea and vomiting   History of gastritis without hemorrhage 2/2 H. pylori  GERD  Daily marijuana use.  Consider cannabis hyperemesis syndrome versus cyclic vomiting syndrome  Abnormal CT findings concerning for mild superimposed colitis    - No indication to repeat EGD at this time.  - Supportive care per medicine team  - IV fluids, monitor electrolytes, replete if indicated  - Clear liquid diet, advance as tolerated  - Recommend scheduled antiemetic  - Recommend pantoprazole 40 mg twice daily  - Add Carafate 1 g 4 times daily with meals and at at bedtime  - Encouraged abstinence from marijuana  - Recommend outpatient colonoscopy  - Avoid NSAIDs  - Antireflux lifestyle    Discussed with Dr. Hickman    Reason for Consult: intractable nausea/vomiting    History of Present Illness   Neela Elaine is a 45 y.o. female with a PMH of GERD, gastritis without bleeding, chronic nausea, pyelonephritis, anxiety, colon polyps presents to [facility] with chief complaint of ongoing abdominal pain associated with nausea and non-bilious and non-bloody vomiting in which GI has been consulted.     On arrival to ED, patient was afebrile and hypertensive.  Initial blood work shows unremarkable CMP with normal BUN and creatinine, LFTs, lipase and lactate.  CBC unremarkable.  Urine tox screen positive for cannabinoid, opiates and benzos (prescribed).  CT abdomen pelvis with IV contrast with no definite acute abdominal pelvic abnormality.  There is mild scattered submucosal fat deposition/wall thickening within the  transverse and descending colon which appears similar to prior exams and may be secondary to chronic inflammation among other processes.    Patient reports having epigastric abdominal pain associated with nausea and vomiting daily.  Patient is not compliant with PPI medications, Pepcid and continues to smoke marijuana daily.  Patient was recently seen by her primary care physician for the symptoms and was prescribed pantoprazole 20 mg p.o. once daily and sertraline 50 mg p.o. once daily.  Patient was advised to continue taking gabapentin 300 mg p.o. once daily and mirtazapine 15 mg once daily and was referred to GI at [facility]. Symptoms have not improved. She denies overt GI bleeding.  Patient reports having regular bowel movements every morning.    ENDOSCOPIC REVIEW  EGD: 1/28/2025 with Dr. Mcclain indicated for recurrent nausea and vomiting, history of H. pylori gastritis, cannabis hyperemesis syndrome  -The examined esophagus was normal  - The Z-line was regular found at 37 cm from the incisors  - Localized mild erythematous mucosa without bleeding was found in the gastric body.  Biopsies were taken with cold forceps for H. pylori testing.  -The examined duodenum was normal.  Gastric biopsy-gastric mucosa with focal reactive changes.  Negative for H. pylori     Allergies  Prochlorperazine, Phenergan [promethazine], Reglan [metoclopramide hcl], Aripiprazole, Fluoxetine, and Haldol [haloperidol]    Current Medication  Current Medications[1]    Past Medical History  Active Ambulatory Problems     Diagnosis Date Noted    Abdominal pain 06/20/2024    Elevated troponin level 06/20/2024    Chest pain 06/20/2024    GERD (gastroesophageal reflux disease) 06/20/2024    BMI 22.0-22.9, adult 06/20/2024    Never smoked cigarettes 06/20/2024    Pyelonephritis 08/15/2024    Flank pain 08/16/2024     Resolved Ambulatory Problems     Diagnosis Date Noted    No Resolved Ambulatory Problems     Past Medical History:   Diagnosis Date     "Acid reflux     Gastritis        Past Surgical History  Surgical History[2]    Family History  No family history of GI malignancies     Social History  TOBACCO:  reports that she has never smoked. She has never been exposed to tobacco smoke. She has never used smokeless tobacco.  ETOH:  reports no history of alcohol use.  DRUGS:  reports current drug use. Drug: Marijuana.Daily     Review of Systems  Review of systems negative unless otherwise stated above.    PHYSICAL EXAM  VS: /70   Pulse 74   Temp 36.4 °C (97.5 °F)   Resp 16   Ht 1.626 m (5' 4\")   Wt 59 kg (130 lb)   SpO2 97%   BMI 22.31 kg/m²  Body mass index is 22.31 kg/m².  Constitutional:       Appearance: She is normal weight. She is ill-appearing. She is not toxic-appearing or diaphoretic.   HENT:         Mouth: Mucous membranes are dry.   Eyes:      General: No scleral icterus.   Cardiovascular:      Rate and Rhythm: Normal rate and regular rhythm.   Pulmonary:      Effort: No respiratory distress.      Breath sounds: No stridor. No wheezing, rhonchi or rales.   Chest:      Chest wall: No tenderness.   Abdominal:      General: There is no distension. Abdomen soft     Palpations: There is no mass.      Tenderness: There is abdominal tenderness.    Musculoskeletal:         Right lower leg: No edema.      Left lower leg: No edema.   Skin:     General: Skin is warm and dry.      Coloration: Skin is not jaundiced or pale.      Findings: No lesion or rash.   Neurological:      General: No focal deficit present.      Mental Status: She is alert and oriented to person, place, and time. Mental status is at baseline.   Psychiatric:         Mood and Affect: Mood normal.         Behavior: Behavior normal.        DATA  Recent blood work and relevant radiology and endoscopic studies were reviewed and discussed with the patient   Results from last 7 days   Lab Units 06/02/25  0637   WBC AUTO x10*3/uL 8.1   RBC AUTO x10*6/uL 4.54   HEMOGLOBIN g/dL 12.3 "   HEMATOCRIT % 36.5   MCV fL 80   MCHC g/dL 33.7   RDW % 13.9   PLATELETS AUTO x10*3/uL 294       Results from last 72 hours   Lab Units 06/02/25  0637   SODIUM mmol/L 138   POTASSIUM mmol/L 3.5   CHLORIDE mmol/L 106   CO2 mmol/L 24   BUN mg/dL 11   CREATININE mg/dL 0.71   CALCIUM mg/dL 8.7   PROTEIN TOTAL g/dL 7.2   BILIRUBIN TOTAL mg/dL 0.7   ALK PHOS U/L 65   AST U/L 22   ALT U/L 9       Results from last 72 hours   Lab Units 05/31/25  0002   INR  1.0         RADIOLOGY REVIEW  === 06/01/25 ===   CT ABDOMEN PELVIS W IV CONTRAST  - Impression -  1. No definite acute abdominopelvic abnormality.  2. Tubular low-density right adnexal region which is increased in  size from 08/15/2024, currently measuring 3.7 by 2 cm in the axial  plane, previously 2.6 x 1.6 cm. The finding may reflect a paraovarian  cyst, hydrosalpinx, or other cystic lesion. Pelvic ultrasound may be  considered for further evaluation  3. Mild scattered submucosal fat deposition/wall thickening within  the transverse and descending colon which appears similar to prior  exams, which may be secondary to chronic inflammation among other  processes. A component of mild superimposed colitis could be present.    === 06/01/25 ===  US RIGHT UPPER QUADRANT  - Impression -  Unremarkable right upper quadrant sonogram    (Electronically signed bySALVADOR Marie on 6/2/2025 at 2:44 PM)         [1]   Current Facility-Administered Medications:     acetaminophen (Tylenol) tablet 650 mg, 650 mg, oral, q4h PRN **OR** acetaminophen (Tylenol) oral liquid 650 mg, 650 mg, nasogastric tube, q4h PRN **OR** acetaminophen (Tylenol) suppository 650 mg, 650 mg, rectal, q4h PRN, SALVADOR Miles    alum-mag hydroxide-simeth (Mylanta) 200-200-20 mg/5 mL oral suspension 30 mL, 30 mL, oral, Once, SALVADOR Miles    dicyclomine (Bentyl) capsule 20 mg, 20 mg, oral, 4x daily PRN, Moses Dailey MD    enoxaparin (Lovenox) syringe 40 mg, 40 mg,  subcutaneous, q24h, SALVADOR Miles, 40 mg at 25 0142    famotidine (Pepcid) tablet 20 mg, 20 mg, oral, BID, SALVADOR Miles, 20 mg at 25 1151    gabapentin (Neurontin) capsule 300 mg, 300 mg, oral, Nightly, Moses Dailey MD    hydrOXYzine HCL (Atarax) tablet 50 mg, 50 mg, oral, q4h PRN, Moses Dailey MD, 50 mg at 25 1202    lactated Ringer's infusion, 100 mL/hr, intravenous, Continuous, Moses Dailey MD, Last Rate: 100 mL/hr at 25 0435, 100 mL/hr at 25 0435    lidocaine (Xylocaine) 2 % mouth solution 15 mL, 15 mL, oral, Once, SALVADOR Miles    melatonin tablet 3 mg, 3 mg, oral, Nightly PRN, SALVADOR Miles    mirtazapine (Remeron) tablet 15 mg, 15 mg, oral, Nightly, Moses Dailey MD    morphine injection 2 mg, 2 mg, intravenous, q3h PRN, SALVADOR Miles, 2 mg at 25 1116    morphine injection 4 mg, 4 mg, intravenous, q3h PRN, SALVADOR Miles    ondansetron (Zofran) tablet 4 mg, 4 mg, oral, q8h PRN **OR** ondansetron (Zofran) injection 4 mg, 4 mg, intravenous, q8h PRN, SALVADOR Miles    [START ON 6/3/2025] pantoprazole (ProtoNix) EC tablet 40 mg, 40 mg, oral, Daily before breakfast, SALVADOR Miles    pantoprazole (Protonix) injection 40 mg, 40 mg, intravenous, BID, SALVADOR Miles, 40 mg at 25 0532    polyethylene glycol (Glycolax, Miralax) packet 17 g, 17 g, oral, Daily PRN, Lorie K Gertrudis, APRN-CNP    sertraline (Zoloft) tablet 50 mg, 50 mg, oral, Daily, Moses Dailey MD, 50 mg at 25 0536    sucralfate (Carafate) tablet 1 g, 1 g, oral, Before meals & nightly, Lorie Caba APRN-CNP, 1 g at 25 1151  [2]   Past Surgical History:  Procedure Laterality Date     SECTION, CLASSIC  1999     SECTION, CLASSIC  10/21/2003

## 2025-06-03 ENCOUNTER — APPOINTMENT (OUTPATIENT)
Dept: RADIOLOGY | Facility: HOSPITAL | Age: 45
End: 2025-06-03
Payer: COMMERCIAL

## 2025-06-03 ENCOUNTER — APPOINTMENT (OUTPATIENT)
Dept: CARDIOLOGY | Facility: HOSPITAL | Age: 45
End: 2025-06-03
Payer: COMMERCIAL

## 2025-06-03 PROCEDURE — A9502 TC99M TETROFOSMIN: HCPCS | Performed by: NURSE PRACTITIONER

## 2025-06-03 PROCEDURE — 2500000001 HC RX 250 WO HCPCS SELF ADMINISTERED DRUGS (ALT 637 FOR MEDICARE OP): Performed by: INTERNAL MEDICINE

## 2025-06-03 PROCEDURE — 2500000001 HC RX 250 WO HCPCS SELF ADMINISTERED DRUGS (ALT 637 FOR MEDICARE OP): Performed by: NURSE PRACTITIONER

## 2025-06-03 PROCEDURE — G0378 HOSPITAL OBSERVATION PER HR: HCPCS

## 2025-06-03 PROCEDURE — 78452 HT MUSCLE IMAGE SPECT MULT: CPT

## 2025-06-03 PROCEDURE — 99232 SBSQ HOSP IP/OBS MODERATE 35: CPT

## 2025-06-03 PROCEDURE — 2500000004 HC RX 250 GENERAL PHARMACY W/ HCPCS (ALT 636 FOR OP/ED): Mod: JZ

## 2025-06-03 PROCEDURE — 99232 SBSQ HOSP IP/OBS MODERATE 35: CPT | Performed by: NURSE PRACTITIONER

## 2025-06-03 PROCEDURE — 78452 HT MUSCLE IMAGE SPECT MULT: CPT | Performed by: INTERNAL MEDICINE

## 2025-06-03 PROCEDURE — 2500000001 HC RX 250 WO HCPCS SELF ADMINISTERED DRUGS (ALT 637 FOR MEDICARE OP)

## 2025-06-03 PROCEDURE — 93017 CV STRESS TEST TRACING ONLY: CPT

## 2025-06-03 PROCEDURE — 2500000002 HC RX 250 W HCPCS SELF ADMINISTERED DRUGS (ALT 637 FOR MEDICARE OP, ALT 636 FOR OP/ED): Performed by: INTERNAL MEDICINE

## 2025-06-03 PROCEDURE — 96375 TX/PRO/DX INJ NEW DRUG ADDON: CPT | Mod: 59

## 2025-06-03 PROCEDURE — 96372 THER/PROPH/DIAG INJ SC/IM: CPT | Mod: 59

## 2025-06-03 PROCEDURE — 3430000001 HC RX 343 DIAGNOSTIC RADIOPHARMACEUTICALS: Performed by: NURSE PRACTITIONER

## 2025-06-03 PROCEDURE — 2500000004 HC RX 250 GENERAL PHARMACY W/ HCPCS (ALT 636 FOR OP/ED): Mod: JZ | Performed by: NURSE PRACTITIONER

## 2025-06-03 PROCEDURE — 96376 TX/PRO/DX INJ SAME DRUG ADON: CPT | Mod: 59

## 2025-06-03 RX ORDER — PANTOPRAZOLE SODIUM 40 MG/1
40 TABLET, DELAYED RELEASE ORAL
Status: DISCONTINUED | OUTPATIENT
Start: 2025-06-03 | End: 2025-06-04 | Stop reason: HOSPADM

## 2025-06-03 RX ORDER — SUCRALFATE 1 G/1
1 TABLET ORAL
Status: DISCONTINUED | OUTPATIENT
Start: 2025-06-03 | End: 2025-06-04 | Stop reason: HOSPADM

## 2025-06-03 RX ORDER — REGADENOSON 0.08 MG/ML
0.4 INJECTION, SOLUTION INTRAVENOUS
Status: COMPLETED | OUTPATIENT
Start: 2025-06-03 | End: 2025-06-03

## 2025-06-03 RX ORDER — DICYCLOMINE HYDROCHLORIDE 10 MG/1
10 CAPSULE ORAL DAILY
Qty: 30 CAPSULE | Refills: 0 | Status: ON HOLD | OUTPATIENT
Start: 2025-06-04 | End: 2025-06-05

## 2025-06-03 RX ORDER — DICYCLOMINE HYDROCHLORIDE 10 MG/1
10 CAPSULE ORAL DAILY
Status: DISCONTINUED | OUTPATIENT
Start: 2025-06-03 | End: 2025-06-04 | Stop reason: HOSPADM

## 2025-06-03 RX ORDER — PANTOPRAZOLE SODIUM 40 MG/1
40 TABLET, DELAYED RELEASE ORAL
Qty: 60 TABLET | Refills: 0 | Status: ON HOLD | OUTPATIENT
Start: 2025-06-03 | End: 2025-06-05

## 2025-06-03 RX ORDER — SUCRALFATE 1 G/1
1 TABLET ORAL
Qty: 120 TABLET | Refills: 0 | Status: ON HOLD | OUTPATIENT
Start: 2025-06-03 | End: 2025-06-05

## 2025-06-03 RX ADMIN — DICYCLOMINE HYDROCHLORIDE 20 MG: 10 CAPSULE ORAL at 09:02

## 2025-06-03 RX ADMIN — SUCRALFATE 1 G: 1 TABLET ORAL at 20:19

## 2025-06-03 RX ADMIN — SUCRALFATE 1 G: 1 TABLET ORAL at 16:02

## 2025-06-03 RX ADMIN — DICYCLOMINE HYDROCHLORIDE 10 MG: 10 CAPSULE ORAL at 13:34

## 2025-06-03 RX ADMIN — DICYCLOMINE HYDROCHLORIDE 20 MG: 10 CAPSULE ORAL at 20:19

## 2025-06-03 RX ADMIN — ONDANSETRON 4 MG: 4 TABLET, FILM COATED ORAL at 13:34

## 2025-06-03 RX ADMIN — ACETAMINOPHEN 650 MG: 325 TABLET ORAL at 17:07

## 2025-06-03 RX ADMIN — TETROFOSMIN 35.1 MILLICURIE: 0.23 INJECTION, POWDER, LYOPHILIZED, FOR SOLUTION INTRAVENOUS at 12:01

## 2025-06-03 RX ADMIN — FAMOTIDINE 20 MG: 20 TABLET, FILM COATED ORAL at 20:20

## 2025-06-03 RX ADMIN — ENOXAPARIN SODIUM 40 MG: 40 INJECTION SUBCUTANEOUS at 00:24

## 2025-06-03 RX ADMIN — SERTRALINE HYDROCHLORIDE 50 MG: 50 TABLET, FILM COATED ORAL at 06:14

## 2025-06-03 RX ADMIN — HYDROXYZINE HYDROCHLORIDE 50 MG: 25 TABLET ORAL at 13:46

## 2025-06-03 RX ADMIN — HYDROXYZINE HYDROCHLORIDE 50 MG: 25 TABLET ORAL at 09:52

## 2025-06-03 RX ADMIN — ONDANSETRON 4 MG: 2 INJECTION INTRAMUSCULAR; INTRAVENOUS at 20:15

## 2025-06-03 RX ADMIN — PANTOPRAZOLE SODIUM 40 MG: 40 TABLET, DELAYED RELEASE ORAL at 06:14

## 2025-06-03 RX ADMIN — GABAPENTIN 300 MG: 300 CAPSULE ORAL at 20:19

## 2025-06-03 RX ADMIN — MORPHINE SULFATE 2 MG: 2 INJECTION, SOLUTION INTRAMUSCULAR; INTRAVENOUS at 10:04

## 2025-06-03 RX ADMIN — MIRTAZAPINE 15 MG: 15 TABLET, FILM COATED ORAL at 20:20

## 2025-06-03 RX ADMIN — SUCRALFATE 1 G: 1 TABLET ORAL at 06:14

## 2025-06-03 RX ADMIN — PANTOPRAZOLE SODIUM 40 MG: 40 TABLET, DELAYED RELEASE ORAL at 16:02

## 2025-06-03 RX ADMIN — REGADENOSON 0.4 MG: 0.08 INJECTION, SOLUTION INTRAVENOUS at 12:01

## 2025-06-03 RX ADMIN — HYDROXYZINE HYDROCHLORIDE 50 MG: 25 TABLET ORAL at 19:02

## 2025-06-03 RX ADMIN — SUCRALFATE 1 G: 1 TABLET ORAL at 13:40

## 2025-06-03 RX ADMIN — TETROFOSMIN 10.8 MILLICURIE: 0.23 INJECTION, POWDER, LYOPHILIZED, FOR SOLUTION INTRAVENOUS at 10:15

## 2025-06-03 RX ADMIN — FAMOTIDINE 20 MG: 20 TABLET, FILM COATED ORAL at 08:58

## 2025-06-03 RX ADMIN — MORPHINE SULFATE 2 MG: 2 INJECTION, SOLUTION INTRAMUSCULAR; INTRAVENOUS at 06:49

## 2025-06-03 ASSESSMENT — PAIN - FUNCTIONAL ASSESSMENT
PAIN_FUNCTIONAL_ASSESSMENT: 0-10

## 2025-06-03 ASSESSMENT — COGNITIVE AND FUNCTIONAL STATUS - GENERAL
DAILY ACTIVITIY SCORE: 24
MOBILITY SCORE: 24

## 2025-06-03 ASSESSMENT — PAIN SCALES - GENERAL
PAINLEVEL_OUTOF10: 10 - WORST POSSIBLE PAIN
PAINLEVEL_OUTOF10: 6
PAINLEVEL_OUTOF10: 6

## 2025-06-03 ASSESSMENT — PAIN DESCRIPTION - LOCATION: LOCATION: ABDOMEN

## 2025-06-03 NOTE — PROGRESS NOTES
Rounding AFIA/Cardiologist:  Raudel Mares, APRN-CNP,   Primary Cardiologist: None   Date:  6/3/2025  Patient:  Neela Elaine  YOB: 1980  MRN:  [id number]   Admit Date:  6/1/2025      SUBJECTIVE:    Neela Elaine was seen and examined today at bedside.     She denies any chest pain or shortness of breath.     Still complains of abdominal pain      VITALS:     Vitals:    06/02/25 1508 06/02/25 1936 06/02/25 2349 06/03/25 0825   BP: 153/88 138/89 111/67 141/83   BP Location:  Right arm Right arm    Patient Position:  Lying Lying    Pulse: 87 79 98 88   Resp: 16 18 18    Temp: 36.6 °C (97.9 °F) 35.8 °C (96.4 °F) 36.2 °C (97.2 °F) 36.8 °C (98.2 °F)   TempSrc:  Temporal Temporal    SpO2: 99% 99% 95% 99%   Weight:       Height:           Intake/Output Summary (Last 24 hours) at 6/3/2025 1009  Last data filed at 6/2/2025 2349  Gross per 24 hour   Intake 2284.67 ml   Output --   Net 2284.67 ml       Wt Readings from Last 4 Encounters:   06/01/25 59 kg (130 lb)   05/31/25 59 kg (130 lb)   05/30/25 59 kg (130 lb)   05/18/25 59 kg (130 lb)       CURRENT HOSPITAL MEDICATIONS:   Scheduled Medications[1]  Continuous Medications[2]  Current Outpatient Medications   Medication Instructions    ascorbic acid (VITAMIN C) 100 mg, Daily RT    dicyclomine (BENTYL) 20 mg, oral, 4 times daily PRN, As needed for abdominal pain    famotidine (PEPCID) 20 mg, oral, 2 times daily    gabapentin (NEURONTIN) 300 mg, Nightly    hydrOXYzine HCL (ATARAX) 50 mg, oral, Every 4 hours PRN    LORazepam (ATIVAN) 1 mg, oral, 3 times daily PRN    mirtazapine (REMERON) 15 mg, Nightly    omeprazole (PRILOSEC) 20 mg, oral, Daily before breakfast    ondansetron ODT (ZOFRAN-ODT) 4 mg, oral, Every 8 hours PRN    pantoprazole (PROTONIX) 20 mg, oral, Daily, Do not crush, chew, or split.    sertraline (ZOLOFT) 50 mg, Daily RT    sucralfate (CARAFATE) 1 g, oral, 4 times daily before meals and nightly         PHYSICAL EXAMINATION:     Physical Exam  Vitals and nursing note reviewed.   HENT:      Head: Normocephalic.      Mouth/Throat:      Mouth: Mucous membranes are moist.   Eyes:      Pupils: Pupils are equal, round, and reactive to light.   Cardiovascular:      Rate and Rhythm: Normal rate and regular rhythm.      Pulses: Normal pulses.   Pulmonary:      Effort: Pulmonary effort is normal.   Abdominal:      General: Bowel sounds are normal.      Palpations: Abdomen is soft.      Tenderness: There is abdominal tenderness.   Musculoskeletal:         General: Normal range of motion.   Skin:     General: Skin is warm and dry.      Capillary Refill: Capillary refill takes less than 2 seconds.   Neurological:      Mental Status: She is alert and oriented to person, place, and time.   Psychiatric:         Mood and Affect: Mood normal.         LAB DATA:     CBC:   Results from last 7 days   Lab Units 06/02/25 0637 06/01/25 1909 05/31/25  0921   WBC AUTO x10*3/uL 8.1 10.4 7.9   RBC AUTO x10*6/uL 4.54 4.85 5.08   HEMOGLOBIN g/dL 12.3 13.3 13.7   HEMATOCRIT % 36.5 37.7 39.5   MCV fL 80 78* 78*   MCH pg 27.1 27.4 27.0   MCHC g/dL 33.7 35.3 34.7   RDW % 13.9 13.7 14.0   PLATELETS AUTO x10*3/uL 294 337 332     CMP:    Results from last 7 days   Lab Units 06/02/25 0637 06/01/25 1909 05/31/25  0921   SODIUM mmol/L 138 137 140   POTASSIUM mmol/L 3.5 3.3* 3.8   CHLORIDE mmol/L 106 104 105   CO2 mmol/L 24 19* 26   BUN mg/dL 11 12 12   CREATININE mg/dL 0.71 0.68 0.86   GLUCOSE mg/dL 76 91 96   PROTEIN TOTAL g/dL 7.2 8.3* 8.0   CALCIUM mg/dL 8.7 9.3 9.6   BILIRUBIN TOTAL mg/dL 0.7 0.8 0.9   ALK PHOS U/L 65 74 74   AST U/L 22 28 12   ALT U/L 9 11 10     BMP:    Results from last 7 days   Lab Units 06/02/25 0637 06/01/25 1909 05/31/25  0921   SODIUM mmol/L 138 137 140   POTASSIUM mmol/L 3.5 3.3* 3.8   CHLORIDE mmol/L 106 104 105   CO2 mmol/L 24 19* 26   BUN mg/dL 11 12 12   CREATININE mg/dL 0.71 0.68 0.86   CALCIUM mg/dL 8.7 9.3  9.6   GLUCOSE mg/dL 76 91 96     Magnesium:  Results from last 7 days   Lab Units 05/31/25  0002   MAGNESIUM mg/dL 1.77     Troponin:    Results from last 7 days   Lab Units 06/02/25  0637 06/01/25  1909 05/31/25  0133   TROPHS ng/L 57* 60* 56*     BNP:     Lipid Panel:  Results from last 7 days   Lab Units 06/02/25  0637   HDL mg/dL 35.2   CHOLESTEROL/HDL RATIO  4.9   VLDL mg/dL 28   TRIGLYCERIDES mg/dL 139   NON HDL CHOL. mg/dL 138        DIAGNOSTIC TESTING:   @No results found for this or any previous visit.    Echocardiogram: Results for orders placed during the hospital encounter of 06/01/25    Transthoracic Echo Complete    [place] [id number]  Tel [phone] Fax [phone]    TRANSTHORACIC ECHOCARDIOGRAM REPORT    Patient Name:       MAKENZIE CHRISTIANSEN     Reading Physician:    [id number] Tomasz Jalloh DO  Study Date:         6/2/2025            Ordering Provider:    [id number] PEDRAZA  MRN/PID:            [id number]            Fellow:  Accession#:         [id number]        Nurse:  Date of Birth/Age:  1980 / 45      Sonographer:          Rosamaria DAVIS  Gender Assigned at  F                   Additional Staff:  Birth:  Height:             162.56 cm           Admit Date:           6/1/2025  Weight:             58.97 kg            Admission Status:     Inpatient -  Routine  BSA / BMI:          1.63 m2 / 22.31     Department Location:  [place] [address]                                     Echo Lab  Blood Pressure: 130 /83 mmHg    Study Type:    TRANSTHORACIC ECHO (TTE) COMPLETE  Diagnosis/ICD: Elevated Troponin-R79.89  Indication:    ELEVATED TROPONIN  CPT Codes:     Echo Complete w Full Doppler-[id number]    Patient History:  Pertinent History: Chest Pain and Elevated Troponin.    Study Detail: The following Echo studies were performed: 2D, M-Mode, Doppler  and  color flow. Technically challenging study due to prominent lung  artifact. The patient was awake.      PHYSICIAN INTERPRETATION:  Left Ventricle: The left ventricular systolic function is normal with a visually estimated ejection fraction of 60%. There are no regional wall motion abnormalities. The left ventricular cavity size is normal. There is mild increased septal and mildly increased posterior left ventricular wall thickness. There is left ventricular concentric remodeling. Spectral Doppler shows a normal pattern of left ventricular diastolic filling.  LV Wall Scoring:  All segments are normal.    Left Atrium: The left atrial size is normal.  Right Ventricle: The right ventricle is normal in size. There is normal right ventricular global systolic function.  Right Atrium: The right atrial size is normal.  Aortic Valve: The aortic valve appears structurally normal. The aortic valve area by VTI is 1.37 cmï¿½ with a peak velocity of 1.44 m/s. The peak and mean gradients are 8 mmHg and 4 mmHg, respectively, with a dimensionless index of 0.60. There is no evidence of aortic valve stenosis.  There is no evidence of aortic valve regurgitation.  Mitral Valve: The mitral valve is normal in structure. There is no evidence of mitral valve stenosis. There is normal mitral valve leaflet mobility. There is no evidence of mitral valve regurgitation. The E Vmax is 1.34 m/s.  Tricuspid Valve: The tricuspid valve is structurally normal. There is normal tricuspid valve leaflet mobility. There is mild tricuspid regurgitation. The doppler estimated RVSP is within normal limits with a right ventricular systolic pressure of 16 mmHg.  Pulmonic Valve: The pulmonic valve is structurally normal. There is no indication of pulmonic valve regurgitation.  Pericardium: No pericardial effusion noted.  Aorta: The aortic root is normal.  Pulmonary Artery: The main pulmonary artery is normal in size, and position, with normal bifurcation into  the left and right pulmonary arteries.  Systemic Veins: The inferior vena cava appears normal in size, with IVC inspiratory collapse greater than 50%.  In comparison to the previous echocardiogram(s): The left ventricular function is unchanged. The left ventricular diastolic function is normal.      CONCLUSIONS:  1. The left ventricular systolic function is normal with a visually estimated ejection fraction of 60%.  2. No regional wall motion abnormalities.  3. There is normal right ventricular global systolic function.  4. Normal sized right ventricle.  5. There is no evidence of mitral valve stenosis.  6. No evidence of mitral valve regurgitation.  7. Mild tricuspid regurgitation is visualized.  8. The doppler estimated RVSP is within normal limits with a right ventricular systolic pressure of 16 mmHg.  9. Aortic valve stenosis is not present. The peak and mean gradients are 8 mmHg and 4 mmHg respectively.  10. The main pulmonary artery is normal in size, and position, with normal bifurcation into the left and right pulmonary arteries.    RECOMMENDATIONS:  Technically suboptimal and limited study, therefore accuracy of above interpretation could be substantially diminished. Clinical correlation is advised. Consider additional imaging modalities if clinically indicated.      QUANTITATIVE DATA SUMMARY:    2D MEASUREMENTS:             Normal Ranges:  Ao Root d:       2.30 cm     (2.0-3.7cm)  LAs:             3.40 cm     (2.7-4.0cm)  IVSd:            1.00 cm     (0.6-1.1cm)  LVPWd:           1.00 cm     (0.6-1.1cm)  LVIDd:           3.60 cm     (3.9-5.9cm)  LVIDs:           2.10 cm  LV Mass Index:   66.2 g/m2  LVEDV Index:     47.97 ml/m2  LV % FS          41.7 %      LEFT ATRIUM:                  Normal Ranges:  LA Vol A4C:        23.5 ml    (22+/-6mL/m2)  LA Vol A2C:        38.6 ml  LA Vol BP:         31.3 ml  LA Vol Index A4C:  14.4ml/m2  LA Vol Index A2C:  23.7 ml/m2  LA Vol Index BP:   19.2 ml/m2  LA Area A4C:        11.6 cm2  LA Area A2C:       14.3 cm2  LA Major Axis A4C: 4.9 cm  LA Major Axis A2C: 4.5 cm  LA Volume Index:   18.0 ml/m2      RIGHT ATRIUM:                Normal Ranges:  RA Vol A4C:        12.5 ml   (8.3-19.5ml)  RA Vol Index A4C:  7.7 ml/m2  RA Area A4C:       8.0 cm2  RA Major Axis A4C: 4.4 cm      AORTA MEASUREMENTS:         Normal Ranges:  Asc Ao, d:          2.60 cm (2.1-3.4cm)      LV SYSTOLIC FUNCTION:  Normal Ranges:  EF-A4C View:    60 % (>=55%)  EF-A2C View:    58 %  EF-Biplane:     57 %  EF-Visual:      60 %  LV EF Reported: 60 %      LV DIASTOLIC FUNCTION:             Normal Ranges:  MV Peak E:             1.34 m/s    (0.7-1.2 m/s)  MV Peak A:             0.84 m/s    (0.42-0.7 m/s)  E/A Ratio:             1.60        (1.0-2.2)  MV e'                  0.127 m/s   (>8.0)  MV lateral e'          0.17 m/s  MV medial e'           0.08 m/s  E/e' Ratio:            10.56       (<8.0)  PulmV Sys Ron:         46.50 cm/s  PulmV Urena Ron:        43.20 cm/s  PulmV S/D Ron:         1.10  PulmV A Revs Ron:      28.40 cm/s  PulmV A Revs Dur:      135.00 msec      MITRAL VALVE:          Normal Ranges:  MV DT:        180 msec (150-240msec)      AORTIC VALVE:                     Normal Ranges:  AoV Vmax:                1.44 m/s (<=1.7m/s)  AoV Peak P.3 mmHg (<20mmHg)  AoV Mean P.0 mmHg (1.7-11.5mmHg)  LVOT Max Ron:            0.85 m/s (<=1.1m/s)  AoV VTI:                 27.20 cm (18-25cm)  LVOT VTI:                16.40 cm  LVOT Diameter:           1.70 cm  (1.8-2.4cm)  AoV Area, VTI:           1.37 cm2 (2.5-5.5cm2)  AoV Area,Vmax:           1.34 cm2 (2.5-4.5cm2)  AoV Dimensionless Index: 0.60      RIGHT VENTRICLE:  RV Basal 2.60 cm  RV Mid   1.90 cm  RV Major 6.1 cm  TAPSE:   23.6 mm  RV s'    0.13 m/s      TRICUSPID VALVE/RVSP:          Normal Ranges:  Peak TR Velocity:     1.82 m/s  Est. RA Pressure:     3 mmHg  RV Syst Pressure:     16 mmHg  (< 30mmHg)  IVC Diam:             1.20  cm      PULMONIC VALVE:          Normal Ranges:  PV Accel Time:  119 msec (>120ms)  PV Max Ron:     1.1 m/s  (0.6-0.9m/s)  PV Max P.2 mmHg      PULMONARY VEINS:  PulmV A Revs Dur: 135.00 msec  PulmV A Revs Ron: 28.40 cm/s  PulmV Urena Ron:   43.20 cm/s  PulmV S/D Ron:    1.10  PulmV Sys Ron:    46.50 cm/s      [id number] Tomasz Jalloh DO  Electronically signed on 2025 at 4:30:26 PM      Wall Scoring        ** Final **      Coronary Angiography: No results found for this or any previous visit from the past 1800 days.            RADIOLOGY:     Transthoracic Echo Complete   Final Result      US right upper quadrant   Final Result   Unremarkable right upper quadrant sonogram             MACRO:   None        Signed by: Joseph Schoenberger 2025 11:15 AM   Dictation workstation:   [id number]      CT abdomen pelvis w IV contrast   Final Result   1. No definite acute abdominopelvic abnormality.   2. Tubular low-density right adnexal region which is increased in   size from 08/15/2024, currently measuring 3.7 by 2 cm in the axial   plane, previously 2.6 x 1.6 cm. The finding may reflect a paraovarian   cyst, hydrosalpinx, or other cystic lesion. Pelvic ultrasound may be   considered for further evaluation   3. Mild scattered submucosal fat deposition/wall thickening within   the transverse and descending colon which appears similar to prior   exams, which may be secondary to chronic inflammation among other   processes. A component of mild superimposed colitis could be present.        Signed by: Jeremiah Espinoza 2025 9:33 PM   Dictation workstation:   [id number]      Nuclear Stress Test    (Results Pending)       PROBLEM LIST   Problem List[3]    ASSESSMENT:   NSTEMI likely type II  Chronic abdominal pain  GERD  Generalized anxiety disorder  Remote history of gastritis  Hypokalemia  Elevated troponin           PLAN:   Admit to medicine.  Remains on telemetry.  Telemetry shows normal sinus rhythm.   Reviewed EKGs which showed normal sinus rhythm with sinus arrhythmia but no obvious ST or T wave abnormalities.  Supplemental O2  Monitor electrolytes, keep potassium greater than 4 and magnesium greater than 2  Reviewed lipid panel, unremarkable  Troponin remains elevated and flat pattern most likely NSTEMI type II secondary to nausea, vomiting and chronic pain.  She did have an echocardiogram less than a year ago which was unremarkable.  Will obtain echocardiogram this visit along with nuclear stress test.  She has had chronically elevated troponins for some time.  I do not believe she needs invasive testing so we will proceed with noninvasive nuclear stress test and echocardiogram to further assess for any coronary blockages versus valvular abnormalities or LV dysfunction due to her minimally elevated troponin.  N.p.o. for stress test today  Echo unremarkable.  No significant LV dysfunction or wall motion abnormalities.  Advised to stop smoking marijuana  Pain control  If stress testing and echocardiogram are unremarkable patient can be discharged from general cardiology perspective.      I have reviewed all medications, laboratory results, and imaging pertinent for today's encounter     Raudel Mares [facility]  Adult Gerontology Acute Care Nurse Practitioner  [facility] [place]   [facility]  [phone]          Of note, this documentation is completed using the Dragon Dictation system (voice recognition software). There may be spelling and/or grammatical errors that were not corrected prior to final submission.    Please do not hesitate to call with questions.  Electronically signed by MARYA Chung-CNP, on 6/3/2025 at 10:09 AM        [1] alum-mag hydroxide-simeth, 30 mL, oral, Once  enoxaparin, 40 mg, subcutaneous, q24h  famotidine, 20 mg, oral, BID  gabapentin, 300 mg, oral, Nightly  lidocaine, 15 mL, oral, Once  mirtazapine, 15 mg, oral,  Nightly  pantoprazole, 40 mg, oral, BID AC  sertraline, 50 mg, oral, Daily  sucralfate, 1 g, oral, Before meals & nightly  [2]    [3]   Patient Active Problem List  Diagnosis    Abdominal pain    Elevated troponin level    Chest pain    GERD (gastroesophageal reflux disease)    BMI 22.0-22.9, adult    Never smoked cigarettes    Pyelonephritis    Flank pain    Intractable nausea and vomiting    Elevated troponin    Chronic superficial gastritis without bleeding    Epigastric pain    Cyclic vomiting syndrome    Marijuana use    Right upper quadrant pain

## 2025-06-03 NOTE — PROGRESS NOTES
"Daily Progress Note    Neela Elaine is a 45 y.o. female on day 0 of admission presenting with Intractable nausea and vomiting.    Subjective   Patient seen resting in bed with significant other.  Patient reports ongoing anxiety symptoms including excessive worry and abdominal pain since age 12.  Patient states she takes anxiety and depression medication daily but she is nervous about being discharged today with ongoing abdominal pain.  Discussed results of recent EGD with patient and had a lengthy conversation regarding techniques to help with anxiety.  Also discussed marijuana use and cessation with patient.  She denies SOB or chest pain.  Appreciate cardiology and GI recommendations.  Anticipate discharge after stress test.       Objective     Physical Exam    Physical Exam  Constitutional:       Appearance: Normal appearance.   HENT:      Head: Normocephalic.      Mouth/Throat:      Mouth: Mucous membranes are moist.   Eyes:      Pupils: Pupils are equal, round, and reactive to light.   Cardiovascular:      Rate and Rhythm: Normal rate and regular rhythm.      Heart sounds: Normal heart sounds, S1 normal and S2 normal.   Pulmonary:      Effort: Pulmonary effort is normal.      Breath sounds: Normal breath sounds.   Abdominal:      General: Bowel sounds are normal.      Palpations: Abdomen is soft.   Musculoskeletal:         General: Normal range of motion.      Cervical back: Neck supple.   Skin:     General: Skin is warm.   Neurological:      Mental Status: She is alert and oriented to person, place, and time.   Psychiatric:         Mood and Affect: Mood is anxious.         Speech: Speech normal.         Behavior: Behavior normal.         Last Recorded Vitals  Blood pressure 141/83, pulse 88, temperature 36.8 °C (98.2 °F), resp. rate 18, height 1.626 m (5' 4\"), weight 59 kg (130 lb), SpO2 99%.  Intake/Output last 3 Shifts:  I/O last 3 completed shifts:  In: 2798 (47.5 mL/kg) [P.O.:820; I.V.:1978 (33.5 " mL/kg)]  Out: 400 (6.8 mL/kg) [Urine:400 (0.2 mL/kg/hr)]  Weight: 59 kg     Medications  Scheduled medications  Scheduled Medications[1]  Continuous medications  Continuous Medications[2]  PRN medications  PRN Medications[3]    Labs  CBC:   Results from last 7 days   Lab Units 06/02/25 0637 06/01/25 1909 05/31/25  0921   WBC AUTO x10*3/uL 8.1 10.4 7.9   RBC AUTO x10*6/uL 4.54 4.85 5.08   HEMOGLOBIN g/dL 12.3 13.3 13.7   HEMATOCRIT % 36.5 37.7 39.5   MCV fL 80 78* 78*   MCH pg 27.1 27.4 27.0   MCHC g/dL 33.7 35.3 34.7   RDW % 13.9 13.7 14.0   PLATELETS AUTO x10*3/uL 294 337 332     CMP:    Results from last 7 days   Lab Units 06/02/25 0637 06/01/25 1909 05/31/25  0921   SODIUM mmol/L 138 137 140   POTASSIUM mmol/L 3.5 3.3* 3.8   CHLORIDE mmol/L 106 104 105   CO2 mmol/L 24 19* 26   BUN mg/dL 11 12 12   CREATININE mg/dL 0.71 0.68 0.86   GLUCOSE mg/dL 76 91 96   PROTEIN TOTAL g/dL 7.2 8.3* 8.0   CALCIUM mg/dL 8.7 9.3 9.6   BILIRUBIN TOTAL mg/dL 0.7 0.8 0.9   ALK PHOS U/L 65 74 74   AST U/L 22 28 12   ALT U/L 9 11 10     BMP:    Results from last 7 days   Lab Units 06/02/25 0637 06/01/25 1909 05/31/25  0921   SODIUM mmol/L 138 137 140   POTASSIUM mmol/L 3.5 3.3* 3.8   CHLORIDE mmol/L 106 104 105   CO2 mmol/L 24 19* 26   BUN mg/dL 11 12 12   CREATININE mg/dL 0.71 0.68 0.86   CALCIUM mg/dL 8.7 9.3 9.6   GLUCOSE mg/dL 76 91 96     Magnesium:  Results from last 7 days   Lab Units 05/31/25  0002   MAGNESIUM mg/dL 1.77     Troponin:    Results from last 7 days   Lab Units 06/02/25 0637 06/01/25  1909 05/31/25  0133   TROPHS ng/L 57* 60* 56*     BNP:     Lipid Panel:  Results from last 7 days   Lab Units 05/31/25  0002   INR  1.0   PROTIME seconds 11.1        Nutrition             Relevant Results  Results from last 7 days   Lab Units 06/02/25  0637 06/01/25  1909 05/31/25  0921 05/31/25  0002   GLUCOSE mg/dL 76 91 96 94     Lab Results   Component Value Date    HGBA1C  06/01/2025      Comment:      Hemoglobin variant  detected which does not interfere with determination of Hemoglobin A1c. Hemoglobin identification can be ordered to characterize the variant if clinically indicated.        Assessment/Plan    Intractable nausea vomiting  Elevated troponin  Chronic superficial gastritis without bleeding  Marijuana use  Possible cyclic vomiting syndrome    -Lipase and liver enzymes normal  -Ultrasound of gallbladder unremarkable  -PPI 40 mg twice daily  -GI consult recommend adding Carafate and marijuana cessation no need for EGD patient recently had which was unremarkable  -Will trend troponins  -Consult cardiology echo unremarkable, advised stop smoking marijuana stress test negative  -A1c 5.7%  -Nutrition consult recommend adding Ensure high-protein and consume 3 meals per day  -Drug screen positive for benzo, cannabis and opiate  -Negative UA      DVTp: Lovenox    PLAN: Home    MARYA Miles-CNP    Plan of care was discussed extensively with patient.  Patient verbalized understanding through teach back method.  All question and concerns addressed upon examination.    Of note, this documentation is completed using the Dragon Dictation system (voice recognition software). There may be spelling and/or grammatical errors that were not corrected prior to final submission.             [1] alum-mag hydroxide-simeth, 30 mL, oral, Once  dicyclomine, 10 mg, oral, Daily  enoxaparin, 40 mg, subcutaneous, q24h  famotidine, 20 mg, oral, BID  gabapentin, 300 mg, oral, Nightly  lidocaine, 15 mL, oral, Once  mirtazapine, 15 mg, oral, Nightly  pantoprazole, 40 mg, oral, BID AC  sertraline, 50 mg, oral, Daily  sucralfate, 1 g, oral, Before meals & nightly    [2]    [3] PRN medications: acetaminophen **OR** acetaminophen **OR** acetaminophen, dicyclomine, hydrOXYzine HCL, melatonin, ondansetron **OR** ondansetron, polyethylene glycol

## 2025-06-03 NOTE — PROGRESS NOTES
"Department of Internal Medicine  Gastroenterology  Progress note      Subjective  GI is following for persistent nausea vomiting    Patient reported to tolerate food yesterday for dinner with no vomiting.  Per bedside RN, patient still reportedly complaining of nausea and abdominal pain,+ anxiety.    Of note, practitioner with hospitalist group informed me the patient reported SI, psych consulted per primary team.      Current Medication  Current Medications[1]    Past Medical History  Active Ambulatory Problems     Diagnosis Date Noted    Abdominal pain 06/20/2024    Elevated troponin level 06/20/2024    Chest pain 06/20/2024    GERD (gastroesophageal reflux disease) 06/20/2024    BMI 22.0-22.9, adult 06/20/2024    Never smoked cigarettes 06/20/2024    Pyelonephritis 08/15/2024    Flank pain 08/16/2024     Resolved Ambulatory Problems     Diagnosis Date Noted    No Resolved Ambulatory Problems     Past Medical History:   Diagnosis Date    Acid reflux     Gastritis        PHYSICAL EXAM  VS: /83   Pulse 88   Temp 36.8 °C (98.2 °F)   Resp 18   Ht 1.626 m (5' 4\")   Wt 59 kg (130 lb)   SpO2 99%   BMI 22.31 kg/m²  Body mass index is 22.31 kg/m².  Physical Exam     DATA  Recent blood work and relevant radiology and endoscopic studies were reviewed and discussed with the patient   Results from last 7 days   Lab Units 06/02/25  0637   WBC AUTO x10*3/uL 8.1   RBC AUTO x10*6/uL 4.54   HEMOGLOBIN g/dL 12.3   HEMATOCRIT % 36.5   MCV fL 80   MCHC g/dL 33.7   RDW % 13.9   PLATELETS AUTO x10*3/uL 294       Results from last 72 hours   Lab Units 06/02/25  0637   SODIUM mmol/L 138   POTASSIUM mmol/L 3.5   CHLORIDE mmol/L 106   CO2 mmol/L 24   BUN mg/dL 11   CREATININE mg/dL 0.71   CALCIUM mg/dL 8.7   PROTEIN TOTAL g/dL 7.2   BILIRUBIN TOTAL mg/dL 0.7   ALK PHOS U/L 65   AST U/L 22   ALT U/L 9             Results from last 72 hours   Lab Units 06/01/25  1909   LIPASE U/L 22       RADIOLOGY REVIEW  === 06/01/25 ===   CT " ABDOMEN PELVIS W IV CONTRAST  - Impression -  1. No definite acute abdominopelvic abnormality.  2. Tubular low-density right adnexal region which is increased in  size from 08/15/2024, currently measuring 3.7 by 2 cm in the axial  plane, previously 2.6 x 1.6 cm. The finding may reflect a paraovarian  cyst, hydrosalpinx, or other cystic lesion. Pelvic ultrasound may be  considered for further evaluation  3. Mild scattered submucosal fat deposition/wall thickening within  the transverse and descending colon which appears similar to prior  exams, which may be secondary to chronic inflammation among other  processes. A component of mild superimposed colitis could be present.     === 06/01/25 ===  US RIGHT UPPER QUADRANT  - Impression -  Unremarkable right upper quadrant sonogram      ENDOSCOPIC REVIEW  EGD: 1/28/2025 with Dr. Mcclain indicated for recurrent nausea and vomiting, history of H. pylori gastritis, cannabis hyperemesis syndrome  -The examined esophagus was normal  - The Z-line was regular found at 37 cm from the incisors  - Localized mild erythematous mucosa without bleeding was found in the gastric body.  Biopsies were taken with cold forceps for H. pylori testing.  -The examined duodenum was normal.  Gastric biopsy-gastric mucosa with focal reactive changes.  Negative for H. pylori       IMPRESSION/RECOMMENDATIONS    Neela Elaine is a 45 y.o. female with a PMH of GERD, gastritis without bleeding, chronic nausea, pyelonephritis, anxiety, colon polyps presents to [facility] with chief complaint of ongoing abdominal pain associated with nausea and non-bilious and non-bloody vomiting.      Reason for Consult:  nausea/vomiting    Chronic epigastric pain associated with nausea and vomiting.  Abdominal pain and nausea still persists, but reportedly tolerated p.o. intake with no vomiting  History of gastritis without hemorrhage 2/2 H. Pylori.  Most recent EGD with results above 1/20/2025 noting gastritis with  biopsy this time negative for H. pylori  GERD  Daily marijuana use.  Consider cannabis hyperemesis syndrome versus cyclic vomiting syndrome  Abnormal CT findings concerning for mild superimposed colitis   6  SI reported and anxiety-psych consulted per primary team    - No indication to repeat EGD at this time.  - Supportive care per medicine team  - IV fluids, monitor electrolytes, replete if indicated  - Favor liquid diet, advance as tolerated once nausea improves  - Recommend scheduled antiemetic  - Recommend pantoprazole 40 mg twice daily  - Recommend Carafate 1 g 4 times daily with meals and at at bedtime.  Recommend crushing and mixing with water to create a slurry  - Encouraged abstinence from marijuana  - Recommend outpatient colonoscopy  - Avoid NSAIDs  - Antireflux lifestyle  -Cannot rule out gastroparesis at this time, consider gastric emptying study outpatient        Discussed with Dr. Hickman       (Electronically signed bySALVADOR Rudolph on 6/3/2025 at 8:56 AM)          [1]   Current Facility-Administered Medications:     acetaminophen (Tylenol) tablet 650 mg, 650 mg, oral, q4h PRN **OR** acetaminophen (Tylenol) oral liquid 650 mg, 650 mg, nasogastric tube, q4h PRN **OR** acetaminophen (Tylenol) suppository 650 mg, 650 mg, rectal, q4h PRN, SALVADOR Miles    alum-mag hydroxide-simeth (Mylanta) 200-200-20 mg/5 mL oral suspension 30 mL, 30 mL, oral, Once, SALVADOR Miles    dicyclomine (Bentyl) capsule 20 mg, 20 mg, oral, 4x daily PRN, Moses Dailey MD    enoxaparin (Lovenox) syringe 40 mg, 40 mg, subcutaneous, q24h, SALVADOR Miles, 40 mg at 06/03/25 0024    famotidine (Pepcid) tablet 20 mg, 20 mg, oral, BID, SALVADOR Miles, 20 mg at 06/02/25 2005    gabapentin (Neurontin) capsule 300 mg, 300 mg, oral, Nightly, Moses Dailey MD, 300 mg at 06/02/25 2005    hydrOXYzine HCL (Atarax) tablet 50 mg, 50 mg, oral, q4h PRN, Moses Dailey MD, 50 mg at  06/02/25 2007    lidocaine (Xylocaine) 2 % mouth solution 15 mL, 15 mL, oral, Once, SALVADOR Miles    melatonin tablet 3 mg, 3 mg, oral, Nightly PRN, SALVADOR Miles    mirtazapine (Remeron) tablet 15 mg, 15 mg, oral, Nightly, Moses Dailey MD, 15 mg at 06/02/25 2005    morphine injection 2 mg, 2 mg, intravenous, q3h PRN, SALVADOR Miles, 2 mg at 06/03/25 0649    morphine injection 4 mg, 4 mg, intravenous, q3h PRN, SALVADOR Miles, 4 mg at 06/02/25 1706    ondansetron (Zofran) tablet 4 mg, 4 mg, oral, q8h PRN **OR** ondansetron (Zofran) injection 4 mg, 4 mg, intravenous, q8h PRN, SALVADOR Miles, 4 mg at 06/02/25 2005    pantoprazole (ProtoNix) EC tablet 40 mg, 40 mg, oral, Daily before breakfast, SALVADOR Miles, 40 mg at 06/03/25 0614    polyethylene glycol (Glycolax, Miralax) packet 17 g, 17 g, oral, Daily PRN, SALVADOR Miles    sertraline (Zoloft) tablet 50 mg, 50 mg, oral, Daily, Moses Dailey MD, 50 mg at 06/03/25 0614    sucralfate (Carafate) tablet 1 g, 1 g, oral, Before meals & nightly, SALVADOR Miles, 1 g at 06/03/25 0614

## 2025-06-03 NOTE — SIGNIFICANT EVENT
In room talking to patient about discharge and follow up.  Pt then stated that she is afraid to be at home alone because she fears that she will take whatever pills she has to end her life.  Lorie Caba CNP notified, 1:1 sitter initiated for safety.

## 2025-06-03 NOTE — DISCHARGE INSTRUCTIONS
We recommend you stop smoking marijuana    Thank you for choosing [facility]. It has been a pleasure taking part in your medical care. Please follow up with your primary care provider as instructed. If your symptoms should persist or worsen, please contact your primary care physician, or in the case of an emergency proceed to the nearest Emergency Room for further care. If you have any questions about the care you received, please call [facility] at [phone]. Thank you again! ROSALIND Melo     Alert-The patient is alert, awake and responds to voice. The patient is oriented to time, place, and person. The triage nurse is able to obtain subjective information.

## 2025-06-03 NOTE — CONSULTS
"Nutrition Initial Assessment:   Nutrition Assessment         Patient is a 45 y.o. female presenting with intractable nausea and vomiting      Nutrition History:  Energy Intake: Poor < 50 %  Pain affecting nutrition status: Yes  If yes, Informed Nursing: abdominal pain, RN aware  Food and Nutrient History: RD consulted for MST = 2. Per EMR, has a history of chronic gastritis, GERD, chronic epigastric Dagoberto pain, marijuana use, chronic anxiety. Here for abdominal pain, N/V. Seen by a RD in August. At that time had poor appetite/intakes for ~2 weeks, with eating 1 meal per day. Pt says usually drinks Boost 2x/day and consumes rice and chicken. Avoids caffeine, pop, spicy foods, and acidic foods. Says insurane used to cover oral nutritional supplements in the past, though no longer covers. Pt expressing high levels of anxiety at time of RD visit. Says anxiety impacts intakes. Per notes, pt expressed depression and anxiety may be contributing to symptoms. Per GI note, may have canabis hyperemesis syndrome versus cyclic vomiting syndrome. NPO for stress test today. Pt and friend at bedside requesting vanilla Ensure with meals when diet advances.  Vitamin/Herbal Supplement Use: Remeron       Anthropometrics:  Height: 162.6 cm (5' 4\")   Weight: 59 kg (130 lb)   BMI (Calculated): 22.3                            Weight History:     Wt Readings from Last 20 Encounters:   06/01/25 59 kg (130 lb)   05/31/25 59 kg (130 lb)   05/30/25 59 kg (130 lb)   05/18/25 59 kg (130 lb)   05/17/25 59 kg (130 lb)   04/15/25 61.2 kg (135 lb)   04/15/25 61.2 kg (135 lb)   03/05/25 61.2 kg (135 lb)   02/04/25 61.2 kg (135 lb)   01/28/25 59 kg (130 lb)   01/07/25 60.3 kg (133 lb)   11/19/24 61.2 kg (135 lb)   11/19/24 63.5 kg (140 lb)   10/18/24 59.3 kg (130 lb 11.7 oz)   09/10/24 59 kg (130 lb)   08/15/24 60.6 kg (133 lb 9.6 oz)   07/18/24 60.9 kg (134 lb 3.2 oz)   06/20/24 61.1 kg (134 lb 9.6 oz)         Weight Change %:  Significant Weight " Loss: No    Nutrition Focused Physical Exam Findings:    Subcutaneous Fat Loss:   Orbital Fat Pads: Well nourished (slightly bulging fat pads)  Buccal Fat Pads: Well nourished (full, rounded cheeks)  Triceps: Well nourished (ample fat tissue)  Muscle Wasting:  Temporalis: Mild-Moderate (slight depression)  Pectoralis (Clavicular Region): Well nourished (clavicle not visible)  Deltoid/Trapezius: Well nourished (rounded appearance at arm, shoulder, neck)  Interosseous: Well nourished (muscle bulges)  Edema:  Edema: none  Physical Findings:  Skin: Negative (for PI)  Digestive System Findings: Abdominal pain, Nausea, Vomiting    Nutrition Significant Labs:  BMP Trend:   Results from last 7 days   Lab Units 06/02/25  0637 06/01/25  1909 05/31/25  0921 05/31/25  0002   GLUCOSE mg/dL 76 91 96 94   CALCIUM mg/dL 8.7 9.3 9.6 10.1   SODIUM mmol/L 138 137 140 139   POTASSIUM mmol/L 3.5 3.3* 3.8 3.3*   CO2 mmol/L 24 19* 26 21   CHLORIDE mmol/L 106 104 105 106   BUN mg/dL 11 12 12 9   CREATININE mg/dL 0.71 0.68 0.86 0.71    , A1C:  Lab Results   Component Value Date    HGBA1C  06/01/2025      Comment:      Hemoglobin variant detected which does not interfere with determination of Hemoglobin A1c. Hemoglobin identification can be ordered to characterize the variant if clinically indicated.       Nutrition Specific Medications:  Reviewed    I/O:    ;      Dietary Orders (From admission, onward)       Start     Ordered    06/03/25 1502  Adult diet Regular, Fat restricted 60 gm; Soft and bite sized 6  Diet effective now        Question Answer Comment   Diet type Regular    Diet type Fat restricted 60 gm    Texture Soft and bite sized 6        06/03/25 1502    06/03/25 1458  Oral nutritional supplements  Until discontinued        Comments: vanilla   Question Answer Comment   Deliver with All meals    Select supplement: Ensure Plus High Protein        06/03/25 1457    06/02/25 0124  May Participate in Room Service  ( ROOM SERVICE MAY  PARTICIPATE)  Once        Question:  .  Answer:  Yes    06/02/25 0123                     Estimated Needs:   Total Energy Estimated Needs in 24 hours (kCal): 1625 kCal  Method for Estimating Needs: 2859-9520 kcal (25-30 kcal/kg)  Total Protein Estimated Needs in 24 Hours (g): 59 g  Method for Estimating 24 Hour Protein Needs: 1 g/kg  Total Fluid Estimated Needs in 24 Hours (mL): 1625 mL  Method for Estimating 24 Hour Fluid Needs: 1 ml/kcal or per MD        Nutrition Diagnosis   Malnutrition Diagnosis  Patient has Malnutrition Diagnosis: No    Nutrition Diagnosis  Patient has Nutrition Diagnosis: Yes  Diagnosis Status (1): New  Nutrition Diagnosis 1: Inadequate oral intake  Related to (1): altered GI function  As Evidenced by (1): reports of abdominal pain, N/V, reports of poor intakes, need for oral nutritional supplements       Nutrition Interventions/Recommendations   Nutrition prescription for oral nutrition    Nutrition Recommendations:  Individualized Nutrition Prescription Provided for : Soft and bite sized diet and low fat diet per provider d/t GI symptoms. Ensure TID.    Nutrition Interventions/Goals:   Goal: Consumes 3 meals per day  Medical Food Supplement: Commercial beverage medical food supplement therapy  Goal: Ensure Plus High Protein TID (provides 350 kcal, 20 g protein per serving)  Coordination of Care with Providers: Nursing, Provider  Goal: friend at bedside      Education Documentation  Nutrition Care Manual, taught by Luciana Tucker RDN, LD at 6/3/2025  2:53 PM.  Learner: Other, Patient  Readiness: Acceptance  Method: Explanation, Handout  Response: Verbalizes Understanding  Comment: Reviewed MNT for nausea and vomiting. Reviewed foods to consume and those to avoid. Discussed trying to increase Boost to TID or use Hydes Instant Breakfast at home to supplement intakes. All quesitons answered. Provided outpatient RD contact card.              Nutrition Monitoring and Evaluation    Food/Nutrient Related History Monitoring  Monitoring and Evaluation Plan: Intake / amount of food, Estimated Energy Intake  Estimated Energy Intake: Energy intake 50 -75% of estimated energy needs  Intake / Amount of food: Consumes at least 50% or more of meals/snacks/supplements    Anthropometric Measurements  Monitoring and Evaluation Plan: Body weight  Body Weight: Body weight - Maintain stable weight    Biochemical Data, Medical Tests and Procedures  Monitoring and Evaluation Plan: Electrolyte/renal panel, Glucose/endocrine profile  Electrolyte and Renal Panel: Electrolytes within normal limits  Glucose/Endocrine Profile: Glucose within normal limits ( mg/dL)    Physical Exam Findings  Monitoring and Evaluation Plan: Digestive System  Digestive System Finding: Abdominal pain, Nausea, Vomiting  Criteria: Improvement in GI symptoms         Time Spent (min): 45 minutes

## 2025-06-03 NOTE — CARE PLAN
The patient's goals for the shift include  getting 8 hours of rest    The clinical goals for the shift include safety    Problem: Safety - Adult  Goal: Free from fall injury  Outcome: Progressing  Flowsheets (Taken 6/2/2025 2137)  Free from fall injury:   Instruct family/caregiver on patient safety   Based on caregiver fall risk screen, instruct family/caregiver to ask for assistance with transferring infant if caregiver noted to have fall risk factors     Problem: Pain - Adult  Goal: Verbalizes/displays adequate comfort level or baseline comfort level  Outcome: Progressing  Flowsheets (Taken 6/2/2025 2137)  Verbalizes/displays adequate comfort level or baseline comfort level:   Encourage patient to monitor pain and request assistance   Assess pain using appropriate pain scale

## 2025-06-03 NOTE — SIGNIFICANT EVENT
"Patient was ready for discharge and notified by nurse patient states:    \" I am afraid to go home that I will just take whatever pills I have there in an attempt to end my life.\"    -Discharge canceled  -Psych consult placed  -Patient placed on suicide precaution    Hemodynamically stable at this time  Reviewed labs    MARYA Miles-CNP    Plan of care was discussed extensively with patient.  Patient verbalized understanding through teach back method.  All question and concerns addressed upon examination.    Of note, this documentation is completed using the Dragon Dictation system (voice recognition software). There may be spelling and/or grammatical errors that were not corrected prior to final submission.    "

## 2025-06-04 ENCOUNTER — HOSPITAL ENCOUNTER (INPATIENT)
Facility: HOSPITAL | Age: 45
LOS: 2 days | Discharge: HOME | End: 2025-06-06
Attending: PSYCHIATRY & NEUROLOGY | Admitting: PSYCHIATRY & NEUROLOGY
Payer: COMMERCIAL

## 2025-06-04 VITALS
HEIGHT: 64 IN | BODY MASS INDEX: 22.2 KG/M2 | WEIGHT: 130 LBS | HEART RATE: 105 BPM | TEMPERATURE: 97.5 F | SYSTOLIC BLOOD PRESSURE: 127 MMHG | RESPIRATION RATE: 16 BRPM | DIASTOLIC BLOOD PRESSURE: 94 MMHG | OXYGEN SATURATION: 98 %

## 2025-06-04 DIAGNOSIS — F41.9 ANXIETY: ICD-10-CM

## 2025-06-04 DIAGNOSIS — K29.50 CHRONIC GASTRITIS WITHOUT BLEEDING, UNSPECIFIED GASTRITIS TYPE: ICD-10-CM

## 2025-06-04 DIAGNOSIS — R10.11 RIGHT UPPER QUADRANT PAIN: ICD-10-CM

## 2025-06-04 DIAGNOSIS — G89.29 ABDOMINAL PAIN, CHRONIC, EPIGASTRIC: ICD-10-CM

## 2025-06-04 DIAGNOSIS — R10.13 ABDOMINAL PAIN, CHRONIC, EPIGASTRIC: ICD-10-CM

## 2025-06-04 DIAGNOSIS — F33.1 MDD (MAJOR DEPRESSIVE DISORDER), RECURRENT EPISODE, MODERATE: Primary | ICD-10-CM

## 2025-06-04 LAB
ANION GAP SERPL CALC-SCNC: 17 MMOL/L (ref 10–20)
BUN SERPL-MCNC: 8 MG/DL (ref 6–23)
CALCIUM SERPL-MCNC: 9.4 MG/DL (ref 8.6–10.3)
CHLORIDE SERPL-SCNC: 104 MMOL/L (ref 98–107)
CO2 SERPL-SCNC: 19 MMOL/L (ref 21–32)
CREAT SERPL-MCNC: 0.75 MG/DL (ref 0.5–1.05)
EGFRCR SERPLBLD CKD-EPI 2021: >90 ML/MIN/1.73M*2
ERYTHROCYTE [DISTWIDTH] IN BLOOD BY AUTOMATED COUNT: 14 % (ref 11.5–14.5)
GLUCOSE SERPL-MCNC: 97 MG/DL (ref 74–99)
HCT VFR BLD AUTO: 39.8 % (ref 36–46)
HGB BLD-MCNC: 13.9 G/DL (ref 12–16)
MCH RBC QN AUTO: 27.4 PG (ref 26–34)
MCHC RBC AUTO-ENTMCNC: 34.9 G/DL (ref 32–36)
MCV RBC AUTO: 79 FL (ref 80–100)
NRBC BLD-RTO: 0 /100 WBCS (ref 0–0)
PLATELET # BLD AUTO: 336 X10*3/UL (ref 150–450)
POTASSIUM SERPL-SCNC: 3.5 MMOL/L (ref 3.5–5.3)
RBC # BLD AUTO: 5.07 X10*6/UL (ref 4–5.2)
SODIUM SERPL-SCNC: 136 MMOL/L (ref 136–145)
WBC # BLD AUTO: 8.2 X10*3/UL (ref 4.4–11.3)

## 2025-06-04 PROCEDURE — 99231 SBSQ HOSP IP/OBS SF/LOW 25: CPT | Performed by: NURSE PRACTITIONER

## 2025-06-04 PROCEDURE — G0378 HOSPITAL OBSERVATION PER HR: HCPCS

## 2025-06-04 PROCEDURE — 96372 THER/PROPH/DIAG INJ SC/IM: CPT | Mod: 59

## 2025-06-04 PROCEDURE — 2500000001 HC RX 250 WO HCPCS SELF ADMINISTERED DRUGS (ALT 637 FOR MEDICARE OP): Performed by: INTERNAL MEDICINE

## 2025-06-04 PROCEDURE — 84443 ASSAY THYROID STIM HORMONE: CPT | Performed by: PSYCHIATRY & NEUROLOGY

## 2025-06-04 PROCEDURE — 1240000001 HC SEMI-PRIVATE BH ROOM DAILY

## 2025-06-04 PROCEDURE — 97165 OT EVAL LOW COMPLEX 30 MIN: CPT | Mod: GO

## 2025-06-04 PROCEDURE — 97150 GROUP THERAPEUTIC PROCEDURES: CPT | Mod: GO

## 2025-06-04 PROCEDURE — 2500000004 HC RX 250 GENERAL PHARMACY W/ HCPCS (ALT 636 FOR OP/ED): Mod: JZ

## 2025-06-04 PROCEDURE — 2500000001 HC RX 250 WO HCPCS SELF ADMINISTERED DRUGS (ALT 637 FOR MEDICARE OP): Performed by: NURSE PRACTITIONER

## 2025-06-04 PROCEDURE — 2500000001 HC RX 250 WO HCPCS SELF ADMINISTERED DRUGS (ALT 637 FOR MEDICARE OP)

## 2025-06-04 PROCEDURE — 2500000001 HC RX 250 WO HCPCS SELF ADMINISTERED DRUGS (ALT 637 FOR MEDICARE OP): Performed by: REGISTERED NURSE

## 2025-06-04 PROCEDURE — 85027 COMPLETE CBC AUTOMATED: CPT

## 2025-06-04 PROCEDURE — 99252 IP/OBS CONSLTJ NEW/EST SF 35: CPT | Performed by: PSYCHIATRY & NEUROLOGY

## 2025-06-04 PROCEDURE — 36415 COLL VENOUS BLD VENIPUNCTURE: CPT

## 2025-06-04 PROCEDURE — 99239 HOSP IP/OBS DSCHRG MGMT >30: CPT

## 2025-06-04 PROCEDURE — 2500000002 HC RX 250 W HCPCS SELF ADMINISTERED DRUGS (ALT 637 FOR MEDICARE OP, ALT 636 FOR OP/ED): Performed by: INTERNAL MEDICINE

## 2025-06-04 PROCEDURE — 2500000004 HC RX 250 GENERAL PHARMACY W/ HCPCS (ALT 636 FOR OP/ED): Performed by: REGISTERED NURSE

## 2025-06-04 PROCEDURE — 80048 BASIC METABOLIC PNL TOTAL CA: CPT

## 2025-06-04 RX ORDER — IBUPROFEN 600 MG/1
600 TABLET, FILM COATED ORAL EVERY 6 HOURS PRN
Status: DISCONTINUED | OUTPATIENT
Start: 2025-06-04 | End: 2025-06-06 | Stop reason: HOSPADM

## 2025-06-04 RX ORDER — ALUMINUM HYDROXIDE, MAGNESIUM HYDROXIDE, AND SIMETHICONE 1200; 120; 1200 MG/30ML; MG/30ML; MG/30ML
10 SUSPENSION ORAL 4 TIMES DAILY PRN
Status: DISCONTINUED | OUTPATIENT
Start: 2025-06-04 | End: 2025-06-06 | Stop reason: HOSPADM

## 2025-06-04 RX ORDER — FAMOTIDINE 20 MG/1
20 TABLET, FILM COATED ORAL 2 TIMES DAILY
Status: DISCONTINUED | OUTPATIENT
Start: 2025-06-04 | End: 2025-06-06 | Stop reason: HOSPADM

## 2025-06-04 RX ORDER — OLANZAPINE 5 MG/1
5 TABLET, FILM COATED ORAL EVERY 6 HOURS PRN
Status: DISCONTINUED | OUTPATIENT
Start: 2025-06-04 | End: 2025-06-06 | Stop reason: HOSPADM

## 2025-06-04 RX ORDER — MIRTAZAPINE 15 MG/1
30 TABLET, FILM COATED ORAL NIGHTLY
Status: DISCONTINUED | OUTPATIENT
Start: 2025-06-04 | End: 2025-06-06 | Stop reason: HOSPADM

## 2025-06-04 RX ORDER — TRAZODONE HYDROCHLORIDE 50 MG/1
50 TABLET ORAL NIGHTLY PRN
Status: DISCONTINUED | OUTPATIENT
Start: 2025-06-04 | End: 2025-06-06 | Stop reason: HOSPADM

## 2025-06-04 RX ORDER — SUCRALFATE 1 G/1
1 TABLET ORAL
Status: DISCONTINUED | OUTPATIENT
Start: 2025-06-04 | End: 2025-06-06 | Stop reason: HOSPADM

## 2025-06-04 RX ORDER — HYDROXYZINE HYDROCHLORIDE 25 MG/1
50 TABLET, FILM COATED ORAL EVERY 6 HOURS PRN
Status: DISCONTINUED | OUTPATIENT
Start: 2025-06-04 | End: 2025-06-06 | Stop reason: HOSPADM

## 2025-06-04 RX ORDER — ACETAMINOPHEN 325 MG/1
650 TABLET ORAL EVERY 6 HOURS PRN
Status: DISCONTINUED | OUTPATIENT
Start: 2025-06-04 | End: 2025-06-06 | Stop reason: HOSPADM

## 2025-06-04 RX ORDER — MIRTAZAPINE 15 MG/1
30 TABLET, FILM COATED ORAL NIGHTLY
Status: DISCONTINUED | OUTPATIENT
Start: 2025-06-04 | End: 2025-06-04 | Stop reason: HOSPADM

## 2025-06-04 RX ORDER — DICYCLOMINE HYDROCHLORIDE 10 MG/1
10 CAPSULE ORAL DAILY
Status: DISCONTINUED | OUTPATIENT
Start: 2025-06-05 | End: 2025-06-06 | Stop reason: HOSPADM

## 2025-06-04 RX ORDER — GABAPENTIN 300 MG/1
300 CAPSULE ORAL NIGHTLY
Status: DISCONTINUED | OUTPATIENT
Start: 2025-06-04 | End: 2025-06-06 | Stop reason: HOSPADM

## 2025-06-04 RX ORDER — ONDANSETRON 4 MG/1
4 TABLET, ORALLY DISINTEGRATING ORAL EVERY 8 HOURS PRN
Status: DISCONTINUED | OUTPATIENT
Start: 2025-06-04 | End: 2025-06-06 | Stop reason: HOSPADM

## 2025-06-04 RX ORDER — OLANZAPINE 10 MG/2ML
5 INJECTION, POWDER, FOR SOLUTION INTRAMUSCULAR EVERY 6 HOURS PRN
Status: DISCONTINUED | OUTPATIENT
Start: 2025-06-04 | End: 2025-06-06 | Stop reason: HOSPADM

## 2025-06-04 RX ORDER — PANTOPRAZOLE SODIUM 40 MG/1
40 TABLET, DELAYED RELEASE ORAL
Status: DISCONTINUED | OUTPATIENT
Start: 2025-06-04 | End: 2025-06-06 | Stop reason: HOSPADM

## 2025-06-04 RX ADMIN — SUCRALFATE 1 G: 1 TABLET ORAL at 11:02

## 2025-06-04 RX ADMIN — DICYCLOMINE HYDROCHLORIDE 20 MG: 10 CAPSULE ORAL at 03:37

## 2025-06-04 RX ADMIN — SUCRALFATE 1 G: 1 TABLET ORAL at 15:17

## 2025-06-04 RX ADMIN — GABAPENTIN 300 MG: 300 CAPSULE ORAL at 20:24

## 2025-06-04 RX ADMIN — SUCRALFATE 1 G: 1 TABLET ORAL at 20:47

## 2025-06-04 RX ADMIN — FAMOTIDINE 20 MG: 20 TABLET, FILM COATED ORAL at 11:01

## 2025-06-04 RX ADMIN — DICYCLOMINE HYDROCHLORIDE 10 MG: 10 CAPSULE ORAL at 11:01

## 2025-06-04 RX ADMIN — MIRTAZAPINE 30 MG: 15 TABLET, FILM COATED ORAL at 20:24

## 2025-06-04 RX ADMIN — ENOXAPARIN SODIUM 40 MG: 40 INJECTION SUBCUTANEOUS at 01:45

## 2025-06-04 RX ADMIN — FAMOTIDINE 20 MG: 20 TABLET, FILM COATED ORAL at 20:23

## 2025-06-04 RX ADMIN — PANTOPRAZOLE SODIUM 40 MG: 40 TABLET, DELAYED RELEASE ORAL at 15:17

## 2025-06-04 RX ADMIN — HYDROXYZINE HYDROCHLORIDE 50 MG: 25 TABLET ORAL at 12:55

## 2025-06-04 RX ADMIN — PANTOPRAZOLE SODIUM 40 MG: 40 TABLET, DELAYED RELEASE ORAL at 06:20

## 2025-06-04 RX ADMIN — SERTRALINE HYDROCHLORIDE 50 MG: 50 TABLET, FILM COATED ORAL at 06:20

## 2025-06-04 RX ADMIN — ONDANSETRON 4 MG: 4 TABLET, ORALLY DISINTEGRATING ORAL at 18:16

## 2025-06-04 RX ADMIN — SUCRALFATE 1 G: 1 TABLET ORAL at 06:20

## 2025-06-04 SDOH — SOCIAL STABILITY: SOCIAL INSECURITY: ARE THERE ANY APPARENT SIGNS OF INJURIES/BEHAVIORS THAT COULD BE RELATED TO ABUSE/NEGLECT?: NO

## 2025-06-04 SDOH — HEALTH STABILITY: MENTAL HEALTH: EXPERIENCED ANY OF THE FOLLOWING LIFE EVENTS: SEXUAL ASSAULT;PHYSICAL ASSAULT

## 2025-06-04 SDOH — HEALTH STABILITY: MENTAL HEALTH: SUICIDAL BEHAVIOR (3 MONTHS): NO

## 2025-06-04 SDOH — HEALTH STABILITY: MENTAL HEALTH: WISH TO BE DEAD (PAST 1 MONTH): NO

## 2025-06-04 SDOH — HEALTH STABILITY: MENTAL HEALTH: HOW DID YOU TRY TO KILL YOURSELF?: SELF HARM BY CUTTING

## 2025-06-04 SDOH — HEALTH STABILITY: MENTAL HEALTH: WHEN DID YOU TRY TO KILL YOURSELF?: 2010

## 2025-06-04 SDOH — HEALTH STABILITY: MENTAL HEALTH: ARE YOU HAVING THOUGHTS OF KILLING YOURSELF RIGHT NOW?: NO

## 2025-06-04 SDOH — HEALTH STABILITY: MENTAL HEALTH: SUICIDAL BEHAVIOR (LIFETIME): YES

## 2025-06-04 SDOH — HEALTH STABILITY: MENTAL HEALTH: NON-SPECIFIC ACTIVE SUICIDAL THOUGHTS (PAST 1 MONTH): NO

## 2025-06-04 SDOH — SOCIAL STABILITY: SOCIAL INSECURITY: WERE YOU ABLE TO COMPLETE ALL THE BEHAVIORAL HEALTH SCREENINGS?: YES

## 2025-06-04 SDOH — HEALTH STABILITY: MENTAL HEALTH: HAVE YOU EVER TRIED TO KILL YOURSELF?: YES

## 2025-06-04 SDOH — HEALTH STABILITY: MENTAL HEALTH: IN THE PAST WEEK, HAVE YOU BEEN HAVING THOUGHTS ABOUT KILLING YOURSELF?: NO

## 2025-06-04 SDOH — SOCIAL STABILITY: SOCIAL INSECURITY: ARE YOU OR HAVE YOU BEEN THREATENED OR ABUSED PHYSICALLY, EMOTIONALLY, OR SEXUALLY BY ANYONE?: NO

## 2025-06-04 SDOH — SOCIAL STABILITY: SOCIAL INSECURITY: DO YOU FEEL UNSAFE GOING BACK TO THE PLACE WHERE YOU ARE LIVING?: NO

## 2025-06-04 SDOH — HEALTH STABILITY: MENTAL HEALTH: IN THE PAST FEW WEEKS, HAVE YOU WISHED YOU WERE DEAD?: NO

## 2025-06-04 SDOH — SOCIAL STABILITY: SOCIAL INSECURITY: HAVE YOU HAD THOUGHTS OF HARMING ANYONE ELSE?: NO

## 2025-06-04 SDOH — SOCIAL STABILITY: SOCIAL INSECURITY: DO YOU FEEL ANYONE HAS EXPLOITED OR TAKEN ADVANTAGE OF YOU FINANCIALLY OR OF YOUR PERSONAL PROPERTY?: NO

## 2025-06-04 SDOH — SOCIAL STABILITY: SOCIAL INSECURITY: DOES ANYONE TRY TO KEEP YOU FROM HAVING/CONTACTING OTHER FRIENDS OR DOING THINGS OUTSIDE YOUR HOME?: NO

## 2025-06-04 SDOH — SOCIAL STABILITY: SOCIAL INSECURITY: ABUSE: ADULT

## 2025-06-04 SDOH — ECONOMIC STABILITY: HOUSING INSECURITY: FEELS SAFE LIVING IN HOME: YES

## 2025-06-04 SDOH — SOCIAL STABILITY: SOCIAL INSECURITY: HAVE YOU HAD ANY THOUGHTS OF HARMING ANYONE ELSE?: NO

## 2025-06-04 SDOH — HEALTH STABILITY: MENTAL HEALTH: IN THE PAST FEW WEEKS, HAVE YOU FELT THAT YOU OR YOUR FAMILY WOULD BE BETTER OFF IF YOU WERE DEAD?: NO

## 2025-06-04 SDOH — HEALTH STABILITY: MENTAL HEALTH: SUICIDAL BEHAVIOR (DESCRIPTION): SELF HARM BY CUTTING

## 2025-06-04 SDOH — SOCIAL STABILITY: SOCIAL INSECURITY: HAS ANYONE EVER THREATENED TO HURT YOUR FAMILY OR YOUR PETS?: NO

## 2025-06-04 ASSESSMENT — PAIN - FUNCTIONAL ASSESSMENT
PAIN_FUNCTIONAL_ASSESSMENT: 0-10

## 2025-06-04 ASSESSMENT — LIFESTYLE VARIABLES
SUBSTANCE_ABUSE_PAST_12_MONTHS: YES
PRESCIPTION_ABUSE_PAST_12_MONTHS: NO
AUDIT-C TOTAL SCORE: 0
PRESCIPTION_ABUSE_PAST_12_MONTHS: NO
SUBSTANCE_ABUSE_PAST_12_MONTHS: YES
SUBSTANCE_ABUSE_PAST_12_MONTHS: YES
AUDIT-C TOTAL SCORE: 0
HOW OFTEN DO YOU HAVE 6 OR MORE DRINKS ON ONE OCCASION: NEVER
HOW OFTEN DO YOU HAVE A DRINK CONTAINING ALCOHOL: NEVER
PRESCIPTION_ABUSE_PAST_12_MONTHS: NO
SKIP TO QUESTIONS 9-10: 1
HOW MANY STANDARD DRINKS CONTAINING ALCOHOL DO YOU HAVE ON A TYPICAL DAY: PATIENT DOES NOT DRINK

## 2025-06-04 ASSESSMENT — ACTIVITIES OF DAILY LIVING (ADL)
DRESSING YOURSELF: INDEPENDENT
ADEQUATE_TO_COMPLETE_ADL: YES
FEEDING YOURSELF: INDEPENDENT
HEARING - RIGHT EAR: FUNCTIONAL
TOILETING: INDEPENDENT
BATHING: INDEPENDENT
WALKS IN HOME: INDEPENDENT
JUDGMENT_ADEQUATE_SAFELY_COMPLETE_DAILY_ACTIVITIES: YES
PATIENT'S MEMORY ADEQUATE TO SAFELY COMPLETE DAILY ACTIVITIES?: YES
HEARING - LEFT EAR: FUNCTIONAL
GROOMING: INDEPENDENT

## 2025-06-04 ASSESSMENT — PATIENT HEALTH QUESTIONNAIRE - PHQ9
1. LITTLE INTEREST OR PLEASURE IN DOING THINGS: NEARLY EVERY DAY
2. FEELING DOWN, DEPRESSED OR HOPELESS: NEARLY EVERY DAY
SUM OF ALL RESPONSES TO PHQ9 QUESTIONS 1 & 2: 6

## 2025-06-04 ASSESSMENT — COLUMBIA-SUICIDE SEVERITY RATING SCALE - C-SSRS
2. HAVE YOU ACTUALLY HAD ANY THOUGHTS OF KILLING YOURSELF?: NO
1. IN THE PAST MONTH, HAVE YOU WISHED YOU WERE DEAD OR WISHED YOU COULD GO TO SLEEP AND NOT WAKE UP?: NO
6. HAVE YOU EVER DONE ANYTHING, STARTED TO DO ANYTHING, OR PREPARED TO DO ANYTHING TO END YOUR LIFE?: YES
6. HAVE YOU EVER DONE ANYTHING, STARTED TO DO ANYTHING, OR PREPARED TO DO ANYTHING TO END YOUR LIFE?: NO

## 2025-06-04 ASSESSMENT — PAIN SCALES - GENERAL
PAINLEVEL_OUTOF10: 7
PAINLEVEL_OUTOF10: 0 - NO PAIN
PAINLEVEL_OUTOF10: 0 - NO PAIN
PAINLEVEL_OUTOF10: 10 - WORST POSSIBLE PAIN
PAINLEVEL_OUTOF10: 0 - NO PAIN

## 2025-06-04 NOTE — NURSING NOTE
"Patient was sitting calmly in bed with a sitter at the bedside. Patient requested some personal hygiene items so I brought them to her. I asked patient was she in any pain and she said \"no\". She was cooperative with me as I took her vitals.   "

## 2025-06-04 NOTE — DISCHARGE SUMMARY
Discharge Diagnosis  Intractable nausea and vomiting    Issues Requiring Follow-Up  Transfer to [address] [place], [place] [address]     Your medication list        START taking these medications        Instructions Last Dose Given Next Dose Due   dicyclomine 10 mg capsule  Commonly known as: Bentyl  Replaces: dicyclomine 20 mg tablet      Take 1 capsule (10 mg) by mouth once daily.       pantoprazole 40 mg EC tablet  Commonly known as: ProtoNix  Replaces: omeprazole 20 mg capsule,delayed release(DR/EC)      Take 1 tablet (40 mg) by mouth 2 times a day before meals. Do not crush, chew, or split.              CONTINUE taking these medications        Instructions Last Dose Given Next Dose Due   famotidine 20 mg tablet  Commonly known as: Pepcid      Take 1 tablet (20 mg) by mouth 2 times a day for 15 days.       gabapentin 300 mg capsule  Commonly known as: Neurontin           hydrOXYzine HCL 50 mg tablet  Commonly known as: Atarax      Take 1 tablet (50 mg) by mouth every 4 hours if needed for anxiety.       mirtazapine 15 mg tablet  Commonly known as: Remeron           ondansetron ODT 4 mg disintegrating tablet  Commonly known as: Zofran-ODT      Dissolve 1 tablet (4 mg) in the mouth every 8 hours if needed for nausea or vomiting for up to 7 days.       sertraline 50 mg tablet  Commonly known as: Zoloft           sucralfate 1 gram tablet  Commonly known as: Carafate      Take 1 tablet (1 g) by mouth 4 times a day before meals.              STOP taking these medications      ascorbic acid 100 mg tablet  Commonly known as: Vitamin C        cephalexin 500 mg capsule  Commonly known as: Keflex        dicyclomine 20 mg tablet  Commonly known as: Bentyl  Replaced by: dicyclomine 10 mg capsule        LORazepam 1 mg tablet  Commonly known as: Ativan        omeprazole 20 mg capsule,delayed release(DR/EC)  Commonly known as: PriLOSEC  Replaced by: pantoprazole 40 mg EC tablet        pantoprazole 20 mg EC tablet  Commonly known as:  ProtoNix                  Where to Get Your Medications        These medications were sent to [id number] - [place], [place] - [address] [facility] [address] [place] [address]      Phone: [phone]   dicyclomine 10 mg capsule  pantoprazole 40 mg EC tablet  sucralfate 1 gram tablet         Test Results Pending At Discharge  Pending Labs       Order Current Status    Basic metabolic panel Collected (06/04/25 0932)    CBC Collected (06/04/25 0932)            Last Vitals  Vitals:    06/03/25 0825 06/03/25 1926 06/04/25 0149 06/04/25 0752   BP: 141/83 (!) 141/98 (!) 136/92 (!) 127/94   BP Location:   Right arm    Patient Position:   Lying Lying   Pulse: 88  95 105   Resp:   16    Temp: 36.8 °C (98.2 °F) 36.8 °C (98.2 °F) 36.7 °C (98.1 °F) 36.4 °C (97.5 °F)   TempSrc:   Temporal    SpO2: 99% 98% 98%    Weight:       Height:           Hospital Course Neela Elaine is a 45 y.o. female presenting with abdominal pain that is going on for several weeks now.  Patient reported sharp epigastric pain associated with intractable nausea and episodes of vomiting described as nonbilious and nonbloody.  She describes daily abdominal pain that is wax and wane during the day as well as worse when she wakes up in the morning.  She had episode of vomiting most of the day over the last week.  Patient was seen at different facilities for the same reason.  Patient did have EGD that confirmed gastritis. Patient denies shortness of breath, diarrhea, fever, or cough.  No prior history of cardiac disease. However she does smoke marijuana on daily basis and the last use was 2 days ago.  Patient was seen by GI no further workup recommend Carafate, PPI twice daily and stop smoking marijuana.  Patient was seen by cardiology no further workup stress test was negative.  On day of discharge patient patient voiced she was afraid to go home and that she was afraid she was going to take all the pills in  her house and attempt to take her life.  Patient states she is depressed and would like help.  Psych was consulted and plan for patient to transfer to 5 .    Patient seen and evaluated, medically clear for transfer to 5 .       Pertinent Physical Exam At Time of Discharge  Physical Exam  Constitutional:       Appearance: Normal appearance.   HENT:      Head: Normocephalic.      Mouth/Throat:      Mouth: Mucous membranes are moist.   Eyes:      Pupils: Pupils are equal, round, and reactive to light.   Cardiovascular:      Rate and Rhythm: Normal rate and regular rhythm.      Heart sounds: Normal heart sounds, S1 normal and S2 normal.   Pulmonary:      Effort: Pulmonary effort is normal.      Breath sounds: Normal breath sounds.   Abdominal:      General: Bowel sounds are normal.      Palpations: Abdomen is soft.   Musculoskeletal:         General: Normal range of motion.      Cervical back: Neck supple.   Skin:     General: Skin is warm.   Neurological:      Mental Status: She is alert and oriented to person, place, and time.   Psychiatric:         Mood and Affect: Mood is depressed.         Behavior: Behavior normal.         Outpatient Follow-Up  Future Appointments   Date Time Provider Department Center   7/10/2025 10:20 AM Preethi Díaz, APRN-CNP [id number] [place]         Lorie Caba, APRN-CNP

## 2025-06-04 NOTE — CONSULTS
History Of Present Illness  Neela Elaine is a 45 y.o. female with history of major depression and general anxiety disorder who has had to come to the ER and admitted to the medical floor number of times because of her abdominal pain nausea and psychiatry was consulted as patient had expressed suicidal thoughts.      Mrs. Elaine on assessment this morning described her mood as somewhat better today because she was able to sleep last night but she stated that over the last several weeks her anxiety and depression have been getting worse.  In fact she had been to [facility] not too long ago although her experience at that inpatient psychiatry unit was not great.  Yesterday she felt better as she was being sent home and she did not feel ready and was afraid that if she gets sick or keeps having the symptoms she will have to come back to the hospital over the ED again and that led to feelings and thoughts of hopelessness and helplessness.  On assessment this morning presenting with patient denied any auditory hallucinations, visual hallucinations, did not endorse any delusions and no objective signs of psychosis evident. No signs of disorganized behavior, disorganized speech. Patient denied any racing thoughts, flight of ideas, severe sleeplessness, unusually elevated or angry mood, unusual impulsivity, unusual spending or impulsive physical relationships. No objective signs of baylee or hypomania noticed.  One of the important information is that her symptoms of GI upset did get worse when she started the Zoloft last week and with her experiencing suicidal thoughts there are concerns that Zoloft may be associated with these    Side effects and hence I encouraged her to get off of the Zoloft.  I discussed to adjust the dose of Remeron which she was agreeable with.  I also discussed need for inpatient psychiatry unit observation for psychiatric therapy treatment but also risk assessment.       MSE: Patient  was alert, oriented to time, place, person and situation. Patient appears well groomed and clad in climate appropriate clothes. Patient is resigned and guarded on approach. Recent and remote memory within normal limits. Memory registration and recall within normal limits. Attention and concentration within normal limits. Speech normal in rate, rhythm and volume. Good eye contact. Thought process Linear. Intact associations. Good fund of knowledge. Mood sad and affect constricted. Patient did not endorse any delusions. Patient denied any auditory visual hallucinations. Patient has At this time denied any had expressed suicidal thoughts yesterday active suicidal  ideations months and is not future oriented.  Patient has fair insight, fair judgment and good impulse control.     Musculoskeletal: Normal gait, no Parkinsonism, no Dystonia, no Akathisia, no TD. Psychomotor activity within normal limits.     Diagnosis: major depression recurrent     Assessment and Plan:     Once medically cleared [facility]  Initiate pink slip once patient has been confirmed and accepted at an inpatient psychiatry unit  Patient needs to be cleared from psychiatry before discharge and please contact psychiatry if patient is wanting to leave the hospital  Increase Remeron 30 mg at bedtime continue with the sitter\      .     Past Medical History  Medical History[1]    Surgical History  Surgical History[2]     Social History  She reports that she has never smoked. She has never been exposed to tobacco smoke. She has never used smokeless tobacco. She reports current drug use. Drug: Marijuana. She reports that she does not drink alcohol.    Family History  Family History[3]     Allergies  Phenergan [promethazine], Prochlorperazine, Reglan [metoclopramide hcl], Aripiprazole, Fluoxetine, and Haldol [haloperidol]     Last Recorded Vitals  Blood pressure (!) 136/92, pulse 95, temperature 36.7 °C (98.1 °F),  "temperature source Temporal, resp. rate 16, height 1.626 m (5' 4\"), weight 59 kg (130 lb), SpO2 98%.    Relevant Results  Recent Results (from the past 48 hours)   Transthoracic Echo Complete    Collection Time: 25  1:56 PM   Result Value Ref Range    AV mn grad 4 mmHg    AV pk faye 1.44 m/s    LV Biplane EF 57 %    LVOT diam 1.70 cm    MV E/A ratio 1.60     Tricuspid annular plane systolic excursion 2.4 cm    LA vol index A/L 19.2 ml/m2    LV EF 60 %    RV free wall pk S' 13.20 cm/s    RVSP 16 mmHg    LVIDd 3.60 cm    Aortic Valve Area by Continuity of Peak Velocity 1.34 cm2    AV pk grad 8 mmHg    Aortic Valve Area by Continuity of VTI 1.37 cm2    LV A4C EF 60.3       Current Medications[4]     Psychiatric Risk Assessment  Violence Risk Assessment: none  Acute Risk of Harm to Others is Considered: low   Suicide Risk Assessment: chronic pain  Protective Factors against Suicide: hopefulness/future orientation, positive family relationships, and sense of responsibility toward family  Acute Risk of Harm to Self is Considered: low    Medication Consent: risks, benefits, side effects reviewed for all ordered meds    Dony Woods MDInpatient consult to Psychiatry  Consult performed by: Dony Woods MD  Consult ordered by: SALVADOR Miles             [1]   Past Medical History:  Diagnosis Date    Acid reflux     Gastritis    [2]   Past Surgical History:  Procedure Laterality Date     SECTION, CLASSIC  1999     SECTION, CLASSIC  10/21/2003   [3]   Family History  Problem Relation Name Age of Onset    Hypertension Mother     [4]   Current Facility-Administered Medications:     acetaminophen (Tylenol) tablet 650 mg, 650 mg, oral, q4h PRN, 650 mg at 25 1707 **OR** acetaminophen (Tylenol) oral liquid 650 mg, 650 mg, nasogastric tube, q4h PRN **OR** acetaminophen (Tylenol) suppository 650 mg, 650 mg, rectal, q4h PRN, SALVADOR Miles    alum-mag hydroxide-simeth " (Mylanta) 200-200-20 mg/5 mL oral suspension 30 mL, 30 mL, oral, Once, SALVADOR Miles    dicyclomine (Bentyl) capsule 10 mg, 10 mg, oral, Daily, SALVADOR Miles, 10 mg at 06/03/25 1334    dicyclomine (Bentyl) capsule 20 mg, 20 mg, oral, 4x daily PRN, Moses Dailey MD, 20 mg at 06/04/25 0337    enoxaparin (Lovenox) syringe 40 mg, 40 mg, subcutaneous, q24h, SALVADOR Miles, 40 mg at 06/04/25 0145    famotidine (Pepcid) tablet 20 mg, 20 mg, oral, BID, SALVADOR Miles, 20 mg at 06/03/25 2020    gabapentin (Neurontin) capsule 300 mg, 300 mg, oral, Nightly, Moses Dailey MD, 300 mg at 06/03/25 2019    hydrOXYzine HCL (Atarax) tablet 50 mg, 50 mg, oral, q4h PRN, Moses Dailey MD, 50 mg at 06/03/25 1902    lidocaine (Xylocaine) 2 % mouth solution 15 mL, 15 mL, oral, Once, SALVADOR Mlies    melatonin tablet 3 mg, 3 mg, oral, Nightly PRN, SALVADOR Miles    mirtazapine (Remeron) tablet 30 mg, 30 mg, oral, Nightly, Dony Woods MD    ondansetron (Zofran) tablet 4 mg, 4 mg, oral, q8h PRN, 4 mg at 06/03/25 1334 **OR** ondansetron (Zofran) injection 4 mg, 4 mg, intravenous, q8h PRN, SALVADOR Miles, 4 mg at 06/03/25 2015    pantoprazole (ProtoNix) EC tablet 40 mg, 40 mg, oral, BID AC, Shena Lopez, APRN-CNP, 40 mg at 06/04/25 0620    polyethylene glycol (Glycolax, Miralax) packet 17 g, 17 g, oral, Daily PRN, MARYA Miles-CNP    sucralfate (Carafate) tablet 1 g, 1 g, oral, Before meals & nightly, SALVADOR Peterson, 1 g at 06/04/25 0620

## 2025-06-04 NOTE — SIGNIFICANT EVENT
Application for Emergency Admission      Ready for Transfer?  Is the patient medically cleared for transfer to inpatient psychiatry: Yes  Has the patient been accepted to an inpatient psychiatric hospital: Yes    Application for Emergency Admission  IN ACCORDANCE [place]  The Chief Clinical Officer of: [facility] Inpatient Psychiatry/[facility] 6/4/2025 .10:12 AM    Reason for Hospitalization  The undersigned has reason to believe that: Neela Elaine Is a mentally ill person subject to hospitalization by court order under division B Section 5122.01 of the Revised Code, i.e., this person:    1.Yes  Represents a substantial risk of physical harm to self as manifested by evidence of threats of, or attempts at, suicide or serious self-inflicted bodily harm    2.No Represents a substantial risk of physical harm to others as manifested by evidence of recent homicidal or other violent behavior, evidence of recent threats that place another in reasonable fear of violent behavior and serious physical harm, or other evidence of present dangerousness    3.Yes Represents a substantial and immediate risk of serious physical impairment or injury to self as manifested by  evidence that the person is unable to provide for and is not providing for the person's basic physical needs because of the person's mental illness and that appropriate provision for those needs cannot be made  immediately available in the community    4.Yes Would benefit from treatment in a hospital for his mental illness and is in need of such treatment as manifested by evidence of behavior that creates a grave and imminent risk to substantial rights of others or  himself.    5.Yes Would benefit from treatment as manifested by evidence of behavior that indicates all of the following:       (a) The person is unlikely to survive safely in the community without supervision, based on a clinical determination.       (b) The  person has a history of lack of compliance with treatment for mental illness and one of the following applies:      (i) At least twice within the thirty-six months prior to the filing of an affidavit seeking court-ordered treatment of the person under section 5122.111 of the Revised Code, the lack of compliance has been a significant factor in necessitating hospitalization in a hospital or receipt of services in a forensic or other mental health unit of a correctional facility, provided that the thirty-six-month period shall be extended by the length of any hospitalization or incarceration of the person that occurred within the thirty-six-month period.      (ii) Within the forty-eight months prior to the filing of an affidavit seeking court-ordered treatment of the person under section 5122.111 of the Revised Code, the lack of compliance resulted in one or more acts of serious violent behavior toward self or others or threats of, or attempts at, serious physical harm to self or others, provided that the forty-eight-month period shall be extended by the length of any hospitalization or incarceration of the person that occurred within the forty-eight-month period.      (c) The person, as a result of mental illness, is unlikely to voluntarily participate in necessary treatment.       (d) In view of the person's treatment history and current behavior, the person is in need of treatment in order to prevent a relapse or deterioration that would be likely to result in substantial risk of serious harm to the person or others.    (e) Represents a substantial risk of physical harm to self or others if allowed to remain at liberty pending examination.    Therefore, it is requested that said person be admitted to the above named facility.    STATEMENT OF BELIEF    Must be filled out by one of the following: a psychiatrist, licensed physician, licensed clinical psychologist, health or ,  or deputy  .  (Statement shall include the circumstances under which the individual was taken into custody and the reason for the person's belief that hospitalization is necessary. The statement shall also include a reference to efforts made to secure the individual's property at his residence if he was taken into custody there. Every reasonable and appropriate effort should be made to take this person into custody in the least conspicuous manner possible.)    Patient expressing suicidal ideations, accepted to inpatient psych.    Sin Pearce MD 6/4/2025     _____________________________________________________________   Place of Employment: [facility]    STATEMENT OF OBSERVATION BY PSYCHIATRIST, LICENSED PHYSICIAN, OR LICENSED CLINICAL PSYCHOLOGIST, IF APPLICABLE    Place of Observation (e.g., [facility], [facility] hospital, office, emergency facility)  (If applicable, please complete)    Sin Pearce MD 6/4/2025    _____________________________________________________________

## 2025-06-04 NOTE — PROGRESS NOTES
Pt seen by Dr sutton he recommends once pt is  medically cleared to please  [facility] for inpatient psychiatry admission  Initiate pink slip once patient has been confirmed and accepted at an inpatient psychiatry unit  Patient needs to be cleared from psychiatry before discharge and please contact psychiatry if patient is wanting to leave the hospital  Remeron 30 mg dose increased, continue with the sitter.      Per susan carpio, pt has been medically cleared.  She will contact [facility].

## 2025-06-04 NOTE — PROGRESS NOTES
"Occupational Therapy     REHAB Therapy Assessment & Treatment    Patient Name: Neela Elaine  MRN: [id number]  Today's Date: 6/4/2025      Activity:  Self-Compassion Group    Attendance:  Attendance  Activity: Discussion/reminisce  Participation: Active participation    Treatment Approach  Approach : Group therapy sessions  Patient Stated Goals: \"Get a strong mind.\"  Cognition: Attention, Directions (Pt alert, oriented, & attentive. Follows complex directions w/ some assistance to respond to questions as pt cannot write in English.)  Social Skills: Demonstrates ability to listen to others, Interacts independently in social activity  Emotional: Mood (Pt mood calm, somewhat anxious; affect animated & appropriate)  Treatment Approach Comments: Pt attended & participated in morning therapy group focused on self-compassion. Pt educated on self-compassion & why it is important to demonstrate compassion toward ourselves. Pt participated in discussion about self-compassion vs. self-criticism, sharing how her anxiety can increase her self-critical thoughts. Pt educated on how self-criticism perpetuates the fight-or-flight response & prevents nervous system regulation. Then, pt completed a self-assessment identifying self-critical thoughts & habits. Pt identified 1-2 self-critical tendencies. Finally, pt educated on using a “Compassionate Thought Diary” to challenge self-critical thoughts in response to example scenarios. Pt walked through example, then asked to complete Compassionate Thought Diary for themselves. Pt completed, using the following thought/situation: guilt/shame over not working right now & feeling her children are taking care of everything. Pt successfully responded to questions on handout encouraging self-compassion. After completing this activity, pt participated in discussion of how the experience felt, stating it helped her arrive at new perspectives on the situation. Pt encouraged to use " questions on thought diary to challenge self-critical tendencies in the future. Pt demonstrates good understanding.      Encounter Problems       Encounter Problems (Active)       OT Goals       Pt will explore, identify, and appropriately utilize effective coping strategies to cope with daily stressors and manage emotions with independence prior to discharge.  (Progressing)       Start:  06/04/25    Expected End:  07/02/25            Pt will demonstrate ability to appropriately modulate emotions and impulses with independence prior to discharge.  (Progressing)       Start:  06/04/25    Expected End:  07/02/25            Pt will explore, identify, and appropriately utilize effective communication strategies to express feelings, wants, and needs with independence prior to discharge.  (Progressing)       Start:  06/04/25    Expected End:  07/02/25                 Education:  Pt given educational handouts on self-compassion, including Compassionate Thought Diary. Reviewed, discussed, & practiced. Pt demonstrates good understanding.

## 2025-06-04 NOTE — PROGRESS NOTES
"Department of Internal Medicine  Gastroenterology  Progress note      Subjective  GI is following for persistent nausea vomiting    Patient was seen by psychiatry this morning and was deemed depressed and voiced suicidal ideations to nursing staff.  She will be discharged from the hospital and admitted to [facility]. behavioral psych unit.  According to nursing staff, patient did tolerate food yesterday with no nausea or vomiting.  She did report increased anxiety, depression.  She did report that GI symptoms did seem to exacerbate after starting Zoloft and this medication will be discontinued.  Patient is afebrile and hemodynamically stable.  No overt GI bleeding.    Current Medication  Current Medications[1]    Past Medical History  Active Ambulatory Problems     Diagnosis Date Noted    Abdominal pain 06/20/2024    Elevated troponin level 06/20/2024    Chest pain 06/20/2024    GERD (gastroesophageal reflux disease) 06/20/2024    BMI 22.0-22.9, adult 06/20/2024    Never smoked cigarettes 06/20/2024    Pyelonephritis 08/15/2024    Flank pain 08/16/2024    MDD (major depressive disorder), recurrent episode, moderate 06/04/2025     Resolved Ambulatory Problems     Diagnosis Date Noted    No Resolved Ambulatory Problems     Past Medical History:   Diagnosis Date    Acid reflux     Gastritis        PHYSICAL EXAM  VS: BP (!) 127/94 (Patient Position: Lying)   Pulse 105   Temp 36.4 °C (97.5 °F)   Resp 16   Ht 1.626 m (5' 4\")   Wt 59 kg (130 lb)   SpO2 98%   BMI 22.31 kg/m²  Body mass index is 22.31 kg/m².  Physical Exam     DATA  Recent blood work and relevant radiology and endoscopic studies were reviewed and discussed with the patient   Results from last 7 days   Lab Units 06/04/25  0932   WBC AUTO x10*3/uL 8.2   RBC AUTO x10*6/uL 5.07   HEMOGLOBIN g/dL 13.9   HEMATOCRIT % 39.8   MCV fL 79*   MCHC g/dL 34.9   RDW % 14.0   PLATELETS AUTO x10*3/uL 336       Results from last 72 hours   Lab Units 06/04/25  0932 " 06/02/25  0637   SODIUM mmol/L 136 138   POTASSIUM mmol/L 3.5 3.5   CHLORIDE mmol/L 104 106   CO2 mmol/L 19* 24   BUN mg/dL 8 11   CREATININE mg/dL 0.75 0.71   CALCIUM mg/dL 9.4 8.7   PROTEIN TOTAL g/dL  --  7.2   BILIRUBIN TOTAL mg/dL  --  0.7   ALK PHOS U/L  --  65   AST U/L  --  22   ALT U/L  --  9       Results from last 72 hours   Lab Units 06/01/25  1909   LIPASE U/L 22       RADIOLOGY REVIEW  === 06/01/25 ===   CT ABDOMEN PELVIS W IV CONTRAST  - Impression -  1. No definite acute abdominopelvic abnormality.  2. Tubular low-density right adnexal region which is increased in  size from 08/15/2024, currently measuring 3.7 by 2 cm in the axial  plane, previously 2.6 x 1.6 cm. The finding may reflect a paraovarian  cyst, hydrosalpinx, or other cystic lesion. Pelvic ultrasound may be  considered for further evaluation  3. Mild scattered submucosal fat deposition/wall thickening within  the transverse and descending colon which appears similar to prior  exams, which may be secondary to chronic inflammation among other  processes. A component of mild superimposed colitis could be present.     === 06/01/25 ===  US RIGHT UPPER QUADRANT  - Impression -  Unremarkable right upper quadrant sonogram      ENDOSCOPIC REVIEW  EGD: 1/28/2025 with Dr. Mcclain indicated for recurrent nausea and vomiting, history of H. pylori gastritis, cannabis hyperemesis syndrome  -The examined esophagus was normal  - The Z-line was regular found at 37 cm from the incisors  - Localized mild erythematous mucosa without bleeding was found in the gastric body.  Biopsies were taken with cold forceps for H. pylori testing.  -The examined duodenum was normal.  Gastric biopsy-gastric mucosa with focal reactive changes.  Negative for H. pylori       IMPRESSION/RECOMMENDATIONS  Neela Elaine is a 45 y.o. female with a PMH of GERD, gastritis without bleeding, chronic nausea, pyelonephritis, anxiety, colon polyps presents to [facility] with chief  complaint of ongoing abdominal pain associated with nausea and non-bilious and non-bloody vomiting. Patient voiced depressed mood and suicidal ideations, psych was consulted and patient transferred to [facility].      ASSESSMENT  Chronic epigastric pain associated with nausea and vomiting.  Abdominal pain and nausea still persists, but reportedly tolerated p.o. intake with no vomiting  History of gastritis without hemorrhage 2/2 H. Pylori.  Most recent EGD with results above 1/20/2025 noting gastritis with biopsy this time negative for H. pylori  GERD  Daily marijuana use.  Consider cannabis hyperemesis syndrome versus cyclic vomiting syndrome  Abnormal CT findings concerning for mild superimposed colitis   6     PLAN  - No indication to repeat EGD at this time.  - Recommend supportive care per medicine team  - Favor liquid diet, advance as tolerated once nausea improves  - Recommend scheduled antiemetic  - Recommend pantoprazole 40 mg twice daily  - Recommend Carafate 1 g 4 times daily with meals and at at bedtime.  Recommend crushing and mixing with water to create a slurry  - Encouraged abstinence from marijuana  - -Cannot rule out gastroparesis at this time, consider gastric emptying study outpatient  - Recommend outpatient colonoscopy for abnormal CT findings of superimposed colitis  - Avoid NSAIDs  - Antireflux lifestyle  - GI will sign off for now, feel free to call with any questions or concerns  - Recommend GI follow-up in 2 weeks    Discussed with Dr. Hickman  (Electronically signed bySALVADOR Marie on 6/4/2025 at 1:30 PM)          [1] No current facility-administered medications for this encounter.  No current outpatient medications on file.    Facility-Administered Medications Ordered in Other Encounters:     acetaminophen (Tylenol) tablet 650 mg, 650 mg, oral, q6h PRN, SALVADOR Bennett    alum-mag hydroxide-simeth (Mylanta) 200-200-20 mg/5 mL oral suspension 10 mL, 10 mL, oral, 4x  daily PRN, SALVADOR Bennett    [START ON 6/5/2025] dicyclomine (Bentyl) capsule 10 mg, 10 mg, oral, Daily, SALVADOR Bennett    famotidine (Pepcid) tablet 20 mg, 20 mg, oral, BID, SALVADOR Bennett    gabapentin (Neurontin) capsule 300 mg, 300 mg, oral, Nightly, SALVADOR Bennett    hydrOXYzine HCL (Atarax) tablet 50 mg, 50 mg, oral, q6h PRN, SALVADOR Bennett, 50 mg at 06/04/25 1255    ibuprofen tablet 600 mg, 600 mg, oral, q6h PRN, SALVADOR Bennett    mirtazapine (Remeron) tablet 30 mg, 30 mg, oral, Nightly, SALVADOR Bennett    OLANZapine (ZyPREXA) injection 5 mg, 5 mg, intramuscular, q6h PRN, SALVADOR Bennett    OLANZapine (ZyPREXA) tablet 5 mg, 5 mg, oral, q6h PRN, SALVADOR Bennett    ondansetron ODT (Zofran-ODT) disintegrating tablet 4 mg, 4 mg, oral, q8h PRN, SALVADOR Bennett    pantoprazole (ProtoNix) EC tablet 40 mg, 40 mg, oral, BID AC, SALVADOR Bennett    sucralfate (Carafate) tablet 1 g, 1 g, oral, Before meals & nightly, SALVADOR Bennett    traZODone (Desyrel) tablet 50 mg, 50 mg, oral, Nightly PRN, SALVADOR Bennett

## 2025-06-04 NOTE — CARE PLAN
The patient's goals for the shift include Patient reports goal is to go to group therapy.    The clinical goals for the shift include Patient will actively participate in treatment plan.    Patient denies SI, HI, A/VH. Patient tearful, reports mood is anxious, depressed. Patient reports that she feels lonely and has no one other than her 2 children and friend. Patient reports she has been isolating for the most part and has lost interest in doing things most days. Patient observed to be out on unit this shift but flat in affect and somewhat withdrawn. Patient given PRN medication for anxiety and for one incidence of nausea was given Zofran. Patient reports pain rating it 10/10, 10 being the worst. Patient reports only Morphine is effective and requests transfer to medical. Provider, Lorie Caba aware of complaint of pain and current vital signs. Provider contacted previously this shift regarding hypertension. Lorie Caba gave order to reassess pressure which was completed and relayed to provider. Patient compliant with scheduled medication. Patient able to make needs known. Care ongoing.       Problem: Pain - Adult  Goal: Verbalizes/displays adequate comfort level or baseline comfort level  Outcome: Progressing     Problem: Infection - Adult  Goal: Absence of infection at discharge  Outcome: Progressing  Goal: Absence of infection during hospitalization  Outcome: Progressing  Goal: Absence of fever/infection during anticipated neutropenic period  Outcome: Progressing     Problem: Safety - Adult  Goal: Free from fall injury  Outcome: Progressing     Problem: Discharge Planning  Goal: Discharge to home or other facility with appropriate resources  Outcome: Progressing     Problem: Chronic Conditions and Co-morbidities  Goal: Patient's chronic conditions and co-morbidity symptoms are monitored and maintained or improved  Outcome: Progressing     Problem: Nutrition  Goal: Nutrient intake appropriate for  maintaining nutritional needs  Outcome: Progressing     Problem: Potential for Harm to Self or Others  Goal: Cooperates with admission process  Outcome: Progressing  Goal: Participates in unit activities  Outcome: Progressing  Goal: Patient/Family participate in treatment and discharge plans  Outcome: Progressing  Goal: Identifies stressors that lead to harmful behaviors  Outcome: Progressing  Goal: Notifies staff when experiencing harmful thoughts toward self/others  Outcome: Progressing  Goal: Denies harm toward self or others  Outcome: Progressing     Problem: Ineffective Coping  Goal: Identifies ineffective coping skills  Outcome: Progressing  Goal: Demonstrates healthy coping skills  Outcome: Progressing  Goal: Participates in unit activities  Outcome: Progressing     Problem: Anxiety  Goal: Attempts to manage anxiety with help  Outcome: Progressing  Goal: Verbalizes ways to manage anxiety  Outcome: Progressing     Problem: Defensive Coping  Goal: Identifies reckless/dangerous behavior  Outcome: Progressing  Goal: Identifies stressors that lead to reckless/dangerous behavior  Outcome: Progressing  Goal: Discusses and identifies healthy coping skills  Outcome: Progressing  Goal: Demonstrates appropriate social interactions  Outcome: Progressing     Problem: IP Suicidal Ideation  Goal: STG: Neela Elaine will not engage in self-injurious activities  Outcome: Progressing

## 2025-06-04 NOTE — NURSING NOTE
.Pt arrived to [facility] accompanied by security. Wand check completed by x2 staff members. Pt's belongings collected and annotated. Vital signs obtained. Noted Elevated blood pressure, assigned RN to report to medical team. Pt oriented to area and made aware of unit expectations. Initiated 15 minute rolling safety checks. No 1:1 required per Will P CNP. Gown changed to psych safe attire. Patient with RN cooperating with admission assessment.

## 2025-06-04 NOTE — NURSING NOTE
1044am Received report from Mirian CARTER. Currently taking care of Pt. Reported behavior to be cooperative, denies pain. B/p 127/94,  pulse, 36.4, 98% RA. Reported last bm 5/29/25. Report passed to assigned RAUL Lopez.

## 2025-06-05 LAB — TSH SERPL-ACNC: 0.99 MIU/L (ref 0.44–3.98)

## 2025-06-05 PROCEDURE — 2500000004 HC RX 250 GENERAL PHARMACY W/ HCPCS (ALT 636 FOR OP/ED): Performed by: REGISTERED NURSE

## 2025-06-05 PROCEDURE — 1240000001 HC SEMI-PRIVATE BH ROOM DAILY

## 2025-06-05 PROCEDURE — 97150 GROUP THERAPEUTIC PROCEDURES: CPT | Mod: GO

## 2025-06-05 PROCEDURE — 99254 IP/OBS CNSLTJ NEW/EST MOD 60: CPT

## 2025-06-05 PROCEDURE — 99222 1ST HOSP IP/OBS MODERATE 55: CPT | Performed by: PSYCHIATRY & NEUROLOGY

## 2025-06-05 PROCEDURE — 2500000001 HC RX 250 WO HCPCS SELF ADMINISTERED DRUGS (ALT 637 FOR MEDICARE OP): Performed by: REGISTERED NURSE

## 2025-06-05 PROCEDURE — RXMED WILLOW AMBULATORY MEDICATION CHARGE

## 2025-06-05 RX ORDER — SUCRALFATE 1 G/1
1 TABLET ORAL
Qty: 120 TABLET | Refills: 0 | Status: SHIPPED | OUTPATIENT
Start: 2025-06-05 | End: 2025-07-06

## 2025-06-05 RX ORDER — FAMOTIDINE 20 MG/1
20 TABLET, FILM COATED ORAL 2 TIMES DAILY
Qty: 30 TABLET | Refills: 0 | Status: SHIPPED | OUTPATIENT
Start: 2025-06-05

## 2025-06-05 RX ORDER — PANTOPRAZOLE SODIUM 40 MG/1
40 TABLET, DELAYED RELEASE ORAL
Qty: 60 TABLET | Refills: 0 | Status: SHIPPED | OUTPATIENT
Start: 2025-06-05 | End: 2025-07-06

## 2025-06-05 RX ORDER — MIRTAZAPINE 30 MG/1
30 TABLET, FILM COATED ORAL NIGHTLY
Qty: 30 TABLET | Refills: 0 | Status: SHIPPED | OUTPATIENT
Start: 2025-06-05 | End: 2025-07-06

## 2025-06-05 RX ORDER — HYDROXYZINE HYDROCHLORIDE 50 MG/1
50 TABLET, FILM COATED ORAL 2 TIMES DAILY PRN
Qty: 60 TABLET | Refills: 0 | Status: SHIPPED | OUTPATIENT
Start: 2025-06-05 | End: 2025-07-06

## 2025-06-05 RX ORDER — ONDANSETRON 4 MG/1
4 TABLET, ORALLY DISINTEGRATING ORAL EVERY 8 HOURS PRN
Qty: 20 TABLET | Refills: 0 | Status: SHIPPED | OUTPATIENT
Start: 2025-06-05

## 2025-06-05 RX ORDER — DICYCLOMINE HYDROCHLORIDE 10 MG/1
10 CAPSULE ORAL DAILY
Qty: 30 CAPSULE | Refills: 0 | Status: SHIPPED | OUTPATIENT
Start: 2025-06-05

## 2025-06-05 RX ADMIN — FAMOTIDINE 20 MG: 20 TABLET, FILM COATED ORAL at 20:52

## 2025-06-05 RX ADMIN — FAMOTIDINE 20 MG: 20 TABLET, FILM COATED ORAL at 09:25

## 2025-06-05 RX ADMIN — GABAPENTIN 300 MG: 300 CAPSULE ORAL at 20:52

## 2025-06-05 RX ADMIN — SUCRALFATE 1 G: 1 TABLET ORAL at 17:07

## 2025-06-05 RX ADMIN — MIRTAZAPINE 30 MG: 15 TABLET, FILM COATED ORAL at 20:52

## 2025-06-05 RX ADMIN — SUCRALFATE 1 G: 1 TABLET ORAL at 06:16

## 2025-06-05 RX ADMIN — DICYCLOMINE HYDROCHLORIDE 10 MG: 10 CAPSULE ORAL at 09:25

## 2025-06-05 RX ADMIN — SUCRALFATE 1 G: 1 TABLET ORAL at 11:45

## 2025-06-05 RX ADMIN — PANTOPRAZOLE SODIUM 40 MG: 40 TABLET, DELAYED RELEASE ORAL at 06:16

## 2025-06-05 RX ADMIN — PANTOPRAZOLE SODIUM 40 MG: 40 TABLET, DELAYED RELEASE ORAL at 17:07

## 2025-06-05 RX ADMIN — HYDROXYZINE HYDROCHLORIDE 50 MG: 25 TABLET ORAL at 16:13

## 2025-06-05 RX ADMIN — ONDANSETRON 4 MG: 4 TABLET, ORALLY DISINTEGRATING ORAL at 16:31

## 2025-06-05 RX ADMIN — SUCRALFATE 1 G: 1 TABLET ORAL at 20:52

## 2025-06-05 ASSESSMENT — COLUMBIA-SUICIDE SEVERITY RATING SCALE - C-SSRS
1. IN THE PAST MONTH, HAVE YOU WISHED YOU WERE DEAD OR WISHED YOU COULD GO TO SLEEP AND NOT WAKE UP?: YES
1. SINCE LAST CONTACT, HAVE YOU WISHED YOU WERE DEAD OR WISHED YOU COULD GO TO SLEEP AND NOT WAKE UP?: NO
4. HAVE YOU HAD THESE THOUGHTS AND HAD SOME INTENTION OF ACTING ON THEM?: NO
2. HAVE YOU ACTUALLY HAD ANY THOUGHTS OF KILLING YOURSELF?: NO
2. HAVE YOU ACTUALLY HAD ANY THOUGHTS OF KILLING YOURSELF?: YES
6. HAVE YOU EVER DONE ANYTHING, STARTED TO DO ANYTHING, OR PREPARED TO DO ANYTHING TO END YOUR LIFE?: YES
6. HAVE YOU EVER DONE ANYTHING, STARTED TO DO ANYTHING, OR PREPARED TO DO ANYTHING TO END YOUR LIFE?: NO
6. HAVE YOU EVER DONE ANYTHING, STARTED TO DO ANYTHING, OR PREPARED TO DO ANYTHING TO END YOUR LIFE?: NO
5. HAVE YOU STARTED TO WORK OUT OR WORKED OUT THE DETAILS OF HOW TO KILL YOURSELF? DO YOU INTEND TO CARRY OUT THIS PLAN?: NO

## 2025-06-05 ASSESSMENT — PAIN SCALES - GENERAL
PAINLEVEL_OUTOF10: 0 - NO PAIN
PAINLEVEL_OUTOF10: 0 - NO PAIN

## 2025-06-05 ASSESSMENT — PAIN - FUNCTIONAL ASSESSMENT
PAIN_FUNCTIONAL_ASSESSMENT: 0-10
PAIN_FUNCTIONAL_ASSESSMENT: 0-10

## 2025-06-05 NOTE — PROGRESS NOTES
"Occupational Therapy     REHAB Therapy Assessment & Treatment    Patient Name: Neela Elaine  MRN: [id number]  Today's Date: 6/5/2025  Attendance:  Attendance  Activity: Discussion/reminisce (OT GRP (AM): Clinical Depression paper was reviewed)  Participation: Active participation    Therapeutic OT GRP (AM)  Treatment Approach  Approach : Group therapy sessions  Patient Stated Goals: \"To go to counseling to finally address my anxiety,\" per Pt.  Cognition: Attention, Directions (Pt is attentive, focused & spontaneous with her answers for duration of session. Statements & contributions to discussion are linear, organized, & on-topic.)  Social Skills: Cooperates with others in group activity  Emotional: Behaviors, Mood (Pt's attitude: Calm, cooperative. Behavior: Appropriate eye contact. Pt mood euthymic.)  Treatment Approach Comments: OT GRP (AM): Clinical Depression paper was reviewed re: s/s of depression, the length of time Pt has been experiencing and triggers precipitating depression. Pt noted that prior to admit intrusive thoughts of self-harm added to Pt’s depression. \"I'm always overthinking things, \" per pt. Pt followed along with the reading material and [id number] s/s from the handout including constant sadness, c/o lack of energy to eat more than 1 meal a day, no energy to initiate and complete self-care tasks of showering, grooming & hygiene; sleeping less than 6hrs a day; c/o poor concentration for video games/leisure tasks. Pt spoke of a brief, concrete schedule he would be willing to implement as part of gaining balance in his day. Pt accepted a paper on daily steps to add activity & (+) coping tools to create a balance between quality sleep, adequate self-care, productive work & healthy leisure; Understanding s/s and gaining balanced daily routine to decrease s/s of depression. Group discussed the benefits these as well as practicing the relaxation techniques including Guided Imagery " with (+) self-talk phrases to safely decrease anger & panic. Next, Pt receptive & engaged in discussion of using distress tolerance skills during daily life activities. Also, Pt participated in discussion of effective stress management, how it promotes success in meaningful roles & occupations, & how mindfulness can improve one’s ability to handle stress. Pt receptive & demonstrates good understanding.      Encounter Problems       Encounter Problems (Active)       OT Goals       Pt will explore, identify, and appropriately utilize effective coping strategies to cope with daily stressors and manage emotions with independence prior to discharge.  (Progressing)       Start:  06/04/25    Expected End:  07/02/25            Pt will demonstrate ability to appropriately modulate emotions and impulses with independence prior to discharge.  (Progressing)       Start:  06/04/25    Expected End:  07/02/25            Pt will explore, identify, and appropriately utilize effective communication strategies to express feelings, wants, and needs with independence prior to discharge.  (Progressing)       Start:  06/04/25    Expected End:  07/02/25

## 2025-06-05 NOTE — GROUP NOTE
Group Topic: Community   Group Date: 6/5/2025  Start Time: 1415  End Time: 1500  Facilitators: HOA Rosenberg   Department: [facility]    Number of Participants: 6   Group Focus: communication  Treatment Modality: Cognitive Behavioral Therapy  Interventions utilized were assignment  Purpose: maladaptive thinking    Name: Neela Elaine YOB: 1980   MR: [id number]      Facilitator:   Level of Participation: active  Quality of Participation: appropriate/pleasant  Interactions with others: appropriate  Mood/Affect: appropriate  Triggers (if applicable): n/a  Cognition: coherent/clear  Progress: Minimal  Comments: Neela shared positive thoughts in group and engaged in discussion.  Plan: continue with services

## 2025-06-05 NOTE — GROUP NOTE
Group Topic: Coping Skills   Group Date: 6/4/2025  Start Time: 1450  End Time: 1500  Facilitators: Jessica Reeves RN   Department: [place] [address] [facility]    Number of Participants: 4   Group Focus: coping skills  Treatment Modality: Cognitive Behavioral Therapy  Interventions utilized were group exercise  Purpose: coping skills    Name: Neela Elaine YOB: 1980   MR: [id number]      Facilitator: Registered Nurse  Level of Participation: active  Quality of Participation: appropriate/pleasant  Interactions with others: appropriate  Mood/Affect: appropriate  Triggers (if applicable): N/A  Cognition: coherent/clear  Progress: Gaining insight or knowledge  Comments: Patient contributed to group discussion regarding stressors and coping skills.  Plan: continue with services

## 2025-06-05 NOTE — PROGRESS NOTES
"Occupational Therapy     REHAB Therapy Assessment & Treatment    Patient Name: Neela Elaine  MRN: [id number]  Today's Date: 6/5/2025    Attendance:  Attendance  Activity: Discussion/reminisce ((4D) Guided Meditation practice “Mindful Way through Depression” CD was practiced followed by discussion.)  Participation: Active participation    Therapeutic OT GRP (PM)  Treatment Approach  Approach : Group therapy sessions  Patient Stated Goals: \"To go to counseling to finally address my anxiety,\" per Pt.  Cognition: Attention, Directions (Pt is attentive, focused & spontaneous with her answers for duration of session. Statements & contributions to discussion are linear, organized, & on-topic.)  Social Skills: Cooperates with others in group activity  Emotional: Behaviors, Mood (Pt's attitude: Calm, cooperative. Behavior: Appropriate eye contact. Pt mood euthymic.)  Stress Management/Relaxation Training: Identifies benefits of stress management/relaxation techniques, Performs stress management/relaxation techniques  Treatment Approach Comments: OT GRP(PM) Guided Meditation practice “Mindful Way through Depression” CD was practiced followed by discussion. Pts discussed the emotion of depression vs the S/S of clinical depression and how in infiltrates a person’s life in the 4 major areas of Sleep, self-care, work & play. Next, Pt learned about \"Mindfulness: What it is & its purpose in one’s daily routine”. Pt was educated on the key elements including \"Awareness & Acceptance,\" of one’s thoughts during this process & the benefits re: improved concentration, greater resilience to stress and improved interpersonal relationships as well relief from depression, anxiety & obsessing. Then Pts sat in their chairs with in a quiet room as they practiced & listening to a 11min session CD narrated by Scott Escalante on “Mindfulness of the Breath”. Afterwards, group discussion identified the areas where incorporating this " "technique could improve one’s sleep, energy and emotional self-regulation skills. Pt noted anxiety level had decreased slightly giving Pt a plan to try this evening to improve quality of one’s sleep following the 15min session. In addition, Pt asked appropriate questions creating a brief discussion on the benefits of Mindfulness skills in dealing with chronic anxiety while noting that the tools learned would be helpful to distract Pt from her \"Overthinking\". Overall, Pt was very receptive to the activity with active listening & appropriate questions and feedback.      Encounter Problems       Encounter Problems (Active)       OT Goals       Pt will explore, identify, and appropriately utilize effective coping strategies to cope with daily stressors and manage emotions with independence prior to discharge.  (Progressing)       Start:  06/04/25    Expected End:  07/02/25            Pt will demonstrate ability to appropriately modulate emotions and impulses with independence prior to discharge.  (Progressing)       Start:  06/04/25    Expected End:  07/02/25            Pt will explore, identify, and appropriately utilize effective communication strategies to express feelings, wants, and needs with independence prior to discharge.  (Progressing)       Start:  06/04/25    Expected End:  07/02/25                       "

## 2025-06-05 NOTE — CONSULTS
"Nutrition Progress Note    RD consulted for nutrition assessment/recommendation d/t pt having \"a lot of gastro symptoms, burning, nausea, vomiting, and weight loss. Was asking about cranberry juice and gatorade, but looks like they are acidic. She is asking about nutritional supplements? Recommendations?\"     Recently seen by this RD on 6/3. At that time RD discussed with pt and pt's friend foods to avoid and those to consume while managing N/V. Reviewed oral nutritional supplements to consume and ordered vanilla Ensure with meals on 6/3. Nurse practitioner recommended soft and bite sized/low fat diet on 6/3. Orders were discontinued upon discharge from medical floor. Re-ordered soft and bite sized/low fat diet and vanilla Ensure TID. Secure chat sent to RN with recommendations. Please re-consult or notify RD as needed.  "

## 2025-06-05 NOTE — PROGRESS NOTES
Social Work Note    HOA called son to discuss current hospitalization and to address any concerns that he may have. Son shared that he has been able to speak with pt and reports that pt is sounding better. Son denies any safety concerns if pt were to be discharge tomorrow.

## 2025-06-05 NOTE — PROGRESS NOTES
"Social Work Note       06/04/25 1135   Arrival Details   Mode of Arrival Wheelchair   Admission Source Other (Comment)   Admission Type Involuntary   History of Present Illness   Admission Reason Suicidal thoughts increased anxiety   HPI Per H&P, \"Neela Elaine is a 45 y.o. female presenting with abdominal pain that is going on for several weeks now.  Patient reported sharp epigastric pain associated with intractable nausea and episodes of vomiting described as nonbilious and nonbloody.  She describes daily abdominal pain that is wax and wane during the day as well as worse when she wakes up in the morning.  She had episode of vomiting most of the day over the last week.  Patient was seen at different facilities for the same reason.  Patient did have EGD that confirmed gastritis.  Patient denies shortness of breath, diarrhea, fever, or cough.  No prior history of cardiac disease.  She denies abnormal alcohol use or illicit drug use.  Also denies smoking.  However she does smoke marijuana on daily basis and the last use was 2 days ago\".   SW Readmission Information    Readmission within 30 Days No   Psychiatric Symptoms   Anxiety Symptoms Generalized   Depression Symptoms Change in energy level;Appetite change   Nedra Symptoms No problems reported or observed.   Psychosis Symptoms   Hallucination Type No problems reported or observed.   Delusion Type No problems reported or observed.   Additional Symptoms - Adult   Generalized Anxiety Disorder Restlessness;Difficult to control worry   Obsessive Compulsive Disorder No problems reported or observed.   Panic Attack Shortness of breath;Sweaty/clammy   Post Traumatic Stress Disorder No problems reported or observed.   Delirium No problems reported or observed.   Past Psychiatric History/Meds/Treatments   Past Psychiatric History Pt reported one prior inpatient admission earlier this year   Past Psychiatric Meds/Treatments Review MAR   Past Violence/Victimization " History None noted   Current Mental Health Contacts    Name/Phone Number none   Provider Name/Phone Number none   Support System   Support System Immediate family   Living Arrangement   Living Arrangement Lives with someone   Home Safety   Feels Safe Living in Home Yes   Potentially Unsafe Housing Conditions Unable to Assess   Home Safety  LISW-S will confirm safety prior to discharge.   Income Information   Employment Status for Patient   Employment Status Unemployed   Income Source Unable to Assess   Current/Previous Occupation Healthcare   [place] Service/Education History   Current or Previous  Service None   Social/Cultural History   Cultural Requests During Hospitalization none   Spiritual Requests During Hospitalization none   Legal   Legal Concerns None reported   Drug Screening   Have you used any substances (canabis, cocaine, heroin, hallucinogens, inhalants, etc.) in the past 12 months? Yes  (THC)   Have you used any prescription drugs other than prescribed in the past 12 months? No   Is a toxicology screen needed? No   Behavioral Health   Behaviors/Mood Anxious;Crying;Flat affect   Orientation   Orientation Level Oriented X4   General Appearance   Motor Activity Unable to assess   General Attitude Cooperative   Appearance/Hygiene Unremarkable   Thought Process   Content Unremarkable   Perception Not altered   Hallucination None   Judgment/Insight Poor   Confusion None   Violence Risk Assessment   Assessment of Violence None noted   Thoughts of Harm to Others No   Ask Suicide-Screening Questions   1. In the past few weeks, have you wished you were dead? N   2. In the past few weeks, have you felt that you or your family would be better off if you were dead? N   3. In the past week, have you been having thoughts about killing yourself? N   4. Have you ever tried to kill yourself? Y   How did you try to kill yourself? self harm by cutting   When did you try to kill yourself? 2010 5.  Are you having thoughts of killing yourself right now? N   Calculated Risk Score Potential Risk   [place] Suicide Severity Rating Scale (Screener/Recent Self-Report)   1. Wish to be Dead (Past 1 Month) N   2. Non-Specific Active Suicidal Thoughts (Past 1 Month) N   6. Suicidal Behavior (Lifetime) Y   6. Suicidal Behavior (3 Months) N   6. Suicidal Behavior (Description) self harm by cutting   Step 1: Risk Factors   Current & Past Psychiatric Dx Mood disorder   Presenting Symptoms Anxiety and/or panic   Precipitants/Stressors Chronic physical pain or other acute medical problem (e.g. CNS disorders)   Change in Treatment Hopeless or dissatisfied with provider or treatment   Access to Lethal Methods  No   Step 2: Protective Factors    Protective Factors Internal Identifies reasons for living   Protective Factors External Responsibility to children   Step 3: Suicidal Ideation Intensity   Most Severe Suicidal Ideation Identified self harm by cutting

## 2025-06-05 NOTE — CONSULTS
Reason For Consult  Medical optimization for inpatient behavioral health evaluation       History Of Present Illness  Neela Elaine is a 45 y.o. female with primary medical history of chronic superficial gastritis and marijuana use who is presenting with suicidal ideation.  Patient was [place] [facility] [place]  with complaints of several weeks of abdominal pain.  Patient also had intractable nausea and vomiting.  Patient was seen by GI and recommend supportive care, PPI twice daily, Carafate and patient was started on Bentyl daily.  Patient did show some improvement on day of discharge patient had suicidal ideation.  Was then transferred to [facility].  Patient states feeling much better today not having thoughts of hurting herself.  Denies chest pain or shortness of breath.  Denies abdominal pain, nausea, vomiting or diarrhea.  Denies fever or chills.    Hospitalist to evaluate medical optimization for psychiatric treatment and evaluation.  Neurological evaluation completed.  No acute or chronic medical issues identified at this time that could be contributing to underlying psychiatric symptoms. Pt is medically optimized for inpatient behavioral health evaluation and treatment.    Review of systems: 10 system were reviewed and were negative except what was mentioned in history of present illness       Past Medical History  She has a past medical history of Acid reflux and Gastritis.    Surgical History  She has a past surgical history that includes  section, classic (1999) and  section, classic (10/21/2003).     Social History  She reports that she has never smoked. She has never been exposed to tobacco smoke. She has never used smokeless tobacco. She reports current drug use. Drug: Marijuana. She reports that she does not drink alcohol.    Family History  Family History[1]     Allergies  Phenergan [promethazine], Prochlorperazine, Reglan [metoclopramide hcl], Aripiprazole, Fluoxetine, and  "Haldol [haloperidol]    Physical Exam  Physical Exam  Constitutional:       Appearance: Normal appearance.   HENT:      Head: Normocephalic.      Mouth/Throat:      Mouth: Mucous membranes are moist.   Eyes:      Pupils: Pupils are equal, round, and reactive to light.   Cardiovascular:      Rate and Rhythm: Normal rate and regular rhythm.      Heart sounds: Normal heart sounds, S1 normal and S2 normal.   Pulmonary:      Effort: Pulmonary effort is normal.      Breath sounds: Normal breath sounds.   Abdominal:      General: Bowel sounds are normal.      Palpations: Abdomen is soft.   Musculoskeletal:         General: Normal range of motion.      Cervical back: Neck supple.   Skin:     General: Skin is warm.   Neurological:      Mental Status: She is alert and oriented to person, place, and time.   Psychiatric:         Mood and Affect: Mood normal.         Behavior: Behavior normal.          Last Recorded Vitals  Blood pressure (!) 135/92, pulse 109, temperature 36.9 °C (98.4 °F), resp. rate 16, height 1.626 m (5' 4\"), weight 57.6 kg (127 lb), SpO2 100%.    Relevant Results  CBC:   Results from last 7 days   Lab Units 06/04/25  0932 06/02/25 0637 06/01/25  1909   WBC AUTO x10*3/uL 8.2 8.1 10.4   RBC AUTO x10*6/uL 5.07 4.54 4.85   HEMOGLOBIN g/dL 13.9 12.3 13.3   HEMATOCRIT % 39.8 36.5 37.7   MCV fL 79* 80 78*   MCH pg 27.4 27.1 27.4   MCHC g/dL 34.9 33.7 35.3   RDW % 14.0 13.9 13.7   PLATELETS AUTO x10*3/uL 336 294 337     CMP:    Results from last 7 days   Lab Units 06/04/25  0932 06/02/25  0637 06/01/25  1909 05/31/25  0921   SODIUM mmol/L 136 138 137 140   POTASSIUM mmol/L 3.5 3.5 3.3* 3.8   CHLORIDE mmol/L 104 106 104 105   CO2 mmol/L 19* 24 19* 26   BUN mg/dL 8 11 12 12   CREATININE mg/dL 0.75 0.71 0.68 0.86   GLUCOSE mg/dL 97 76 91 96   PROTEIN TOTAL g/dL  --  7.2 8.3* 8.0   CALCIUM mg/dL 9.4 8.7 9.3 9.6   BILIRUBIN TOTAL mg/dL  --  0.7 0.8 0.9   ALK PHOS U/L  --  65 74 74   AST U/L  --  22 28 12   ALT U/L  --  " 9 11 10     BMP:    Results from last 7 days   Lab Units 06/04/25  0932 06/02/25  0637 06/01/25  1909   SODIUM mmol/L 136 138 137   POTASSIUM mmol/L 3.5 3.5 3.3*   CHLORIDE mmol/L 104 106 104   CO2 mmol/L 19* 24 19*   BUN mg/dL 8 11 12   CREATININE mg/dL 0.75 0.71 0.68   CALCIUM mg/dL 9.4 8.7 9.3   GLUCOSE mg/dL 97 76 91     Magnesium:  Results from last 7 days   Lab Units 05/31/25  0002   MAGNESIUM mg/dL 1.77     Troponin:    Results from last 7 days   Lab Units 06/02/25  0637 06/01/25  1909 05/31/25  0133   TROPHS ng/L 57* 60* 56*     BNP:     Lipid Panel:  Results from last 7 days   Lab Units 05/31/25  0002   INR  1.0   PROTIME seconds 11.1          Assessment/Plan    Suicidal ideation  Chronic superficial gastritis  Marijuana use    -Patient transferred from medicine floor to [facility]  -Was seen by GI no indication for EGD at this time recommend supportive care, PPI twice daily and Carafate also abstain from marijuana use  -Admit to Behavioral Health Unit  -Contract for Safety   -Safety Protocols  -Medications to be determined by psychiatry   -Vital Signs BID  -Group Therapy as appropriate  -Social Work for outpatient therapy   -Encourage Healthy Lifestyle and Exercise as appropriate  -Substance Abuse  -Risks discussed  -Encourage abstinence  -Inpatient/Outpatient treatment therapies  -Will resume meds    Medicine to Sign off  Hemodynamically stable at this time  Reviewed labs  Please call if acute needs or requested assessment is needed    MARYA Miles-CNP    Plan of care was discussed extensively with patient.  Patient verbalized understanding through teach back method.  All question and concerns addressed upon examination.    Of note, this documentation is completed using the Dragon Dictation system (voice recognition software). There may be spelling and/or grammatical errors that were not corrected prior to final submission.         [1]   Family History  Problem Relation Name Age of Onset     Hypertension Mother

## 2025-06-05 NOTE — H&P
The reason for admission includes: feeling depressed and feeling suicidal.  Onset of symptoms was gradual starting 1 week ago with gradually worsening course since that time. Psychosocial Stressors: health.        History Of Present Illness  Neela Elaine is a 45 y.o. year old female patient with past psych history of anxiety depression who initially presented to the ED for abdominal pain.  Was transferred to medical floor diagnosed with gastritis and colitis.  While on the medical floor psych consulted as patient reported she was experiencing vitals of anxiety and suicidal ideation.  She was afraid of being discharged home.  Patient currently prescribed Remeron and reports she was recently started on Zoloft last week.  States that she believes Zoloft did make her GI issues worse.  Patient lives at home with her son and daughter.  Denies any history of baylee or psychosis.  States that she was using marijuana occasionally, encouraged by GI team to discontinue use of marijuana as it may cause hyperemesis.  Seen by psych consult team who discontinued Zoloft and increased Remeron to 30 mg oral daily at bedtime.  She was transferred to [facility] once medically cleared.     Past Medical History  Medical History[1]    Past Psychiatric History:   Previous therapy: no  Previous psychiatric hospitalizations: no  Previous diagnoses: yes - MDD, ALDAIR  Previous suicide attempts: no  History of violence: no    Allergies  RX Allergies[2]     MSE  General: Appropriately groomed and dressed.  Appearance: Appears stated age.  Attitude: Calm, cooperative.  Behavior: Appropriate eye contact.  Motor activity: No agitation or retardation. no EPS.  Normal gait.  Speech: Regular rate, rhythm, volume and tone.  Mood: Less depressed  Affect: Appropriate range  Thought process: Organized, linear, goal-directed.  Associations are logical.  Thought content: Does not endorse suicidal or homicidal ideation, no delusions elicited.  Thought  perception: Did not endorse auditory or visual hallucinations.  Cognition: Alert, oriented x3.  no deficit in memory or attention.  Insight: Fair.  Judgment: Fair.    Psychiatric Risk Assessment  Violence Risk Assessment: major mental illness  Acute Risk of Harm to Others is Considered: low   Suicide Risk Assessment: chronic medical illness, chronic pain, and current psychiatric illness  Protective Factors against Suicide: adherence to  treatment, hopefulness/future orientation, positive family relationships, and sense of responsibility toward family  Acute Risk of Harm to Self is Considered: moderate    Last Recorded Vitals  Vitals:    06/05/25 0718   BP: (!) 135/92   Pulse: 109   Resp: 16   Temp: 36.9 °C (98.4 °F)   SpO2: 100%        OARRS Reviewed:yes    Relevant Results  Scheduled medications  Scheduled Medications[3]  Continuous medications  Continuous Medications[4]  PRN medications  PRN Medications[5]     Results for orders placed or performed during the hospital encounter of 06/01/25 (from the past 96 hours)   CBC and Auto Differential   Result Value Ref Range    WBC 10.4 4.4 - 11.3 x10*3/uL    nRBC 0.0 0.0 - 0.0 /100 WBCs    RBC 4.85 4.00 - 5.20 x10*6/uL    Hemoglobin 13.3 12.0 - 16.0 g/dL    Hematocrit 37.7 36.0 - 46.0 %    MCV 78 (L) 80 - 100 fL    MCH 27.4 26.0 - 34.0 pg    MCHC 35.3 32.0 - 36.0 g/dL    RDW 13.7 11.5 - 14.5 %    Platelets 337 150 - 450 x10*3/uL    Neutrophils % 67.4 40.0 - 80.0 %    Immature Granulocytes %, Automated 0.2 0.0 - 0.9 %    Lymphocytes % 22.7 13.0 - 44.0 %    Monocytes % 7.6 2.0 - 10.0 %    Eosinophils % 1.8 0.0 - 6.0 %    Basophils % 0.3 0.0 - 2.0 %    Neutrophils Absolute 6.99 1.20 - 7.70 x10*3/uL    Immature Granulocytes Absolute, Automated 0.02 0.00 - 0.70 x10*3/uL    Lymphocytes Absolute 2.36 1.20 - 4.80 x10*3/uL    Monocytes Absolute 0.79 0.10 - 1.00 x10*3/uL    Eosinophils Absolute 0.19 0.00 - 0.70 x10*3/uL    Basophils Absolute 0.03 0.00 - 0.10 x10*3/uL    Comprehensive metabolic panel   Result Value Ref Range    Glucose 91 74 - 99 mg/dL    Sodium 137 136 - 145 mmol/L    Potassium 3.3 (L) 3.5 - 5.3 mmol/L    Chloride 104 98 - 107 mmol/L    Bicarbonate 19 (L) 21 - 32 mmol/L    Anion Gap 17 10 - 20 mmol/L    Urea Nitrogen 12 6 - 23 mg/dL    Creatinine 0.68 0.50 - 1.05 mg/dL    eGFR >90 >60 mL/min/1.73m*2    Calcium 9.3 8.6 - 10.3 mg/dL    Albumin 4.5 3.4 - 5.0 g/dL    Alkaline Phosphatase 74 33 - 110 U/L    Total Protein 8.3 (H) 6.4 - 8.2 g/dL    AST 28 9 - 39 U/L    Bilirubin, Total 0.8 0.0 - 1.2 mg/dL    ALT 11 7 - 45 U/L   Lipase   Result Value Ref Range    Lipase 22 9 - 82 U/L   Lactate   Result Value Ref Range    Lactate 0.9 0.4 - 2.0 mmol/L   Troponin I, High Sensitivity   Result Value Ref Range    Troponin I, High Sensitivity 60 (HH) 0 - 13 ng/L   Hemoglobin A1c   Result Value Ref Range    Hemoglobin A1C     ECG 12 lead   Result Value Ref Range    Ventricular Rate 76 BPM    Atrial Rate 76 BPM    [place] Interval 170 ms    QRS Duration 86 ms    QT Interval 408 ms    QTC Calculation(Bazett) 459 ms    P Axis 63 degrees    R Axis 63 degrees    T Axis 64 degrees    QRS Count 12 beats    Q Onset 220 ms    P Onset 135 ms    P Offset 185 ms    T Offset 424 ms    QTC Fredericia 441 ms   Drug Screen, Urine   Result Value Ref Range    Amphetamine Screen, Urine Presumptive Negative Presumptive Negative    Barbiturate Screen, Urine Presumptive Negative Presumptive Negative    Benzodiazepines Screen, Urine Presumptive Positive (A) Presumptive Negative    Cannabinoid Screen, Urine Presumptive Positive (A) Presumptive Negative    Cocaine Metabolite Screen, Urine Presumptive Negative Presumptive Negative    Fentanyl Screen, Urine Presumptive Negative Presumptive Negative    Opiate Screen, Urine Presumptive Positive (A) Presumptive Negative    Oxycodone Screen, Urine Presumptive Negative Presumptive Negative    PCP Screen, Urine Presumptive Negative Presumptive Negative    Methadone  Screen, Urine Presumptive Negative Presumptive Negative   Urinalysis with Reflex Culture and Microscopic   Result Value Ref Range    Color, Urine Light-Yellow Light-Yellow, Yellow, Dark-Yellow    Appearance, Urine Clear Clear    Specific Gravity, Urine >1.050 (N) 1.005 - 1.035    pH, Urine 6.0 5.0, 5.5, 6.0, 6.5, 7.0, 7.5, 8.0    Protein, Urine 20 (TRACE) NEGATIVE, 10 (TRACE), 20 (TRACE) mg/dL    Glucose, Urine Normal Normal mg/dL    Blood, Urine NEGATIVE NEGATIVE mg/dL    Ketones, Urine 80 (3+) (A) NEGATIVE mg/dL    Bilirubin, Urine NEGATIVE NEGATIVE mg/dL    Urobilinogen, Urine Normal Normal mg/dL    Nitrite, Urine NEGATIVE NEGATIVE    Leukocyte Esterase, Urine NEGATIVE NEGATIVE   Extra Urine Gray Tube   Result Value Ref Range    Extra Tube 293    Urinalysis Microscopic   Result Value Ref Range    WBC, Urine 1-5 1-5, NONE /HPF    RBC, Urine 1-2 NONE, 1-2, 3-5 /HPF    Squamous Epithelial Cells, Urine 1-9 (SPARSE) Reference range not established. /HPF    Mucus, Urine FEW Reference range not established. /LPF   CBC   Result Value Ref Range    WBC 8.1 4.4 - 11.3 x10*3/uL    nRBC 0.0 0.0 - 0.0 /100 WBCs    RBC 4.54 4.00 - 5.20 x10*6/uL    Hemoglobin 12.3 12.0 - 16.0 g/dL    Hematocrit 36.5 36.0 - 46.0 %    MCV 80 80 - 100 fL    MCH 27.1 26.0 - 34.0 pg    MCHC 33.7 32.0 - 36.0 g/dL    RDW 13.9 11.5 - 14.5 %    Platelets 294 150 - 450 x10*3/uL   Comprehensive metabolic panel   Result Value Ref Range    Glucose 76 74 - 99 mg/dL    Sodium 138 136 - 145 mmol/L    Potassium 3.5 3.5 - 5.3 mmol/L    Chloride 106 98 - 107 mmol/L    Bicarbonate 24 21 - 32 mmol/L    Anion Gap 12 10 - 20 mmol/L    Urea Nitrogen 11 6 - 23 mg/dL    Creatinine 0.71 0.50 - 1.05 mg/dL    eGFR >90 >60 mL/min/1.73m*2    Calcium 8.7 8.6 - 10.3 mg/dL    Albumin 4.0 3.4 - 5.0 g/dL    Alkaline Phosphatase 65 33 - 110 U/L    Total Protein 7.2 6.4 - 8.2 g/dL    AST 22 9 - 39 U/L    Bilirubin, Total 0.7 0.0 - 1.2 mg/dL    ALT 9 7 - 45 U/L   Troponin I, High  Sensitivity   Result Value Ref Range    Troponin I, High Sensitivity 57 (HH) 0 - 13 ng/L   Lipid Panel   Result Value Ref Range    Cholesterol 173 0 - 199 mg/dL    HDL-Cholesterol 35.2 mg/dL    Cholesterol/HDL Ratio 4.9     LDL Calculated 110 (H) <=99 mg/dL    VLDL 28 0 - 40 mg/dL    Triglycerides 139 0 - 149 mg/dL    Non HDL Cholesterol 138 0 - 149 mg/dL   Transthoracic Echo Complete   Result Value Ref Range    AV mn grad 4 mmHg    AV pk faye 1.44 m/s    LV Biplane EF 57 %    LVOT diam 1.70 cm    MV E/A ratio 1.60     Tricuspid annular plane systolic excursion 2.4 cm    LA vol index A/L 19.2 ml/m2    LV EF 60 %    RV free wall pk S' 13.20 cm/s    RVSP 16 mmHg    LVIDd 3.60 cm    Aortic Valve Area by Continuity of Peak Velocity 1.34 cm2    AV pk grad 8 mmHg    Aortic Valve Area by Continuity of VTI 1.37 cm2    LV A4C EF 60.3    CBC   Result Value Ref Range    WBC 8.2 4.4 - 11.3 x10*3/uL    nRBC 0.0 0.0 - 0.0 /100 WBCs    RBC 5.07 4.00 - 5.20 x10*6/uL    Hemoglobin 13.9 12.0 - 16.0 g/dL    Hematocrit 39.8 36.0 - 46.0 %    MCV 79 (L) 80 - 100 fL    MCH 27.4 26.0 - 34.0 pg    MCHC 34.9 32.0 - 36.0 g/dL    RDW 14.0 11.5 - 14.5 %    Platelets 336 150 - 450 x10*3/uL   Basic metabolic panel   Result Value Ref Range    Glucose 97 74 - 99 mg/dL    Sodium 136 136 - 145 mmol/L    Potassium 3.5 3.5 - 5.3 mmol/L    Chloride 104 98 - 107 mmol/L    Bicarbonate 19 (L) 21 - 32 mmol/L    Anion Gap 17 10 - 20 mmol/L    Urea Nitrogen 8 6 - 23 mg/dL    Creatinine 0.75 0.50 - 1.05 mg/dL    eGFR >90 >60 mL/min/1.73m*2    Calcium 9.4 8.6 - 10.3 mg/dL         Assessment/Plan   Principal Problem:    MDD (major depressive disorder), recurrent episode, moderate     Patient reports she is feeling better today, still having some abdominal pain but it is improving.  Slept well with increase in Remeron.  Has been active on the unit, participating in group therapy.    Impression:     Labs and Chart: reviewed   Case discussed with treatment team  members  Encouraged patient to attend group and other mileu activity  Collateral from family - pending  Discharge planing  Medication:       - Reviewed. To continue as ordered    Medication Consent  Medication Consent: risks, benefits, side effects reviewed for all ordered meds and patient expressed understanding and consent obtained    MARYA Bennett-CNP        [1]   Past Medical History:  Diagnosis Date    Acid reflux     Gastritis    [2]   Allergies  Allergen Reactions    Phenergan [Promethazine] Anaphylaxis    Prochlorperazine Anaphylaxis    Reglan [Metoclopramide Hcl] Anaphylaxis    Aripiprazole Nausea/vomiting and Nausea And Vomiting     Patient received one dose of Abilify and began vomiting within 2 hours. The vomiting resolved over the next 24 hours and Abilify was discontinued.    Fluoxetine GI Upset and Nausea And Vomiting     nausea    Haldol [Haloperidol] Anxiety and Other   [3] dicyclomine, 10 mg, oral, Daily  famotidine, 20 mg, oral, BID  gabapentin, 300 mg, oral, Nightly  mirtazapine, 30 mg, oral, Nightly  pantoprazole, 40 mg, oral, BID AC  sucralfate, 1 g, oral, Before meals & nightly  [4]    [5] PRN medications: acetaminophen, alum-mag hydroxide-simeth, hydrOXYzine HCL, ibuprofen, OLANZapine, OLANZapine, ondansetron ODT, traZODone

## 2025-06-05 NOTE — CARE PLAN
"Neela has been calm and cooperative today, compliant with unit milieu.  She has flat affect, is quiet and withdrawn.  She verbalizes moderate depression and anxiety, denies having any suicidal ideations.  She was given Atarax for anxiety at 1614, had increased anxiety after speaking with her daughter on the phone.  She was sad that her daughter told her that she wasn't going to take care of her when she got out of the hospital.  She has been compliant with medications, denies feeling any negative side effects.  Possible discharge for tomorrow.      The patient's goals for the shift include \"I'm going to try to have a good day\" - Met    The clinical goals for the shift include Participate in group therapy meetins - Met    Problem: Pain - Adult  Goal: Verbalizes/displays adequate comfort level or baseline comfort level  Outcome: Progressing     Problem: Infection - Adult  Goal: Absence of infection at discharge  Outcome: Progressing  Goal: Absence of infection during hospitalization  Outcome: Progressing  Goal: Absence of fever/infection during anticipated neutropenic period  Outcome: Progressing     Problem: Safety - Adult  Goal: Free from fall injury  Outcome: Progressing     Problem: Discharge Planning  Goal: Discharge to home or other facility with appropriate resources  Outcome: Progressing     Problem: Chronic Conditions and Co-morbidities  Goal: Patient's chronic conditions and co-morbidity symptoms are monitored and maintained or improved  Outcome: Progressing     Problem: Nutrition  Goal: Nutrient intake appropriate for maintaining nutritional needs  Outcome: Progressing     Problem: Potential for Harm to Self or Others  Goal: Cooperates with admission process  Outcome: Progressing  Goal: Participates in unit activities  Outcome: Progressing  Goal: Patient/Family participate in treatment and discharge plans  Outcome: Progressing  Goal: Identifies stressors that lead to harmful behaviors  Outcome: " Progressing  Goal: Notifies staff when experiencing harmful thoughts toward self/others  Outcome: Progressing  Goal: Denies harm toward self or others  Outcome: Progressing     Problem: Ineffective Coping  Goal: Identifies ineffective coping skills  Outcome: Progressing  Goal: Demonstrates healthy coping skills  Outcome: Progressing  Goal: Participates in unit activities  Outcome: Progressing     Problem: Anxiety  Goal: Attempts to manage anxiety with help  Outcome: Progressing  Goal: Verbalizes ways to manage anxiety  Outcome: Progressing     Problem: Defensive Coping  Goal: Identifies reckless/dangerous behavior  Outcome: Progressing  Goal: Identifies stressors that lead to reckless/dangerous behavior  Outcome: Progressing  Goal: Discusses and identifies healthy coping skills  Outcome: Progressing  Goal: Demonstrates appropriate social interactions  Outcome: Progressing     Problem: IP Suicidal Ideation  Goal: STG: Neela Elaine will not engage in self-injurious activities  Outcome: Progressing

## 2025-06-06 ENCOUNTER — PHARMACY VISIT (OUTPATIENT)
Dept: PHARMACY | Facility: CLINIC | Age: 45
End: 2025-06-06
Payer: COMMERCIAL

## 2025-06-06 VITALS
HEART RATE: 119 BPM | DIASTOLIC BLOOD PRESSURE: 96 MMHG | HEIGHT: 64 IN | OXYGEN SATURATION: 100 % | WEIGHT: 127 LBS | TEMPERATURE: 98.1 F | SYSTOLIC BLOOD PRESSURE: 132 MMHG | RESPIRATION RATE: 18 BRPM | BODY MASS INDEX: 21.68 KG/M2

## 2025-06-06 PROCEDURE — 2500000001 HC RX 250 WO HCPCS SELF ADMINISTERED DRUGS (ALT 637 FOR MEDICARE OP): Performed by: REGISTERED NURSE

## 2025-06-06 PROCEDURE — 97530 THERAPEUTIC ACTIVITIES: CPT | Mod: GO

## 2025-06-06 PROCEDURE — 97150 GROUP THERAPEUTIC PROCEDURES: CPT | Mod: GO

## 2025-06-06 PROCEDURE — 99239 HOSP IP/OBS DSCHRG MGMT >30: CPT | Performed by: PSYCHIATRY & NEUROLOGY

## 2025-06-06 PROCEDURE — RXMED WILLOW AMBULATORY MEDICATION CHARGE

## 2025-06-06 RX ADMIN — PANTOPRAZOLE SODIUM 40 MG: 40 TABLET, DELAYED RELEASE ORAL at 08:21

## 2025-06-06 RX ADMIN — SUCRALFATE 1 G: 1 TABLET ORAL at 07:43

## 2025-06-06 RX ADMIN — FAMOTIDINE 20 MG: 20 TABLET, FILM COATED ORAL at 08:21

## 2025-06-06 RX ADMIN — DICYCLOMINE HYDROCHLORIDE 10 MG: 10 CAPSULE ORAL at 08:21

## 2025-06-06 RX ADMIN — SUCRALFATE 1 G: 1 TABLET ORAL at 12:26

## 2025-06-06 ASSESSMENT — COLUMBIA-SUICIDE SEVERITY RATING SCALE - C-SSRS
2. HAVE YOU ACTUALLY HAD ANY THOUGHTS OF KILLING YOURSELF?: NO
1. SINCE LAST CONTACT, HAVE YOU WISHED YOU WERE DEAD OR WISHED YOU COULD GO TO SLEEP AND NOT WAKE UP?: NO
6. HAVE YOU EVER DONE ANYTHING, STARTED TO DO ANYTHING, OR PREPARED TO DO ANYTHING TO END YOUR LIFE?: NO

## 2025-06-06 ASSESSMENT — PAIN - FUNCTIONAL ASSESSMENT: PAIN_FUNCTIONAL_ASSESSMENT: 0-10

## 2025-06-06 ASSESSMENT — PAIN SCALES - GENERAL: PAINLEVEL_OUTOF10: 0 - NO PAIN
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
## 2025-06-06 NOTE — PROGRESS NOTES
"Occupational Therapy     REHAB Therapy Assessment & Treatment    Patient Name: Neela Elaine  MRN: [id number]  Today's Date: 6/6/2025    Attendance:  Attendance  Activity: One-to-one (1:1 OT Ther. Act: Untangling Your Thoughts paper was reviewed.)  Participation: Active participation    1:1 OT Therapeutic Act:  Treatment Approach  Approach : 1 to1 Therapy sessions  Patient Stated Goals: \"To go to a Citizen of [place] speaking therapist because this info really helped me!\"  Cognition: Attention, Directions (Pt's statements & contributions to discussion are linear, organized, & on-topic. Pt follows complex multi-step directions independently. Pt demonstrate fair-good insight; organized understanding of material reviewed)  Social Skills: Demonstrates ability to listen to others  Emotional: Behaviors, Mood (Pt mood calm, affect brighter, more animated.)  Stress Management/Relaxation Training: Identifies benefits of stress management/relaxation techniques, Performs stress management/relaxation techniques  Treatment Approach Comments: 1:1 OT Ther. Act: Untangling Your Thoughts paper was reviewed. Pt educated on irrational vs rational viewpoints & how irrational thinking impede healthy problem solving skills and increases one’s own stress/anxiety. Pt reviewed several pictured descriptions of irrational thinking such as “Black/White” thinking, Magnifying/Minimizing”, “Labeling”, “Jumping to conclusions”, “Fortune Telling”, “Blaming” and “Emotional Reasoning” actually add to one’s sense of anxiety and depression thus adding to anxiety. Pt verbalized how perfectionistic thinking and mood altering substances can impede rational thinking and impair one’s self esteem. Regarding rational, (+) self-talk, Pt was educated on the concepts of seeing the \"shades of gray vs Black/White thinking\", the concept of doing a 'reality check' form of challenging one's irrational beliefs. Pt discussed the concept of \"substitute terms\" to select " "the appropriate emotion to fit the situation, ie: instead of devastated say that you're frustrated about a situation then use “thought substitution” where one changing a negative thought into a positive and realistic thought such as \"I was annoyed to be here, but being in the hospital gave me some tips to cope and new medication that seems to help,\" per Pt. Pt role played rational problem solving tips and reframing (-) thoughts to process how one may not feel like they have control over fire or death, but one has choices in how one reacts to stress such as calling for help seeking help for physical/mental care to address distressful issues to decrease anxiety for good insight. Pt beginning to internalize the concepts learned in the OT sessions.      Encounter Problems       Encounter Problems (Active)       OT Goals       Pt will explore, identify, and appropriately utilize effective coping strategies to cope with daily stressors and manage emotions with independence prior to discharge.  (Progressing)       Start:  06/04/25    Expected End:  07/02/25            Pt will demonstrate ability to appropriately modulate emotions and impulses with independence prior to discharge.  (Progressing)       Start:  06/04/25    Expected End:  07/02/25            Pt will explore, identify, and appropriately utilize effective communication strategies to express feelings, wants, and needs with independence prior to discharge.  (Progressing)       Start:  06/04/25    Expected End:  07/02/25                     Education Documentation  No documentation found.  Education Comments  No comments found.          Additional Comments:      "

## 2025-06-06 NOTE — CARE PLAN
"Neela discharged today, to home with her daughter.  Patient verbalized readiness and desire for discharge, she has improved mood and affect, denies having any suicidal ideations.  She has been calm and cooperative, compliant with unit milieu.  She states that she slept well last night.   She has been compliant with medications, denies feeling any negative side effects.  Prescriptions filled with meds to bed and given to patient at discharge.  She verbalized understanding of all discharge instructions, medications, and follow up information.  She is to follow up with [facility].  She has follow up scheduled with her gastroenterologist.  All of patient's belongings returned and signed for.      The patient's goals for the shift include \"I'm supposed to go home today\" - Met    The clinical goals for the shift include Participate in group therapy meetins - Met    Problem: Pain - Adult  Goal: Verbalizes/displays adequate comfort level or baseline comfort level  Outcome: Met     Problem: Infection - Adult  Goal: Absence of infection at discharge  Outcome: Met  Goal: Absence of infection during hospitalization  Outcome: Met  Goal: Absence of fever/infection during anticipated neutropenic period  Outcome: Met     Problem: Safety - Adult  Goal: Free from fall injury  Outcome: Met     Problem: Discharge Planning  Goal: Discharge to home or other facility with appropriate resources  Outcome: Met     Problem: Chronic Conditions and Co-morbidities  Goal: Patient's chronic conditions and co-morbidity symptoms are monitored and maintained or improved  Outcome: Met     Problem: Nutrition  Goal: Nutrient intake appropriate for maintaining nutritional needs  Outcome: Met     Problem: Potential for Harm to Self or Others  Goal: Cooperates with admission process  Outcome: Met  Goal: Participates in unit activities  Outcome: Met  Goal: Patient/Family participate in treatment and discharge plans  Outcome: Met  Goal: Identifies stressors " that lead to harmful behaviors  Outcome: Met  Goal: Notifies staff when experiencing harmful thoughts toward self/others  Outcome: Met  Goal: Denies harm toward self or others  Outcome: Met     Problem: Ineffective Coping  Goal: Identifies ineffective coping skills  Outcome: Met  Goal: Demonstrates healthy coping skills  Outcome: Met  Goal: Participates in unit activities  Outcome: Met     Problem: Anxiety  Goal: Attempts to manage anxiety with help  Outcome: Met  Goal: Verbalizes ways to manage anxiety  Outcome: Met     Problem: Defensive Coping  Goal: Identifies reckless/dangerous behavior  Outcome: Met  Goal: Identifies stressors that lead to reckless/dangerous behavior  Outcome: Met  Goal: Discusses and identifies healthy coping skills  Outcome: Met  Goal: Demonstrates appropriate social interactions  Outcome: Met     Problem: IP Suicidal Ideation  Goal: STG: Neela Geronimorio will not engage in self-injurious activities  Outcome: Met

## 2025-06-06 NOTE — DISCHARGE SUMMARY
Discharge Diagnosis:  MDD (major depressive disorder), recurrent episode, moderate    Hospital Course:  Patient is a 45-year-old female with a history of anxiety and depression who was admitted to [address] for suicidal ideation. Due to acutely elevated and imminent risk for self-harm/harm to others, patient required a level of care equivalent to inpatient hospitalization for safety, evaluation, treatment and stabilization.     The patient was admitted to [address] under the care of Dr. Woods, restricted to the tellez and placed on suicide, behavior and elopement precautions.  At the beginning of hospitalization, patient presented to the ED for abdominal pain. Was transferred to medical floor diagnosed with gastritis and colitis. While on the medical floor psych consulted as patient reported she was experiencing vitals of anxiety and suicidal ideation. She was afraid of being discharged home. Patient currently prescribed Remeron and reports she was recently started on Zoloft last week. States that she believes Zoloft did make her GI issues worse. Patient lives at home with her son and daughter. Denies any history of baylee or psychosis. States that she was using marijuana occasionally, encouraged by GI team to discontinue use of marijuana as it may cause hyperemesis. Seen by psych consult team who discontinued Zoloft and increased Remeron to 30 mg oral daily at bedtime. She was transferred to [facility]. once medically cleared.       The treatment team made the following interventions: medication, group/milieu therapy, individual therapy    Over the course of hospitalization, patient reported improvement and objective signs of improvement were noted by staff.  Patient reported improved mood and sleep.  Prior to discharge patient future oriented, looking forward to spending time with family.  She was an active participant in group therapy on unit.  Zoloft discontinued as it was likely contributing  to GI issues, changes in mood.  Remeron increased to 30 mg oral daily at bedtime.  Patient responded well.  She understood the importance of following up with outpatient psych services and maintaining medication compliance.    Psychiatric Medications: Remeron 30 mg oral daily at bedtime.  Patient tolerated medications without side effects.    Prior to the date of discharge, patient was able to contract for safety and stated they felt safe and appropriate for discharge.  The treatment team found the patient not to be an imminent danger to self or others.  The patient denied suicidal or homicidal ideation and did not endorse auditory and visual hallucinations.  The patient's condition at the time of discharge was stable and initial symptoms improved over the course of hospitalization.    The patient was discharged home under the supervision of family with a 30-day supply of Remeron 30 mg oral daily at bedtime.    The patient was instructed to call the patient's outpatient provider in the event of worsening symptoms or medication side effects.  Should the patient be unable to maintain their personal safety or the safety of others, instructions were provided to dial 9-1-1 or go to the closest emergency room.    Discharge Mental Status Exam:  General:  Patient is awake, alert, and oriented to person, place, time, and situation.    Appearance:  Appears well-hydrated, well-nourished, and well-groomed and approximately stated age.   Attitude:  Patient was calm and cooperative throughout the interview, which is appropriate to the context of the interview and the topics discussed.   Behavior:  Eye contact is appropriate with topics of discussion.   Motor Activity:  Motor activity is normal. No psychomotor disturbances or abnormal involuntary movements were noted, including psychomotor agitation, psychomotor retardation, involuntary movements, extrapyramidal symptoms, akathisia, or tardive dyskinesia. Gait is normal.   Speech:   Speech is spontaneous, coherent, fluent and of appropriate quantity, rate, volume, and tone and non-vulgar/vulgar.  Speech and mannerisms are consistent with topics of discussion.   Affect:  Euthymic with a full range, mood congruent and appropriate to content.   Thought Process:  Thought process was linear, organized, and goal-directed and devoid of loose associations, flight of ideas, thought blocking or tangents.   Thought Content:  Thought content was devoid of suicidal ideation or intent, homicidal ideation or intent, self-harm ideation or intent, delusions, illusions, obsessions, or paranoia.   Thought Perception:  Did not endorse auditory or visual hallucinations. Patient did not appear to be internally distracted or preoccupied.   Cognition:  Knowledge and intelligence are believed to be average.  Recent and remote memory, fund of knowledge, and abstract reasoning appear grossly intact, appropriate for age and education, and there are no impairments in attention, concentration, or language.   Insight:  Insight regarding psychiatric conditions is good.   Judgment:  Judgment is good.    Recent Labs:  No results found for this or any previous visit (from the past 24 hours).     Risk Assessment at Discharge:  Violence Risk Assessment: major mental illness  Acute Risk of Harm to Others is Considered: low   Risk Mitigated by: Adherence to treatment, strong therapeutic alliance. Follow-up.    Suicide Risk Assessment: chronic medical illness, chronic pain, and current psychiatric illness  Protective Factors against Suicide: adherence to  treatment, child-related concerns/living with children at home < 18 yrs, positive family relationships, sense of responsibility toward family, and social support/connectedness  Acute Risk of Harm to Self is Considered: low  Risk Mitigated by: Adherence to treatment, strong therapeutic alliance. Follow-up.      Outpatient Follow-up Appointments:    [facility]  - Intake  [address], [place] [address]  Phone: [phone]  Go on 6/9/2025  Se anima a Neela a acudir a [facility] jose roberto juárez horario de atención sin hebert previa el 9/6/25 a las 8:00 a. m. Por favor, traiga jessica copia de juárez documentación de pop, identificación y tarjeta de seguro.    El horario de atención sin hebert previa es de lunes a viernes, de 8:00 a. m. a 3:00 p. m.      Billy Parr, MARYA-CNP

## 2025-06-06 NOTE — CARE PLAN
The patient's goals for the shift include sleep    The clinical goals for the shift include not isolate    Over the shift, the patient did not make progress toward the following goals. Barriers to progression include clinical presentation. Recommendations to address these barriers include med compliance.      States anxiety is 3/10 depression is 2/10  Denies AVH and SI

## 2025-06-14 LAB
ATRIAL RATE: 76 BPM
P AXIS: 63 DEGREES
P OFFSET: 185 MS
P ONSET: 135 MS
PR INTERVAL: 170 MS
Q ONSET: 220 MS
QRS COUNT: 12 BEATS
QRS DURATION: 86 MS
QT INTERVAL: 408 MS
QTC CALCULATION(BAZETT): 459 MS
QTC FREDERICIA: 441 MS
R AXIS: 63 DEGREES
T AXIS: 64 DEGREES
T OFFSET: 424 MS
VENTRICULAR RATE: 76 BPM

## 2025-06-27 ENCOUNTER — APPOINTMENT (OUTPATIENT)
Dept: CARDIOLOGY | Facility: HOSPITAL | Age: 45
End: 2025-06-27
Payer: COMMERCIAL

## 2025-06-27 ENCOUNTER — HOSPITAL ENCOUNTER (EMERGENCY)
Facility: HOSPITAL | Age: 45
Discharge: HOME | End: 2025-06-27
Attending: STUDENT IN AN ORGANIZED HEALTH CARE EDUCATION/TRAINING PROGRAM
Payer: COMMERCIAL

## 2025-06-27 ENCOUNTER — APPOINTMENT (OUTPATIENT)
Dept: RADIOLOGY | Facility: HOSPITAL | Age: 45
End: 2025-06-27
Payer: COMMERCIAL

## 2025-06-27 VITALS
TEMPERATURE: 98.1 F | HEIGHT: 64 IN | OXYGEN SATURATION: 98 % | HEART RATE: 97 BPM | BODY MASS INDEX: 20.49 KG/M2 | RESPIRATION RATE: 16 BRPM | SYSTOLIC BLOOD PRESSURE: 121 MMHG | DIASTOLIC BLOOD PRESSURE: 88 MMHG | WEIGHT: 120 LBS

## 2025-06-27 DIAGNOSIS — K29.70 GASTRITIS WITHOUT BLEEDING, UNSPECIFIED CHRONICITY, UNSPECIFIED GASTRITIS TYPE: Primary | ICD-10-CM

## 2025-06-27 DIAGNOSIS — F41.0 ANXIETY ATTACK: ICD-10-CM

## 2025-06-27 LAB
ALBUMIN SERPL BCP-MCNC: 4.6 G/DL (ref 3.4–5)
ALP SERPL-CCNC: 70 U/L (ref 33–110)
ALT SERPL W P-5'-P-CCNC: 8 U/L (ref 7–45)
ANION GAP SERPL CALC-SCNC: 14 MMOL/L (ref 10–20)
AST SERPL W P-5'-P-CCNC: 13 U/L (ref 9–39)
ATRIAL RATE: 100 BPM
B-HCG SERPL-ACNC: <2 MIU/ML
BASOPHILS # BLD AUTO: 0.02 X10*3/UL (ref 0–0.1)
BASOPHILS NFR BLD AUTO: 0.2 %
BILIRUB SERPL-MCNC: 0.5 MG/DL (ref 0–1.2)
BNP SERPL-MCNC: 9 PG/ML (ref 0–99)
BUN SERPL-MCNC: 9 MG/DL (ref 6–23)
CALCIUM SERPL-MCNC: 9.9 MG/DL (ref 8.6–10.3)
CARDIAC TROPONIN I PNL SERPL HS: 61 NG/L (ref 0–13)
CARDIAC TROPONIN I PNL SERPL HS: 65 NG/L (ref 0–13)
CHLORIDE SERPL-SCNC: 105 MMOL/L (ref 98–107)
CO2 SERPL-SCNC: 22 MMOL/L (ref 21–32)
CREAT SERPL-MCNC: 0.72 MG/DL (ref 0.5–1.05)
D DIMER PPP FEU-MCNC: 1001 NG/ML FEU
EGFRCR SERPLBLD CKD-EPI 2021: >90 ML/MIN/1.73M*2
EOSINOPHIL # BLD AUTO: 0.14 X10*3/UL (ref 0–0.7)
EOSINOPHIL NFR BLD AUTO: 1.6 %
ERYTHROCYTE [DISTWIDTH] IN BLOOD BY AUTOMATED COUNT: 14.5 % (ref 11.5–14.5)
GLUCOSE SERPL-MCNC: 96 MG/DL (ref 74–99)
HCT VFR BLD AUTO: 42 % (ref 36–46)
HGB BLD-MCNC: 14.3 G/DL (ref 12–16)
IMM GRANULOCYTES # BLD AUTO: 0.03 X10*3/UL (ref 0–0.7)
IMM GRANULOCYTES NFR BLD AUTO: 0.4 % (ref 0–0.9)
LACTATE SERPL-SCNC: 1 MMOL/L (ref 0.4–2)
LIPASE SERPL-CCNC: 19 U/L (ref 9–82)
LYMPHOCYTES # BLD AUTO: 1.92 X10*3/UL (ref 1.2–4.8)
LYMPHOCYTES NFR BLD AUTO: 22.5 %
MCH RBC QN AUTO: 27.1 PG (ref 26–34)
MCHC RBC AUTO-ENTMCNC: 34 G/DL (ref 32–36)
MCV RBC AUTO: 80 FL (ref 80–100)
MONOCYTES # BLD AUTO: 0.55 X10*3/UL (ref 0.1–1)
MONOCYTES NFR BLD AUTO: 6.4 %
NEUTROPHILS # BLD AUTO: 5.88 X10*3/UL (ref 1.2–7.7)
NEUTROPHILS NFR BLD AUTO: 68.9 %
NRBC BLD-RTO: 0 /100 WBCS (ref 0–0)
P AXIS: -7 DEGREES
P OFFSET: 195 MS
P ONSET: 149 MS
PLATELET # BLD AUTO: 332 X10*3/UL (ref 150–450)
POTASSIUM SERPL-SCNC: 3.7 MMOL/L (ref 3.5–5.3)
PR INTERVAL: 142 MS
PROT SERPL-MCNC: 8.5 G/DL (ref 6.4–8.2)
Q ONSET: 220 MS
QRS COUNT: 16 BEATS
QRS DURATION: 74 MS
QT INTERVAL: 362 MS
QTC CALCULATION(BAZETT): 466 MS
QTC FREDERICIA: 429 MS
R AXIS: -1 DEGREES
RBC # BLD AUTO: 5.28 X10*6/UL (ref 4–5.2)
SODIUM SERPL-SCNC: 137 MMOL/L (ref 136–145)
T AXIS: 2 DEGREES
T OFFSET: 401 MS
TSH SERPL-ACNC: 1.1 MIU/L (ref 0.44–3.98)
VENTRICULAR RATE: 100 BPM
WBC # BLD AUTO: 8.5 X10*3/UL (ref 4.4–11.3)

## 2025-06-27 PROCEDURE — 83605 ASSAY OF LACTIC ACID: CPT | Performed by: STUDENT IN AN ORGANIZED HEALTH CARE EDUCATION/TRAINING PROGRAM

## 2025-06-27 PROCEDURE — 2500000004 HC RX 250 GENERAL PHARMACY W/ HCPCS (ALT 636 FOR OP/ED): Mod: JW | Performed by: STUDENT IN AN ORGANIZED HEALTH CARE EDUCATION/TRAINING PROGRAM

## 2025-06-27 PROCEDURE — 84702 CHORIONIC GONADOTROPIN TEST: CPT | Performed by: STUDENT IN AN ORGANIZED HEALTH CARE EDUCATION/TRAINING PROGRAM

## 2025-06-27 PROCEDURE — 71045 X-RAY EXAM CHEST 1 VIEW: CPT

## 2025-06-27 PROCEDURE — 96374 THER/PROPH/DIAG INJ IV PUSH: CPT | Mod: 59

## 2025-06-27 PROCEDURE — 84484 ASSAY OF TROPONIN QUANT: CPT | Performed by: STUDENT IN AN ORGANIZED HEALTH CARE EDUCATION/TRAINING PROGRAM

## 2025-06-27 PROCEDURE — 71275 CT ANGIOGRAPHY CHEST: CPT | Mod: FOREIGN READ | Performed by: RADIOLOGY

## 2025-06-27 PROCEDURE — 96375 TX/PRO/DX INJ NEW DRUG ADDON: CPT | Mod: 59

## 2025-06-27 PROCEDURE — 96361 HYDRATE IV INFUSION ADD-ON: CPT

## 2025-06-27 PROCEDURE — 2550000001 HC RX 255 CONTRASTS: Mod: JZ | Performed by: STUDENT IN AN ORGANIZED HEALTH CARE EDUCATION/TRAINING PROGRAM

## 2025-06-27 PROCEDURE — 93005 ELECTROCARDIOGRAM TRACING: CPT

## 2025-06-27 PROCEDURE — 84075 ASSAY ALKALINE PHOSPHATASE: CPT | Performed by: STUDENT IN AN ORGANIZED HEALTH CARE EDUCATION/TRAINING PROGRAM

## 2025-06-27 PROCEDURE — 85379 FIBRIN DEGRADATION QUANT: CPT | Performed by: STUDENT IN AN ORGANIZED HEALTH CARE EDUCATION/TRAINING PROGRAM

## 2025-06-27 PROCEDURE — 99285 EMERGENCY DEPT VISIT HI MDM: CPT | Mod: 25 | Performed by: STUDENT IN AN ORGANIZED HEALTH CARE EDUCATION/TRAINING PROGRAM

## 2025-06-27 PROCEDURE — 84443 ASSAY THYROID STIM HORMONE: CPT | Performed by: STUDENT IN AN ORGANIZED HEALTH CARE EDUCATION/TRAINING PROGRAM

## 2025-06-27 PROCEDURE — 71275 CT ANGIOGRAPHY CHEST: CPT

## 2025-06-27 PROCEDURE — 85025 COMPLETE CBC W/AUTO DIFF WBC: CPT | Performed by: STUDENT IN AN ORGANIZED HEALTH CARE EDUCATION/TRAINING PROGRAM

## 2025-06-27 PROCEDURE — 36415 COLL VENOUS BLD VENIPUNCTURE: CPT | Performed by: STUDENT IN AN ORGANIZED HEALTH CARE EDUCATION/TRAINING PROGRAM

## 2025-06-27 PROCEDURE — 71045 X-RAY EXAM CHEST 1 VIEW: CPT | Mod: FOREIGN READ | Performed by: RADIOLOGY

## 2025-06-27 PROCEDURE — 83880 ASSAY OF NATRIURETIC PEPTIDE: CPT | Performed by: STUDENT IN AN ORGANIZED HEALTH CARE EDUCATION/TRAINING PROGRAM

## 2025-06-27 PROCEDURE — 83690 ASSAY OF LIPASE: CPT | Performed by: STUDENT IN AN ORGANIZED HEALTH CARE EDUCATION/TRAINING PROGRAM

## 2025-06-27 RX ORDER — FAMOTIDINE 10 MG/ML
20 INJECTION, SOLUTION INTRAVENOUS ONCE
Status: COMPLETED | OUTPATIENT
Start: 2025-06-27 | End: 2025-06-27

## 2025-06-27 RX ORDER — ALUMINUM HYDROXIDE, MAGNESIUM HYDROXIDE, AND SIMETHICONE 1200; 120; 1200 MG/30ML; MG/30ML; MG/30ML
30 SUSPENSION ORAL ONCE
Status: DISCONTINUED | OUTPATIENT
Start: 2025-06-27 | End: 2025-06-27 | Stop reason: HOSPADM

## 2025-06-27 RX ORDER — ONDANSETRON HYDROCHLORIDE 2 MG/ML
4 INJECTION, SOLUTION INTRAVENOUS ONCE
Status: COMPLETED | OUTPATIENT
Start: 2025-06-27 | End: 2025-06-27

## 2025-06-27 RX ORDER — DIAZEPAM 5 MG/ML
2 INJECTION, SOLUTION INTRAMUSCULAR; INTRAVENOUS ONCE
Status: COMPLETED | OUTPATIENT
Start: 2025-06-27 | End: 2025-06-27

## 2025-06-27 RX ADMIN — IOHEXOL 75 ML: 350 INJECTION, SOLUTION INTRAVENOUS at 10:05

## 2025-06-27 RX ADMIN — FAMOTIDINE 20 MG: 10 INJECTION, SOLUTION INTRAVENOUS at 09:08

## 2025-06-27 RX ADMIN — SODIUM CHLORIDE, SODIUM LACTATE, POTASSIUM CHLORIDE, AND CALCIUM CHLORIDE 1000 ML: .6; .31; .03; .02 INJECTION, SOLUTION INTRAVENOUS at 09:07

## 2025-06-27 RX ADMIN — ONDANSETRON 4 MG: 2 INJECTION INTRAMUSCULAR; INTRAVENOUS at 09:08

## 2025-06-27 RX ADMIN — DIAZEPAM 2 MG: 10 INJECTION, SOLUTION INTRAMUSCULAR; INTRAVENOUS at 09:08

## 2025-06-27 SDOH — SOCIAL STABILITY: SOCIAL NETWORK: VISITOR BEHAVIORS: UNABLE TO ASSESS

## 2025-06-27 SDOH — HEALTH STABILITY: MENTAL HEALTH: HAVE YOU ACTUALLY HAD ANY THOUGHTS OF KILLING YOURSELF?: NO

## 2025-06-27 SDOH — HEALTH STABILITY: MENTAL HEALTH: HAVE YOU EVER DONE ANYTHING, STARTED TO DO ANYTHING, OR PREPARED TO DO ANYTHING TO END YOUR LIFE?: NO

## 2025-06-27 SDOH — HEALTH STABILITY: MENTAL HEALTH: HAVE YOU WISHED YOU WERE DEAD OR WISHED YOU COULD GO TO SLEEP AND NOT WAKE UP?: NO

## 2025-06-27 SDOH — HEALTH STABILITY: MENTAL HEALTH

## 2025-06-27 SDOH — HEALTH STABILITY: MENTAL HEALTH: SLEEP PATTERN: SLEEPS ALL NIGHT

## 2025-06-27 SDOH — SOCIAL STABILITY: SOCIAL INSECURITY: FAMILY BEHAVIORS: UNABLE TO ASSESS

## 2025-06-27 SDOH — HEALTH STABILITY: MENTAL HEALTH: SUICIDE ASSESSMENT: ADULT (C-SSRS)

## 2025-06-27 SDOH — HEALTH STABILITY: MENTAL HEALTH: CONTENT: UNABLE TO ASSESS

## 2025-06-27 SDOH — HEALTH STABILITY: MENTAL HEALTH: BEHAVIORAL HEALTH(WDL): EXCEPTIONS TO WDL

## 2025-06-27 SDOH — HEALTH STABILITY: MENTAL HEALTH: BEHAVIORS/MOOD: WITHDRAWN;TEARFUL;SAD

## 2025-06-27 SDOH — SOCIAL STABILITY: SOCIAL NETWORK: PARENT/GUARDIAN/SIGNIFICANT OTHER INVOLVEMENT: NO INVOLVEMENT

## 2025-06-27 ASSESSMENT — LIFESTYLE VARIABLES
HAVE PEOPLE ANNOYED YOU BY CRITICIZING YOUR DRINKING: NO
EVER HAD A DRINK FIRST THING IN THE MORNING TO STEADY YOUR NERVES TO GET RID OF A HANGOVER: NO
EVER FELT BAD OR GUILTY ABOUT YOUR DRINKING: NO
HAVE YOU EVER FELT YOU SHOULD CUT DOWN ON YOUR DRINKING: NO
TOTAL SCORE: 0

## 2025-06-27 ASSESSMENT — PAIN SCALES - GENERAL
PAINLEVEL_OUTOF10: 0 - NO PAIN
PAINLEVEL_OUTOF10: 0 - NO PAIN

## 2025-06-27 ASSESSMENT — PAIN - FUNCTIONAL ASSESSMENT: PAIN_FUNCTIONAL_ASSESSMENT: 0-10

## 2025-06-27 ASSESSMENT — PAIN DESCRIPTION - PROGRESSION: CLINICAL_PROGRESSION: NOT CHANGED

## 2025-06-27 NOTE — ED PROVIDER NOTES
Emergency Department Provider Note       History of Present Illness     History provided by: Patient  Limitations to History: None  External Records Reviewed with Brief Summary: None    HPI:  Neela Elaine is a 45 y.o. female with a history of anxiety presenting with 3 days of severe anxiety, upset stomach, nausea, decreased p.o. intake, chest pain.  Denies prior cardiac history.  No cough or fever.  No difficulty breathing.    Physical Exam   Triage vitals:  T 36.5 °C (97.7 °F)  HR (!) 121  /90  RR 16  O2 98 % None (Room air)    Physical Exam  Vitals and nursing note reviewed.   Constitutional:       General: She is not in acute distress.  HENT:      Head: Atraumatic.      Mouth/Throat:      Mouth: Mucous membranes are moist.      Pharynx: Oropharynx is clear.   Eyes:      Extraocular Movements: Extraocular movements intact.      Conjunctiva/sclera: Conjunctivae normal.      Pupils: Pupils are equal, round, and reactive to light.   Cardiovascular:      Rate and Rhythm: Regular rhythm. Tachycardia present.      Pulses: Normal pulses.   Pulmonary:      Effort: Pulmonary effort is normal. No respiratory distress.      Breath sounds: Normal breath sounds.   Abdominal:      General: There is no distension.      Palpations: Abdomen is soft.      Tenderness: There is no abdominal tenderness. There is no guarding or rebound.   Musculoskeletal:         General: No deformity.      Cervical back: Neck supple.   Skin:     General: Skin is warm and dry.   Neurological:      Mental Status: She is alert and oriented to person, place, and time. Mental status is at baseline.      Cranial Nerves: No cranial nerve deficit.      Sensory: No sensory deficit.      Motor: No weakness.   Psychiatric:         Mood and Affect: Mood is anxious. Affect is tearful.           Medical Decision Making & ED Course   Medical Decision Makin y.o. female with a history of anxiety presenting with significant anxiety, tearful  on exam.  Patient is tachycardic, lungs are clear.  Normal pulses.  No peripheral edema.  Denies cardiac history.  Workup obtained to rule out ACS, PE, pneumothorax, infectious process, intra-abdominal process.  Patient given diazepam for severe anxiety.  Given Zofran and Pepcid for symptomatic relief.  ----      Differential diagnoses considered include but are not limited to: Anxiety, gastritis, peptic ulcer disease, ACS, PE, pneumothorax    Social Determinants of Health which Significantly Impact Care: Social Determinants of Health which Significantly Impact Care: Mental health disorder     EKG Independent Interpretation: EKG interpreted by myself. Please see ED Course for full interpretation.    Independent Result Review and Interpretation: Relevant laboratory and radiographic results were reviewed and independently interpreted by myself.  As necessary, they are commented on in the ED Course.    Chronic conditions affecting the patient's care: As documented above in [facility]    The patient was discussed with the following consultants/services: None    Care Considerations: As documented above in [facility]    ED Course:  ED Course as of 06/27/25 1247   Fri Jun 27, 2025   0850 Twelve-lead ECG obtained at 0845 by my interpretation demonstrates normal sinus rhythm with a rate of 100, no acute ST elevation or depression.  QTc 466. [MK]   0931 CBC is unremarkable, no leukocytosis, normal H&H.  Metabolic panel with normal renal function, no electrolyte derangements.  Normal LFTs.  Normal serum lactate.  Normal lipase.  hCG negative.  Troponin is mildly elevated at 65, similar to most recent baseline, will continue to trend.  D-dimer elevated, CT PE study obtained. [MK]   0951 XR chest 1 view  Chest x-ray is clear [MK]   1014 Thyroid Stimulating Hormone: 1.10 [MK]   1014 BNP: 9 [MK]   1024 CT angio chest for pulmonary embolism  CT PE study negative for acute PE.  Shows findings of gastric wall thickening consistent with gastritis.  [MK]   1039 Troponin I, High Sensitivity(!!): 61  Repeat troponin downtrending at 61 [MK]   1246 On reevaluation patient is resting comfortably.  Heart rate has normalized.  Patient reports improvement in symptoms.  Patient states that she has an upcoming appointment with psychiatry for evaluation for her anxiety.  Patient is already on medications for her gastritis including PPI, Carafate.  Has Zofran at home as needed. [MK]   1246 Patient was offered EPAT evaluation while in the ED.  Patient declines any SI, HI, hallucinations.  Patient would prefer to follow-up outpatient and go home. [MK]      ED Course User Index  [MK] Tomasz Henao MD         Diagnoses as of 06/27/25 1247   Gastritis without bleeding, unspecified chronicity, unspecified gastritis type   Anxiety attack       Disposition   As a result of the work-up, the patient was discharged home.  she was informed of her diagnosis and instructed to come back with any concerns or worsening of condition.  she and was agreeable to the plan as discussed above.  she was given the opportunity to ask questions.  All of the patient's questions were answered.    Procedures   Procedures        Tomasz Henao MD  Emergency Medicine                                                       Tomasz Henao MD  06/27/25 1246

## 2025-07-08 ENCOUNTER — HOSPITAL ENCOUNTER (EMERGENCY)
Facility: HOSPITAL | Age: 45
Discharge: HOME | End: 2025-07-08
Payer: COMMERCIAL

## 2025-07-08 ENCOUNTER — HOSPITAL ENCOUNTER (EMERGENCY)
Facility: HOSPITAL | Age: 45
Discharge: HOME | End: 2025-07-08
Attending: EMERGENCY MEDICINE
Payer: COMMERCIAL

## 2025-07-08 VITALS
DIASTOLIC BLOOD PRESSURE: 74 MMHG | TEMPERATURE: 97.7 F | BODY MASS INDEX: 21.34 KG/M2 | RESPIRATION RATE: 16 BRPM | OXYGEN SATURATION: 100 % | HEART RATE: 90 BPM | WEIGHT: 125 LBS | SYSTOLIC BLOOD PRESSURE: 124 MMHG | HEIGHT: 64 IN

## 2025-07-08 VITALS
TEMPERATURE: 98.8 F | SYSTOLIC BLOOD PRESSURE: 128 MMHG | RESPIRATION RATE: 18 BRPM | BODY MASS INDEX: 21.34 KG/M2 | OXYGEN SATURATION: 100 % | DIASTOLIC BLOOD PRESSURE: 85 MMHG | HEART RATE: 99 BPM | HEIGHT: 64 IN | WEIGHT: 125 LBS

## 2025-07-08 DIAGNOSIS — K29.00 ACUTE GASTRITIS WITHOUT HEMORRHAGE, UNSPECIFIED GASTRITIS TYPE: ICD-10-CM

## 2025-07-08 DIAGNOSIS — R10.13 ABDOMINAL PAIN, EPIGASTRIC: Primary | ICD-10-CM

## 2025-07-08 DIAGNOSIS — F41.8 DEPRESSION WITH ANXIETY: Primary | ICD-10-CM

## 2025-07-08 LAB
ALBUMIN SERPL BCP-MCNC: 4.4 G/DL (ref 3.4–5)
ALBUMIN SERPL BCP-MCNC: 4.5 G/DL (ref 3.4–5)
ALP SERPL-CCNC: 71 U/L (ref 33–110)
ALP SERPL-CCNC: 71 U/L (ref 33–110)
ALT SERPL W P-5'-P-CCNC: 20 U/L (ref 7–45)
ALT SERPL W P-5'-P-CCNC: 21 U/L (ref 7–45)
AMPHETAMINES UR QL SCN: ABNORMAL
AMPHETAMINES UR QL SCN: ABNORMAL
ANION GAP SERPL CALC-SCNC: 14 MMOL/L (ref 10–20)
ANION GAP SERPL CALC-SCNC: 15 MMOL/L (ref 10–20)
APPEARANCE UR: CLEAR
AST SERPL W P-5'-P-CCNC: 14 U/L (ref 9–39)
AST SERPL W P-5'-P-CCNC: 15 U/L (ref 9–39)
BACTERIA #/AREA URNS AUTO: ABNORMAL /HPF
BARBITURATES UR QL SCN: ABNORMAL
BARBITURATES UR QL SCN: ABNORMAL
BASOPHILS # BLD AUTO: 0.02 X10*3/UL (ref 0–0.1)
BASOPHILS # BLD AUTO: 0.03 X10*3/UL (ref 0–0.1)
BASOPHILS NFR BLD AUTO: 0.2 %
BASOPHILS NFR BLD AUTO: 0.2 %
BENZODIAZ UR QL SCN: ABNORMAL
BENZODIAZ UR QL SCN: ABNORMAL
BILIRUB DIRECT SERPL-MCNC: 0.1 MG/DL (ref 0–0.3)
BILIRUB SERPL-MCNC: 0.5 MG/DL (ref 0–1.2)
BILIRUB SERPL-MCNC: 0.7 MG/DL (ref 0–1.2)
BILIRUB UR STRIP.AUTO-MCNC: NEGATIVE MG/DL
BUN SERPL-MCNC: 10 MG/DL (ref 6–23)
BUN SERPL-MCNC: 11 MG/DL (ref 6–23)
BZE UR QL SCN: ABNORMAL
BZE UR QL SCN: ABNORMAL
CALCIUM SERPL-MCNC: 9.7 MG/DL (ref 8.6–10.3)
CALCIUM SERPL-MCNC: 9.7 MG/DL (ref 8.6–10.3)
CANNABINOIDS UR QL SCN: ABNORMAL
CANNABINOIDS UR QL SCN: ABNORMAL
CHLORIDE SERPL-SCNC: 104 MMOL/L (ref 98–107)
CHLORIDE SERPL-SCNC: 104 MMOL/L (ref 98–107)
CO2 SERPL-SCNC: 22 MMOL/L (ref 21–32)
CO2 SERPL-SCNC: 23 MMOL/L (ref 21–32)
COLOR UR: ABNORMAL
CREAT SERPL-MCNC: 0.72 MG/DL (ref 0.5–1.05)
CREAT SERPL-MCNC: 0.76 MG/DL (ref 0.5–1.05)
EGFRCR SERPLBLD CKD-EPI 2021: >90 ML/MIN/1.73M*2
EGFRCR SERPLBLD CKD-EPI 2021: >90 ML/MIN/1.73M*2
EOSINOPHIL # BLD AUTO: 0.28 X10*3/UL (ref 0–0.7)
EOSINOPHIL # BLD AUTO: 0.33 X10*3/UL (ref 0–0.7)
EOSINOPHIL NFR BLD AUTO: 2.6 %
EOSINOPHIL NFR BLD AUTO: 3.1 %
ERYTHROCYTE [DISTWIDTH] IN BLOOD BY AUTOMATED COUNT: 14.4 % (ref 11.5–14.5)
ERYTHROCYTE [DISTWIDTH] IN BLOOD BY AUTOMATED COUNT: 14.8 % (ref 11.5–14.5)
ETHANOL SERPL-MCNC: <10 MG/DL
FENTANYL+NORFENTANYL UR QL SCN: ABNORMAL
FENTANYL+NORFENTANYL UR QL SCN: ABNORMAL
GLUCOSE SERPL-MCNC: 86 MG/DL (ref 74–99)
GLUCOSE SERPL-MCNC: 96 MG/DL (ref 74–99)
GLUCOSE UR STRIP.AUTO-MCNC: NORMAL MG/DL
HCT VFR BLD AUTO: 40.1 % (ref 36–46)
HCT VFR BLD AUTO: 40.8 % (ref 36–46)
HGB BLD-MCNC: 13.8 G/DL (ref 12–16)
HGB BLD-MCNC: 14 G/DL (ref 12–16)
IMM GRANULOCYTES # BLD AUTO: 0.02 X10*3/UL (ref 0–0.7)
IMM GRANULOCYTES # BLD AUTO: 0.05 X10*3/UL (ref 0–0.7)
IMM GRANULOCYTES NFR BLD AUTO: 0.2 % (ref 0–0.9)
IMM GRANULOCYTES NFR BLD AUTO: 0.4 % (ref 0–0.9)
KETONES UR STRIP.AUTO-MCNC: ABNORMAL MG/DL
LACTATE SERPL-SCNC: 1.7 MMOL/L (ref 0.4–2)
LEUKOCYTE ESTERASE UR QL STRIP.AUTO: ABNORMAL
LIPASE SERPL-CCNC: 27 U/L (ref 9–82)
LIPASE SERPL-CCNC: 30 U/L (ref 9–82)
LYMPHOCYTES # BLD AUTO: 1.49 X10*3/UL (ref 1.2–4.8)
LYMPHOCYTES # BLD AUTO: 1.97 X10*3/UL (ref 1.2–4.8)
LYMPHOCYTES NFR BLD AUTO: 15.6 %
LYMPHOCYTES NFR BLD AUTO: 16.4 %
MCH RBC QN AUTO: 26.8 PG (ref 26–34)
MCH RBC QN AUTO: 27.4 PG (ref 26–34)
MCHC RBC AUTO-ENTMCNC: 34.3 G/DL (ref 32–36)
MCHC RBC AUTO-ENTMCNC: 34.4 G/DL (ref 32–36)
MCV RBC AUTO: 78 FL (ref 80–100)
MCV RBC AUTO: 80 FL (ref 80–100)
METHADONE UR QL SCN: ABNORMAL
METHADONE UR QL SCN: ABNORMAL
MONOCYTES # BLD AUTO: 0.5 X10*3/UL (ref 0.1–1)
MONOCYTES # BLD AUTO: 0.78 X10*3/UL (ref 0.1–1)
MONOCYTES NFR BLD AUTO: 5.5 %
MONOCYTES NFR BLD AUTO: 6.2 %
NEUTROPHILS # BLD AUTO: 6.79 X10*3/UL (ref 1.2–7.7)
NEUTROPHILS # BLD AUTO: 9.5 X10*3/UL (ref 1.2–7.7)
NEUTROPHILS NFR BLD AUTO: 74.6 %
NEUTROPHILS NFR BLD AUTO: 75 %
NITRITE UR QL STRIP.AUTO: ABNORMAL
NRBC BLD-RTO: 0 /100 WBCS (ref 0–0)
NRBC BLD-RTO: 0 /100 WBCS (ref 0–0)
OPIATES UR QL SCN: ABNORMAL
OPIATES UR QL SCN: ABNORMAL
OXYCODONE+OXYMORPHONE UR QL SCN: ABNORMAL
OXYCODONE+OXYMORPHONE UR QL SCN: ABNORMAL
PCP UR QL SCN: ABNORMAL
PCP UR QL SCN: ABNORMAL
PH UR STRIP.AUTO: 7 [PH]
PLATELET # BLD AUTO: 300 X10*3/UL (ref 150–450)
PLATELET # BLD AUTO: 329 X10*3/UL (ref 150–450)
POTASSIUM SERPL-SCNC: 3.2 MMOL/L (ref 3.5–5.3)
POTASSIUM SERPL-SCNC: 3.5 MMOL/L (ref 3.5–5.3)
PROT SERPL-MCNC: 8.3 G/DL (ref 6.4–8.2)
PROT SERPL-MCNC: 8.7 G/DL (ref 6.4–8.2)
PROT UR STRIP.AUTO-MCNC: NEGATIVE MG/DL
RBC # BLD AUTO: 5.04 X10*6/UL (ref 4–5.2)
RBC # BLD AUTO: 5.22 X10*6/UL (ref 4–5.2)
RBC # UR STRIP.AUTO: NEGATIVE MG/DL
RBC #/AREA URNS AUTO: ABNORMAL /HPF
SODIUM SERPL-SCNC: 137 MMOL/L (ref 136–145)
SODIUM SERPL-SCNC: 138 MMOL/L (ref 136–145)
SP GR UR STRIP.AUTO: 1.01
SQUAMOUS #/AREA URNS AUTO: ABNORMAL /HPF
UROBILINOGEN UR STRIP.AUTO-MCNC: NORMAL MG/DL
WBC # BLD AUTO: 12.7 X10*3/UL (ref 4.4–11.3)
WBC # BLD AUTO: 9.1 X10*3/UL (ref 4.4–11.3)
WBC #/AREA URNS AUTO: ABNORMAL /HPF

## 2025-07-08 PROCEDURE — 85025 COMPLETE CBC W/AUTO DIFF WBC: CPT

## 2025-07-08 PROCEDURE — 96361 HYDRATE IV INFUSION ADD-ON: CPT

## 2025-07-08 PROCEDURE — 36415 COLL VENOUS BLD VENIPUNCTURE: CPT | Performed by: EMERGENCY MEDICINE

## 2025-07-08 PROCEDURE — 84075 ASSAY ALKALINE PHOSPHATASE: CPT

## 2025-07-08 PROCEDURE — 96374 THER/PROPH/DIAG INJ IV PUSH: CPT

## 2025-07-08 PROCEDURE — 80048 BASIC METABOLIC PNL TOTAL CA: CPT | Performed by: EMERGENCY MEDICINE

## 2025-07-08 PROCEDURE — 80307 DRUG TEST PRSMV CHEM ANLYZR: CPT

## 2025-07-08 PROCEDURE — 36415 COLL VENOUS BLD VENIPUNCTURE: CPT

## 2025-07-08 PROCEDURE — 99284 EMERGENCY DEPT VISIT MOD MDM: CPT | Mod: 25 | Performed by: EMERGENCY MEDICINE

## 2025-07-08 PROCEDURE — 2500000001 HC RX 250 WO HCPCS SELF ADMINISTERED DRUGS (ALT 637 FOR MEDICARE OP)

## 2025-07-08 PROCEDURE — 99284 EMERGENCY DEPT VISIT MOD MDM: CPT | Mod: 25

## 2025-07-08 PROCEDURE — 85025 COMPLETE CBC W/AUTO DIFF WBC: CPT | Performed by: EMERGENCY MEDICINE

## 2025-07-08 PROCEDURE — 80307 DRUG TEST PRSMV CHEM ANLYZR: CPT | Performed by: EMERGENCY MEDICINE

## 2025-07-08 PROCEDURE — 2500000005 HC RX 250 GENERAL PHARMACY W/O HCPCS: Performed by: EMERGENCY MEDICINE

## 2025-07-08 PROCEDURE — 83605 ASSAY OF LACTIC ACID: CPT | Performed by: EMERGENCY MEDICINE

## 2025-07-08 PROCEDURE — 2500000001 HC RX 250 WO HCPCS SELF ADMINISTERED DRUGS (ALT 637 FOR MEDICARE OP): Performed by: EMERGENCY MEDICINE

## 2025-07-08 PROCEDURE — 81001 URINALYSIS AUTO W/SCOPE: CPT | Mod: 59 | Performed by: EMERGENCY MEDICINE

## 2025-07-08 PROCEDURE — 87077 CULTURE AEROBIC IDENTIFY: CPT | Mod: ELYLAB | Performed by: EMERGENCY MEDICINE

## 2025-07-08 PROCEDURE — 96375 TX/PRO/DX INJ NEW DRUG ADDON: CPT

## 2025-07-08 PROCEDURE — 2500000004 HC RX 250 GENERAL PHARMACY W/ HCPCS (ALT 636 FOR OP/ED): Mod: JZ

## 2025-07-08 PROCEDURE — 82248 BILIRUBIN DIRECT: CPT | Performed by: EMERGENCY MEDICINE

## 2025-07-08 PROCEDURE — 2500000002 HC RX 250 W HCPCS SELF ADMINISTERED DRUGS (ALT 637 FOR MEDICARE OP, ALT 636 FOR OP/ED): Performed by: EMERGENCY MEDICINE

## 2025-07-08 PROCEDURE — 82077 ASSAY SPEC XCP UR&BREATH IA: CPT | Performed by: EMERGENCY MEDICINE

## 2025-07-08 PROCEDURE — 83690 ASSAY OF LIPASE: CPT | Performed by: EMERGENCY MEDICINE

## 2025-07-08 PROCEDURE — 83690 ASSAY OF LIPASE: CPT

## 2025-07-08 PROCEDURE — 2500000004 HC RX 250 GENERAL PHARMACY W/ HCPCS (ALT 636 FOR OP/ED): Mod: JZ | Performed by: EMERGENCY MEDICINE

## 2025-07-08 RX ORDER — POTASSIUM CHLORIDE 20 MEQ/1
40 TABLET, EXTENDED RELEASE ORAL ONCE
Status: COMPLETED | OUTPATIENT
Start: 2025-07-08 | End: 2025-07-08

## 2025-07-08 RX ORDER — FENTANYL CITRATE 50 UG/ML
50 INJECTION, SOLUTION INTRAMUSCULAR; INTRAVENOUS ONCE
Status: COMPLETED | OUTPATIENT
Start: 2025-07-08 | End: 2025-07-08

## 2025-07-08 RX ORDER — FAMOTIDINE 10 MG/ML
20 INJECTION, SOLUTION INTRAVENOUS ONCE
Status: COMPLETED | OUTPATIENT
Start: 2025-07-08 | End: 2025-07-08

## 2025-07-08 RX ORDER — ONDANSETRON HYDROCHLORIDE 2 MG/ML
4 INJECTION, SOLUTION INTRAVENOUS ONCE
Status: COMPLETED | OUTPATIENT
Start: 2025-07-08 | End: 2025-07-08

## 2025-07-08 RX ORDER — DICYCLOMINE HYDROCHLORIDE 20 MG/1
20 TABLET ORAL 2 TIMES DAILY
Qty: 20 TABLET | Refills: 0 | Status: SHIPPED | OUTPATIENT
Start: 2025-07-08 | End: 2025-07-15 | Stop reason: WASHOUT

## 2025-07-08 RX ORDER — ALUMINUM HYDROXIDE, MAGNESIUM HYDROXIDE, AND SIMETHICONE 1200; 120; 1200 MG/30ML; MG/30ML; MG/30ML
20 SUSPENSION ORAL ONCE
Status: COMPLETED | OUTPATIENT
Start: 2025-07-08 | End: 2025-07-08

## 2025-07-08 RX ORDER — LIDOCAINE HYDROCHLORIDE 20 MG/ML
15 SOLUTION OROPHARYNGEAL ONCE
Status: COMPLETED | OUTPATIENT
Start: 2025-07-08 | End: 2025-07-08

## 2025-07-08 RX ORDER — CAPSAICIN 0.03 G/100G
CREAM TOPICAL 2 TIMES DAILY
Qty: 56.6 G | Refills: 0 | Status: SHIPPED | OUTPATIENT
Start: 2025-07-08 | End: 2025-07-15

## 2025-07-08 RX ORDER — ALUMINUM HYDROXIDE, MAGNESIUM HYDROXIDE, AND SIMETHICONE 1200; 120; 1200 MG/30ML; MG/30ML; MG/30ML
30 SUSPENSION ORAL ONCE
Status: COMPLETED | OUTPATIENT
Start: 2025-07-08 | End: 2025-07-08

## 2025-07-08 RX ORDER — FAMOTIDINE 20 MG/1
20 TABLET, FILM COATED ORAL 2 TIMES DAILY
Qty: 20 TABLET | Refills: 0 | Status: SHIPPED | OUTPATIENT
Start: 2025-07-08 | End: 2025-07-18

## 2025-07-08 RX ADMIN — FAMOTIDINE 20 MG: 10 INJECTION, SOLUTION INTRAVENOUS at 08:53

## 2025-07-08 RX ADMIN — POTASSIUM CHLORIDE 40 MEQ: 1500 TABLET, EXTENDED RELEASE ORAL at 21:09

## 2025-07-08 RX ADMIN — FENTANYL CITRATE 50 MCG: 50 INJECTION INTRAMUSCULAR; INTRAVENOUS at 08:54

## 2025-07-08 RX ADMIN — FENTANYL CITRATE 50 MCG: 50 INJECTION INTRAMUSCULAR; INTRAVENOUS at 21:09

## 2025-07-08 RX ADMIN — LIDOCAINE HYDROCHLORIDE 15 ML: 20 SOLUTION ORAL at 20:41

## 2025-07-08 RX ADMIN — ALUMINUM HYDROXIDE, MAGNESIUM HYDROXIDE, AND SIMETHICONE 20 ML: 200; 200; 20 SUSPENSION ORAL at 08:54

## 2025-07-08 RX ADMIN — SODIUM CHLORIDE, SODIUM LACTATE, POTASSIUM CHLORIDE, AND CALCIUM CHLORIDE 1000 ML: .6; .31; .03; .02 INJECTION, SOLUTION INTRAVENOUS at 08:55

## 2025-07-08 RX ADMIN — ONDANSETRON 4 MG: 2 INJECTION INTRAMUSCULAR; INTRAVENOUS at 20:41

## 2025-07-08 RX ADMIN — ALUMINUM HYDROXIDE, MAGNESIUM HYDROXIDE, AND SIMETHICONE 30 ML: 200; 200; 20 SUSPENSION ORAL at 20:41

## 2025-07-08 ASSESSMENT — LIFESTYLE VARIABLES
TOTAL SCORE: 0
HAVE PEOPLE ANNOYED YOU BY CRITICIZING YOUR DRINKING: NO
EVER HAD A DRINK FIRST THING IN THE MORNING TO STEADY YOUR NERVES TO GET RID OF A HANGOVER: NO
EVER HAD A DRINK FIRST THING IN THE MORNING TO STEADY YOUR NERVES TO GET RID OF A HANGOVER: NO
EVER FELT BAD OR GUILTY ABOUT YOUR DRINKING: NO
TOTAL SCORE: 0
HAVE YOU EVER FELT YOU SHOULD CUT DOWN ON YOUR DRINKING: NO
HAVE PEOPLE ANNOYED YOU BY CRITICIZING YOUR DRINKING: NO
HAVE YOU EVER FELT YOU SHOULD CUT DOWN ON YOUR DRINKING: NO
EVER FELT BAD OR GUILTY ABOUT YOUR DRINKING: NO

## 2025-07-08 ASSESSMENT — PAIN SCALES - GENERAL
PAINLEVEL_OUTOF10: 8
PAINLEVEL_OUTOF10: 10 - WORST POSSIBLE PAIN
PAINLEVEL_OUTOF10: 0 - NO PAIN

## 2025-07-08 ASSESSMENT — PAIN DESCRIPTION - LOCATION
LOCATION: ABDOMEN
LOCATION: ABDOMEN

## 2025-07-08 ASSESSMENT — PAIN - FUNCTIONAL ASSESSMENT
PAIN_FUNCTIONAL_ASSESSMENT: 0-10

## 2025-07-08 ASSESSMENT — PAIN DESCRIPTION - FREQUENCY: FREQUENCY: CONSTANT/CONTINUOUS

## 2025-07-08 ASSESSMENT — PAIN DESCRIPTION - PAIN TYPE: TYPE: ACUTE PAIN

## 2025-07-08 ASSESSMENT — PAIN DESCRIPTION - DESCRIPTORS: DESCRIPTORS: ACHING

## 2025-07-08 NOTE — ED TRIAGE NOTES
Pt comes from home for second visit today. Pt has abdominal pain in the center upper. Pt states similar to her gastritis pain she has chronically. Pt states that the pain is making her depressed. Pt denies any SI/HI. Pt did not able to  her Capsaicin cream from the pharmacy.  Pt has GI appointment on Thursday. Pt drove herself.

## 2025-07-08 NOTE — ED PROVIDER NOTES
HPI   Chief Complaint   Patient presents with    Depression     No SI/HI    Abdominal Pain       Patient is tearful crying 45-year-old comes in with epigastric pain that she is trying to deal for the last couple of months, was seen earlier scheduled to see her GI doctor on Thursday in [place] comes back because she is very depressed about her gastritis and very anxious and wants to talk to somebody also coming on continuous pain.              Patient History   Medical History[1]  Surgical History[2]  Family History[3]  Social History[4]    Physical Exam   ED Triage Vitals [07/08/25 1924]   Temperature Heart Rate Respirations BP   37.1 °C (98.8 °F) 95 16 162/79      Pulse Ox Temp Source Heart Rate Source Patient Position   99 % Temporal Monitor --      BP Location FiO2 (%)     -- --       Physical Exam  Vitals reviewed.   Cardiovascular:      Rate and Rhythm: Normal rate and regular rhythm.   Pulmonary:      Effort: Pulmonary effort is normal.   Abdominal:      Palpations: Abdomen is soft.      Tenderness: There is abdominal tenderness in the epigastric area.   Skin:     General: Skin is warm and dry.   Neurological:      Mental Status: She is alert.           ED Course & MDM   Diagnoses as of 07/08/25 2101   Depression with anxiety   Acute gastritis without hemorrhage, unspecified gastritis type                 No data recorded     Eric Coma Scale Score: 15 (07/08/25 1926 : Bia Rebolledo RN)                           Medical Decision Making  My differential diagnosis  Acute gastritis acute depression with anxiety acute mood disorder acute adjustment disorder  Ordered CBC chemistry liver enzymes drug screen and ethanol levels and a psychiatry evaluation for patient's depression with anxiety further workup and management to be done based upon result test obtained  Patient CBC chemistries were unremarkable, drug screen showed marijuana and fentanyl and patient received fentanyl this morning, patient was  seen by psychiatry and the recommended outpatient follow-up with [facility] and patient already has an appointment set up tomorrow with the [facility] and appointment with a gastroenterologist on Thursday as scheduled.  Patient was given prescription for Bentyl reassured and discharged home  Labs Reviewed  CBC WITH AUTO DIFFERENTIAL - Abnormal     WBC                           12.7 (*)               nRBC                          0.0                    RBC                           5.04                   Hemoglobin                    13.8                   Hematocrit                    40.1                   MCV                           80                     MCH                           27.4                   MCHC                          34.4                   RDW                           14.4                   Platelets                     329                    Neutrophils %                 75.0                   Immature Granulocytes %, Automated   0.4                    Lymphocytes %                 15.6                   Monocytes %                   6.2                    Eosinophils %                 2.6                    Basophils %                   0.2                    Neutrophils Absolute          9.50 (*)               Immature Granulocytes Absolute, Au*   0.05                   Lymphocytes Absolute          1.97                   Monocytes Absolute            0.78                   Eosinophils Absolute          0.33                   Basophils Absolute            0.03                BASIC METABOLIC PANEL - Abnormal     Glucose                       86                     Sodium                        138                    Potassium                     3.2 (*)                Chloride                      104                    Bicarbonate                   22                     Anion Gap                     15                     Urea Nitrogen                 10                     Creatinine                     0.72                   eGFR                          >90                    Calcium                       9.7                 HEPATIC FUNCTION PANEL - Abnormal     Albumin                       4.5                    Bilirubin, Total              0.7                    Bilirubin, Direct             0.1                    Alkaline Phosphatase          71                     ALT                           20                     AST                           14                     Total Protein                 8.7 (*)             DRUG SCREEN,URINE - Abnormal     Amphetamine Screen, Urine                            Barbiturate Screen, Urine                            Benzodiazepines Screen, Urine                          Cannabinoid Screen, Urine       (*)                  Cocaine Metabolite Screen, Urine                          Fentanyl Screen, Urine          (*)                  Opiate Screen, Urine                                 Oxycodone Screen, Urine                              PCP Screen, Urine                                    Methadone Screen, Urine                                Narrative: Drug screen results are presumptive and should not be used to assess                 compliance with prescribed medication. Contact the performing [facility] laboratory                 to add-on definitive confirmatory testing if clinically indicated.                                Toxicology screening results are reported qualitatively. The concentration must                 be greater than or equal to the cutoff to be reported as positive. The concentration                 at which the screening test can detect an individual drug or metabolite varies.                 The absence of expected drug(s) and/or drug metabolite(s) may indicate non-compliance,                 inappropriate timing of specimen collection relative to drug administration, poor drug                 absorption, diluted/adulterated urine,  or limitations of testing. For medical purposes                 only; not valid for forensic use.                                Interpretive questions should be directed to the laboratory medical directors.  URINALYSIS WITH REFLEX CULTURE AND MICROSCOPIC - Abnormal     Color, Urine                                         Appearance, Urine             Clear                  Specific Gravity, Urine       1.013                  pH, Urine                     7.0                    Protein, Urine                NEGATIVE                Glucose, Urine                Normal                 Blood, Urine                  NEGATIVE                Ketones, Urine                40 (2+) (*)               Bilirubin, Urine              NEGATIVE                Urobilinogen, Urine           Normal                 Nitrite, Urine                2+ (*)                 Leukocyte Esterase, Urine     250 Lola/uL (*)            MICROSCOPIC ONLY, URINE - Abnormal     WBC, Urine                    6-10 (*)               RBC, Urine                    1-2                    Squamous Epithelial Cells, Urine                          Bacteria, Urine               1+ (*)              LIPASE - Normal     Lipase                        27                       Narrative: Venipuncture immediately after or during the administration of Metamizole may lead to falsely low results. Testing should be performed immediately prior to Metamizole dosing.  LACTATE - Normal     Lactate                       1.7                      Narrative: Venipuncture immediately after or during the administration of Metamizole may lead to falsely low results. Testing should be performed immediately prior to Metamizole dosing.  ALCOHOL - Normal     Alcohol                       <10                 URINE CULTURE  URINALYSIS WITH REFLEX CULTURE AND MICROSCOPIC       Narrative: The following orders were created for panel order Urinalysis with Reflex Culture and Microscopic.                 Procedure                               Abnormality         Status                                   ---------                               -----------         ------                                   Urinalysis with Reflex C...[[id number]]  Abnormal            Final result                             Extra Urine Gray Tube[[id number]]                            In process                                               Please view results for these tests on the individual orders.  EXTRA URINE GRAY TUBE     No orders to display           Procedure  Procedures         [1]   Past Medical History:  Diagnosis Date    Acid reflux     Gastritis    [2]   Past Surgical History:  Procedure Laterality Date     SECTION, CLASSIC  1999     SECTION, CLASSIC  10/21/2003   [3]   Family History  Problem Relation Name Age of Onset    Hypertension Mother     [4]   Social History  Tobacco Use    Smoking status: Never     Passive exposure: Never    Smokeless tobacco: Never   Vaping Use    Vaping status: Never Used   Substance Use Topics    Alcohol use: Never    Drug use: Yes     Types: Marijuana     Comment: daily        Wicho Patten MD  [phone]

## 2025-07-08 NOTE — ED PROVIDER NOTES
"HPI   Chief Complaint   Patient presents with    Abdominal Pain     Pt states she has take her meds but still having stomach pain       History provided by: Patient    Limitations to history: None    CC: Abdominal pain    HPI: 45-year-old female with a history of gastritis and anxiety presents emergency department to be evaluated for abdominal pain.  Patient has been experiencing intermittent epigastric abdominal pain for several months.  She states that this episode is been present for the last few days.  She characterized the pain as \"burning\" and localized to the epigastric region.  She says it feels similar to the previous exacerbations of gastritis that she has had.  She has been taking Protonix and Carafate with no much relief of her discomfort and she takes Zofran as needed for nausea.  The Zofran is controlling her nausea.  She denies any injuries.  Denies blood in the urine or stool.  Denies fever and chills.  Denies chest pain or shortness of breath.  Denies history of heart or lung disease.  Denies urgency, frequency, dysuria.  Patient has not followed up with GI for this, she is scheduled to follow-up with them on Thursday.  Patient has had multiple ED visits and CT scans for similar episodes.    ROS: Negative unless mentioned in HPI    Medical Hx: Allergies reviewed.  Immunizations are up-to-date.    Physical exam:    Constitutional: Sitting comfortably in the room and in no distress.  Oriented to person, place, time, and situation.    HEENT: Head is normocephalic, atraumatic. Patient's airway is patent.  Tympanic membranes are clear bilaterally.  Nasal mucosa clear.  Mouth with normal mucosa.  Throat is not erythematous and there are no oropharyngeal exudates, uvula is midline.  No obvious facial deformities.    Eyes: Clear bilaterally.  Pupils are equal round and reactive to light and accommodation.  Extraocular movements intact.      Cardiac: Regular rate, regular rhythm.  Heart sounds S1, S2.  No " murmurs, rubs, or gallops.  PMI nondisplaced.  No JVD.    Respiratory: Regular respiratory rate and effort.  Breath sounds are clear and equal bilaterally, no adventitious lung sounds.  Patient is speaking in full sentences and is in no apparent respiratory distress. No use of accessory muscles.      Gastrointestinal: Abdomen is soft, nondistended, and nontender.  There are no obvious deformities.  No rebound tenderness or guarding.  Bowel sounds are normal active.    Genitourinary: No CVA or flank tenderness.    Musculoskeletal: No reproducible tenderness.  No obvious skin or bony deformities.  Patient has equal range of motion in all extremities and no strength deficiencies.  No muscle or joint tenderness. No back or neck tenderness.  Capillary refill less than 3 seconds.  Strong peripheral pulses.  No sensory deficits.    Neurological: Patient is alert and oriented.  No focal deficits.  5/5 strength in all extremities.  Cranial nerves II through XII intact. GCS15.     Skin: Skin is normal color for race and is warm, dry, and intact.  No evidence of trauma.  No lesions, rashes, bruising, jaundice, or masses.    Psych: Appropriate mood and affect.  No apparent risk to self or others.    Heme/lymph: No adenopathy, lymphadenopathy, or splenomegaly    Physical exam is otherwise negative unless stated above or in history of present illness.              Patient History   Medical History[1]  Surgical History[2]  Family History[3]  Social History[4]    Physical Exam   ED Triage Vitals [07/08/25 0810]   Temperature Heart Rate Respirations BP   36.5 °C (97.7 °F) 95 16 125/90      Pulse Ox Temp Source Heart Rate Source Patient Position   100 % Temporal Monitor --      BP Location FiO2 (%)     -- --       Physical Exam      ED Course & MDM   Diagnoses as of 07/08/25 0946   Abdominal pain, epigastric          Patient updated on plan for lab testing, IV insertion, radiology imaging, and medications to be administered while in  the ER (if indicated). Patient updated on expected wait times for testing and results. Patient provided my name and told to ask any staff member for questions or concerns if they should arise. Electronic medical record reviewed.     MDM    Patient presented to the emergency department with the chief complaint epigastric abdominal tenderness palpation.  Abdomen is soft and nondistended.  Examination of her heart and lungs unremarkable. on arrival to the emergency department, vital signs were within normal limits    Based on the patient's history and physical exam of a low suspicion for ACS, arrhythmia, heart failure, DVT or PE, asthma.  Low suspicion for dissection.  Low suspicion for cholecystitis, cholangitis, small bowel obstruction, diverticulitis, appendicitis.  Will give the patient IV fluids as well as fentanyl, Pepcid, Maalox.  Will obtain basic blood work, lipase, and a urine drug screen to evaluate for recent marijuana use.  At this time I do not believe the patient would have any immediate benefit to additional imaging given that her presentation appears to be very similar to her previous exacerbations and she has had multiple CT scans over the last few months.    Drug screen is positive for marijuana use.  Lipase is 30.  CBC reveals no leukocytosis or anemia and CMP is unremarkable.  Patient does feel better after medications and feels comfortable going home.  I do believe she is likely experiencing a  exacerbation of her gastritis.  Patient will be discharged with Pepcid to take with the omeprazole and Carafate.  I confirmed that she has adequate amounts of Zofran at home.  I will also discharge the patient with capsaicin cream.  I discussed marijuana cessation.  Discussed hot showers to help with the nausea.  She will follow-up with her primary care provider and she will follow-up with her GI appointment that she has this week.  All questions and concerns addressed.  Reasons to return to ER discussed.   Patient verbalized understanding and agreement with the treatment plan and they remained hemodynamically stable in the ER.      This note was dictated using a speech recognition program.  While an attempt was made at proof-reading to minimize errors, minor errors in transcription may be present       No data recorded                                 Medical Decision Making      Procedure  Procedures         [1]   Past Medical History:  Diagnosis Date    Acid reflux     Gastritis    [2]   Past Surgical History:  Procedure Laterality Date     SECTION, CLASSIC  1999     SECTION, CLASSIC  10/21/2003   [3]   Family History  Problem Relation Name Age of Onset    Hypertension Mother     [4]   Social History  Tobacco Use    Smoking status: Never     Passive exposure: Never    Smokeless tobacco: Never   Vaping Use    Vaping status: Never Used   Substance Use Topics    Alcohol use: Never    Drug use: Yes     Types: Marijuana     Comment: daily        Angel Lock PA-C  [phone]

## 2025-07-09 LAB
HOLD SPECIMEN: NORMAL
HOLD SPECIMEN: NORMAL

## 2025-07-09 NOTE — DISCHARGE INSTRUCTIONS
Make sure you follow-up with the [facility] tomorrow as scheduled and also see your gastroenterologist on Thursday as scheduled

## 2025-07-09 NOTE — ED NOTES
Patient talkedwith epat within 30 minutes of arrival; EPAT states she did not meet inpatient marianela Diaz RN  07/08/25 2046

## 2025-07-09 NOTE — CONSULTS
EPAT Initial Psychiatry Consult    ASSESSMENT  On initial assessment, patient reported depression and anxiety symptoms related to chronic abdominal pain, nausea and vomiting. She denies any suicidal thoughts, was able to identify protective factors and she participated in safety planning. She recently had a brief psychiatric admission during which she was scheduled for outpatient follow up with [facility] (tomorrow 7/9/25) and GI follow up the day after. Inpatient psychiatric admission was considered, however at this time there are no urgent/decompensated concerns that would necessitate an inpatient psychiatric admission. Patient DOES NOT require involuntary psychiatric admission at this time. Patient declines voluntary admission. Patient was referred to the [facility] for previously scheduled outpatient intake appointment and was agreeable with this plan. ED provider was informed of recommendation.    PSYCHIATRIC FOLLOW UP INFORMATION - please include in ED or hospital discharge paperwork  [facility] - Intake  [address], [place] [address]  Phone: [phone]  Go on 6/9/2025 between hours of 8am and 3pm    IMPRESSION  Generalized anxiety disorder with panic attacks  Unspecified mood disorder  Cannabis use disorder, severe    RECOMMENDATIONS  Safety:  - Patient does not currently meet criteria for inpatient psychiatric admission.   - Patient does not require a 1:1 sitter from a psychiatric perspective at this time.   - I have had a discussion with the patient about warning signs that if their condition is worsening they should consider returning to the ED and/or calling 911. The patient has identified appropriate internal coping strategies and has people and social settings that provide distraction and support. The patient has a person who they can talk to in a crisis. The patient also identified emergency resources that are readily available should she develop suicidal thoughts  or other urgently concerning mental health symptoms    Medications:  - Continue with current medications (mirtazapine 30 mg at bedtime, hydroxyzine 50 mg BID PRN anxiety, gabapentin 300 mg at bedtime)    Work-up:  - Workup per ED, no additional workup from psychiatric perspective    Follow-Up:  - [facility] 7/9/2025    Recommendations discussed with ED provider, Dr. Patten.  Patient seen and discussed with Dr. Brenner, who agrees with above plan.    Lamberto Rod,   PGY3 Psychiatry Resident   ---------------------------------------------------------------------------------------------------------------    Referred by: Wicho Patten MD  An interactive audio and video telecommunication system which permits real time communications between the patient (at the originating site) and provider (at the distant site) was utilized to provide this telehealth service.  Verbal consent was requested and obtained from Neela Elaine on this date, 07/08/25 for a telehealth visit and the patient's location was confirmed at the time of the visit.    HISTORY OF PRESENT ILLNESS  Neela Elaine is a 45 y.o. female with a past psychiatric history of ALDAIR, MDD, panic attacks, and THC use and a past medical history of chronic abdominal pain,  who presented to the [place] ED for abdominal pain. The patient presented for the same concern earlier in the day and received an abdominal workup, was prescribed home going medications. Patient was medically cleared and EPAT was consulted to complete an evaluation.    On chart review, patient has presented 15 times in the past year to the ED for intractable abdominal pain associated with nausea/vomiting, and anixety/panic symptoms. Although I did not review these encounters in their entirety, the Ed presentations have typically resulted in discharge from the ED with outpatient follow up. She has followed with family medicine for anxiety and depression treatment. She was admitted  "to medical unit in June 2025 and then admitted to inpatient psych for a brief stay, I reviewed the admission and discharge notes for this admission.     On interview, she reports abdominal pain for the past 8 years with associated weight loss and worsening depression and anxiety. She states that these symptoms will typically occur for 2 weeks at a time. She states, \"I feel depressed because of the pain\". She reported that after discharge from psychiatric unit she felt \"fine for a couple days and then down\". She reported that suicidal thoughts have not returned since they resolved during her inpatient stay.    We discussed potential role of THC use in her anxiety and nausea/vomiting symptoms. I recommended abstinence and that she discuss this with her provider tomorrow at intake appointment. She acknowledged my recommendation for abstinence from all substance use.    Psychiatric Review of Symptoms  She describes the following psychiatric symptoms   -Anxiety: frequent ruminations and \"over thinking\"  -Mood: depressed mood, feelings of loneliness    She denied the following symptoms:  No active or historical psychotic or clustered manic symptoms  No active or recent (with past 2 weeks) suicidal or homicidal thoughts    She reports a history of the following symptoms (>2 weeks prior):  -Panic attacks which are self limited and associated with feeling of racing heart rate, feeling of impending doom  -Brief episode of suicidal thoughts without plan or intent during last medical hospitalization     Psychiatric History  She reports compliance with psychotropics (mirtazapine and gabapentin, has been on for >10 years), and utilized PRN hydroxyzine for anxiety which she finds benefit from.   1 Previous psychiatric hospitalization ([facility] 6/2025), brief stay for suicidal thoughts without intent or planning after medical admission for abdominal pain   No history of suicidal attempt    Substance Use History  Reports THC use " "daily, morning, lunchtime and before bed; reports smoking THC for past 30 years. She reports it helps with appetite.   Reported quitting tobacco use >5 years prior  Reported stopping all alcohol consumption a few years ago    Social History  She has a son and daughter that live with her. She reports having no friends. Relationship with children is good and spending time with them helps her feel better. Coping skills: going for drives, going outside to enjoy nature, spending time with family, listening to Mandaeism music.     Denied access to firearms    Past Medical History  Medical History[1]     Past Surgical History  Surgical History[2]     Family History  Family History[3]   Not discussed    Allergies  Phenergan [promethazine], Prochlorperazine, Reglan [metoclopramide hcl], Aripiprazole, Fluoxetine, and Haldol [haloperidol]    PDMP Review  Reviewed: Yes  Comments: No suspicious fill hx    OBJECTIVE  Vitals  Blood pressure 162/79, pulse 95, temperature 37.1 °C (98.8 °F), temperature source Temporal, resp. rate 16, height 1.626 m (5' 4\"), weight 56.7 kg (125 lb), SpO2 99%.    Mental Status Exam  General/Appearance: mild distress, appears stated age, moderately kept  Attitude/Behavior: Cooperative, conversant, engaged, and with good eye contact., tearful  Motor Activity: no psychomotor agitation or retardation, no tremor or other abnormal movements, gait: not assessed  Mood: \"Sad\"  Affect: Dysphoric and Mood congruent  Speech: Spontaneous, Coherent, Fluent, and Appropriate rate  Thought Process: linear, goal directed  Thought Content: no suicidal ideation, no homicidal ideation, themes of demoralization related to medical symptoms   Thought Perception: no perceptual abnormalities noted  Cognition: grossly intact  Insight: limited  Judgment: limited    Home Medications  Medication Documentation Review Audit       Reviewed by Poonam Martin RN (Registered Nurse) on 06/27/25 at 0835      Medication Order Taking? Sig " Documenting Provider Last Dose Status   dicyclomine (Bentyl) 10 mg capsule [id number]  Take 1 capsule (10 mg) by mouth once daily. SALVADOR Bennett  Active   famotidine (Pepcid) 20 mg tablet [id number]  Take 1 tablet (20 mg) by mouth 2 times a day. SALVADOR Bennett  Active   gabapentin (Neurontin) 300 mg capsule [id number]  Take 1 capsule (300 mg) by mouth once daily at bedtime. Historical Provider, MD  Active   hydrOXYzine HCL (Atarax) 50 mg tablet [id number]  Take 1 tablet (50 mg) by mouth 2 times a day as needed for anxiety. SALVADOR Bennett  Active   mirtazapine (Remeron) 30 mg tablet [id number]  Take 1 tablet (30 mg) by mouth once daily at bedtime. SALVADOR Bennett  Active   ondansetron ODT (Zofran-ODT) 4 mg disintegrating tablet [id number]  Dissolve 1 tablet (4 mg) in the mouth every 8 hours if needed for nausea or vomiting. SALVADOR Bennett  Active   pantoprazole (ProtoNix) 40 mg EC tablet [id number]  Take 1 tablet (40 mg) by mouth 2 times a day before meals. Do not crush, chew, or split. SALVADOR Bennett  Active   sucralfate (Carafate) 1 gram tablet [id number]  Take 1 tablet (1 g) by mouth 4 times a day before meals. SALVADOR Bennett  Active                     Current Medications  Scheduled Medications[4]  Continuous Medications[5]  PRN Medications[6]     Labs  Results for orders placed or performed during the hospital encounter of 07/08/25 (from the past 24 hours)   CBC and Auto Differential   Result Value Ref Range    WBC 9.1 4.4 - 11.3 x10*3/uL    nRBC 0.0 0.0 - 0.0 /100 WBCs    RBC 5.22 (H) 4.00 - 5.20 x10*6/uL    Hemoglobin 14.0 12.0 - 16.0 g/dL    Hematocrit 40.8 36.0 - 46.0 %    MCV 78 (L) 80 - 100 fL    MCH 26.8 26.0 - 34.0 pg    MCHC 34.3 32.0 - 36.0 g/dL    RDW 14.8 (H) 11.5 - 14.5 %    Platelets 300 150 - 450 x10*3/uL    Neutrophils % 74.6 40.0 - 80.0 %    Immature Granulocytes %, Automated 0.2 0.0 - 0.9 %    Lymphocytes  % 16.4 13.0 - 44.0 %    Monocytes % 5.5 2.0 - 10.0 %    Eosinophils % 3.1 0.0 - 6.0 %    Basophils % 0.2 0.0 - 2.0 %    Neutrophils Absolute 6.79 1.20 - 7.70 x10*3/uL    Immature Granulocytes Absolute, Automated 0.02 0.00 - 0.70 x10*3/uL    Lymphocytes Absolute 1.49 1.20 - 4.80 x10*3/uL    Monocytes Absolute 0.50 0.10 - 1.00 x10*3/uL    Eosinophils Absolute 0.28 0.00 - 0.70 x10*3/uL    Basophils Absolute 0.02 0.00 - 0.10 x10*3/uL   Comprehensive metabolic panel   Result Value Ref Range    Glucose 96 74 - 99 mg/dL    Sodium 137 136 - 145 mmol/L    Potassium 3.5 3.5 - 5.3 mmol/L    Chloride 104 98 - 107 mmol/L    Bicarbonate 23 21 - 32 mmol/L    Anion Gap 14 10 - 20 mmol/L    Urea Nitrogen 11 6 - 23 mg/dL    Creatinine 0.76 0.50 - 1.05 mg/dL    eGFR >90 >60 mL/min/1.73m*2    Calcium 9.7 8.6 - 10.3 mg/dL    Albumin 4.4 3.4 - 5.0 g/dL    Alkaline Phosphatase 71 33 - 110 U/L    Total Protein 8.3 (H) 6.4 - 8.2 g/dL    AST 15 9 - 39 U/L    Bilirubin, Total 0.5 0.0 - 1.2 mg/dL    ALT 21 7 - 45 U/L   Lipase   Result Value Ref Range    Lipase 30 9 - 82 U/L   Gray Top   Result Value Ref Range    Extra Tube Hold for add-ons.    Drug Screen, Urine   Result Value Ref Range    Amphetamine Screen, Urine Presumptive Negative Presumptive Negative    Barbiturate Screen, Urine Presumptive Negative Presumptive Negative    Benzodiazepines Screen, Urine Presumptive Negative Presumptive Negative    Cannabinoid Screen, Urine Presumptive Positive (A) Presumptive Negative    Cocaine Metabolite Screen, Urine Presumptive Negative Presumptive Negative    Fentanyl Screen, Urine Presumptive Negative Presumptive Negative    Opiate Screen, Urine Presumptive Negative Presumptive Negative    Oxycodone Screen, Urine Presumptive Negative Presumptive Negative    PCP Screen, Urine Presumptive Negative Presumptive Negative    Methadone Screen, Urine Presumptive Negative Presumptive Negative        Imaging  No CT head results found for the past 14 days   No  MRI head results found for the past 14 days     Encounter Date: 25   ECG 12 lead   Result Value    Ventricular Rate 100    Atrial Rate 100    MI Interval 142    QRS Duration 74    QT Interval 362    QTC Calculation(Bazett) 466    P Axis -7    R Axis -1    T Axis 2    QRS Count 16    Q Onset 220    P Onset 149    P Offset 195    T Offset 401    QTC Fredericia 429    Narrative    Normal sinus rhythm  Moderate voltage criteria for LVH, may be normal variant  Borderline ECG  When compared with ECG of 2025 20:37,  Questionable change in QRS axis  ST no longer elevated in Inferior leads  Nonspecific T wave abnormality now evident in Inferior leads       PSYCHIATRIC RISK ASSESSMENT  Violence Risk Factors:  current psychiatric illness and unemployed  Acute Risk of Harm to Others is Considered: Low  Suicide Risk Factors: chronic medical illness, chronic pain, and current psychiatric illness  Protective Factors: Caodaism affiliation/spirituality, fear of suicide or death, sense of responsibility towards family, social support/connectedness, positive family relationships, and hopefulness/future-orientation  Acute Risk of Harm to Self is Considered: Low    Medication Consent  Medication Consent: no medication changes necessary for review          [1]   Past Medical History:  Diagnosis Date    Acid reflux     Gastritis    [2]   Past Surgical History:  Procedure Laterality Date     SECTION, CLASSIC  1999     SECTION, CLASSIC  10/21/2003   [3]   Family History  Problem Relation Name Age of Onset    Hypertension Mother     [4] alum-mag hydroxide-simeth, 30 mL, oral, Once  lidocaine, 15 mL, oral, Once  ondansetron, 4 mg, intravenous, Once  [5]    [6]

## 2025-07-10 ENCOUNTER — OFFICE VISIT (OUTPATIENT)
Dept: GASTROENTEROLOGY | Facility: CLINIC | Age: 45
End: 2025-07-10
Payer: COMMERCIAL

## 2025-07-10 VITALS
HEIGHT: 64 IN | SYSTOLIC BLOOD PRESSURE: 123 MMHG | HEART RATE: 98 BPM | TEMPERATURE: 98.4 F | WEIGHT: 125.6 LBS | DIASTOLIC BLOOD PRESSURE: 88 MMHG | BODY MASS INDEX: 21.44 KG/M2

## 2025-07-10 DIAGNOSIS — R93.5 ABNORMAL CT OF THE ABDOMEN: Primary | ICD-10-CM

## 2025-07-10 DIAGNOSIS — R10.11 RIGHT UPPER QUADRANT PAIN: ICD-10-CM

## 2025-07-10 DIAGNOSIS — R11.2 NAUSEA AND VOMITING, UNSPECIFIED VOMITING TYPE: ICD-10-CM

## 2025-07-10 DIAGNOSIS — R10.13 EPIGASTRIC ABDOMINAL PAIN: ICD-10-CM

## 2025-07-10 DIAGNOSIS — K29.50 CHRONIC GASTRITIS WITHOUT BLEEDING, UNSPECIFIED GASTRITIS TYPE: ICD-10-CM

## 2025-07-10 DIAGNOSIS — Z86.0100 HISTORY OF COLON POLYPS: ICD-10-CM

## 2025-07-10 PROCEDURE — 99212 OFFICE O/P EST SF 10 MIN: CPT | Performed by: NURSE PRACTITIONER

## 2025-07-10 PROCEDURE — 3008F BODY MASS INDEX DOCD: CPT | Performed by: NURSE PRACTITIONER

## 2025-07-10 PROCEDURE — 99204 OFFICE O/P NEW MOD 45 MIN: CPT | Performed by: NURSE PRACTITIONER

## 2025-07-10 RX ORDER — SUCRALFATE 1 G/10ML
1 SUSPENSION ORAL
Qty: 1200 ML | Refills: 11 | Status: SHIPPED | OUTPATIENT
Start: 2025-07-10 | End: 2026-07-10

## 2025-07-10 RX ORDER — OMEPRAZOLE 20 MG/1
1 CAPSULE, DELAYED RELEASE ORAL
COMMUNITY
Start: 2025-04-17

## 2025-07-10 ASSESSMENT — ENCOUNTER SYMPTOMS
NEUROLOGICAL NEGATIVE: 1
RESPIRATORY NEGATIVE: 1
DIAPHORESIS: 0
PSYCHIATRIC NEGATIVE: 1
DIFFICULTY URINATING: 0
CARDIOVASCULAR NEGATIVE: 1
CHEST TIGHTNESS: 0
WHEEZING: 0
ROS GI COMMENTS: SEE HPI
STRIDOR: 0
HEMATOLOGIC/LYMPHATIC NEGATIVE: 1
FEVER: 0
MUSCULOSKELETAL NEGATIVE: 1
COUGH: 0
SHORTNESS OF BREATH: 0
ALLERGIC/IMMUNOLOGIC NEGATIVE: 1
FATIGUE: 0
EYES NEGATIVE: 1
APNEA: 0
CHILLS: 0
ENDOCRINE NEGATIVE: 1

## 2025-07-10 ASSESSMENT — PAIN SCALES - GENERAL: PAINLEVEL_OUTOF10: 0-NO PAIN

## 2025-07-10 NOTE — PROGRESS NOTES
"Subjective   Patient ID: Neela Elaine is a 45 y.o. female who presents for New Patient Visit.    Multiple GI providers in the past. Last seen in GI in January by Dr. Mcclain for epigastric abdominal pain, N/V  Ongoing since she was 11 or 12   She is having episodes of vomiting   The vomiting comes and goes  She is using Carafate and omeprazole   The Carafate has been helping     She continues to use marijuana but has cut back and the a drug screen in July found cannabinoid   Hot showers seem to help  She has smoked marijuana multiple times/day for many years (was smoking 8-9 times/day)  She cut back on marijuana and now down to 3 times/day  Now feeling a little better    Reports a history of colon polyps in 2013 and reports this was a \"large polyp\" and was told if not removed would have been cancer       No history of diabetes     CT in March, 2025-  IMPRESSION:   1. Mild thickening of the distal gastric wall may be secondary to under   distension versus gastritis.   2. Thickening of the distal esophageal wall, suggesting reflux esophagitis.     CT in June  - mild gastric wall thickening     EGD 1/2025-  Findings:         -  The examined esophagus was normal.          -  The Z-line was regular and was found 37 cm from the incisors.          -  Localized mildly erythematous mucosa without bleeding was found             in the gastric body.  Biopsies were taken with a cold forceps for             Helicobacter pylori testing.          -  The examined duodenum was normal.       FINAL DIAGNOSIS:   GASTRIC BIOPSY -   GASTRIC MUCOSA WITH FOCAL REACTIVE CHANGES.   NEGATIVE FOR HELICOBACTER PYLORI ORGANISMS ON IMMUNOHISTOCHEMISTRY STAIN   WITH SATISFACTORY CONTRO     Family Hx: Denies a family hx of CRC, GI cancers     Review of Systems   Constitutional:  Negative for chills, diaphoresis, fatigue and fever.   HENT: Negative.     Eyes: Negative.    Respiratory: Negative.  Negative for apnea, cough, chest tightness, " shortness of breath, wheezing and stridor.    Cardiovascular: Negative.    Gastrointestinal:         See HPI    Endocrine: Negative.    Genitourinary: Negative.  Negative for difficulty urinating.   Musculoskeletal: Negative.    Skin: Negative.    Allergic/Immunologic: Negative.    Neurological: Negative.    Hematological: Negative.    Psychiatric/Behavioral: Negative.         Objective   Physical Exam  Constitutional:       Appearance: Normal appearance.   HENT:      Nose: Nose normal.   Eyes:      General: Lids are normal.   Cardiovascular:      Rate and Rhythm: Normal rate and regular rhythm.      Heart sounds: Normal heart sounds.   Pulmonary:      Effort: Pulmonary effort is normal.      Breath sounds: Normal breath sounds.   Abdominal:      General: Bowel sounds are normal.   Musculoskeletal:         General: Normal range of motion.   Skin:     General: Skin is warm and dry.   Neurological:      Mental Status: She is alert and oriented to person, place, and time.   Psychiatric:         Mood and Affect: Mood normal.         Assessment/Plan   Diagnoses and all orders for this visit:  Abnormal CT of the abdomen  -     Esophagogastroduodenoscopy (EGD); Future  Nausea and vomiting, unspecified vomiting type  -     sucralfate (Carafate) 100 mg/mL suspension; Take 10 mL (1 g) by mouth 4 times a day with meals. Take 1 hour before meals and at bedtime  Chronic gastritis without bleeding, unspecified gastritis type  -     Referral to Gastroenterology  -     sucralfate (Carafate) 100 mg/mL suspension; Take 10 mL (1 g) by mouth 4 times a day with meals. Take 1 hour before meals and at bedtime  Epigastric abdominal pain  -     Referral to Gastroenterology  -     sucralfate (Carafate) 100 mg/mL suspension; Take 10 mL (1 g) by mouth 4 times a day with meals. Take 1 hour before meals and at bedtime  Right upper quadrant pain  History of colon polyps  -     Colonoscopy Screening; Average Risk Patient; Future     44 year old  female with a PMH of chronic N/V/epigastric pain most likely 2/2 cannabinoid hyperemesis syndrome and GERD, MDD who present today for chronic nausea, vomiting and abdominal pain, ongoing for many years. Has seen multiple GI providers in the past with multiple ER visit and EGDs . Last EGD was in January and this showed localized mildly erythematous mucosa without bleeding, biopsies were negative for H pylori. She did have a recent CT AP in June which showed gastric wall thickening and prior CT in March which showed gastric wall thickening-due to the continue abnormality on CT will proceed with EGD. She also reports a h/o large polyp, no colonoscopy since 2013-ordered for evaluation.    We discussed most likely her symptoms are 2/2 marijuana use, she as recently cut down from 8-9 times/day to 3 times/day. We discussed ways to help with symptoms including hot showers, capsaicin, marijuana cessation.    She will continue omeprazole for GERD and will start Carafate    Follow-up after above          SALVADOR Boyle 07/10/25 10:17 AM

## 2025-07-10 NOTE — PATIENT INSTRUCTIONS
Continue carafate   Continue Omeprazole in the morning about 30-60 minutes before breakfast  Eat a cracker, toast in the morning  You can try capsaicin to abdomen     You will be scheduled for a colonoscopy.  Please read all of the instructions 7 days before your colonoscopy.    You will need to take ONLY clear liquids the ENTIRE day before your procedure. These include (clear fruit juices, soda, Gatorade, broth, jello and coffee/tea) Avoid red and purple drinks. No cream or milk in the coffee.  You will need to take a bowel preparation.  You will also need a .      To schedule a procedure please call [phone]            Overview  What is cannabinoid hyperemesis syndrome?  Cannabinoid hyperemesis syndrome (CHS) happens when you have cycles of nausea, vomiting and abdominal pain after using cannabis (marijuana) for a long time. People with CHS often find temporary relief from these symptoms by taking hot baths and showers.  “Hyperemesis” means severe vomiting. Cannabinoids are compounds in the Cannabis sativa plant that bind to cannabinoid receptors in your brain, spinal cord, gastrointestinal tract and other body tissues. Examples of cannabinoids include tetrahydrocannabinol (THC) and cannabidiol (CBD).  CHS is more than just a side effect of marijuana use. It’s a condition that can lead to serious health complications if you don’t get treatment for it.  How common is cannabinoid hyperemesis syndrome?  It’s difficult for researchers to know how common CHS is. Not everyone with the condition seeks medical help or tells their provider that they use marijuana.  One study found that 32.9% of self-reported frequent marijuana users who came to an emergency department for care met the criteria for CHS. With the widespread use, increased potency and legalization of marijuana in multiple states in the [place] CHS may be becoming increasingly common.  Symptoms and Causes  Symptoms of CHS typically come on several  years after the start of chronic marijuana use.  What are the symptoms of cannabinoid hyperemesis syndrome?  Symptoms of CHS typically come on several years after the start of chronic marijuana use. But not everyone who uses marijuana long-term experiences CHS.  Common symptoms of cannabinoid hyperemesis syndrome include:  Persistent nausea -- often in the morning.  Repeated vomiting and retching (making the sound and movement of vomiting). This can happen up to five times an hour.  Intense abdominal discomfort or pain.  Fear of throwing up.  Loss of appetite.  Hot baths and showers tend to help reduce or curb the symptoms. Many people with CHS will compulsively shower or bathe -- often for hours every day -- to relieve CHS symptoms.  Symptoms of CHS and their severity depend on the phase of the syndrome:  Prodromal phase: This phase is most common in adults who have used cannabis since they were teenagers. You may have abdominal pain or morning nausea. You may also fear throwing up but never actually vomit. This phase can last for months or years.  Hyperemetic phase: This is the characteristic phase of CHS. It usually lasts 24 to 48 hours and involves overwhelming, recurrent vomiting and nausea. You may start compulsively bathing and avoid certain foods or purposefully restrict your food intake.  Recovery phase: During recovery, you stop using cannabis (even in small amounts). When you’re in the recovery phase, symptoms lessen over a few days or months. Eventually, they completely disappear.  What is scromiting?  Some people call certain symptoms of CHS “scromiting.” The term combines “vomiting” and “screaming.” You may have intense pain, which causes you to scream while you vomit.  What causes cannabinoid hyperemesis syndrome?  Scientists don’t yet know the exact cause of CHS. But the main theory is that it may happen due to long-term overstimulation of receptors in your endocannabinoid system (ECS). This may lead  to issues with your body’s natural control of nausea and vomiting.  What are the risk factors for cannabinoid hyperemesis syndrome?  People who use marijuana long-term -- typically for about 10 to 12 years -- are at risk of developing CHS. But not every person who uses marijuana, even long-term use, develops CHS.  It tends to affect people who use cannabis at least once a week and happens more often in adults who’ve been using cannabis since their adolescent years.    Diagnosis and Tests  How is cannabinoid hyperemesis syndrome diagnosed?  Healthcare providers mainly diagnose CHS based on your symptoms, medical history and history of substance use. Your provider will do a physical exam and may ask you:  How often you use cannabis (marijuana).  How long you’ve used cannabis.  When you vomit or feel nauseated.  If other factors, like certain foods, lead to vomiting.  Whether you have lost weight for no known reason.  If you take hot baths or showers to try to relieve symptoms.  It’s important to be honest about your marijuana use if you have symptoms of CHS. Without knowing this background, providers often misdiagnose CHS as other conditions, like cyclic vomiting syndrome (CVS).  Providers typically use the following criteria to diagnose CHS:  Long-term, frequent marijuana use (more than one year).  Severe cyclic vomiting episodes.  Abdominal pain.  Symptoms stop with sustained abstinence from marijuana use.  Compulsive bathing in hot water.    Management and Treatment  What is the treatment for cannabinoid hyperemesis treatment?  The only known treatment to permanently get rid of CHS is to stop cannabis use completely. You may have symptoms and side effects of CHS for a few weeks after quitting cannabis. Over time, symptoms will disappear.  If you have cannabis use disorder and need help quitting, professional treatment is available. Talk to your healthcare provider or an addiction counselor.  Researchers are  currently studying several treatment options to manage the hyperemetic phase of CHS. But there are currently no therapies approved by the [place] Food and Drug Administration (FDA) for CHS.  You may need to go to the hospital for severe CHS. There, healthcare providers may give you:  IV fluids and electrolytes if you become dehydrated from vomiting.  Antiemetics, though these often aren’t effective at relieving vomiting in CHS.  Other alternative therapies for symptom relief, like capsaicin cream to relieve pain.  How do you stop cannabinoid hyperemesis syndrome?  The only cure for CHS is to stop using cannabis. Hot baths may relieve the nausea for a while, but they don’t cure CHS. Taking too many hot baths can increase dehydration due to sweating.  You may use home treatments to relieve CHS symptoms immediately after quitting cannabis. These remedies may help you feel better while you transition to the recovery phase. Your healthcare provider may recommend:  Antihistamines, like diphenhydramine (Benadryl®).  Capsaicin cream to relieve pain.  Pain relievers like ibuprofen (Advil®, Motrin®) or acetaminophen (Tylenol®).  Prescription medications, including benzodiazepines (like lorazepam), antipsychotic medications (like haloperidol) or tricyclic antidepressants.  How soon after cannabis hyperemesis syndrome treatment will I feel better?  Most people with CHS who stop using cannabis have relief from symptoms within 10 days. But it may take a few months to feel fully recovered.    Caroleen / Prognosis  What are the possible complications of cannabinoid hyperemesis syndrome?  There are several possible complications of CHS, including:  Dehydration.  Electrolyte imbalance.  Malnutrition.  Weight loss.  Tooth decay.  Choking, pneumonitis and/or aspiration pneumonia.  Injury to your esophagus, like Boerhaave’s syndrome or Ivonne Johnson tear.  Scald burns from using very hot water to try to manage symptoms.  When should I go to  the ER for cannabinoid hyperemesis syndrome?  CHS can increase your risk for severe dehydration. Call 911 or go to your nearest emergency room if you have any dehydration symptoms, including:  Dark or very little pee.  Delirium (sudden confusion).  Dizziness.  Fatigue or unexplained sleepiness.  Quickened heart rate.  Rapid breathing.  Syncope (fainting).    Prevention  How can I prevent cannabinoid hyperemesis syndrome?  The only proven way to prevent cannabis hyperemesis syndrome is to avoid cannabis (marijuana).  Quitting marijuana use can be hard. If you need help quitting, speak to a healthcare provider or connect with your local addiction treatment services.  You can also call the Substance Abuse and Mental Health Services Administration (SAMA) National Helpline at 1.266.685.4892. It’s a free, confidential service that’s open 24/7, 365 days a year. They provide treatment referrals and information in English and Romanian.

## 2025-07-11 LAB — BACTERIA UR CULT: ABNORMAL

## 2025-07-12 ENCOUNTER — TELEPHONE (OUTPATIENT)
Dept: PHARMACY | Facility: HOSPITAL | Age: 45
End: 2025-07-12
Payer: COMMERCIAL

## 2025-07-12 NOTE — PROGRESS NOTES
EDPD Note: Antibiotics Reviewed and Warranted    Contacted Mr./Mrs./Ms. Neela Elaine regarding a positive urine culture/result that was taken during their recent emergency room visit. I completed education with patient . The patient is being treated appropriately with no antibiotics.    Patient had no urinary symptoms at ED presentation and currently has no urinary symptoms, therefore no treatment is appropriate.    Susceptibility data from last 90 days.  Collected Specimen Info Organism Amoxicillin/Clavulanate Ampicillin Ampicillin/Sulbactam Cefazolin Cefazolin (uncomplicated UTIs only) Ceftriaxone Ciprofloxacin Gentamicin Levofloxacin Nitrofurantoin Piperacillin/Tazobactam   07/08/25 Urine from Clean Catch/Voided Escherichia coli  S  R  I  R  S  S  S  S  S  S  S   05/18/25 Urine from Clean Catch/Voided Escherichia coli  S  R  S  S  S   S  S  S  S  S   04/15/25 Urine from Clean Catch/Voided Escherichia coli  S  R  R  I  S  S  S  S   S  S     Collected Specimen Info Organism Trimethoprim/Sulfamethoxazole   07/08/25 Urine from Clean Catch/Voided Escherichia coli  S   05/18/25 Urine from Clean Catch/Voided Escherichia coli  S   04/15/25 Urine from Clean Catch/Voided Escherichia coli  S        No further follow up needed from EDPD Team.     Aurelio Schmitt, AissatouD

## 2025-07-14 ENCOUNTER — APPOINTMENT (OUTPATIENT)
Dept: RADIOLOGY | Facility: HOSPITAL | Age: 45
End: 2025-07-14
Payer: COMMERCIAL

## 2025-07-14 ENCOUNTER — APPOINTMENT (OUTPATIENT)
Dept: CARDIOLOGY | Facility: HOSPITAL | Age: 45
End: 2025-07-14
Payer: COMMERCIAL

## 2025-07-14 ENCOUNTER — HOSPITAL ENCOUNTER (EMERGENCY)
Facility: HOSPITAL | Age: 45
Discharge: HOME | End: 2025-07-15
Attending: STUDENT IN AN ORGANIZED HEALTH CARE EDUCATION/TRAINING PROGRAM
Payer: COMMERCIAL

## 2025-07-14 DIAGNOSIS — E87.6 HYPOKALEMIA: ICD-10-CM

## 2025-07-14 DIAGNOSIS — R11.2 CANNABINOID HYPEREMESIS SYNDROME: Primary | ICD-10-CM

## 2025-07-14 DIAGNOSIS — N30.00 ACUTE CYSTITIS WITHOUT HEMATURIA: ICD-10-CM

## 2025-07-14 DIAGNOSIS — F12.90 CANNABINOID HYPEREMESIS SYNDROME: Primary | ICD-10-CM

## 2025-07-14 LAB
ALBUMIN SERPL BCP-MCNC: 4.3 G/DL (ref 3.4–5)
ALP SERPL-CCNC: 69 U/L (ref 33–110)
ALT SERPL W P-5'-P-CCNC: 14 U/L (ref 7–45)
AMPHETAMINES UR QL SCN: ABNORMAL
ANION GAP SERPL CALC-SCNC: 15 MMOL/L (ref 10–20)
APPEARANCE UR: CLEAR
AST SERPL W P-5'-P-CCNC: 13 U/L (ref 9–39)
B-HCG SERPL-ACNC: <2 MIU/ML
BACTERIA #/AREA URNS AUTO: ABNORMAL /HPF
BARBITURATES UR QL SCN: ABNORMAL
BASOPHILS # BLD AUTO: 0.03 X10*3/UL (ref 0–0.1)
BASOPHILS NFR BLD AUTO: 0.3 %
BENZODIAZ UR QL SCN: ABNORMAL
BILIRUB DIRECT SERPL-MCNC: 0.1 MG/DL (ref 0–0.3)
BILIRUB SERPL-MCNC: 0.6 MG/DL (ref 0–1.2)
BILIRUB UR STRIP.AUTO-MCNC: NEGATIVE MG/DL
BUN SERPL-MCNC: 7 MG/DL (ref 6–23)
BZE UR QL SCN: ABNORMAL
CALCIUM SERPL-MCNC: 9.6 MG/DL (ref 8.6–10.3)
CANNABINOIDS UR QL SCN: ABNORMAL
CARDIAC TROPONIN I PNL SERPL HS: 55 NG/L (ref 0–13)
CHLORIDE SERPL-SCNC: 107 MMOL/L (ref 98–107)
CO2 SERPL-SCNC: 22 MMOL/L (ref 21–32)
COLOR UR: ABNORMAL
CREAT SERPL-MCNC: 0.63 MG/DL (ref 0.5–1.05)
EGFRCR SERPLBLD CKD-EPI 2021: >90 ML/MIN/1.73M*2
EOSINOPHIL # BLD AUTO: 0.22 X10*3/UL (ref 0–0.7)
EOSINOPHIL NFR BLD AUTO: 1.8 %
ERYTHROCYTE [DISTWIDTH] IN BLOOD BY AUTOMATED COUNT: 14 % (ref 11.5–14.5)
FENTANYL+NORFENTANYL UR QL SCN: ABNORMAL
GLUCOSE SERPL-MCNC: 92 MG/DL (ref 74–99)
GLUCOSE UR STRIP.AUTO-MCNC: NORMAL MG/DL
HCT VFR BLD AUTO: 36.6 % (ref 36–46)
HGB BLD-MCNC: 12.8 G/DL (ref 12–16)
IMM GRANULOCYTES # BLD AUTO: 0.03 X10*3/UL (ref 0–0.7)
IMM GRANULOCYTES NFR BLD AUTO: 0.3 % (ref 0–0.9)
INR PPP: 1.1 (ref 0.9–1.1)
KETONES UR STRIP.AUTO-MCNC: ABNORMAL MG/DL
LACTATE SERPL-SCNC: 1.4 MMOL/L (ref 0.4–2)
LEUKOCYTE ESTERASE UR QL STRIP.AUTO: ABNORMAL
LIPASE SERPL-CCNC: 23 U/L (ref 9–82)
LYMPHOCYTES # BLD AUTO: 1.84 X10*3/UL (ref 1.2–4.8)
LYMPHOCYTES NFR BLD AUTO: 15.3 %
MCH RBC QN AUTO: 27.1 PG (ref 26–34)
MCHC RBC AUTO-ENTMCNC: 35 G/DL (ref 32–36)
MCV RBC AUTO: 77 FL (ref 80–100)
METHADONE UR QL SCN: ABNORMAL
MONOCYTES # BLD AUTO: 0.78 X10*3/UL (ref 0.1–1)
MONOCYTES NFR BLD AUTO: 6.5 %
MUCOUS THREADS #/AREA URNS AUTO: ABNORMAL /LPF
NEUTROPHILS # BLD AUTO: 9.09 X10*3/UL (ref 1.2–7.7)
NEUTROPHILS NFR BLD AUTO: 75.8 %
NITRITE UR QL STRIP.AUTO: ABNORMAL
NRBC BLD-RTO: 0 /100 WBCS (ref 0–0)
OPIATES UR QL SCN: ABNORMAL
OXYCODONE+OXYMORPHONE UR QL SCN: ABNORMAL
PCP UR QL SCN: ABNORMAL
PH UR STRIP.AUTO: 8.5 [PH]
PLATELET # BLD AUTO: 358 X10*3/UL (ref 150–450)
POTASSIUM SERPL-SCNC: 3 MMOL/L (ref 3.5–5.3)
PROT SERPL-MCNC: 7.9 G/DL (ref 6.4–8.2)
PROT UR STRIP.AUTO-MCNC: NEGATIVE MG/DL
PROTHROMBIN TIME: 11.9 SECONDS (ref 9.8–12.4)
RBC # BLD AUTO: 4.73 X10*6/UL (ref 4–5.2)
RBC # UR STRIP.AUTO: NEGATIVE MG/DL
RBC #/AREA URNS AUTO: ABNORMAL /HPF
SODIUM SERPL-SCNC: 141 MMOL/L (ref 136–145)
SP GR UR STRIP.AUTO: 1.01
SQUAMOUS #/AREA URNS AUTO: ABNORMAL /HPF
UROBILINOGEN UR STRIP.AUTO-MCNC: NORMAL MG/DL
WBC # BLD AUTO: 12 X10*3/UL (ref 4.4–11.3)
WBC #/AREA URNS AUTO: ABNORMAL /HPF

## 2025-07-14 PROCEDURE — 96361 HYDRATE IV INFUSION ADD-ON: CPT

## 2025-07-14 PROCEDURE — 87086 URINE CULTURE/COLONY COUNT: CPT | Mod: ELYLAB | Performed by: PHYSICIAN ASSISTANT

## 2025-07-14 PROCEDURE — 96365 THER/PROPH/DIAG IV INF INIT: CPT

## 2025-07-14 PROCEDURE — 2500000004 HC RX 250 GENERAL PHARMACY W/ HCPCS (ALT 636 FOR OP/ED): Mod: JZ | Performed by: PHYSICIAN ASSISTANT

## 2025-07-14 PROCEDURE — 85610 PROTHROMBIN TIME: CPT | Performed by: PHYSICIAN ASSISTANT

## 2025-07-14 PROCEDURE — 96366 THER/PROPH/DIAG IV INF ADDON: CPT

## 2025-07-14 PROCEDURE — 93005 ELECTROCARDIOGRAM TRACING: CPT

## 2025-07-14 PROCEDURE — 99285 EMERGENCY DEPT VISIT HI MDM: CPT | Mod: 25 | Performed by: STUDENT IN AN ORGANIZED HEALTH CARE EDUCATION/TRAINING PROGRAM

## 2025-07-14 PROCEDURE — 71045 X-RAY EXAM CHEST 1 VIEW: CPT | Mod: FOREIGN READ | Performed by: RADIOLOGY

## 2025-07-14 PROCEDURE — 81001 URINALYSIS AUTO W/SCOPE: CPT | Mod: 59 | Performed by: PHYSICIAN ASSISTANT

## 2025-07-14 PROCEDURE — 36415 COLL VENOUS BLD VENIPUNCTURE: CPT | Performed by: PHYSICIAN ASSISTANT

## 2025-07-14 PROCEDURE — 71045 X-RAY EXAM CHEST 1 VIEW: CPT

## 2025-07-14 PROCEDURE — 83605 ASSAY OF LACTIC ACID: CPT | Performed by: PHYSICIAN ASSISTANT

## 2025-07-14 PROCEDURE — 84484 ASSAY OF TROPONIN QUANT: CPT | Performed by: PHYSICIAN ASSISTANT

## 2025-07-14 PROCEDURE — 80307 DRUG TEST PRSMV CHEM ANLYZR: CPT | Performed by: PHYSICIAN ASSISTANT

## 2025-07-14 PROCEDURE — 96375 TX/PRO/DX INJ NEW DRUG ADDON: CPT

## 2025-07-14 PROCEDURE — 96372 THER/PROPH/DIAG INJ SC/IM: CPT | Performed by: PHYSICIAN ASSISTANT

## 2025-07-14 PROCEDURE — 80048 BASIC METABOLIC PNL TOTAL CA: CPT | Performed by: PHYSICIAN ASSISTANT

## 2025-07-14 PROCEDURE — 83690 ASSAY OF LIPASE: CPT | Performed by: PHYSICIAN ASSISTANT

## 2025-07-14 PROCEDURE — 82248 BILIRUBIN DIRECT: CPT | Performed by: PHYSICIAN ASSISTANT

## 2025-07-14 PROCEDURE — 85025 COMPLETE CBC W/AUTO DIFF WBC: CPT | Performed by: PHYSICIAN ASSISTANT

## 2025-07-14 PROCEDURE — 2500000001 HC RX 250 WO HCPCS SELF ADMINISTERED DRUGS (ALT 637 FOR MEDICARE OP): Performed by: PHYSICIAN ASSISTANT

## 2025-07-14 PROCEDURE — 84702 CHORIONIC GONADOTROPIN TEST: CPT | Performed by: PHYSICIAN ASSISTANT

## 2025-07-14 RX ORDER — CEFTRIAXONE 1 G/50ML
1 INJECTION, SOLUTION INTRAVENOUS ONCE
Status: COMPLETED | OUTPATIENT
Start: 2025-07-14 | End: 2025-07-15

## 2025-07-14 RX ORDER — DICYCLOMINE HYDROCHLORIDE 10 MG/ML
10 INJECTION INTRAMUSCULAR ONCE
Status: COMPLETED | OUTPATIENT
Start: 2025-07-14 | End: 2025-07-14

## 2025-07-14 RX ORDER — FAMOTIDINE 10 MG/ML
20 INJECTION, SOLUTION INTRAVENOUS ONCE
Status: COMPLETED | OUTPATIENT
Start: 2025-07-14 | End: 2025-07-14

## 2025-07-14 RX ORDER — POTASSIUM CHLORIDE 1.5 G/1.58G
40 POWDER, FOR SOLUTION ORAL ONCE
Status: COMPLETED | OUTPATIENT
Start: 2025-07-14 | End: 2025-07-14

## 2025-07-14 RX ORDER — POTASSIUM CHLORIDE 20 MEQ/1
40 TABLET, EXTENDED RELEASE ORAL ONCE
Status: DISCONTINUED | OUTPATIENT
Start: 2025-07-14 | End: 2025-07-14

## 2025-07-14 RX ORDER — ONDANSETRON HYDROCHLORIDE 2 MG/ML
4 INJECTION, SOLUTION INTRAVENOUS ONCE
Status: COMPLETED | OUTPATIENT
Start: 2025-07-14 | End: 2025-07-14

## 2025-07-14 RX ORDER — HYDROXYZINE HYDROCHLORIDE 25 MG/ML
25 INJECTION, SOLUTION INTRAMUSCULAR ONCE
Status: COMPLETED | OUTPATIENT
Start: 2025-07-14 | End: 2025-07-14

## 2025-07-14 RX ORDER — KETOROLAC TROMETHAMINE 30 MG/ML
15 INJECTION, SOLUTION INTRAMUSCULAR; INTRAVENOUS ONCE
Status: COMPLETED | OUTPATIENT
Start: 2025-07-14 | End: 2025-07-14

## 2025-07-14 RX ADMIN — CEFTRIAXONE 1 G: 1 INJECTION, SOLUTION INTRAVENOUS at 22:30

## 2025-07-14 RX ADMIN — SODIUM CHLORIDE, SODIUM LACTATE, POTASSIUM CHLORIDE, AND CALCIUM CHLORIDE 1000 ML: .6; .31; .03; .02 INJECTION, SOLUTION INTRAVENOUS at 21:22

## 2025-07-14 RX ADMIN — ONDANSETRON 4 MG: 2 INJECTION INTRAMUSCULAR; INTRAVENOUS at 21:20

## 2025-07-14 RX ADMIN — KETOROLAC TROMETHAMINE 15 MG: 30 INJECTION, SOLUTION INTRAMUSCULAR at 21:20

## 2025-07-14 RX ADMIN — FAMOTIDINE 20 MG: 10 INJECTION, SOLUTION INTRAVENOUS at 21:20

## 2025-07-14 RX ADMIN — HYDROXYZINE HYDROCHLORIDE 25 MG: 25 INJECTION, SOLUTION INTRAMUSCULAR at 21:54

## 2025-07-14 RX ADMIN — POTASSIUM CHLORIDE 40 MEQ: 1.5 POWDER, FOR SOLUTION ORAL at 22:47

## 2025-07-14 RX ADMIN — DICYCLOMINE HYDROCHLORIDE 10 MG: 10 INJECTION, SOLUTION INTRAMUSCULAR at 21:54

## 2025-07-14 ASSESSMENT — PAIN DESCRIPTION - ORIENTATION: ORIENTATION: MID

## 2025-07-14 ASSESSMENT — PAIN DESCRIPTION - PAIN TYPE: TYPE: ACUTE PAIN

## 2025-07-14 ASSESSMENT — PAIN DESCRIPTION - FREQUENCY: FREQUENCY: CONSTANT/CONTINUOUS

## 2025-07-14 ASSESSMENT — PAIN DESCRIPTION - LOCATION: LOCATION: ABDOMEN

## 2025-07-14 ASSESSMENT — PAIN DESCRIPTION - DESCRIPTORS: DESCRIPTORS: SORE

## 2025-07-14 ASSESSMENT — PAIN SCALES - GENERAL: PAINLEVEL_OUTOF10: 7

## 2025-07-14 ASSESSMENT — PAIN - FUNCTIONAL ASSESSMENT: PAIN_FUNCTIONAL_ASSESSMENT: 0-10

## 2025-07-14 NOTE — Clinical Note
Neela Elaine was seen and treated in our emergency department on 7/14/2025.  She may return to work on 07/18/2025.       If you have any questions or concerns, please don't hesitate to call.      Angel Ponce, DO

## 2025-07-15 VITALS
BODY MASS INDEX: 21.34 KG/M2 | RESPIRATION RATE: 16 BRPM | SYSTOLIC BLOOD PRESSURE: 146 MMHG | DIASTOLIC BLOOD PRESSURE: 84 MMHG | OXYGEN SATURATION: 100 % | WEIGHT: 125 LBS | HEART RATE: 64 BPM | HEIGHT: 64 IN | TEMPERATURE: 97.9 F

## 2025-07-15 LAB
ATRIAL RATE: 87 BPM
CARDIAC TROPONIN I PNL SERPL HS: 49 NG/L (ref 0–13)
HOLD SPECIMEN: NORMAL
P AXIS: 75 DEGREES
P OFFSET: 182 MS
P ONSET: 133 MS
PR INTERVAL: 174 MS
Q ONSET: 220 MS
QRS COUNT: 14 BEATS
QRS DURATION: 82 MS
QT INTERVAL: 386 MS
QTC CALCULATION(BAZETT): 464 MS
QTC FREDERICIA: 436 MS
R AXIS: 56 DEGREES
T AXIS: 55 DEGREES
T OFFSET: 413 MS
VENTRICULAR RATE: 87 BPM

## 2025-07-15 PROCEDURE — 96375 TX/PRO/DX INJ NEW DRUG ADDON: CPT

## 2025-07-15 PROCEDURE — 36415 COLL VENOUS BLD VENIPUNCTURE: CPT | Performed by: PHYSICIAN ASSISTANT

## 2025-07-15 PROCEDURE — 84484 ASSAY OF TROPONIN QUANT: CPT | Performed by: PHYSICIAN ASSISTANT

## 2025-07-15 PROCEDURE — 2500000004 HC RX 250 GENERAL PHARMACY W/ HCPCS (ALT 636 FOR OP/ED): Mod: JW | Performed by: PHYSICIAN ASSISTANT

## 2025-07-15 RX ORDER — DICYCLOMINE HYDROCHLORIDE 20 MG/1
20 TABLET ORAL 4 TIMES DAILY PRN
Qty: 20 TABLET | Refills: 0 | Status: SHIPPED | OUTPATIENT
Start: 2025-07-15 | End: 2025-07-20

## 2025-07-15 RX ORDER — CEPHALEXIN 500 MG/1
500 CAPSULE ORAL 3 TIMES DAILY
Qty: 21 CAPSULE | Refills: 0 | Status: SHIPPED | OUTPATIENT
Start: 2025-07-15 | End: 2025-07-22

## 2025-07-15 RX ORDER — ONDANSETRON 4 MG/1
4 TABLET, ORALLY DISINTEGRATING ORAL EVERY 8 HOURS PRN
Qty: 20 TABLET | Refills: 0 | Status: SHIPPED | OUTPATIENT
Start: 2025-07-15 | End: 2025-07-22

## 2025-07-15 RX ORDER — SUCRALFATE 1 G/1
1 TABLET ORAL
Qty: 40 TABLET | Refills: 0 | Status: SHIPPED | OUTPATIENT
Start: 2025-07-15 | End: 2025-07-25

## 2025-07-15 RX ORDER — PANTOPRAZOLE SODIUM 20 MG/1
20 TABLET, DELAYED RELEASE ORAL DAILY
Qty: 20 TABLET | Refills: 0 | Status: SHIPPED | OUTPATIENT
Start: 2025-07-15 | End: 2025-08-04

## 2025-07-15 RX ORDER — DIAZEPAM 5 MG/ML
2 INJECTION, SOLUTION INTRAMUSCULAR; INTRAVENOUS ONCE
Status: COMPLETED | OUTPATIENT
Start: 2025-07-15 | End: 2025-07-15

## 2025-07-15 RX ORDER — POTASSIUM CHLORIDE 20 MEQ/1
20 TABLET, EXTENDED RELEASE ORAL DAILY
Qty: 5 TABLET | Refills: 0 | Status: SHIPPED | OUTPATIENT
Start: 2025-07-15 | End: 2025-07-20

## 2025-07-15 RX ADMIN — DIAZEPAM 2 MG: 10 INJECTION, SOLUTION INTRAMUSCULAR; INTRAVENOUS at 00:54

## 2025-07-15 NOTE — ED PROVIDER NOTES
HPI   Chief Complaint   Patient presents with    Vomiting     I've been here twice in the past week, but I am still throwing up       45-year-old female presents emergency room with a chief complaint of epigastric abdominal burning with persistent nausea and vomiting.  The patient states she has had multiple ED visits and nothing seems to be controlling her symptoms.  The patient has a history of chronic gastritis and is scheduled for an EGD.  She denies any hematemesis or hematochezia, denies any melanotic stools.  She does continue to smoke marijuana.  She denies any fevers, chills, chest pain, back pain, dizziness and or syncope.  She has a past medical history of GERD, chronic gastritis, cyclic vomiting syndrome, marijuana abuse, depression.  The patient states that she is on Neurontin, omeprazole, Carafate, Zofran, Remeron, Pepcid and Bentyl and Atarax.      History provided by:  Patient and medical records          Patient History   Medical History[1]  Surgical History[2]  Family History[3]  Social History[4]    Physical Exam   ED Triage Vitals [07/14/25 1929]   Temperature Heart Rate Respirations BP   36.6 °C (97.9 °F) 100 15 (!) 140/95      Pulse Ox Temp Source Heart Rate Source Patient Position   99 % Temporal Monitor Sitting      BP Location FiO2 (%)     Right arm --       Physical Exam  Vitals and nursing note reviewed. Exam conducted with a chaperone present.   Constitutional:       General: She is awake.      Appearance: She is well-developed and well-groomed. She is ill-appearing.   HENT:      Head: Normocephalic and atraumatic.      Right Ear: Tympanic membrane, ear canal and external ear normal.      Left Ear: Tympanic membrane, ear canal and external ear normal.      Nose: Nose normal.      Mouth/Throat:      Mouth: Mucous membranes are moist.      Pharynx: Oropharynx is clear.   Eyes:      Extraocular Movements: Extraocular movements intact.      Conjunctiva/sclera: Conjunctivae normal.       Pupils: Pupils are equal, round, and reactive to light.   Cardiovascular:      Rate and Rhythm: Normal rate and regular rhythm.      Pulses: Normal pulses.      Heart sounds: Normal heart sounds.   Pulmonary:      Effort: Pulmonary effort is normal.      Breath sounds: Normal breath sounds. No wheezing, rhonchi or rales.   Abdominal:      General: Abdomen is flat. Bowel sounds are normal.      Palpations: Abdomen is soft. There is no mass.      Tenderness: There is abdominal tenderness in the epigastric area. There is no guarding.          Comments: Epigastric tenderness without guarding or rebound.   Musculoskeletal:         General: No swelling or tenderness. Normal range of motion.      Cervical back: Normal range of motion and neck supple.   Skin:     General: Skin is warm and dry.      Capillary Refill: Capillary refill takes less than 2 seconds.      Findings: No rash.   Neurological:      General: No focal deficit present.      Mental Status: She is alert and oriented to person, place, and time. Mental status is at baseline.   Psychiatric:         Mood and Affect: Mood normal.         Behavior: Behavior normal. Behavior is cooperative.         Thought Content: Thought content normal.         Judgment: Judgment normal.           ED Course & MDM   Diagnoses as of 07/14/25 2147   Cannabinoid hyperemesis syndrome   Hypokalemia   Acute cystitis without hematuria                 No data recorded     [place] Coma Scale Score: 15 (07/14/25 1929 : Minerva Conte RN)                           Medical Decision Making  45-year-old female presents emergency room with a chief complaint of epigastric abdominal burning with persistent nausea and vomiting.  The patient states she has had multiple ED visits and nothing seems to be controlling her symptoms.  The patient has a history of chronic gastritis and is scheduled for an EGD.  She denies any hematemesis or hematochezia, denies any melanotic stools.  She does continue  to smoke marijuana.  She denies any fevers, chills, chest pain, back pain, dizziness and or syncope.  She has a past medical history of GERD, chronic gastritis, cyclic vomiting syndrome, marijuana abuse, depression.  The patient states that she is on Neurontin, omeprazole, Carafate, Zofran, Remeron, Pepcid and Bentyl and Atarax.  Temperature 36.6, heart rate 100, respirations 15, blood pressure 140/95, pulse ox is 99% on room air  The patient's symptoms are treated with Pepcid 20 mg IV push, Toradol 15 mg IV push, Zofran 4 mg IV push, Bentyl 10 mg IM and a liter of LR.  Lab results white count is 12 hemoglobin 12.8 hematocrit 36.6 platelet count was 358 metabolic panel shows potassium of 3, she received potassium 40 mill equivalents orally, the rest of her metabolic was reviewed and within normal limits, her lipase is 23, beta quant was negative less than 2, troponin is 55, 2 weeks ago her values were 61, 65, 1 month ago was 57.  Her hepatic function panel was reviewed and unremarkable, her urinalysis shows 1+ ketones 2+ nitrite 75 leuks white cells, 20 red cells 1-2 bacteria 1+.  Culture is in process.  I have ordered Rocephin 1 g IV piggyback  EKG interpreted by me at 2130 hrs. sinus rhythm with marked sinus arrhythmia rate 87, , QRS was 82,  QTc was 464 no STEMI  Urine tox cream positive for cannabinoid which is consistent with her known history of cannabinoid hyperemesis syndrome.  Patient was requesting something for her anxiety she was given hydroxyzine 25 mg IM.  Repeat blood pressure is 126/82, heart rate 78, respirations 16, pulse ox is 100% on room air  Repeat troponin is 49.  Patient was given Valium 2 mg IV push.  Repeat examination the patient's vomiting has improved.  At this point, do not feel the patient requires any further workup or imaging.  I do feel the patient's symptoms are due to cannabinoid abuse.  Patient was discharged home with prescription for Zofran, Carafate, Protonix,  potassium and dicyclomine.  She was advised to call her GI doctor in the morning.  Repeat blood pressure 131/80 heart rate 76 respirations 16 pulse ox is 100% on room air.  The patient verbalizes understanding and agreement with the plan of disposition all questions answered prior to discharge        Procedure  Procedures         [1]   Past Medical History:  Diagnosis Date    Acid reflux     Gastritis    [2]   Past Surgical History:  Procedure Laterality Date     SECTION, CLASSIC  1999     SECTION, CLASSIC  10/21/2003   [3]   Family History  Problem Relation Name Age of Onset    Hypertension Mother     [4]   Social History  Tobacco Use    Smoking status: Never     Passive exposure: Never    Smokeless tobacco: Never   Vaping Use    Vaping status: Never Used   Substance Use Topics    Alcohol use: Never    Drug use: Yes     Types: Marijuana     Comment: daily        Titus Ross PA-C  07/15/25 0111

## 2025-07-17 LAB — BACTERIA UR CULT: ABNORMAL

## 2025-07-18 ENCOUNTER — RESULTS FOLLOW-UP (OUTPATIENT)
Dept: PHARMACY | Facility: HOSPITAL | Age: 45
End: 2025-07-18
Payer: COMMERCIAL

## 2025-07-19 LAB
ATRIAL RATE: 100 BPM
P AXIS: -7 DEGREES
P OFFSET: 195 MS
P ONSET: 149 MS
PR INTERVAL: 142 MS
Q ONSET: 220 MS
QRS COUNT: 16 BEATS
QRS DURATION: 74 MS
QT INTERVAL: 362 MS
QTC CALCULATION(BAZETT): 466 MS
QTC FREDERICIA: 429 MS
R AXIS: -1 DEGREES
T AXIS: 2 DEGREES
T OFFSET: 401 MS
VENTRICULAR RATE: 100 BPM

## 2025-07-24 ENCOUNTER — APPOINTMENT (OUTPATIENT)
Dept: RADIOLOGY | Facility: HOSPITAL | Age: 45
End: 2025-07-24
Payer: COMMERCIAL

## 2025-07-24 ENCOUNTER — HOSPITAL ENCOUNTER (EMERGENCY)
Facility: HOSPITAL | Age: 45
Discharge: HOME | End: 2025-07-24
Payer: COMMERCIAL

## 2025-07-24 VITALS
BODY MASS INDEX: 21.34 KG/M2 | OXYGEN SATURATION: 99 % | HEART RATE: 83 BPM | DIASTOLIC BLOOD PRESSURE: 83 MMHG | RESPIRATION RATE: 18 BRPM | HEIGHT: 64 IN | TEMPERATURE: 96.8 F | SYSTOLIC BLOOD PRESSURE: 130 MMHG | WEIGHT: 125 LBS

## 2025-07-24 DIAGNOSIS — R10.2 PELVIC PAIN: Primary | ICD-10-CM

## 2025-07-24 DIAGNOSIS — A49.9 UTI (URINARY TRACT INFECTION), BACTERIAL: ICD-10-CM

## 2025-07-24 DIAGNOSIS — N39.0 UTI (URINARY TRACT INFECTION), BACTERIAL: ICD-10-CM

## 2025-07-24 LAB
ANION GAP SERPL CALC-SCNC: 11 MMOL/L (ref 10–20)
APPEARANCE UR: ABNORMAL
B-HCG SERPL-ACNC: <2 MIU/ML
BACTERIA #/AREA URNS AUTO: ABNORMAL /HPF
BASOPHILS # BLD AUTO: 0.02 X10*3/UL (ref 0–0.1)
BASOPHILS NFR BLD AUTO: 0.3 %
BILIRUB UR STRIP.AUTO-MCNC: NEGATIVE MG/DL
BUN SERPL-MCNC: 9 MG/DL (ref 6–23)
CALCIUM SERPL-MCNC: 9.5 MG/DL (ref 8.6–10.3)
CHLORIDE SERPL-SCNC: 103 MMOL/L (ref 98–107)
CLUE CELLS VAG LPF-#/AREA: PRESENT /[LPF]
CO2 SERPL-SCNC: 27 MMOL/L (ref 21–32)
COLOR UR: ABNORMAL
CREAT SERPL-MCNC: 0.67 MG/DL (ref 0.5–1.05)
EGFRCR SERPLBLD CKD-EPI 2021: >90 ML/MIN/1.73M*2
EOSINOPHIL # BLD AUTO: 0.18 X10*3/UL (ref 0–0.7)
EOSINOPHIL NFR BLD AUTO: 2.7 %
ERYTHROCYTE [DISTWIDTH] IN BLOOD BY AUTOMATED COUNT: 14.9 % (ref 11.5–14.5)
GLUCOSE SERPL-MCNC: 107 MG/DL (ref 74–99)
GLUCOSE UR STRIP.AUTO-MCNC: NORMAL MG/DL
HCT VFR BLD AUTO: 39.4 % (ref 36–46)
HGB BLD-MCNC: 13.3 G/DL (ref 12–16)
HOLD SPECIMEN: NORMAL
IMM GRANULOCYTES # BLD AUTO: 0.01 X10*3/UL (ref 0–0.7)
IMM GRANULOCYTES NFR BLD AUTO: 0.1 % (ref 0–0.9)
KETONES UR STRIP.AUTO-MCNC: NEGATIVE MG/DL
LACTATE SERPL-SCNC: 0.9 MMOL/L (ref 0.4–2)
LEUKOCYTE ESTERASE UR QL STRIP.AUTO: ABNORMAL
LYMPHOCYTES # BLD AUTO: 1.52 X10*3/UL (ref 1.2–4.8)
LYMPHOCYTES NFR BLD AUTO: 22.6 %
MCH RBC QN AUTO: 27 PG (ref 26–34)
MCHC RBC AUTO-ENTMCNC: 33.8 G/DL (ref 32–36)
MCV RBC AUTO: 80 FL (ref 80–100)
MONOCYTES # BLD AUTO: 0.49 X10*3/UL (ref 0.1–1)
MONOCYTES NFR BLD AUTO: 7.3 %
NEUTROPHILS # BLD AUTO: 4.51 X10*3/UL (ref 1.2–7.7)
NEUTROPHILS NFR BLD AUTO: 67 %
NITRITE UR QL STRIP.AUTO: ABNORMAL
NRBC BLD-RTO: 0 /100 WBCS (ref 0–0)
NUGENT SCORE: 8 (ref ?–6)
PH UR STRIP.AUTO: 7 [PH]
PLATELET # BLD AUTO: 348 X10*3/UL (ref 150–450)
POTASSIUM SERPL-SCNC: 3.8 MMOL/L (ref 3.5–5.3)
PROT UR STRIP.AUTO-MCNC: NEGATIVE MG/DL
RBC # BLD AUTO: 4.92 X10*6/UL (ref 4–5.2)
RBC # UR STRIP.AUTO: NEGATIVE MG/DL
RBC #/AREA URNS AUTO: ABNORMAL /HPF
SODIUM SERPL-SCNC: 137 MMOL/L (ref 136–145)
SP GR UR STRIP.AUTO: 1.01
SQUAMOUS #/AREA URNS AUTO: ABNORMAL /HPF
UROBILINOGEN UR STRIP.AUTO-MCNC: NORMAL MG/DL
WBC # BLD AUTO: 6.7 X10*3/UL (ref 4.4–11.3)
WBC #/AREA URNS AUTO: ABNORMAL /HPF
YEAST VAG WET PREP-#/AREA: ABNORMAL

## 2025-07-24 PROCEDURE — 36415 COLL VENOUS BLD VENIPUNCTURE: CPT | Performed by: NURSE PRACTITIONER

## 2025-07-24 PROCEDURE — 81001 URINALYSIS AUTO W/SCOPE: CPT | Performed by: NURSE PRACTITIONER

## 2025-07-24 PROCEDURE — 96365 THER/PROPH/DIAG IV INF INIT: CPT

## 2025-07-24 PROCEDURE — 84702 CHORIONIC GONADOTROPIN TEST: CPT | Performed by: NURSE PRACTITIONER

## 2025-07-24 PROCEDURE — 96375 TX/PRO/DX INJ NEW DRUG ADDON: CPT

## 2025-07-24 PROCEDURE — 87205 SMEAR GRAM STAIN: CPT | Mod: ELYLAB | Performed by: NURSE PRACTITIONER

## 2025-07-24 PROCEDURE — 80048 BASIC METABOLIC PNL TOTAL CA: CPT | Performed by: NURSE PRACTITIONER

## 2025-07-24 PROCEDURE — 76856 US EXAM PELVIC COMPLETE: CPT | Mod: FOREIGN READ | Performed by: RADIOLOGY

## 2025-07-24 PROCEDURE — 76830 TRANSVAGINAL US NON-OB: CPT | Mod: FOREIGN READ | Performed by: RADIOLOGY

## 2025-07-24 PROCEDURE — 76856 US EXAM PELVIC COMPLETE: CPT

## 2025-07-24 PROCEDURE — 2500000004 HC RX 250 GENERAL PHARMACY W/ HCPCS (ALT 636 FOR OP/ED): Mod: JZ | Performed by: NURSE PRACTITIONER

## 2025-07-24 PROCEDURE — 99284 EMERGENCY DEPT VISIT MOD MDM: CPT | Mod: 25

## 2025-07-24 PROCEDURE — 83605 ASSAY OF LACTIC ACID: CPT | Performed by: NURSE PRACTITIONER

## 2025-07-24 PROCEDURE — 87077 CULTURE AEROBIC IDENTIFY: CPT | Mod: ELYLAB | Performed by: NURSE PRACTITIONER

## 2025-07-24 PROCEDURE — 85025 COMPLETE CBC W/AUTO DIFF WBC: CPT | Performed by: NURSE PRACTITIONER

## 2025-07-24 RX ORDER — KETOROLAC TROMETHAMINE 30 MG/ML
15 INJECTION, SOLUTION INTRAMUSCULAR; INTRAVENOUS ONCE
Status: COMPLETED | OUTPATIENT
Start: 2025-07-24 | End: 2025-07-24

## 2025-07-24 RX ORDER — SULFAMETHOXAZOLE AND TRIMETHOPRIM 800; 160 MG/1; MG/1
1 TABLET ORAL 2 TIMES DAILY
Qty: 14 TABLET | Refills: 0 | Status: SHIPPED | OUTPATIENT
Start: 2025-07-24 | End: 2025-07-31

## 2025-07-24 RX ORDER — ONDANSETRON HYDROCHLORIDE 2 MG/ML
4 INJECTION, SOLUTION INTRAVENOUS ONCE
Status: COMPLETED | OUTPATIENT
Start: 2025-07-24 | End: 2025-07-24

## 2025-07-24 RX ORDER — CEFTRIAXONE 2 G/50ML
2 INJECTION, SOLUTION INTRAVENOUS ONCE
Status: COMPLETED | OUTPATIENT
Start: 2025-07-24 | End: 2025-07-24

## 2025-07-24 RX ORDER — NAPROXEN 500 MG/1
500 TABLET ORAL 2 TIMES DAILY PRN
Qty: 30 TABLET | Refills: 0 | Status: SHIPPED | OUTPATIENT
Start: 2025-07-24

## 2025-07-24 RX ADMIN — KETOROLAC TROMETHAMINE 15 MG: 30 INJECTION, SOLUTION INTRAMUSCULAR at 08:28

## 2025-07-24 RX ADMIN — ONDANSETRON 4 MG: 2 INJECTION INTRAMUSCULAR; INTRAVENOUS at 08:28

## 2025-07-24 RX ADMIN — CEFTRIAXONE 2 G: 2 INJECTION, SOLUTION INTRAVENOUS at 11:18

## 2025-07-24 ASSESSMENT — PAIN DESCRIPTION - LOCATION: LOCATION: ABDOMEN

## 2025-07-24 ASSESSMENT — PAIN SCALES - GENERAL
PAINLEVEL_OUTOF10: 10 - WORST POSSIBLE PAIN
PAINLEVEL_OUTOF10: 8

## 2025-07-24 ASSESSMENT — PAIN DESCRIPTION - PAIN TYPE: TYPE: ACUTE PAIN

## 2025-07-24 ASSESSMENT — PAIN DESCRIPTION - DESCRIPTORS: DESCRIPTORS: ACHING

## 2025-07-24 ASSESSMENT — PAIN DESCRIPTION - ORIENTATION: ORIENTATION: RIGHT;LOWER

## 2025-07-24 ASSESSMENT — PAIN - FUNCTIONAL ASSESSMENT: PAIN_FUNCTIONAL_ASSESSMENT: 0-10

## 2025-07-24 NOTE — ED PROVIDER NOTES
HPI   Chief Complaint   Patient presents with    Flank Pain     R flank pain with dysuria and foul smelling urine       45-year-old female returns emergency department, tells me she has got a reoccurrence of her right lower quadrant/right pelvic pain, over the last 3 days or so.  Tells me she been having some urinary symptoms as well, that her urine is malodorous.  States she had intercourse the other day and it caused significant pain to the right pelvis.  She tells me she was admitted recently for a kidney stone      History provided by:  Patient   used: No            Patient History   Medical History[1]  Surgical History[2]  Family History[3]  Social History[4]    Physical Exam   ED Triage Vitals [07/24/25 0734]   Temperature Heart Rate Respirations BP   36 °C (96.8 °F) 93 17 108/72      Pulse Ox Temp Source Heart Rate Source Patient Position   99 % Temporal Monitor --      BP Location FiO2 (%)     -- --       Physical Exam  Constitutional: Vitals noted, no distress. Afebrile.   Cardiovascular: Regular, rate, rhythm, no murmur.   Pulmonary: Lungs clear bilaterally with good aeration. No adventitious breath sounds.   Gastrointestinal: Soft, nonsurgical.  Tender right lower quadrant. No peritoneal signs. Normoactive bowel sounds.   Musculoskeletal: No peripheral edema. Negative Homans bilaterally, no cords.   Skin: No rash.   Neuro: No focal neurologic deficits, NIH score of 0.      ED Course & MDM   Diagnoses as of 07/24/25 1132   Pelvic pain   UTI (urinary tract infection), bacterial          Labs Reviewed   CBC WITH AUTO DIFFERENTIAL - Abnormal       Result Value    WBC 6.7      nRBC 0.0      RBC 4.92      Hemoglobin 13.3      Hematocrit 39.4      MCV 80      MCH 27.0      MCHC 33.8      RDW 14.9 (*)     Platelets 348      Neutrophils % 67.0      Immature Granulocytes %, Automated 0.1      Lymphocytes % 22.6      Monocytes % 7.3      Eosinophils % 2.7      Basophils % 0.3      Neutrophils  Absolute 4.51      Immature Granulocytes Absolute, Automated 0.01      Lymphocytes Absolute 1.52      Monocytes Absolute 0.49      Eosinophils Absolute 0.18      Basophils Absolute 0.02     BASIC METABOLIC PANEL - Abnormal    Glucose 107 (*)     Sodium 137      Potassium 3.8      Chloride 103      Bicarbonate 27      Anion Gap 11      Urea Nitrogen 9      Creatinine 0.67      eGFR >90      Calcium 9.5     URINALYSIS WITH REFLEX CULTURE AND MICROSCOPIC - Abnormal    Color, Urine Light-Yellow      Appearance, Urine Turbid (*)     Specific Gravity, Urine 1.010      pH, Urine 7.0      Protein, Urine NEGATIVE      Glucose, Urine Normal      Blood, Urine NEGATIVE      Ketones, Urine NEGATIVE      Bilirubin, Urine NEGATIVE      Urobilinogen, Urine Normal      Nitrite, Urine 2+ (*)     Leukocyte Esterase, Urine 250 Lola/uL (*)    MICROSCOPIC ONLY, URINE - Abnormal    WBC, Urine 21-50 (*)     RBC, Urine 1-2      Squamous Epithelial Cells, Urine 1-9 (SPARSE)      Bacteria, Urine 4+ (*)    LACTATE - Normal    Lactate 0.9      Narrative:     Venipuncture immediately after or during the administration of Metamizole may lead to falsely low results. Testing should be performed immediately prior to Metamizole dosing.   HUMAN CHORIONIC GONADOTROPIN, SERUM QUANTITATIVE - Normal    HCG, Beta-Quantitative <2      Narrative:      Total HCG measurement is performed using the [employer] Access   Immunoassay which detects intact HCG and free beta HCG subunit.    This test is not indicated for use as a tumor marker.   HCG testing is performed using a different test methodology at [facility] than other [facility]. Direct result comparison   should only be made within the same method.       GRAM STAIN FOR BACTERIAL VAGINOSIS/YEAST   URINE CULTURE   URINALYSIS WITH REFLEX CULTURE AND MICROSCOPIC    Narrative:     The following orders were created for panel order Urinalysis with Reflex Culture and  Microscopic.  Procedure                               Abnormality         Status                     ---------                               -----------         ------                     Urinalysis with Reflex C...[[id number]]  Abnormal            Final result               Extra Urine Gray Tube[[id number]]                            In process                   Please view results for these tests on the individual orders.   C. TRACHOMATIS / N. GONORRHOEAE, AMPLIFIED, UROGENITAL   EXTRA URINE GRAY TUBE        US PELVIS TRANSABDOMINAL WITH TRANSVAGINAL   Final Result   Multiple right follicles.   Normal color and spectral Doppler flow within both ovaries.   Signed by Titus Alva MD                 No data recorded     [place] Coma Scale Score: 15 (07/24/25 0735 : Arminda Boothe, RN)                   Medical Decision Making    Do not see any evidence of stone On recent imaging, CT imaging 6/1 showed tubular, low-density right adnexal region.  Will obtain ultrasound imaging and it does not appear she has had that performed.    With this finding I also obtained GC/committee a culture and vaginitis panel.  Urinalysis obtained as well    I did give her 4 mg of IV morphine and 15 mg of IV Toradol for discomfort.    CBC with normal white count at 6.7, hemoglobin 13.3 and platelets of 348.  Metabolic panel remarkable, normal electrolytes and creatinine 0.67.  Urinalysis with +2 nitrites, leukocyte esterase and white cells noted as well.    Ultrasound of the pelvis fairly unremarkable.    Patient states pain increases with intercourse.  Discussed her recurrent urinary tract infection, given Rocephin here will be discharged home on Bactrim after reviewing sensitivity from previous culture.  Does appear she has had multiple urinary tract infections recently.  I recommended she follow-up with both primary care as well as closely with GYN, she tells me she has not seen a GYN in many years.  Referral provided.    Discussed return  with any worsening symptoms or additional concerns.    Procedure  Procedures       [1]   Past Medical History:  Diagnosis Date    Acid reflux     Gastritis    [2]   Past Surgical History:  Procedure Laterality Date     SECTION, CLASSIC  1999     SECTION, CLASSIC  10/21/2003   [3]   Family History  Problem Relation Name Age of Onset    Hypertension Mother     [4]   Social History  Tobacco Use    Smoking status: Never     Passive exposure: Never    Smokeless tobacco: Never   Vaping Use    Vaping status: Never Used   Substance Use Topics    Alcohol use: Never    Drug use: Yes     Types: Marijuana     Comment: daily        SALVDAOR Campos  25 1130

## 2025-07-25 ENCOUNTER — RESULTS FOLLOW-UP (OUTPATIENT)
Dept: PHARMACY | Facility: HOSPITAL | Age: 45
End: 2025-07-25
Payer: COMMERCIAL

## 2025-07-26 LAB — BACTERIA UR CULT: ABNORMAL

## 2025-07-30 LAB
ATRIAL RATE: 87 BPM
P AXIS: 75 DEGREES
P OFFSET: 182 MS
P ONSET: 133 MS
PR INTERVAL: 174 MS
Q ONSET: 220 MS
QRS COUNT: 14 BEATS
QRS DURATION: 82 MS
QT INTERVAL: 386 MS
QTC CALCULATION(BAZETT): 464 MS
QTC FREDERICIA: 436 MS
R AXIS: 56 DEGREES
T AXIS: 55 DEGREES
T OFFSET: 413 MS
VENTRICULAR RATE: 87 BPM

## 2025-08-07 ENCOUNTER — APPOINTMENT (OUTPATIENT)
Dept: RADIOLOGY | Facility: HOSPITAL | Age: 45
End: 2025-08-07
Payer: COMMERCIAL

## 2025-08-07 ENCOUNTER — APPOINTMENT (OUTPATIENT)
Dept: CARDIOLOGY | Facility: HOSPITAL | Age: 45
End: 2025-08-07
Payer: COMMERCIAL

## 2025-08-07 ENCOUNTER — HOSPITAL ENCOUNTER (EMERGENCY)
Facility: HOSPITAL | Age: 45
Discharge: HOME | End: 2025-08-07
Attending: EMERGENCY MEDICINE
Payer: COMMERCIAL

## 2025-08-07 VITALS
HEIGHT: 64 IN | RESPIRATION RATE: 16 BRPM | TEMPERATURE: 97 F | WEIGHT: 120 LBS | HEART RATE: 88 BPM | BODY MASS INDEX: 20.49 KG/M2 | OXYGEN SATURATION: 100 % | DIASTOLIC BLOOD PRESSURE: 74 MMHG | SYSTOLIC BLOOD PRESSURE: 140 MMHG

## 2025-08-07 DIAGNOSIS — R11.2 NAUSEA AND VOMITING, UNSPECIFIED VOMITING TYPE: ICD-10-CM

## 2025-08-07 DIAGNOSIS — R10.13 ABDOMINAL PAIN, EPIGASTRIC: ICD-10-CM

## 2025-08-07 DIAGNOSIS — F12.90 CANNABINOID HYPEREMESIS SYNDROME: Primary | ICD-10-CM

## 2025-08-07 DIAGNOSIS — R11.2 CANNABINOID HYPEREMESIS SYNDROME: Primary | ICD-10-CM

## 2025-08-07 LAB
ALBUMIN SERPL BCP-MCNC: 4.5 G/DL (ref 3.4–5)
ALP SERPL-CCNC: 73 U/L (ref 33–110)
ALT SERPL W P-5'-P-CCNC: 7 U/L (ref 7–45)
ANION GAP SERPL CALC-SCNC: 14 MMOL/L (ref 10–20)
APPEARANCE UR: CLEAR
AST SERPL W P-5'-P-CCNC: 14 U/L (ref 9–39)
B-HCG SERPL-ACNC: <2 MIU/ML
BASOPHILS # BLD AUTO: 0.02 X10*3/UL (ref 0–0.1)
BASOPHILS NFR BLD AUTO: 0.3 %
BILIRUB DIRECT SERPL-MCNC: 0.1 MG/DL (ref 0–0.3)
BILIRUB SERPL-MCNC: 0.6 MG/DL (ref 0–1.2)
BILIRUB UR STRIP.AUTO-MCNC: NEGATIVE MG/DL
BUN SERPL-MCNC: 7 MG/DL (ref 6–23)
CALCIUM SERPL-MCNC: 9.5 MG/DL (ref 8.6–10.3)
CARDIAC TROPONIN I PNL SERPL HS: 52 NG/L (ref 0–13)
CARDIAC TROPONIN I PNL SERPL HS: 55 NG/L (ref 0–13)
CHLORIDE SERPL-SCNC: 105 MMOL/L (ref 98–107)
CO2 SERPL-SCNC: 23 MMOL/L (ref 21–32)
COLOR UR: ABNORMAL
CREAT SERPL-MCNC: 0.79 MG/DL (ref 0.5–1.05)
EGFRCR SERPLBLD CKD-EPI 2021: >90 ML/MIN/1.73M*2
EOSINOPHIL # BLD AUTO: 0.14 X10*3/UL (ref 0–0.7)
EOSINOPHIL NFR BLD AUTO: 1.8 %
ERYTHROCYTE [DISTWIDTH] IN BLOOD BY AUTOMATED COUNT: 14.5 % (ref 11.5–14.5)
GLUCOSE SERPL-MCNC: 109 MG/DL (ref 74–99)
GLUCOSE UR STRIP.AUTO-MCNC: NORMAL MG/DL
HCT VFR BLD AUTO: 40.3 % (ref 36–46)
HGB BLD-MCNC: 13.7 G/DL (ref 12–16)
IMM GRANULOCYTES # BLD AUTO: 0.02 X10*3/UL (ref 0–0.7)
IMM GRANULOCYTES NFR BLD AUTO: 0.3 % (ref 0–0.9)
KETONES UR STRIP.AUTO-MCNC: ABNORMAL MG/DL
LACTATE SERPL-SCNC: 1.8 MMOL/L (ref 0.4–2)
LEUKOCYTE ESTERASE UR QL STRIP.AUTO: NEGATIVE
LIPASE SERPL-CCNC: 21 U/L (ref 9–82)
LYMPHOCYTES # BLD AUTO: 1.17 X10*3/UL (ref 1.2–4.8)
LYMPHOCYTES NFR BLD AUTO: 15.4 %
MAGNESIUM SERPL-MCNC: 1.85 MG/DL (ref 1.6–2.4)
MCH RBC QN AUTO: 27.2 PG (ref 26–34)
MCHC RBC AUTO-ENTMCNC: 34 G/DL (ref 32–36)
MCV RBC AUTO: 80 FL (ref 80–100)
MONOCYTES # BLD AUTO: 0.47 X10*3/UL (ref 0.1–1)
MONOCYTES NFR BLD AUTO: 6.2 %
NEUTROPHILS # BLD AUTO: 5.79 X10*3/UL (ref 1.2–7.7)
NEUTROPHILS NFR BLD AUTO: 76 %
NITRITE UR QL STRIP.AUTO: NEGATIVE
NRBC BLD-RTO: 0 /100 WBCS (ref 0–0)
PH UR STRIP.AUTO: 6.5 [PH]
PLATELET # BLD AUTO: 327 X10*3/UL (ref 150–450)
POTASSIUM SERPL-SCNC: 3.6 MMOL/L (ref 3.5–5.3)
PROT SERPL-MCNC: 8.6 G/DL (ref 6.4–8.2)
PROT UR STRIP.AUTO-MCNC: NEGATIVE MG/DL
RBC # BLD AUTO: 5.03 X10*6/UL (ref 4–5.2)
RBC # UR STRIP.AUTO: NEGATIVE MG/DL
SODIUM SERPL-SCNC: 138 MMOL/L (ref 136–145)
SP GR UR STRIP.AUTO: 1.01
UROBILINOGEN UR STRIP.AUTO-MCNC: NORMAL MG/DL
WBC # BLD AUTO: 7.6 X10*3/UL (ref 4.4–11.3)

## 2025-08-07 PROCEDURE — 81003 URINALYSIS AUTO W/O SCOPE: CPT

## 2025-08-07 PROCEDURE — 83735 ASSAY OF MAGNESIUM: CPT

## 2025-08-07 PROCEDURE — 85025 COMPLETE CBC W/AUTO DIFF WBC: CPT

## 2025-08-07 PROCEDURE — 36415 COLL VENOUS BLD VENIPUNCTURE: CPT

## 2025-08-07 PROCEDURE — 83690 ASSAY OF LIPASE: CPT

## 2025-08-07 PROCEDURE — 96374 THER/PROPH/DIAG INJ IV PUSH: CPT | Performed by: EMERGENCY MEDICINE

## 2025-08-07 PROCEDURE — 96372 THER/PROPH/DIAG INJ SC/IM: CPT

## 2025-08-07 PROCEDURE — 96375 TX/PRO/DX INJ NEW DRUG ADDON: CPT | Performed by: EMERGENCY MEDICINE

## 2025-08-07 PROCEDURE — 83605 ASSAY OF LACTIC ACID: CPT

## 2025-08-07 PROCEDURE — 84702 CHORIONIC GONADOTROPIN TEST: CPT

## 2025-08-07 PROCEDURE — 71045 X-RAY EXAM CHEST 1 VIEW: CPT

## 2025-08-07 PROCEDURE — 93005 ELECTROCARDIOGRAM TRACING: CPT

## 2025-08-07 PROCEDURE — 80051 ELECTROLYTE PANEL: CPT

## 2025-08-07 PROCEDURE — 82248 BILIRUBIN DIRECT: CPT

## 2025-08-07 PROCEDURE — 84484 ASSAY OF TROPONIN QUANT: CPT

## 2025-08-07 PROCEDURE — 71045 X-RAY EXAM CHEST 1 VIEW: CPT | Performed by: RADIOLOGY

## 2025-08-07 PROCEDURE — 99285 EMERGENCY DEPT VISIT HI MDM: CPT | Performed by: EMERGENCY MEDICINE

## 2025-08-07 PROCEDURE — 2500000004 HC RX 250 GENERAL PHARMACY W/ HCPCS (ALT 636 FOR OP/ED): Mod: JZ

## 2025-08-07 PROCEDURE — 96361 HYDRATE IV INFUSION ADD-ON: CPT | Performed by: EMERGENCY MEDICINE

## 2025-08-07 RX ORDER — DICYCLOMINE HYDROCHLORIDE 20 MG/1
20 TABLET ORAL 2 TIMES DAILY PRN
Qty: 20 TABLET | Refills: 0 | Status: SHIPPED | OUTPATIENT
Start: 2025-08-07 | End: 2025-08-17

## 2025-08-07 RX ORDER — KETOROLAC TROMETHAMINE 30 MG/ML
15 INJECTION, SOLUTION INTRAMUSCULAR; INTRAVENOUS ONCE
Status: COMPLETED | OUTPATIENT
Start: 2025-08-07 | End: 2025-08-07

## 2025-08-07 RX ORDER — DICYCLOMINE HYDROCHLORIDE 10 MG/ML
10 INJECTION INTRAMUSCULAR ONCE
Status: COMPLETED | OUTPATIENT
Start: 2025-08-07 | End: 2025-08-07

## 2025-08-07 RX ORDER — FAMOTIDINE 10 MG/ML
20 INJECTION, SOLUTION INTRAVENOUS ONCE
Status: COMPLETED | OUTPATIENT
Start: 2025-08-07 | End: 2025-08-07

## 2025-08-07 RX ORDER — ONDANSETRON 4 MG/1
4 TABLET, ORALLY DISINTEGRATING ORAL EVERY 8 HOURS PRN
Qty: 20 TABLET | Refills: 0 | Status: SHIPPED | OUTPATIENT
Start: 2025-08-07 | End: 2025-08-14

## 2025-08-07 RX ADMIN — SODIUM CHLORIDE, SODIUM LACTATE, POTASSIUM CHLORIDE, AND CALCIUM CHLORIDE 1000 ML: .6; .31; .03; .02 INJECTION, SOLUTION INTRAVENOUS at 09:58

## 2025-08-07 RX ADMIN — DICYCLOMINE HYDROCHLORIDE 10 MG: 10 INJECTION, SOLUTION INTRAMUSCULAR at 09:57

## 2025-08-07 RX ADMIN — KETOROLAC TROMETHAMINE 15 MG: 30 INJECTION, SOLUTION INTRAMUSCULAR at 09:57

## 2025-08-07 RX ADMIN — FAMOTIDINE 20 MG: 10 INJECTION, SOLUTION INTRAVENOUS at 09:57

## 2025-08-07 ASSESSMENT — LIFESTYLE VARIABLES
HAVE PEOPLE ANNOYED YOU BY CRITICIZING YOUR DRINKING: NO
EVER FELT BAD OR GUILTY ABOUT YOUR DRINKING: NO
HAVE YOU EVER FELT YOU SHOULD CUT DOWN ON YOUR DRINKING: NO
TOTAL SCORE: 0
EVER HAD A DRINK FIRST THING IN THE MORNING TO STEADY YOUR NERVES TO GET RID OF A HANGOVER: NO

## 2025-08-07 ASSESSMENT — PAIN - FUNCTIONAL ASSESSMENT
PAIN_FUNCTIONAL_ASSESSMENT: 0-10
PAIN_FUNCTIONAL_ASSESSMENT: 0-10

## 2025-08-07 ASSESSMENT — PAIN DESCRIPTION - PAIN TYPE
TYPE: ACUTE PAIN
TYPE: ACUTE PAIN

## 2025-08-07 ASSESSMENT — PAIN DESCRIPTION - ORIENTATION: ORIENTATION: RIGHT;LOWER

## 2025-08-07 ASSESSMENT — PAIN DESCRIPTION - DESCRIPTORS
DESCRIPTORS: CRAMPING;ACHING
DESCRIPTORS: ACHING;CRAMPING

## 2025-08-07 ASSESSMENT — PAIN DESCRIPTION - LOCATION
LOCATION: ABDOMEN
LOCATION: ABDOMEN

## 2025-08-07 ASSESSMENT — PAIN DESCRIPTION - PROGRESSION: CLINICAL_PROGRESSION: GRADUALLY IMPROVING

## 2025-08-07 ASSESSMENT — PAIN SCALES - GENERAL
PAINLEVEL_OUTOF10: 6
PAINLEVEL_OUTOF10: 8

## 2025-08-07 NOTE — ED PROVIDER NOTES
HPI   Chief Complaint   Patient presents with    Abdominal Pain    Vomiting     Patient arrives with c/o vomiting and abd pain x3 days.        45-year-old female with past medical history significant for GERD, cyclical vomiting syndrome, marijuana abuse, chronic gastritis, and depression presents to the emergency department for evaluation of epigastric abdominal burning and nausea/vomiting since yesterday.  She also endorses a decreased appetite for the past 3 days.  States that these are her typical symptoms that she comes in for.  States she has had nonbloody nonbilious vomiting with associated persistent nausea.  Also states that she is experiencing epigastric burning that is nonradiating, intermittent, worse with vomiting, nothing making it better.  Does endorse continued marijuana use.  States that she has been taking Zofran, Pepcid, Bentyl, Carafate, Remeron and omeprazole with minimal relief in her symptoms.  She denies fever, chills, body aches, lightheadedness, dizziness, diarrhea, constipation, melena, hematochezia, dysuria or urinary symptoms.  She denies any other concerns or symptoms at this time.      History provided by:  Patient   used: No      Patient History   Medical History[1]  Surgical History[2]  Family History[3]  Social History[4]    Physical Exam   ED Triage Vitals [08/07/25 0931]   Temperature Heart Rate Respirations BP   36.1 °C (97 °F) 97 16 148/87      Pulse Ox Temp Source Heart Rate Source Patient Position   100 % Temporal Monitor --      BP Location FiO2 (%)     Right arm --       Physical Exam  Nursing notes reviewed and confirmed by me.  Chart review performed including medications, allergies, and medical, surgical, and family history    Constitutional: Vital signs per nursing notes.  Well developed, well nourished.  No acute distress.    Psychiatric: no abnormalities of mood or affect   Eyes: PERRL; conjunctivae and lids normal; EOMI  HENT: Head is normocephalic,  atraumatic. External ears normal in appearance without drainage.  Nose is without deformity or drainage.  Posterior oropharynx without edema or erythema.  Moist mucous membranes.  Neck: neck supple, no meningismus signs or rigidity.  trachea midline without deviation.   Respiratory: Breath sounds clear bilaterally no wheezes rales or rhonchi.  No respiratory distress.  Normal respiratory rate/effort.    Cardiovascular: regular rate and rhythm; no murmurs.   distal pulses intact b/l radial  Neurological: normal speech patient is alert and oriented x3.  Patient able to move extremities.  Grossly intact strength and sensation of upper and lower extremities.  No focal neurologic deficits appreciated on exam.  GI: Abdomen is soft with mild epigastric tenderness to palpation.  No rebound, rigidity, or guarding.  No masses or hernias appreciated.  No organomegaly.  No CVA tenderness.  Lymphatic: no significant lymphadenopathy appreciated  Musculoskeletal: Patient able to move all extremities.  No deformities or swelling appreciated.  No calf tenderness or edema.  Skin:  no rash or erythema.  No wounds. Normal capillary refill.    ED Course & MDM   Diagnoses as of 08/07/25 1234   Cannabinoid hyperemesis syndrome   Nausea and vomiting, unspecified vomiting type   Abdominal pain, epigastric     Eric Coma Scale Score: 15 (08/07/25 0935 : Hilda De León RN)                   Medical Decision Making  Differential diagnoses considered but not limited to: Cannabis hyperemesis syndrome, gastritis, GERD    This is a 45-year-old female with past medical history significant for GERD, cyclical vomiting syndrome, marijuana abuse, chronic gastritis, and depression presents to the emergency department for evaluation of epigastric abdominal burning and nausea/vomiting since yesterday.  Patient has had multiple recent visits and has been evaluated for pelvic pain with ultrasounds that ruled out ovarian etiology, with multiple visits  including CT scans of the chest and abdomen noting gastritis.  Patient states that her symptoms are unchanged and are how they normally present, she is declining any CT or ultrasound imaging today as she does not believe her pain is different.  On exam I have low suspicion for ureteral or kidney etiology.  I do not suspect cholecystitis or pancreatitis given the patient's exam.  Patient is nontoxic appearing, in no acute distress. Heart sounds normal with regular rate and rhythm.  Lungs clear to auscultation bilaterally with no adventitious sounds.  Abdomen is soft, nondistended with mild epigastric tenderness to palpation, with no rebound or guarding.  No CVA tenderness.  Negative Homans' sign bilaterally. Neurovascularly intact, Brisk capillary refill. Strong and equal pulses throughout. Sensation intact.  Patient initially treated with a liter of IV fluids, 15 mg IV Toradol, 20 mg IV Pepcid, and 10 mg IM Bentyl.    Negative serum hCG.  CBC unremarkable with no leukocytosis or anemia.  BMP with mild hyperglycemia 109, otherwise unremarkable with normal electrolytes and kidney function.  Hepatic function panel revealed elevated total protein 8.6 otherwise unremarkable.  Lipase, magnesium, lactate unremarkable.  UA with 1+ ketones, otherwise unremarkable with no evidence of urinary tract infection.  Chest x-ray showed no evidence of acute cardiopulmonary process.  High-sensitivity troponin x 2 were 55 and 52, which does appear consistent with the patient's baseline.  She also has had recent cardiac workup including a stress test and echo on 06/01/2025.  EKG showed no evidence of acute ischemia giving low suspicion for ACS.  I reevaluated the patient, she tells me she feels significantly better with near complete resolution of her symptoms, and is requesting to go home.  I believe this is appropriate given that this is the same presentation patient has had multiple ER visits.  I did educate patient on marijuana  cessation.  She was given prescriptions for Bentyl and Zofran and educated on their usage.  I did advise that she follow-up with her PCP within a week.  She was also given strict return precautions with any new or worsening symptoms.    As a result of the work-up, the patient was discharged home.  she was informed of her clinical impression, educated on lab and imaging findings, I explained reasons for the patient to return to the Emergency Department and instructed to come back with any concerns or worsening of condition.  she demonstrated verbal understanding and was in agreement with the plan of care.  I emphasized the importance of follow up in the timeframe recommended.  she was given the opportunity to ask questions.  All of the patient's questions were answered.  Patient discharged in good stable condition.     Amount and/or Complexity of Data Reviewed  Labs: ordered. Decision-making details documented in ED Course.  Radiology: ordered. Decision-making details documented in ED Course.  ECG/medicine tests: ordered.     Details: EKG at 1002, with ventricular rate of 86, as interpreted by me, shows a normal rate and rhythm, normal axis, normal intervals. And normal ST and T wave pattern with no evidence of acute ischemia or other acute findings      Labs Reviewed   CBC WITH AUTO DIFFERENTIAL - Abnormal       Result Value    WBC 7.6      nRBC 0.0      RBC 5.03      Hemoglobin 13.7      Hematocrit 40.3      MCV 80      MCH 27.2      MCHC 34.0      RDW 14.5      Platelets 327      Neutrophils % 76.0      Immature Granulocytes %, Automated 0.3      Lymphocytes % 15.4      Monocytes % 6.2      Eosinophils % 1.8      Basophils % 0.3      Neutrophils Absolute 5.79      Immature Granulocytes Absolute, Automated 0.02      Lymphocytes Absolute 1.17 (*)     Monocytes Absolute 0.47      Eosinophils Absolute 0.14      Basophils Absolute 0.02     BASIC METABOLIC PANEL - Abnormal    Glucose 109 (*)     Sodium 138       Potassium 3.6      Chloride 105      Bicarbonate 23      Anion Gap 14      Urea Nitrogen 7      Creatinine 0.79      eGFR >90      Calcium 9.5     HEPATIC FUNCTION PANEL - Abnormal    Albumin 4.5      Bilirubin, Total 0.6      Bilirubin, Direct 0.1      Alkaline Phosphatase 73      ALT 7      AST 14      Total Protein 8.6 (*)    TROPONIN I, HIGH SENSITIVITY - Abnormal    Troponin I, High Sensitivity 55 (*)     Narrative:     Less than 99th percentile of normal range cutoff-  Female and children under 18 years old <14 ng/L; Male <21 ng/L: Negative  Repeat testing should be performed if clinically indicated.     Female and children under 18 years old 14-50 ng/L; Male 21-50 ng/L:  Consistent with possible cardiac damage and possible increased clinical   risk. Serial measurements may help to assess extent of myocardial damage.     >50 ng/L: Consistent with cardiac damage, increased clinical risk and  myocardial infarction. Serial measurements may help assess extent of   myocardial damage.      NOTE: Children less than 1 year old may have higher baseline troponin   levels and results should be interpreted in conjunction with the overall   clinical context.     NOTE: Troponin I testing is performed using a different   testing methodology at [facility] than at other   [facility]. Direct result comparisons should only   be made within the same method.   URINALYSIS WITH REFLEX CULTURE AND MICROSCOPIC - Abnormal    Color, Urine Light-Yellow      Appearance, Urine Clear      Specific Gravity, Urine 1.014      pH, Urine 6.5      Protein, Urine NEGATIVE      Glucose, Urine Normal      Blood, Urine NEGATIVE      Ketones, Urine 10 (1+) (*)     Bilirubin, Urine NEGATIVE      Urobilinogen, Urine Normal      Nitrite, Urine NEGATIVE      Leukocyte Esterase, Urine NEGATIVE     TROPONIN I, HIGH SENSITIVITY - Abnormal    Troponin I, High Sensitivity 52 (*)     Narrative:     Less than 99th percentile of normal range  cutoff-  Female and children under 18 years old <14 ng/L; Male <21 ng/L: Negative  Repeat testing should be performed if clinically indicated.     Female and children under 18 years old 14-50 ng/L; Male 21-50 ng/L:  Consistent with possible cardiac damage and possible increased clinical   risk. Serial measurements may help to assess extent of myocardial damage.     >50 ng/L: Consistent with cardiac damage, increased clinical risk and  myocardial infarction. Serial measurements may help assess extent of   myocardial damage.      NOTE: Children less than 1 year old may have higher baseline troponin   levels and results should be interpreted in conjunction with the overall   clinical context.     NOTE: Troponin I testing is performed using a different   testing methodology at [facility] than at other   [facility]. Direct result comparisons should only   be made within the same method.   MAGNESIUM - Normal    Magnesium 1.85     LIPASE - Normal    Lipase 21      Narrative:     Venipuncture immediately after or during the administration of Metamizole may lead to falsely low results. Testing should be performed immediately prior to Metamizole dosing.   LACTATE - Normal    Lactate 1.8      Narrative:     Venipuncture immediately after or during the administration of Metamizole may lead to falsely low results. Testing should be performed immediately prior to Metamizole dosing.   HUMAN CHORIONIC GONADOTROPIN, SERUM QUANTITATIVE - Normal    HCG, Beta-Quantitative <2      Narrative:      Total HCG measurement is performed using the [employer] Access   Immunoassay which detects intact HCG and free beta HCG subunit.    This test is not indicated for use as a tumor marker.   HCG testing is performed using a different test methodology at [facility] than other [facility]. Direct result comparison   should only be made within the same method.       URINALYSIS WITH REFLEX CULTURE AND  MICROSCOPIC    Narrative:     The following orders were created for panel order Urinalysis with Reflex Culture and Microscopic.  Procedure                               Abnormality         Status                     ---------                               -----------         ------                     Urinalysis with Reflex C...[[id number]]  Abnormal            Final result               Extra Urine Gray Tube[[id number]]                            In process                   Please view results for these tests on the individual orders.   EXTRA URINE GRAY TUBE        XR chest 1 view   Final Result   No acute cardiopulmonary process is evident.        MACRO:   None        Signed by: Srikanth Escobar 2025 10:29 AM   Dictation workstation:   [id number]                   [1]   Past Medical History:  Diagnosis Date    Acid reflux     Gastritis    [2]   Past Surgical History:  Procedure Laterality Date     SECTION, CLASSIC  1999     SECTION, CLASSIC  10/21/2003   [3]   Family History  Problem Relation Name Age of Onset    Hypertension Mother     [4]   Social History  Tobacco Use    Smoking status: Never     Passive exposure: Never    Smokeless tobacco: Never   Vaping Use    Vaping status: Never Used   Substance Use Topics    Alcohol use: Never    Drug use: Yes     Types: Marijuana     Comment: daily        Carlos Joe PA-C  [phone]

## 2025-08-08 LAB
ATRIAL RATE: 86 BPM
HOLD SPECIMEN: NORMAL
P AXIS: 80 DEGREES
P OFFSET: 194 MS
P ONSET: 143 MS
PR INTERVAL: 158 MS
Q ONSET: 222 MS
QRS COUNT: 14 BEATS
QRS DURATION: 80 MS
QT INTERVAL: 366 MS
QTC CALCULATION(BAZETT): 437 MS
QTC FREDERICIA: 412 MS
R AXIS: 62 DEGREES
T AXIS: 60 DEGREES
T OFFSET: 405 MS
VENTRICULAR RATE: 86 BPM

## 2025-08-09 ENCOUNTER — TELEPHONE (OUTPATIENT)
Dept: PHARMACY | Facility: HOSPITAL | Age: 45
End: 2025-08-09
Payer: COMMERCIAL

## 2025-08-09 DIAGNOSIS — B96.89 BACTERIAL VAGINOSIS: Primary | ICD-10-CM

## 2025-08-09 DIAGNOSIS — N76.0 BACTERIAL VAGINOSIS: Primary | ICD-10-CM

## 2025-08-09 RX ORDER — METRONIDAZOLE 500 MG/1
500 TABLET ORAL 2 TIMES DAILY
Qty: 14 TABLET | Refills: 0 | Status: SHIPPED | OUTPATIENT
Start: 2025-08-09 | End: 2025-08-16

## 2025-08-09 NOTE — PROGRESS NOTES
EDPD Note: Initiation     Contacted Neela Elaine regarding a positive bacterial vaginosis culture/result that was taken during their recent emergency room visit. I completed education with patient. The patient is not being treated appropriately.    Patient was encouraged to abstain from sexual intercourse for at least 7-14 days or after completion of treatment.  Counseled patient that sex partners do not need to be tested or treated.  Patient made aware that if they experience any signs/symptoms to go to ED for further evaluation. Made aware to follow up with PCP. Patient verbalized understanding and had no further questions or concerns.     The following prescription was sent to the patient's preferred pharmacy. No further follow up needed from EDPD Team.     Drug: Flagyl 500mg   Sig: BID x7  Qty: 14  Days Supply: 7    Susceptibility data from last 90 days.  Collected Specimen Info Organism Amoxicillin/Clavulanate Ampicillin Ampicillin/Sulbactam Cefazolin Cefazolin (uncomplicated UTIs only) Ceftriaxone Ciprofloxacin Gentamicin Levofloxacin Nitrofurantoin Piperacillin/Tazobactam   07/24/25 Urine from Clean Catch/Voided Escherichia coli   S   S  S   S  S  S  S  S   07/14/25 Urine from Clean Catch/Voided Escherichia coli  S  R  I  S  S   S  S  S  S  S   07/08/25 Urine from Clean Catch/Voided Escherichia coli  S  R  I  R  S  S  S  S  S  S  S   05/18/25 Urine from Clean Catch/Voided Escherichia coli  S  R  S  S  S   S  S  S  S  S     Collected Specimen Info Organism Trimethoprim/Sulfamethoxazole   07/24/25 Urine from Clean Catch/Voided Escherichia coli  S   07/14/25 Urine from Clean Catch/Voided Escherichia coli  S   07/08/25 Urine from Clean Catch/Voided Escherichia coli  S   05/18/25 Urine from Clean Catch/Voided Escherichia coli  S       If there are any other questions for the ED Post-Discharge Culture Follow Up Team, please contact [phone]. Fax: [phone].    Crista Valladares, AissatouD

## 2025-09-02 ENCOUNTER — HOSPITAL ENCOUNTER (EMERGENCY)
Facility: HOSPITAL | Age: 45
Discharge: HOME | End: 2025-09-02
Attending: STUDENT IN AN ORGANIZED HEALTH CARE EDUCATION/TRAINING PROGRAM
Payer: COMMERCIAL

## 2025-09-02 VITALS
TEMPERATURE: 97.9 F | WEIGHT: 125 LBS | OXYGEN SATURATION: 100 % | DIASTOLIC BLOOD PRESSURE: 85 MMHG | RESPIRATION RATE: 22 BRPM | HEART RATE: 93 BPM | BODY MASS INDEX: 21.34 KG/M2 | HEIGHT: 64 IN | SYSTOLIC BLOOD PRESSURE: 155 MMHG

## 2025-09-02 DIAGNOSIS — R10.13 DYSPEPSIA: ICD-10-CM

## 2025-09-02 DIAGNOSIS — K29.00 ACUTE GASTRITIS WITHOUT HEMORRHAGE, UNSPECIFIED GASTRITIS TYPE: Primary | ICD-10-CM

## 2025-09-02 DIAGNOSIS — E87.6 HYPOKALEMIA: ICD-10-CM

## 2025-09-02 DIAGNOSIS — R11.2 NAUSEA AND VOMITING, UNSPECIFIED VOMITING TYPE: ICD-10-CM

## 2025-09-02 LAB
ALBUMIN SERPL BCP-MCNC: 4.3 G/DL (ref 3.4–5)
ALP SERPL-CCNC: 62 U/L (ref 33–110)
ALT SERPL W P-5'-P-CCNC: 6 U/L (ref 7–45)
ANION GAP SERPL CALC-SCNC: 14 MMOL/L (ref 10–20)
AST SERPL W P-5'-P-CCNC: 12 U/L (ref 9–39)
B-HCG SERPL-ACNC: <2 MIU/ML
BASOPHILS # BLD AUTO: 0.02 X10*3/UL (ref 0–0.1)
BASOPHILS NFR BLD AUTO: 0.2 %
BILIRUB DIRECT SERPL-MCNC: 0.1 MG/DL (ref 0–0.3)
BILIRUB SERPL-MCNC: 0.6 MG/DL (ref 0–1.2)
BUN SERPL-MCNC: 7 MG/DL (ref 6–23)
CALCIUM SERPL-MCNC: 9.7 MG/DL (ref 8.6–10.3)
CHLORIDE SERPL-SCNC: 107 MMOL/L (ref 98–107)
CO2 SERPL-SCNC: 22 MMOL/L (ref 21–32)
CREAT SERPL-MCNC: 0.65 MG/DL (ref 0.5–1.05)
EGFRCR SERPLBLD CKD-EPI 2021: >90 ML/MIN/1.73M*2
EOSINOPHIL # BLD AUTO: 0.27 X10*3/UL (ref 0–0.7)
EOSINOPHIL NFR BLD AUTO: 3.2 %
ERYTHROCYTE [DISTWIDTH] IN BLOOD BY AUTOMATED COUNT: 13.9 % (ref 11.5–14.5)
GLUCOSE SERPL-MCNC: 85 MG/DL (ref 74–99)
HCT VFR BLD AUTO: 36.9 % (ref 36–46)
HGB BLD-MCNC: 12.9 G/DL (ref 12–16)
IMM GRANULOCYTES # BLD AUTO: 0.01 X10*3/UL (ref 0–0.7)
IMM GRANULOCYTES NFR BLD AUTO: 0.1 % (ref 0–0.9)
LACTATE SERPL-SCNC: 0.9 MMOL/L (ref 0.4–2)
LIPASE SERPL-CCNC: 21 U/L (ref 9–82)
LYMPHOCYTES # BLD AUTO: 2.15 X10*3/UL (ref 1.2–4.8)
LYMPHOCYTES NFR BLD AUTO: 25.2 %
MCH RBC QN AUTO: 27.1 PG (ref 26–34)
MCHC RBC AUTO-ENTMCNC: 35 G/DL (ref 32–36)
MCV RBC AUTO: 78 FL (ref 80–100)
MONOCYTES # BLD AUTO: 0.63 X10*3/UL (ref 0.1–1)
MONOCYTES NFR BLD AUTO: 7.4 %
NEUTROPHILS # BLD AUTO: 5.45 X10*3/UL (ref 1.2–7.7)
NEUTROPHILS NFR BLD AUTO: 63.9 %
NRBC BLD-RTO: 0 /100 WBCS (ref 0–0)
PLATELET # BLD AUTO: 299 X10*3/UL (ref 150–450)
POTASSIUM SERPL-SCNC: 3.3 MMOL/L (ref 3.5–5.3)
PROT SERPL-MCNC: 7.7 G/DL (ref 6.4–8.2)
RBC # BLD AUTO: 4.76 X10*6/UL (ref 4–5.2)
SODIUM SERPL-SCNC: 140 MMOL/L (ref 136–145)
WBC # BLD AUTO: 8.5 X10*3/UL (ref 4.4–11.3)

## 2025-09-02 PROCEDURE — 83690 ASSAY OF LIPASE: CPT | Performed by: PHYSICIAN ASSISTANT

## 2025-09-02 PROCEDURE — 84702 CHORIONIC GONADOTROPIN TEST: CPT | Performed by: PHYSICIAN ASSISTANT

## 2025-09-02 PROCEDURE — 96375 TX/PRO/DX INJ NEW DRUG ADDON: CPT

## 2025-09-02 PROCEDURE — 83605 ASSAY OF LACTIC ACID: CPT | Performed by: PHYSICIAN ASSISTANT

## 2025-09-02 PROCEDURE — 80053 COMPREHEN METABOLIC PANEL: CPT | Performed by: PHYSICIAN ASSISTANT

## 2025-09-02 PROCEDURE — 82248 BILIRUBIN DIRECT: CPT | Performed by: PHYSICIAN ASSISTANT

## 2025-09-02 PROCEDURE — 96374 THER/PROPH/DIAG INJ IV PUSH: CPT

## 2025-09-02 PROCEDURE — 85025 COMPLETE CBC W/AUTO DIFF WBC: CPT | Performed by: PHYSICIAN ASSISTANT

## 2025-09-02 PROCEDURE — 2500000002 HC RX 250 W HCPCS SELF ADMINISTERED DRUGS (ALT 637 FOR MEDICARE OP, ALT 636 FOR OP/ED): Performed by: PHYSICIAN ASSISTANT

## 2025-09-02 PROCEDURE — 99284 EMERGENCY DEPT VISIT MOD MDM: CPT | Mod: 25 | Performed by: STUDENT IN AN ORGANIZED HEALTH CARE EDUCATION/TRAINING PROGRAM

## 2025-09-02 PROCEDURE — 36415 COLL VENOUS BLD VENIPUNCTURE: CPT | Performed by: PHYSICIAN ASSISTANT

## 2025-09-02 PROCEDURE — 96361 HYDRATE IV INFUSION ADD-ON: CPT

## 2025-09-02 PROCEDURE — 2500000004 HC RX 250 GENERAL PHARMACY W/ HCPCS (ALT 636 FOR OP/ED): Performed by: PHYSICIAN ASSISTANT

## 2025-09-02 RX ORDER — POTASSIUM CHLORIDE 20 MEQ/1
40 TABLET, EXTENDED RELEASE ORAL ONCE
Status: COMPLETED | OUTPATIENT
Start: 2025-09-02 | End: 2025-09-02

## 2025-09-02 RX ORDER — FAMOTIDINE 20 MG/1
20 TABLET, FILM COATED ORAL 2 TIMES DAILY
Qty: 30 TABLET | Refills: 0 | Status: SHIPPED | OUTPATIENT
Start: 2025-09-02 | End: 2025-09-17

## 2025-09-02 RX ORDER — POTASSIUM CHLORIDE 20 MEQ/1
20 TABLET, EXTENDED RELEASE ORAL DAILY
Qty: 5 TABLET | Refills: 0 | Status: SHIPPED | OUTPATIENT
Start: 2025-09-02 | End: 2025-09-07

## 2025-09-02 RX ORDER — SUCRALFATE 1 G/1
1 TABLET ORAL
Qty: 40 TABLET | Refills: 0 | Status: SHIPPED | OUTPATIENT
Start: 2025-09-02 | End: 2025-09-12

## 2025-09-02 RX ORDER — KETOROLAC TROMETHAMINE 30 MG/ML
15 INJECTION, SOLUTION INTRAMUSCULAR; INTRAVENOUS ONCE
Status: COMPLETED | OUTPATIENT
Start: 2025-09-02 | End: 2025-09-02

## 2025-09-02 RX ORDER — DIAZEPAM 5 MG/ML
2 INJECTION, SOLUTION INTRAMUSCULAR; INTRAVENOUS ONCE
Status: COMPLETED | OUTPATIENT
Start: 2025-09-02 | End: 2025-09-02

## 2025-09-02 RX ORDER — ONDANSETRON HYDROCHLORIDE 2 MG/ML
4 INJECTION, SOLUTION INTRAVENOUS ONCE
Status: COMPLETED | OUTPATIENT
Start: 2025-09-02 | End: 2025-09-02

## 2025-09-02 RX ORDER — DICYCLOMINE HYDROCHLORIDE 20 MG/1
20 TABLET ORAL 4 TIMES DAILY PRN
Qty: 20 TABLET | Refills: 0 | Status: SHIPPED | OUTPATIENT
Start: 2025-09-02 | End: 2025-09-07

## 2025-09-02 RX ORDER — FAMOTIDINE 10 MG/ML
20 INJECTION, SOLUTION INTRAVENOUS ONCE
Status: COMPLETED | OUTPATIENT
Start: 2025-09-02 | End: 2025-09-02

## 2025-09-02 RX ADMIN — SODIUM CHLORIDE, SODIUM LACTATE, POTASSIUM CHLORIDE, AND CALCIUM CHLORIDE 1000 ML: .6; .31; .03; .02 INJECTION, SOLUTION INTRAVENOUS at 21:45

## 2025-09-02 RX ADMIN — KETOROLAC TROMETHAMINE 15 MG: 30 INJECTION, SOLUTION INTRAMUSCULAR at 21:45

## 2025-09-02 RX ADMIN — DIAZEPAM 2 MG: 5 INJECTION INTRAMUSCULAR; INTRAVENOUS at 22:50

## 2025-09-02 RX ADMIN — FAMOTIDINE 20 MG: 10 INJECTION, SOLUTION INTRAVENOUS at 21:45

## 2025-09-02 RX ADMIN — ONDANSETRON 4 MG: 2 INJECTION INTRAMUSCULAR; INTRAVENOUS at 21:45

## 2025-09-02 RX ADMIN — POTASSIUM CHLORIDE 40 MEQ: 1500 TABLET, EXTENDED RELEASE ORAL at 22:27

## 2025-09-02 ASSESSMENT — PAIN - FUNCTIONAL ASSESSMENT
PAIN_FUNCTIONAL_ASSESSMENT: 0-10
PAIN_FUNCTIONAL_ASSESSMENT: 0-10

## 2025-09-02 ASSESSMENT — PAIN DESCRIPTION - DESCRIPTORS
DESCRIPTORS: SORE
DESCRIPTORS: SORE

## 2025-09-02 ASSESSMENT — PAIN DESCRIPTION - PAIN TYPE: TYPE: ACUTE PAIN

## 2025-09-02 ASSESSMENT — PAIN DESCRIPTION - LOCATION
LOCATION: ABDOMEN
LOCATION: ABDOMEN

## 2025-09-02 ASSESSMENT — PAIN SCALES - GENERAL
PAINLEVEL_OUTOF10: 10 - WORST POSSIBLE PAIN
PAINLEVEL_OUTOF10: 10 - WORST POSSIBLE PAIN